# Patient Record
Sex: FEMALE | Race: BLACK OR AFRICAN AMERICAN | NOT HISPANIC OR LATINO
[De-identification: names, ages, dates, MRNs, and addresses within clinical notes are randomized per-mention and may not be internally consistent; named-entity substitution may affect disease eponyms.]

---

## 2019-09-05 ENCOUNTER — RESULT REVIEW (OUTPATIENT)
Age: 44
End: 2019-09-05

## 2021-06-22 ENCOUNTER — APPOINTMENT (OUTPATIENT)
Dept: PHYSICAL MEDICINE AND REHAB | Facility: CLINIC | Age: 46
End: 2021-06-22
Payer: OTHER MISCELLANEOUS

## 2021-06-22 ENCOUNTER — NON-APPOINTMENT (OUTPATIENT)
Age: 46
End: 2021-06-22

## 2021-06-22 VITALS
WEIGHT: 218 LBS | TEMPERATURE: 96.7 F | OXYGEN SATURATION: 97 % | HEART RATE: 67 BPM | RESPIRATION RATE: 18 BRPM | SYSTOLIC BLOOD PRESSURE: 116 MMHG | BODY MASS INDEX: 38.62 KG/M2 | DIASTOLIC BLOOD PRESSURE: 80 MMHG | HEIGHT: 63 IN

## 2021-06-22 DIAGNOSIS — Z86.39 PERSONAL HISTORY OF OTHER ENDOCRINE, NUTRITIONAL AND METABOLIC DISEASE: ICD-10-CM

## 2021-06-22 DIAGNOSIS — M54.6 PAIN IN THORACIC SPINE: ICD-10-CM

## 2021-06-22 DIAGNOSIS — R09.89 OTHER SPECIFIED SYMPTOMS AND SIGNS INVOLVING THE CIRCULATORY AND RESPIRATORY SYSTEMS: ICD-10-CM

## 2021-06-22 DIAGNOSIS — M54.16 RADICULOPATHY, LUMBAR REGION: ICD-10-CM

## 2021-06-22 PROCEDURE — 99072 ADDL SUPL MATRL&STAF TM PHE: CPT

## 2021-06-22 PROCEDURE — 99214 OFFICE O/P EST MOD 30 MIN: CPT

## 2021-06-23 NOTE — DATA REVIEWED
[Plain X-Rays] : plain X-Rays [FreeTextEntry1] : Radiology work up \par Xray of the right knee \par Xray of  the lumbar spine 5/26/2021 Mild djd no fractures\par Xray of the thoracic spine 5/26/2021 no fractures\par

## 2021-06-23 NOTE — REVIEW OF SYSTEMS
[Fever] : no fever [Eye Pain] : no eye pain [Earache] : no earache [Chest Pain] : no chest pain [Shortness Of Breath] : no shortness of breath [Abdominal Pain] : no abdominal pain [Dysuria] : no dysuria [Muscle Pain] : muscle pain

## 2021-06-23 NOTE — PHYSICAL EXAM
[Not Fit For Work] : Not fit for work [Percentage Of Disability ( ___ % )] : currently ~he/she~ is at [unfilled]% disability [FreeTextEntry1] : General: alert and oriented x3. Pleasant. Language: English\par Eyes: extra-ocular movements are intact. No discharge\par HENT: Head:normocephalic, atraumatic. Ears: no discharge. nose: No discharge . Throat: Clear\par Neck: full active range of motion. Spurlings negative\par Resp: good air movement\par CVS: regular rhythm. Regular rate\par Abdom: soft nontender\par WATSON: Thoracic spine spasms in the paraspinal muscles from T7 through T12\par Lumbar spine: FAROM 0-70 moderate  paraspinal spasm. L1 to L4\par \par LUE: Shoulder FAROM, MS 5/5 \par Elbow FAROM, MS 5/5, Reflexes 2/4\par Wrist: FAROM, MS 5/5 reflexes 2/4\par \par RUE: Shoulder FAROM, MS 5/5 \par Elbow FAROM, MS 5/5, Reflexes 2/4\par Wrist: FAROM, MS 5/5 reflexes 2/4\par \par LLE: Hip: FAROM MS 5-/5\par Knee FAROM, MS 5-/5 reflexes 2/4\par Ankle: FAROM,  MS 5-/5 reflexes 2/4\par \par RLE: Hip: FAROM MS 5-/5\par Knee FAROM, MS 5-/5 reflexes 1/4\par Ankle: FAROM, MS 5-/5  reflexes 2/4\par NS: RUE:sensation is intact to light touch, pinprick and proprioception\par \par LUE:sensation is intact to light touch, pinprick and proprioception\par \par RLE:sensation is intact to light touch, pinprick and proprioception\par Negative FAIR, Negative SARAH, Negative SLR\par \par LLE:sensation is intact to light touch, pinprick and proprioception\par Negative FAIR, Negative SARAH, Negative SLR\par \par \par Gait: .spontaneous, reciprocal, and safe without an assistive device

## 2021-06-23 NOTE — HISTORY OF PRESENT ILLNESS
[FreeTextEntry1] : Workers Compensation\par Date of accident: May 7, 2021\par Areas affected: low back pain\par 2:\par .\par 45 year old female was on her job central  for [employer] on May 7, 2021 when she injured her back. 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 She landed on her buttocks and her back struck the edge of the chair which was pinned against the wall. She  developed pain in her back over the course of her shift. She thought the pain would improve with rest and ibuprofen. Her pain worsened.\par She was not allowed to see a doctor until she received her WC number.  She only recently received her WC number. Her pain continues to be severe and she presents for rwo week follow up\par \par She has been to therapy for 6  sessions since her last office visit. \par \par .\par Pain: 6-8/10 Worse: 9/10 Quality: sharp Frequency: constant \par The pain starts over the hips and radiates to the ribs. The pain is aggravated with bending, lifting and lay flat. Sitting is the least painful position. She has pain getting out of a chair.   Her oain is worse with flexion at the hips. Her walking is limited by back pain. two blocks No leg weakness. No b/b difficulties> still has pain with siting and satnding\par She takes extra strength Tylenol 500 mg 2 po tid\par .\par She has never had a WC case previously\par She has not injured her low back before\par Functional Deficits: \par She has pain with getting off low surfaces. She has pain with lower extremity. She has pain with Adls that involve bending. Raising her legs increase pain. She has pain using the stairs\par She has been off work since May 10, 2021\par She drove to the office\par 5:\par Radiology work up \par Xray of the right knee \par Xray of  the lumbar spine 5/26/2021 Mild djd no fractures\par Xray of the thoracic spine 5/26/2021 no fractures\par Xray

## 2021-06-23 NOTE — HISTORY OF PRESENT ILLNESS
[FreeTextEntry1] : Workers Compensation\par Date of accident: May 7, 2021\par Areas affected: low back pain\par 2:\par .\par 45 year old female was on her job central  for [employer] on May 7, 2021 when she injured her back. 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 She landed on her buttocks and her back struck the edge of the chair which was pinned against the wall. She  developed pain in her back over the course of her shift. She thought the pain would improve with rest and ibuprofen. Her pain worsened.\par She was not allowed to see a doctor until she received her WC number.  She only recently received her WC number. Her pain continues to be severe and she presents for rwo week follow up\par \par She has been to therapy for 6  sessions since her last office visit. \par \par .\par Pain: 6-8/10 Worse: 9/10 Quality: sharp Frequency: constant \par The pain starts over the hips and radiates to the ribs. The pain is aggravated with bending, lifting and lay flat. Sitting is the least painful position. She has pain getting out of a chair.   Her oain is worse with flexion at the hips. Her walking is limited by back pain. two blocks No leg weakness. No b/b difficulties> still has pain with siting and satnding\par She takes extra strength Tylenol 500 mg 2 po tid\par .\par She has never had a WC case previously\par She has not injured her low back before\par Functional Deficits: \par She has pain with getting off low surfaces. She has pain with lower extremity. She has pain with Adls that involve bending. Raising her legs increase pain. She has pain using the stairs\par She has been off work since May 10, 2021\par She drove to the office\par 5:\par Radiology work up \par Xray of the right knee \par Xray of  the lumbar spine 5/26/2021 Mild djd no fractures\par Xray of the thoracic spine 5/26/2021 no fractures\par Xray

## 2021-07-08 ENCOUNTER — APPOINTMENT (OUTPATIENT)
Dept: INTERNAL MEDICINE | Facility: CLINIC | Age: 46
End: 2021-07-08
Payer: MEDICAID

## 2021-07-08 VITALS
TEMPERATURE: 96.9 F | SYSTOLIC BLOOD PRESSURE: 130 MMHG | RESPIRATION RATE: 18 BRPM | WEIGHT: 210 LBS | HEIGHT: 63 IN | OXYGEN SATURATION: 97 % | BODY MASS INDEX: 37.21 KG/M2 | DIASTOLIC BLOOD PRESSURE: 86 MMHG | HEART RATE: 87 BPM

## 2021-07-08 DIAGNOSIS — Z11.52 ENCOUNTER FOR SCREENING FOR COVID-19: ICD-10-CM

## 2021-07-08 DIAGNOSIS — N88.8 OTHER SPECIFIED NONINFLAMMATORY DISORDERS OF CERVIX UTERI: ICD-10-CM

## 2021-07-08 PROCEDURE — 99213 OFFICE O/P EST LOW 20 MIN: CPT | Mod: 25

## 2021-07-08 NOTE — HISTORY OF PRESENT ILLNESS
[FreeTextEntry1] : pap [de-identified] : Patient presents for annual Pap smear.  Reporting last menstrual period about 5 years ago.  Status post hysterectomy secondary to fibroids.  Patient denies any pelvic pain urinary symptoms abnormal vaginal discharge or bleeding changes in weight or any abnormal Pap smears in the past.\par She is requesting Covid testing

## 2021-07-08 NOTE — PHYSICAL EXAM
[Normal] : no acute distress, well nourished, well developed and well-appearing [Urethral Meatus] : normal urethra [External Female Genitalia] : normal external genitalia [Vagina] : normal vaginal exam [Cervix] : normal cervix [Uterine Adnexae] : normal adnexa [FreeTextEntry1] : s/p hysterectomy , no adnexal ttp, no cevical motion tenderness,  cervical atrophy+, white d/c

## 2021-07-08 NOTE — ASSESSMENT
[FreeTextEntry1] : Routine Pap safe sex discussed with the patient cytology with reflex to HPV bacterial vaginosis panel GC chlamydia trichomoniasis check pending.\par \par Screening Covid Covid PCR done pending\par Vaginal discharge intervention pending results

## 2021-07-10 LAB
C TRACH RRNA SPEC QL NAA+PROBE: NOT DETECTED
CANDIDA VAG CYTO: NOT DETECTED
G VAGINALIS+PREV SP MTYP VAG QL MICRO: NOT DETECTED
HPV HIGH+LOW RISK DNA PNL CVX: NOT DETECTED
N GONORRHOEA RRNA SPEC QL NAA+PROBE: NOT DETECTED
SARS-COV-2 N GENE NPH QL NAA+PROBE: NOT DETECTED
SOURCE AMPLIFICATION: NORMAL
SOURCE TP AMPLIFICATION: NORMAL
T VAGINALIS RRNA SPEC QL NAA+PROBE: NOT DETECTED
T VAGINALIS VAG QL WET PREP: NOT DETECTED

## 2021-07-15 LAB — CYTOLOGY CVX/VAG DOC THIN PREP: NORMAL

## 2021-07-20 ENCOUNTER — APPOINTMENT (OUTPATIENT)
Dept: PHYSICAL MEDICINE AND REHAB | Facility: CLINIC | Age: 46
End: 2021-07-20
Payer: OTHER MISCELLANEOUS

## 2021-07-20 VITALS
RESPIRATION RATE: 18 BRPM | HEART RATE: 81 BPM | SYSTOLIC BLOOD PRESSURE: 116 MMHG | DIASTOLIC BLOOD PRESSURE: 84 MMHG | BODY MASS INDEX: 38.45 KG/M2 | OXYGEN SATURATION: 96 % | TEMPERATURE: 96.8 F | HEIGHT: 63 IN | WEIGHT: 217 LBS

## 2021-07-20 PROCEDURE — 99213 OFFICE O/P EST LOW 20 MIN: CPT

## 2021-07-20 PROCEDURE — 99072 ADDL SUPL MATRL&STAF TM PHE: CPT

## 2021-07-22 NOTE — REVIEW OF SYSTEMS
[Muscle Pain] : muscle pain [Fever] : no fever [Eye Pain] : no eye pain [Earache] : no earache [Chest Pain] : no chest pain [Shortness Of Breath] : no shortness of breath [Abdominal Pain] : no abdominal pain [Dysuria] : no dysuria

## 2021-07-22 NOTE — PHYSICAL EXAM
[Not Fit For Work] : Not fit for work [Percentage Of Disability ( ___ % )] : currently ~he/she~ is at [unfilled]% disability [FreeTextEntry1] : General: alert and oriented x3. Pleasant. Language: English\par Eyes: extra-ocular movements are intact. No discharge\par HENT: Head:normocephalic, atraumatic. Ears: no discharge. nose: No discharge . Throat: Clear\par Neck: full active range of motion. Spurlings negative\par Resp: good air movement\par CVS: regular rhythm. Regular rate\par Abdom: soft nontender\par WATSON: Thoracic spine spasms in the paraspinal muscles from T7 through T12\par Lumbar spine: FAROM 0-80 mild  paraspinal spasm. L1 to L4\par \par LUE: Shoulder FAROM, MS 5/5 \par Elbow FAROM, MS 5/5, Reflexes 2/4\par Wrist: FAROM, MS 5/5 reflexes 2/4\par \par RUE: Shoulder FAROM, MS 5/5 \par Elbow FAROM, MS 5/5, Reflexes 2/4\par Wrist: FAROM, MS 5/5 reflexes 2/4\par \par LLE: Hip: FAROM MS 5-/5\par Knee FAROM, MS 5-/5 reflexes 2/4\par Ankle: FAROM,  MS 5-/5 reflexes 2/4\par \par RLE: Hip: FAROM MS 5-/5\par Knee FAROM, MS 5-/5 reflexes 1/4\par Ankle: FAROM, MS 5-/5  reflexes 2/4\par NS: RUE:sensation is intact to light touch, pinprick and proprioception\par \par LUE:sensation is intact to light touch, pinprick and proprioception\par \par RLE:sensation is intact to light touch, pinprick and proprioception\par Negative FAIR, Negative SARAH, Negative SLR\par \par LLE:sensation is intact to light touch, pinprick and proprioception\par Negative FAIR, Negative SARAH, Negative SLR\par \par \par Gait: .spontaneous, reciprocal, and safe without an assistive device \par

## 2021-07-22 NOTE — HISTORY OF PRESENT ILLNESS
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 She landed on her buttocks and her back struck the edge of the chair which was pinned against the wall. She  developed pain in her back over the course of her shift. She thought the pain would improve with rest and ibuprofen. Her pain worsened.\par She was not allowed to see a doctor until she received her WC number.  She only recently received her WC number. Her pain continues to be severe and she presents for two week follow up\par \par She has been to therapy for 12  sessions since her last office visit (17 sessions total).  Theraspy has reduced her back pain. Medrol also reduced her back pain. \par \par Pain: 5/10 Worse: 9/10 Quality: sharp Frequency: constant \par The pain starts over the hips and radiates to the ribs. The pain is aggravated with bending, lifting and lay flat. Sitting is the least painful position.  She has numbness going into both thighs. She has pain getting out of a chair.  No leg weakness. No b/b difficulties\par She takes extra strength Tylenol 500 mg 2 po tid\par \par She has never had a WC case previously\par She has not injured her low back before\par Functional Deficits: \par She has pain with getting off low surfaces. She has pain with lower extremity. She has pain with Adls that involve bending. Raising her legs increase pain. She has pain using the stairs\par She has been off work since May 10, 2021\par She drove to the office\par \par

## 2021-08-24 ENCOUNTER — APPOINTMENT (OUTPATIENT)
Dept: PHYSICAL MEDICINE AND REHAB | Facility: CLINIC | Age: 46
End: 2021-08-24
Payer: OTHER MISCELLANEOUS

## 2021-08-24 VITALS
RESPIRATION RATE: 18 BRPM | TEMPERATURE: 96.6 F | WEIGHT: 218 LBS | HEIGHT: 63 IN | SYSTOLIC BLOOD PRESSURE: 128 MMHG | DIASTOLIC BLOOD PRESSURE: 84 MMHG | BODY MASS INDEX: 38.62 KG/M2 | OXYGEN SATURATION: 98 % | HEART RATE: 95 BPM

## 2021-08-24 VITALS — BODY MASS INDEX: 38.62 KG/M2 | WEIGHT: 218 LBS | RESPIRATION RATE: 17 BRPM | HEIGHT: 63 IN

## 2021-08-24 DIAGNOSIS — M54.14 RADICULOPATHY, THORACIC REGION: ICD-10-CM

## 2021-08-24 PROCEDURE — 99213 OFFICE O/P EST LOW 20 MIN: CPT

## 2021-08-24 PROCEDURE — 99072 ADDL SUPL MATRL&STAF TM PHE: CPT

## 2021-08-27 NOTE — HISTORY OF PRESENT ILLNESS
[FreeTextEntry1] : Workers Compensation\par Date of accident: May 7, 2021\par Areas affected: low back pain\par \par 45 year old female was on her job central  for [employer] on May 7, 2021 when she injured her back. 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 She landed on her buttocks and her back struck the edge of the chair which was pinned against the wall. She  developed pain in her back over the course of her shift. She thought the pain would improve with rest and ibuprofen. Her pain worsened.\par She was not allowed to see a doctor until she received her WC number.  \par \par She has been to therapy for 12  sessions since her last office visit (17 sessions total).  Therapy has reduced her back pain.  Therapy has been slowly increasing her lifting capacity Medrol also reduced her back pain.  She still has discomfort with prolonged standing and bending at the hips.\par \par Pain: 5/10 Worse: 9/10 Quality: sharp Frequency: constant \par The pain starts over the hips and radiates to the ribs. The pain is aggravated with bending, lifting and lay flat. Sitting is the least painful position.  She has numbness going into both thighs. She has pain getting out of a chair.  No leg weakness. No b/b difficulties\par She takes extra strength Tylenol 500 mg 2 po tid\par Has been careful with her lifting mechanics\par \par She has never had a WC case previously\par She has not injured her low back before\par Functional Deficits: \par She has pain with getting off low surfaces. She has pain with lower extremity. She has pain with Adls that involve bending. Raising her legs increase pain. She has pain using the stairs\par She has been off work since May 10, 2021\par She drove to the office\par \par

## 2021-08-27 NOTE — PHYSICAL EXAM
[Not Fit For Work] : Not fit for work [Percentage Of Disability ( ___ % )] : currently ~he/she~ is at [unfilled]% disability [FreeTextEntry1] : General: alert and oriented x3. Pleasant. Language: English\par Eyes: extra-ocular movements are intact. No discharge\par HENT: Head:normocephalic, atraumatic. Ears: no discharge. nose: No discharge . Throat: Clear\par Neck: full active range of motion. Spurlings negative\par Resp: good air movement\par CVS: regular rhythm. Regular rate\par Abdom: soft nontender\par WATSON: Thoracic spine spasms in the paraspinal muscles from T7-T10\par Lumbar spine: FAROM 0-90 mild  paraspinal spasm. L1 to L4\par \par LUE: Shoulder FAROM, MS 5/5 \par Elbow FAROM, MS 5/5, Reflexes 2/4\par Wrist: FAROM, MS 5/5 reflexes 2/4\par \par RUE: Shoulder FAROM, MS 5/5 \par Elbow FAROM, MS 5/5, Reflexes 2/4\par Wrist: FAROM, MS 5/5 reflexes 2/4\par \par LLE: Hip: FAROM MS 5-/5\par Knee FAROM, MS 5-/5 reflexes 2/4\par Ankle: FAROM,  MS 5-/5 reflexes 2/4\par \par RLE: Hip: FAROM MS 5-/5\par Knee FAROM, MS 5-/5 reflexes 1/4\par Ankle: FAROM, MS 5-/5  reflexes 2/4\par NS: RUE:sensation is intact to light touch, pinprick and proprioception\par \par LUE:sensation is intact to light touch, pinprick and proprioception\par \par RLE:sensation is intact to light touch, pinprick and proprioception\par Negative FAIR, Negative SARAH, Negative SLR\par \par LLE:sensation is intact to light touch, pinprick and proprioception\par Negative FAIR, Negative SARAH, Negative SLR\par \par \par Gait: .spontaneous, reciprocal, and safe without an assistive device \par  [FreeTextEntry3] : Off work [FreeTextEntry4] : Off work total temporary disabled

## 2021-08-31 ENCOUNTER — APPOINTMENT (OUTPATIENT)
Dept: PHYSICAL MEDICINE AND REHAB | Facility: CLINIC | Age: 46
End: 2021-08-31
Payer: OTHER MISCELLANEOUS

## 2021-08-31 VITALS
TEMPERATURE: 96.9 F | SYSTOLIC BLOOD PRESSURE: 128 MMHG | DIASTOLIC BLOOD PRESSURE: 80 MMHG | BODY MASS INDEX: 38.62 KG/M2 | HEART RATE: 88 BPM | OXYGEN SATURATION: 98 % | RESPIRATION RATE: 18 BRPM | WEIGHT: 218 LBS | HEIGHT: 63 IN

## 2021-08-31 PROCEDURE — 95910 NRV CNDJ TEST 7-8 STUDIES: CPT

## 2021-08-31 PROCEDURE — 99072 ADDL SUPL MATRL&STAF TM PHE: CPT

## 2021-08-31 PROCEDURE — 99212 OFFICE O/P EST SF 10 MIN: CPT | Mod: 25

## 2021-08-31 PROCEDURE — 95886 MUSC TEST DONE W/N TEST COMP: CPT

## 2021-09-06 NOTE — HISTORY OF PRESENT ILLNESS
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 She landed on her buttocks and her back struck the edge of the chair which was pinned against the wall. She  developed pain in her back over the course of her shift. She thought the pain would improve with rest and ibuprofen. Her pain worsened.\par She was not allowed to see a doctor until she received her WC number.  \par \par She has been to therapy for 12  sessions since her last office visit (17 sessions total).  Therapy has reduced her back pain.  Therapy has been slowly increasing her lifting capacity Medrol also reduced her back pain.  She still has discomfort with prolonged standing and bending at the hips.\par \par Pain: 5/10 Worse: 9/10 Quality: sharp Frequency: constant \par The pain starts over the hips and radiates to the ribs. The pain is aggravated with bending, lifting and lay flat. Sitting is the least painful position.  She has numbness going into both thighs. She has pain getting out of a chair.  No leg weakness. No b/b difficulties\par She takes extra strength Tylenol 500 mg 2 po tid\par Has been careful with her lifting mechanics\par \par She has never had a WC case previously\par She has not injured her low back before\par Functional Deficits: \par She has pain with getting off low surfaces. She has pain with lower extremity. She has pain with Adls that involve bending. Raising her legs increase pain. She has pain using the stairs\par She has been off work since May 10, 2021\par She drove to the office\par \par

## 2021-09-23 ENCOUNTER — APPOINTMENT (OUTPATIENT)
Dept: PHYSICAL MEDICINE AND REHAB | Facility: CLINIC | Age: 46
End: 2021-09-23
Payer: OTHER MISCELLANEOUS

## 2021-09-23 VITALS
SYSTOLIC BLOOD PRESSURE: 115 MMHG | TEMPERATURE: 87.5 F | DIASTOLIC BLOOD PRESSURE: 75 MMHG | HEIGHT: 63 IN | HEART RATE: 85 BPM | WEIGHT: 218 LBS | RESPIRATION RATE: 16 BRPM | OXYGEN SATURATION: 97 % | BODY MASS INDEX: 38.62 KG/M2

## 2021-09-23 DIAGNOSIS — M54.16 RADICULOPATHY, LUMBAR REGION: ICD-10-CM

## 2021-09-23 DIAGNOSIS — Z00.8 ENCOUNTER FOR OTHER GENERAL EXAMINATION: ICD-10-CM

## 2021-09-23 PROCEDURE — 99214 OFFICE O/P EST MOD 30 MIN: CPT

## 2021-09-23 PROCEDURE — 99072 ADDL SUPL MATRL&STAF TM PHE: CPT

## 2021-09-27 PROBLEM — M54.16 ACUTE LEFT LUMBAR RADICULOPATHY: Status: ACTIVE | Noted: 2021-06-22

## 2021-09-27 PROBLEM — Z00.8 ENCOUNTER FOR WORK CAPABILITY ASSESSMENT: Status: ACTIVE | Noted: 2021-06-22

## 2021-09-27 NOTE — HISTORY OF PRESENT ILLNESS
[FreeTextEntry1] : Workers Compensation\par Date of accident: May 7, 2021\par Areas affected: low back pain\par \par 45 year old female was on her job central  for [employer] on May 7, 2021 when she injured her back. 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 She landed on her buttocks and her back struck the edge of the chair which was pinned against the wall. She  developed pain in her back over the course of her shift. She thought the pain would improve with rest and ibuprofen. Her pain worsened.\par She was not allowed to see a doctor until she received her WC number.  \par \par She has been to therapy for 9  sessions since her last office visit (26 sessions total).  Therapy has reduced her back pain.  Therapy has been slowly increasing her lifting capacity Medrol also reduced her back pain.  She still has discomfort with prolonged standing and bending at the hips.\par \par Pain: 2/10 Worse: 9/10 Quality: sharp Frequency: constant \par The pain starts over the hips and radiates to the ribs. The pain is aggravated with bending, lifting and lay flat. Sitting is the least painful position.  She has numbness going into both thighs. She has pain getting out of a chair.  No leg weakness. No b/b difficulties. Her pain increases with activities\par She takes extra strength Tylenol 500 mg 2 po tid\par Has been careful with her lifting mechanics\par Has been trying to simulate lifting mechanics at her job.\par She has pain with reaching for objects below her knees in a standing position\par \par She anticpates return to work October 4, 2021\par She has never had a WC case previously\par She has not injured her low back before\par Functional Deficits: \par She has pain with getting off low surfaces.  She has pain with Adls that involve bending. \par She has been off work since May 10, 2021\par She drove to the office\par \par \par Light duty Oct 4, 2021 no lift more than 10 lbs. No crawl, no bending, no laddwers. No overhead moret lucero 5 lbs\par

## 2021-09-27 NOTE — DATA REVIEWED
[Plain X-Rays] : plain X-Rays [EMG] : EMG [MRI] : MRI [FreeTextEntry1] : Radiology work up \par Xray of the right knee \par Xray of  the lumbar spine 5/26/2021 Mild djd no fractures\par Xray of the thoracic spine 5/26/2021 no fractures\par \par EMG/NCV of the lower extremities of the lower extremities performed on August 31, 2021 reveals a right sural neuropathy and a bilateral S1 radiculopathy\par \par RI of the lumbar spine performed on July 6, 2021 reveals disc desiccation at L2/3 and L3/4.  At L4/5 bilateral facet and ligamentum flavum hypertrophy slightly narrowing the transverse diameter of the central canal and mildly intruding into both neuroforamen.  L5/S1 left facet hypertrophy mildly intruding into the left neuroforamen

## 2021-09-27 NOTE — PHYSICAL EXAM
[Not Fit For Work] : Not fit for work [Percentage Of Disability ( ___ % )] : currently ~he/she~ is at [unfilled]% disability [FreeTextEntry3] : Off work [FreeTextEntry4] : Off work total temporary disabled [FreeTextEntry1] : Patient return to work is October 4, 2021.  She is to start at light duty and progress to a full duty status to continue with restorative therapies to transition back to full work.  She will be followed on a monthly basis to monitor her return to work

## 2021-10-21 ENCOUNTER — APPOINTMENT (OUTPATIENT)
Dept: PHYSICAL MEDICINE AND REHAB | Facility: CLINIC | Age: 46
End: 2021-10-21

## 2021-12-17 ENCOUNTER — NON-APPOINTMENT (OUTPATIENT)
Age: 46
End: 2021-12-17

## 2021-12-17 ENCOUNTER — APPOINTMENT (OUTPATIENT)
Dept: INTERNAL MEDICINE | Facility: CLINIC | Age: 46
End: 2021-12-17
Payer: MEDICAID

## 2021-12-17 ENCOUNTER — LABORATORY RESULT (OUTPATIENT)
Age: 46
End: 2021-12-17

## 2021-12-17 VITALS
RESPIRATION RATE: 17 BRPM | TEMPERATURE: 96.6 F | WEIGHT: 218 LBS | OXYGEN SATURATION: 97 % | SYSTOLIC BLOOD PRESSURE: 118 MMHG | DIASTOLIC BLOOD PRESSURE: 78 MMHG | HEIGHT: 63 IN | BODY MASS INDEX: 38.62 KG/M2 | HEART RATE: 93 BPM

## 2021-12-17 DIAGNOSIS — E04.1 NONTOXIC SINGLE THYROID NODULE: ICD-10-CM

## 2021-12-17 DIAGNOSIS — Z00.00 ENCOUNTER FOR GENERAL ADULT MEDICAL EXAMINATION W/OUT ABNORMAL FINDINGS: ICD-10-CM

## 2021-12-17 PROCEDURE — 93000 ELECTROCARDIOGRAM COMPLETE: CPT | Mod: 59

## 2021-12-17 PROCEDURE — 36415 COLL VENOUS BLD VENIPUNCTURE: CPT

## 2021-12-17 PROCEDURE — 99214 OFFICE O/P EST MOD 30 MIN: CPT | Mod: 25

## 2021-12-17 PROCEDURE — 99396 PREV VISIT EST AGE 40-64: CPT | Mod: 25

## 2021-12-17 PROCEDURE — G0447 BEHAVIOR COUNSEL OBESITY 15M: CPT

## 2021-12-20 PROBLEM — E04.1 THYROID NODULE: Status: ACTIVE | Noted: 2021-12-17

## 2021-12-20 NOTE — PHYSICAL EXAM
[No Acute Distress] : no acute distress [Well Nourished] : well nourished [Well Developed] : well developed [Well-Appearing] : well-appearing [Normal Sclera/Conjunctiva] : normal sclera/conjunctiva [PERRL] : pupils equal round and reactive to light [EOMI] : extraocular movements intact [Normal Outer Ear/Nose] : the outer ears and nose were normal in appearance [Normal Oropharynx] : the oropharynx was normal [No JVD] : no jugular venous distention [No Lymphadenopathy] : no lymphadenopathy [Supple] : supple [Enlarged Diffusely] : was diffusely enlarged [No Respiratory Distress] : no respiratory distress  [No Accessory Muscle Use] : no accessory muscle use [Clear to Auscultation] : lungs were clear to auscultation bilaterally [Normal Rate] : normal rate  [Regular Rhythm] : with a regular rhythm [Normal S1, S2] : normal S1 and S2 [No Murmur] : no murmur heard [No Carotid Bruits] : no carotid bruits [No Abdominal Bruit] : a ~M bruit was not heard ~T in the abdomen [No Varicosities] : no varicosities [Pedal Pulses Present] : the pedal pulses are present [No Edema] : there was no peripheral edema [No Palpable Aorta] : no palpable aorta [No Extremity Clubbing/Cyanosis] : no extremity clubbing/cyanosis [Soft] : abdomen soft [Non Tender] : non-tender [Non-distended] : non-distended [No Masses] : no abdominal mass palpated [No HSM] : no HSM [Normal Bowel Sounds] : normal bowel sounds [Normal Posterior Cervical Nodes] : no posterior cervical lymphadenopathy [Normal Anterior Cervical Nodes] : no anterior cervical lymphadenopathy [No CVA Tenderness] : no CVA  tenderness [No Spinal Tenderness] : no spinal tenderness [No Joint Swelling] : no joint swelling [Grossly Normal Strength/Tone] : grossly normal strength/tone [No Rash] : no rash [Coordination Grossly Intact] : coordination grossly intact [No Focal Deficits] : no focal deficits [Normal Gait] : normal gait [Deep Tendon Reflexes (DTR)] : deep tendon reflexes were 2+ and symmetric [Normal Affect] : the affect was normal [Normal Insight/Judgement] : insight and judgment were intact

## 2021-12-21 LAB
25(OH)D3 SERPL-MCNC: 27.5 NG/ML
ALBUMIN SERPL ELPH-MCNC: 4.8 G/DL
ALP BLD-CCNC: 83 U/L
ALT SERPL-CCNC: 12 U/L
ANION GAP SERPL CALC-SCNC: 12 MMOL/L
APPEARANCE: ABNORMAL
AST SERPL-CCNC: 15 U/L
BASOPHILS # BLD AUTO: 0.04 K/UL
BASOPHILS NFR BLD AUTO: 0.6 %
BILIRUB SERPL-MCNC: 0.2 MG/DL
BILIRUBIN URINE: NEGATIVE
BLOOD URINE: NEGATIVE
BUN SERPL-MCNC: 12 MG/DL
CALCIUM SERPL-MCNC: 9.6 MG/DL
CHLORIDE SERPL-SCNC: 106 MMOL/L
CHOLEST SERPL-MCNC: 159 MG/DL
CO2 SERPL-SCNC: 26 MMOL/L
COLOR: YELLOW
CREAT SERPL-MCNC: 0.74 MG/DL
EOSINOPHIL # BLD AUTO: 0.15 K/UL
EOSINOPHIL NFR BLD AUTO: 2.4 %
ESTIMATED AVERAGE GLUCOSE: 97 MG/DL
GLUCOSE QUALITATIVE U: NEGATIVE
GLUCOSE SERPL-MCNC: 100 MG/DL
HBA1C MFR BLD HPLC: 5 %
HCT VFR BLD CALC: 46.6 %
HDLC SERPL-MCNC: 68 MG/DL
HGB BLD-MCNC: 14.6 G/DL
IMM GRANULOCYTES NFR BLD AUTO: 0.3 %
KETONES URINE: NEGATIVE
LDLC SERPL CALC-MCNC: 80 MG/DL
LEUKOCYTE ESTERASE URINE: NEGATIVE
LYMPHOCYTES # BLD AUTO: 1.83 K/UL
LYMPHOCYTES NFR BLD AUTO: 29.4 %
MAN DIFF?: NORMAL
MCHC RBC-ENTMCNC: 31.3 GM/DL
MCHC RBC-ENTMCNC: 31.7 PG
MCV RBC AUTO: 101.1 FL
MONOCYTES # BLD AUTO: 0.61 K/UL
MONOCYTES NFR BLD AUTO: 9.8 %
NEUTROPHILS # BLD AUTO: 3.58 K/UL
NEUTROPHILS NFR BLD AUTO: 57.5 %
NITRITE URINE: NEGATIVE
NONHDLC SERPL-MCNC: 90 MG/DL
PH URINE: 6
PLATELET # BLD AUTO: 278 K/UL
POTASSIUM SERPL-SCNC: 4.9 MMOL/L
PROT SERPL-MCNC: 7.6 G/DL
PROTEIN URINE: NORMAL
RBC # BLD: 4.61 M/UL
RBC # FLD: 12 %
SODIUM SERPL-SCNC: 143 MMOL/L
SPECIFIC GRAVITY URINE: 1.03
TRIGL SERPL-MCNC: 53 MG/DL
TSH SERPL-ACNC: 0.68 UIU/ML
UROBILINOGEN URINE: NORMAL
WBC # FLD AUTO: 6.23 K/UL

## 2021-12-21 NOTE — HISTORY OF PRESENT ILLNESS
[FreeTextEntry1] : annual wellness exam [de-identified] : 47 y/o female presents for annual wellness exam. She states she has recently been having increasing lumbar back pain that is radiating  to her hips and anterior hip flexor area. She has also been having intermittent chest tightness. She is also requesting a referral to a bariatric surgeon. She feels otherwise well and offers no other acute complaints or concerns. ROS as documented below. \par

## 2021-12-21 NOTE — HISTORY OF PRESENT ILLNESS
[FreeTextEntry1] : annual wellness exam [de-identified] : 47 y/o female presents for annual wellness exam. She states she has recently been having increasing lumbar back pain that is radiating  to her hips and anterior hip flexor area. She has also been having intermittent chest tightness. She is also requesting a referral to a bariatric surgeon. She feels otherwise well and offers no other acute complaints or concerns. ROS as documented below. \par

## 2021-12-21 NOTE — HEALTH RISK ASSESSMENT
[Never] : Never [No] : No [0] : 2) Feeling down, depressed, or hopeless: Not at all (0) [PHQ-2 Negative - No further assessment needed] : PHQ-2 Negative - No further assessment needed [[id number]] : 0

## 2021-12-21 NOTE — COUNSELING
[Potential consequences of obesity discussed] : Potential consequences of obesity discussed [Benefits of weight loss discussed] : Benefits of weight loss discussed [Encouraged to maintain food diary] : Encouraged to maintain food diary [Encouraged to increase physical activity] : Encouraged to increase physical activity [FreeTextEntry4] : 8

## 2021-12-21 NOTE — HEALTH RISK ASSESSMENT
[Never] : Never [No] : No [0] : 2) Feeling down, depressed, or hopeless: Not at all (0) [PHQ-2 Negative - No further assessment needed] : PHQ-2 Negative - No further assessment needed [[id number]] : 0

## 2021-12-21 NOTE — REVIEW OF SYSTEMS
[Back Pain] : back pain [Fever] : no fever [Chills] : no chills [Recent Change In Weight] : ~T no recent weight change [Vision Problems] : no vision problems [Earache] : no earache [Nasal Discharge] : no nasal discharge [Sore Throat] : no sore throat [Chest Pain] : no chest pain [Palpitations] : no palpitations [Shortness Of Breath] : no shortness of breath [Wheezing] : no wheezing [Abdominal Pain] : no abdominal pain [Nausea] : no nausea [Diarrhea] : diarrhea [Vomiting] : no vomiting [Dysuria] : no dysuria [Hematuria] : no hematuria [Frequency] : no frequency [Joint Pain] : no joint pain [Joint Swelling] : no joint swelling [Skin Rash] : no skin rash [Headache] : no headache [Dizziness] : no dizziness [Anxiety] : no anxiety [Depression] : no depression [Swollen Glands] : no swollen glands [FreeTextEntry5] : intermittent chest tightness

## 2022-01-04 NOTE — REASON FOR VISIT
[Initial Consult] : an initial consult for [Morbid Obesity (BMI>40)] : morbid obesity (bmi>40) [Home] : at home, [unfilled] , at the time of the visit. [Medical Office: ([facility])___] : at the medical office located in  [Verbal consent obtained from patient] : the patient, [unfilled]

## 2022-01-07 ENCOUNTER — APPOINTMENT (OUTPATIENT)
Dept: SURGERY | Facility: CLINIC | Age: 47
End: 2022-01-07
Payer: MEDICAID

## 2022-01-07 VITALS — HEIGHT: 63 IN | WEIGHT: 248 LBS | BODY MASS INDEX: 43.94 KG/M2

## 2022-01-07 DIAGNOSIS — E66.01 MORBID (SEVERE) OBESITY DUE TO EXCESS CALORIES: ICD-10-CM

## 2022-01-07 PROCEDURE — 99203 OFFICE O/P NEW LOW 30 MIN: CPT | Mod: 95

## 2022-01-07 NOTE — HISTORY OF PRESENT ILLNESS
[de-identified] :  NORBERT COONEY is a 46 year old female here for consultation for weight loss surgery. \par \par She has been struggling with her weight for many years.  Has lost weight several times with Ev[employer]ain[employer] Adhikari, losing up to 60 pounds at one point, but always regains the weight.  She has difficulty with portion sizes at times, and admits to making some unhealthy food choices.  She also is an "emotional eater." Feels frustrated with "yo-yo" dieting, and with the expense of gym memberships and diet programs.\par \par PMHx: morbid obesity\par PSHx: hysterectomy\par Denies prior history of bleeding disorder or blood clots\par No hx of dysphagia or GERD\par Nonsmoker\par \par Referred by her PCP, Dr. Amador.\par

## 2022-01-07 NOTE — ASSESSMENT
[FreeTextEntry1] : 46-year-old female with longstanding history of morbid obesity (height 5 feet 3 inches, weight 248 pounds, BMI 44) who presents for evaluation for bariatric surgery.  She is interested in sleeve gastrectomy.\par \par The patient has failed multiple prior attempts at weight loss and is now dealing with the side effects, risk factors, and poor quality of life associated with morbid obesity.  They would benefit from surgical weight loss as outlined in the NIH and  ASMBS consensus statements on bariatric surgery.  Surgical intervention is medically indicated.\par \par My impression is that the patient is a reasonable candidate for laparoscopic sleeve gastrectomy.\par

## 2022-01-07 NOTE — CONSULT LETTER
[Dear  ___] : Dear  [unfilled], [Consult Letter:] : I had the pleasure of evaluating your patient, [unfilled]. [Please see my note below.] : Please see my note below. [Consult Closing:] : Thank you very much for allowing me to participate in the care of this patient.  If you have any questions, please do not hesitate to contact me. [Sincerely,] : Sincerely, [FreeTextEntry2] : Dr. Zan Amador [FreeTextEntry3] : Rahul Field MD, FACS, FASMBS\par [facility]\par [address]\[place] [address]\par tel. [phone]\par fax [phone]\par  \par

## 2022-01-13 DIAGNOSIS — Z98.84 BARIATRIC SURGERY STATUS: ICD-10-CM

## 2022-01-18 ENCOUNTER — RESULT REVIEW (OUTPATIENT)
Age: 47
End: 2022-01-18

## 2022-01-21 DIAGNOSIS — Z01.812 ENCOUNTER FOR PREPROCEDURAL LABORATORY EXAMINATION: ICD-10-CM

## 2022-01-25 ENCOUNTER — NON-APPOINTMENT (OUTPATIENT)
Age: 47
End: 2022-01-25

## 2022-01-25 ENCOUNTER — APPOINTMENT (OUTPATIENT)
Dept: CARDIOLOGY | Facility: CLINIC | Age: 47
End: 2022-01-25
Payer: MEDICAID

## 2022-01-25 VITALS
OXYGEN SATURATION: 100 % | HEIGHT: 63 IN | DIASTOLIC BLOOD PRESSURE: 74 MMHG | SYSTOLIC BLOOD PRESSURE: 117 MMHG | HEART RATE: 100 BPM

## 2022-01-25 DIAGNOSIS — R06.00 DYSPNEA, UNSPECIFIED: ICD-10-CM

## 2022-01-25 PROCEDURE — 99213 OFFICE O/P EST LOW 20 MIN: CPT

## 2022-01-25 PROCEDURE — 93000 ELECTROCARDIOGRAM COMPLETE: CPT

## 2022-01-25 RX ORDER — CYCLOBENZAPRINE HYDROCHLORIDE 5 MG/1
5 TABLET, FILM COATED ORAL
Refills: 0 | Status: DISCONTINUED | COMMUNITY
End: 2022-01-25

## 2022-01-25 RX ORDER — TIZANIDINE 2 MG/1
2 TABLET ORAL
Refills: 0 | Status: DISCONTINUED | COMMUNITY
End: 2022-01-25

## 2022-01-25 RX ORDER — CETIRIZINE HYDROCHLORIDE 10 MG/1
10 TABLET, COATED ORAL
Refills: 0 | Status: DISCONTINUED | COMMUNITY
End: 2022-01-25

## 2022-01-25 RX ORDER — IBUPROFEN 600 MG/1
600 TABLET, FILM COATED ORAL 3 TIMES DAILY
Refills: 0 | Status: DISCONTINUED | COMMUNITY
End: 2022-01-25

## 2022-01-25 RX ORDER — NAPROXEN 500 MG/1
500 TABLET ORAL TWICE DAILY
Qty: 60 | Refills: 0 | Status: DISCONTINUED | COMMUNITY
Start: 2021-06-22 | End: 2022-01-25

## 2022-01-25 RX ORDER — CYCLOBENZAPRINE HYDROCHLORIDE 10 MG/1
10 TABLET, FILM COATED ORAL TWICE DAILY
Qty: 60 | Refills: 0 | Status: DISCONTINUED | COMMUNITY
Start: 2021-06-22 | End: 2022-01-25

## 2022-01-25 RX ORDER — METHYLPREDNISOLONE 4 MG/1
4 TABLET ORAL AS DIRECTED
Qty: 1 | Refills: 0 | Status: DISCONTINUED | COMMUNITY
Start: 2021-06-22 | End: 2022-01-25

## 2022-01-25 RX ORDER — CYCLOBENZAPRINE HYDROCHLORIDE 7.5 MG/1
7.5 TABLET, FILM COATED ORAL AT BEDTIME
Qty: 10 | Refills: 0 | Status: DISCONTINUED | COMMUNITY
Start: 2021-12-17 | End: 2022-01-25

## 2022-01-25 RX ORDER — METHYLPREDNISOLONE 4 MG/1
4 TABLET ORAL
Qty: 1 | Refills: 0 | Status: DISCONTINUED | COMMUNITY
Start: 2021-12-17 | End: 2022-01-25

## 2022-01-25 NOTE — PHYSICAL EXAM
[General Appearance - Well Developed] : well developed [Normal Appearance] : normal appearance [Well Groomed] : well groomed [General Appearance - Well Nourished] : well nourished [No Deformities] : no deformities [General Appearance - In No Acute Distress] : no acute distress [Normal Conjunctiva] : the conjunctiva exhibited no abnormalities [Eyelids - No Xanthelasma] : the eyelids demonstrated no xanthelasmas [Normal Oral Mucosa] : normal oral mucosa [No Oral Pallor] : no oral pallor [No Oral Cyanosis] : no oral cyanosis [Normal Jugular Venous A Waves Present] : normal jugular venous A waves present [Normal Jugular Venous V Waves Present] : normal jugular venous V waves present [No Jugular Venous Marley A Waves] : no jugular venous marley A waves [Respiration, Rhythm And Depth] : normal respiratory rhythm and effort [Exaggerated Use Of Accessory Muscles For Inspiration] : no accessory muscle use [Auscultation Breath Sounds / Voice Sounds] : lungs were clear to auscultation bilaterally [Heart Rate And Rhythm] : heart rate and rhythm were normal [Heart Sounds] : normal S1 and S2 [Murmurs] : no murmurs present [Abdomen Soft] : soft [Abdomen Tenderness] : non-tender [Abdomen Mass (___ Cm)] : no abdominal mass palpated [Abnormal Walk] : normal gait [Gait - Sufficient For Exercise Testing] : the gait was sufficient for exercise testing [Nail Clubbing] : no clubbing of the fingernails [Cyanosis, Localized] : no localized cyanosis [Petechial Hemorrhages (___cm)] : no petechial hemorrhages [Skin Color & Pigmentation] : normal skin color and pigmentation [] : no rash [No Venous Stasis] : no venous stasis [Skin Lesions] : no skin lesions [No Skin Ulcers] : no skin ulcer [No Xanthoma] : no  xanthoma was observed [Oriented To Time, Place, And Person] : oriented to person, place, and time [Affect] : the affect was normal [Mood] : the mood was normal [No Anxiety] : not feeling anxious

## 2022-01-26 ENCOUNTER — APPOINTMENT (OUTPATIENT)
Dept: ULTRASOUND IMAGING | Facility: HOSPITAL | Age: 47
End: 2022-01-26
Payer: MEDICAID

## 2022-01-26 ENCOUNTER — APPOINTMENT (OUTPATIENT)
Dept: RADIOLOGY | Facility: HOSPITAL | Age: 47
End: 2022-01-26
Payer: MEDICAID

## 2022-01-26 ENCOUNTER — OUTPATIENT (OUTPATIENT)
Dept: OUTPATIENT SERVICES | Facility: HOSPITAL | Age: 47
LOS: 1 days | End: 2022-01-26
Payer: MEDICAID

## 2022-01-26 DIAGNOSIS — K21.9 GASTRO-ESOPHAGEAL REFLUX DISEASE WITHOUT ESOPHAGITIS: ICD-10-CM

## 2022-01-26 PROCEDURE — 76700 US EXAM ABDOM COMPLETE: CPT

## 2022-01-26 PROCEDURE — 74246 X-RAY XM UPR GI TRC 2CNTRST: CPT | Mod: 26

## 2022-01-26 PROCEDURE — 74246 X-RAY XM UPR GI TRC 2CNTRST: CPT

## 2022-01-26 PROCEDURE — 76700 US EXAM ABDOM COMPLETE: CPT | Mod: 26

## 2022-01-31 ENCOUNTER — APPOINTMENT (OUTPATIENT)
Dept: INTERNAL MEDICINE | Facility: CLINIC | Age: 47
End: 2022-01-31
Payer: MEDICAID

## 2022-01-31 PROCEDURE — 99211 OFF/OP EST MAY X REQ PHY/QHP: CPT

## 2022-02-01 LAB — SARS-COV-2 N GENE NPH QL NAA+PROBE: NOT DETECTED

## 2022-02-02 ENCOUNTER — NON-APPOINTMENT (OUTPATIENT)
Age: 47
End: 2022-02-02

## 2022-02-04 ENCOUNTER — APPOINTMENT (OUTPATIENT)
Dept: PULMONOLOGY | Facility: CLINIC | Age: 47
End: 2022-02-04
Payer: MEDICAID

## 2022-02-04 VITALS
OXYGEN SATURATION: 98 % | RESPIRATION RATE: 18 BRPM | WEIGHT: 238 LBS | BODY MASS INDEX: 42.17 KG/M2 | HEIGHT: 63 IN | TEMPERATURE: 97.4 F | DIASTOLIC BLOOD PRESSURE: 82 MMHG | HEART RATE: 92 BPM | SYSTOLIC BLOOD PRESSURE: 122 MMHG

## 2022-02-04 DIAGNOSIS — R06.83 SNORING: ICD-10-CM

## 2022-02-04 DIAGNOSIS — Z78.9 OTHER SPECIFIED HEALTH STATUS: ICD-10-CM

## 2022-02-04 DIAGNOSIS — Z83.3 FAMILY HISTORY OF DIABETES MELLITUS: ICD-10-CM

## 2022-02-04 PROCEDURE — ZZZZZ: CPT

## 2022-02-04 PROCEDURE — 99204 OFFICE O/P NEW MOD 45 MIN: CPT

## 2022-02-04 NOTE — HISTORY OF PRESENT ILLNESS
[Never] : never [TextBox_4] : Ms. Sanchez is a 46 year old, nonsmoking, female.  She has medical history of childhood asthma and obesity. \par She presents for an initial visit. Her chief concern is bariatric surgery clearance. \par \par She feels she is in her baseline state of good health at this time. \par She notes her last asthma exacerbation was over 20 years ago.  She notes she was never admitted to the hospital or intubated d/t asthma.\par \par She admits to intermittent snoring.  She admits she is not a "sound sleeper" and that she tosses and turns, so there are mornings she awakens still tired. She admits to awakening in the middle of the night d/t dry mouth. \par \par She denies fever/chills, decreased appetite, cough, SOB @ rest or exertion, chest tightness, wheezing, or any other issues at this time.  \par She denies witnessed apneas, awakening gasping/choking, or AM headache.

## 2022-02-04 NOTE — ASSESSMENT
[FreeTextEntry1] : Ms. Sanchez is a 46 year old, nonsmoking, female.  She has medical history of childhood asthma and obesity. \par She presents for an initial visit. Her chief concern is bariatric surgery clearance. \par \par 1. Pre-Operative Examination:\par - Patient is a nonsmoker, VS WNL, asymptomatic of Pulmonary complaints. PFT and CXR WNL.\par - Clearance contingent upon HST results. \par \par 2. Asthma:\par - PFT & CXR WNL.\par - No RX needed at this time.\par \par 3. Snoring:\par - HST RX'ed via [facility].\par \par Patient to follow up post HST. \par Patient to call with further questions and concerns.\par Patient verbalizes understanding of care plan and is agreeable.

## 2022-02-04 NOTE — REASON FOR VISIT
[Initial] : an initial visit [Pre-op Risk Stratification] : pre-op risk stratification [Obesity] : obesity [Sleep Evaluation] : sleep evaluation

## 2022-02-04 NOTE — PROCEDURE
[FreeTextEntry1] : PFT's performed in office WNL. \par FEV1: 89% \par FEV1/FVC Ratio: 88%\par AJI44-05%: 112%\par DLCO: 78% \par \par CXR in office; Read by Dr. Barksdale. \par Impression: Normal appearing heart. No infiltrates, effusion or opacities noted.\par

## 2022-02-11 ENCOUNTER — APPOINTMENT (OUTPATIENT)
Dept: INTERNAL MEDICINE | Facility: CLINIC | Age: 47
End: 2022-02-11
Payer: MEDICAID

## 2022-02-11 PROCEDURE — 99211 OFF/OP EST MAY X REQ PHY/QHP: CPT

## 2022-02-14 ENCOUNTER — NON-APPOINTMENT (OUTPATIENT)
Age: 47
End: 2022-02-14

## 2022-02-14 LAB — SARS-COV-2 N GENE NPH QL NAA+PROBE: NOT DETECTED

## 2022-02-26 ENCOUNTER — APPOINTMENT (OUTPATIENT)
Dept: INTERNAL MEDICINE | Facility: CLINIC | Age: 47
End: 2022-02-26
Payer: MEDICAID

## 2022-02-26 VITALS
DIASTOLIC BLOOD PRESSURE: 72 MMHG | HEIGHT: 63 IN | TEMPERATURE: 97.6 F | RESPIRATION RATE: 17 BRPM | OXYGEN SATURATION: 99 % | HEART RATE: 88 BPM | SYSTOLIC BLOOD PRESSURE: 124 MMHG | WEIGHT: 233 LBS | BODY MASS INDEX: 41.29 KG/M2

## 2022-02-26 PROCEDURE — 99211 OFF/OP EST MAY X REQ PHY/QHP: CPT

## 2022-03-03 ENCOUNTER — OUTPATIENT (OUTPATIENT)
Dept: OUTPATIENT SERVICES | Facility: HOSPITAL | Age: 47
LOS: 1 days | End: 2022-03-03
Payer: MEDICAID

## 2022-03-03 ENCOUNTER — APPOINTMENT (OUTPATIENT)
Dept: CV DIAGNOSITCS | Facility: HOSPITAL | Age: 47
End: 2022-03-03

## 2022-03-03 ENCOUNTER — APPOINTMENT (OUTPATIENT)
Dept: CV DIAGNOSTICS | Facility: HOSPITAL | Age: 47
End: 2022-03-03

## 2022-03-03 ENCOUNTER — TRANSCRIPTION ENCOUNTER (OUTPATIENT)
Age: 47
End: 2022-03-03

## 2022-03-03 DIAGNOSIS — R06.00 DYSPNEA, UNSPECIFIED: ICD-10-CM

## 2022-03-03 LAB — SARS-COV-2 N GENE NPH QL NAA+PROBE: NOT DETECTED

## 2022-03-03 PROCEDURE — 93016 CV STRESS TEST SUPVJ ONLY: CPT

## 2022-03-03 PROCEDURE — 93018 CV STRESS TEST I&R ONLY: CPT

## 2022-03-03 PROCEDURE — 93017 CV STRESS TEST TRACING ONLY: CPT

## 2022-03-03 PROCEDURE — 93306 TTE W/DOPPLER COMPLETE: CPT

## 2022-03-03 PROCEDURE — 93306 TTE W/DOPPLER COMPLETE: CPT | Mod: 26

## 2022-03-04 NOTE — HISTORY OF PRESENT ILLNESS
[Preoperative Visit] : for a medical evaluation prior to surgery [Scheduled Procedure ___] : a [unfilled] [Date of Surgery ___] : on [unfilled] [Surgeon Name ___] : surgeon: [unfilled] [FreeTextEntry1] : Katina is a 46-year-old female asthma who is pursuing gastric sleeve. She is on her feet at work and likes to walk but weather has been cold. No CP, palpitations, SOB on exertion.

## 2022-03-04 NOTE — DISCUSSION/SUMMARY
[Procedure Intermediate Risk] : the procedure risk is intermediate [Patient Intermediate Risk] : the patient is an intermediate risk [Additional Diagnostics Recommended] : additional diagnostics recommended [FreeTextEntry1] : The patient is a 46-year-old female asthma pursuing gastric sleeve. \par #1 CV- normal ecg, ECHO and exercise stress test\par #2 Pulm- mild asthma\par #3 General- Start a regular daily walking exercise regimen\par Final clearance pending above.\par 3/4/22 Addendum: ECHO normal, stress test no ischemia\par There are no cardiac contraindications to gastric sleeve.

## 2022-03-22 ENCOUNTER — FORM ENCOUNTER (OUTPATIENT)
Age: 47
End: 2022-03-22

## 2022-03-23 ENCOUNTER — APPOINTMENT (OUTPATIENT)
Dept: SLEEP CENTER | Facility: CLINIC | Age: 47
End: 2022-03-23
Payer: MEDICAID

## 2022-03-23 ENCOUNTER — OUTPATIENT (OUTPATIENT)
Dept: OUTPATIENT SERVICES | Facility: HOSPITAL | Age: 47
LOS: 1 days | End: 2022-03-23
Payer: MEDICAID

## 2022-03-23 PROCEDURE — 95806 SLEEP STUDY UNATT&RESP EFFT: CPT | Mod: 26

## 2022-03-23 PROCEDURE — 95806 SLEEP STUDY UNATT&RESP EFFT: CPT

## 2022-03-31 ENCOUNTER — APPOINTMENT (OUTPATIENT)
Dept: PULMONOLOGY | Facility: CLINIC | Age: 47
End: 2022-03-31
Payer: MEDICAID

## 2022-03-31 VITALS — WEIGHT: 233 LBS | HEIGHT: 63 IN | BODY MASS INDEX: 41.29 KG/M2

## 2022-03-31 DIAGNOSIS — Z01.811 ENCOUNTER FOR PREPROCEDURAL RESPIRATORY EXAMINATION: ICD-10-CM

## 2022-03-31 DIAGNOSIS — J45.909 UNSPECIFIED ASTHMA, UNCOMPLICATED: ICD-10-CM

## 2022-03-31 PROCEDURE — 99213 OFFICE O/P EST LOW 20 MIN: CPT | Mod: 95

## 2022-03-31 NOTE — REASON FOR VISIT
[Home] : at home, [unfilled] , at the time of the visit. [Medical Office: ([facility])___] : at the medical office located in  [Verbal consent obtained from patient] : the patient, [unfilled] [Follow-Up] : a follow-up visit [Asthma] : asthma [Sleep Apnea] : sleep apnea [Pre-op Risk Stratification] : pre-op risk stratification [Obesity] : obesity

## 2022-03-31 NOTE — ASSESSMENT
[FreeTextEntry1] : Ms. Sanchez is a 46 year old, nonsmoking, female.  She has medical history of childhood asthma and obesity. She presents via video for a follow up visit. Her chief concern is bariatric surgery clearance. \par \par 1. Pre-Operative Respiratory Examination:\par - Patient is a nonsmoker, VS WNL, asymptomatic of Pulmonary complaints. PFT and CXR WNL.\par - Patient's HST c/w mild KATHLEEN. Positional sleep and weight loss will likely cure mild KATHLEEN. \par - Patient able to move forward with planned procedure. \par \par 2. Asthma:\par - PFT & CXR WNL.\par - No RX needed at this time.\par \par 3. KATHLEEN, Mild:\par - I have discussed all the negative health consequences associated with obstructive and central sleep apnea including heart conditions/MI, hypertension, diabetes, chronic inflammation, memory issues, stroke, obesity, decreased libido, sleep related accidents, as well as anxiety and depression. Additional recommendations included: Avoid alcohol and sedatives at bedtime. Proper sleep hygiene such as maintaining a regular sleep routine, avoiding naps if possible, not watching TV or reading in bed,  and maintaining a quiet, comfortable bedroom. Sleepy driving avoidance and risks discussed with patient. \par - Diet, exercise and weight loss suggested.\par - Positional sleep and weight loss will likely cure mild KATHLEEN. \par - Upon reaching goal weight; pt advised to follow up with office for repeat HST to ensure KATHLEEN cured with weight loss. \par \par Patient to call with further questions and concerns.\par Patient verbalizes understanding of care plan and is agreeable. \par

## 2022-03-31 NOTE — PHYSICAL EXAM
[No Acute Distress] : no acute distress [Well Nourished] : well nourished [No Deformities] : no deformities [Well Developed] : well developed [Normal Appearance] : normal appearance [No Resp Distress] : no resp distress [Normal Color/ Pigmentation] : normal color/ pigmentation [No Focal Deficits] : no focal deficits [Oriented x3] : oriented x3 [Normal Mood] : normal mood [Normal Affect] : normal affect

## 2022-03-31 NOTE — PROCEDURE
[FreeTextEntry1] : 2/4/22 PFT's performed in office WNL. \par FEV1: 89% \par FEV1/FVC Ratio: 88%\par [id number]%: 112%\par DLCO: 78% \par \par 2/4/22 CXR in office; Read by Dr. Barksdale. \par Impression: Normal appearing heart. No infiltrates, effusion or opacities noted.\par

## 2022-03-31 NOTE — DISCUSSION/SUMMARY
[FreeTextEntry1] : 3/23/22 HST c/w mild KATHLEEN. AHI of 6.7/hr with SPO2 <88% for 0.3 minutes. \par Patient spent most of the time supine position.

## 2022-03-31 NOTE — HISTORY OF PRESENT ILLNESS
[Never] : never [TextBox_4] : Ms. Sanchez is a 46 year old, nonsmoking, female.  She has medical history of childhood asthma and obesity. She presents via video for a follow up visit. Her chief concern is bariatric surgery clearance. \par \par 2/4/22 NPA Visit:\par \par She feels she is in her baseline state of good health at this time. \par She notes her last asthma exacerbation was over 20 years ago.  She notes she was never admitted to the hospital or intubated d/t asthma.\par \par She admits to intermittent snoring.  She admits she is not a "sound sleeper" and that she tosses and turns, so there are mornings she awakens still tired. She admits to awakening in the middle of the night d/t dry mouth. \par \par 3/31/22 UPDATE VIA VIDEO:\par \par Patient has no new complaints.\par She continues to feel she is in good health with no pulmonary concerns at this time. \par \par She denies fever/chills, decreased appetite, cough, SOB @ rest or exertion, chest tightness, wheezing, or any other issues at this time.  \par She denies witnessed apneas, awakening gasping/choking, or AM headache.

## 2022-04-04 ENCOUNTER — NON-APPOINTMENT (OUTPATIENT)
Age: 47
End: 2022-04-04

## 2022-04-14 ENCOUNTER — APPOINTMENT (OUTPATIENT)
Dept: INTERNAL MEDICINE | Facility: CLINIC | Age: 47
End: 2022-04-14
Payer: MEDICAID

## 2022-04-14 VITALS
TEMPERATURE: 97.3 F | OXYGEN SATURATION: 98 % | BODY MASS INDEX: 41.29 KG/M2 | DIASTOLIC BLOOD PRESSURE: 82 MMHG | HEART RATE: 86 BPM | WEIGHT: 233 LBS | RESPIRATION RATE: 17 BRPM | SYSTOLIC BLOOD PRESSURE: 124 MMHG | HEIGHT: 63 IN

## 2022-04-14 DIAGNOSIS — Z01.419 ENCOUNTER FOR GYNECOLOGICAL EXAMINATION (GENERAL) (ROUTINE) W/OUT ABNORMAL FINDINGS: ICD-10-CM

## 2022-04-14 DIAGNOSIS — G47.33 OBSTRUCTIVE SLEEP APNEA (ADULT) (PEDIATRIC): ICD-10-CM

## 2022-04-14 DIAGNOSIS — Z11.3 ENCOUNTER FOR SCREENING FOR INFECTIONS WITH A PREDOMINANTLY SEXUAL MODE OF TRANSMISSION: ICD-10-CM

## 2022-04-14 PROCEDURE — 36415 COLL VENOUS BLD VENIPUNCTURE: CPT

## 2022-04-14 PROCEDURE — 99213 OFFICE O/P EST LOW 20 MIN: CPT | Mod: 25

## 2022-04-14 PROCEDURE — G0101: CPT

## 2022-04-14 NOTE — HISTORY OF PRESENT ILLNESS
[Other: _____] : [unfilled] [FreeTextEntry1] : papallisonear [de-identified] : Patient is A0 presents for annual pelvic examination\par Patient is reporting to have LMP in ,\par  s/p hysterectomy  2/2 fibroids, cervix and ovaries remain \par Patient denies  dysuria, frequency , pelvic pain ,  vag d/c , vaginal bleeding. \par  last pelvic exam  and was nl per patient\par   pt   is sexual active with multiple partners.\par Denies any acute complaints\par \par

## 2022-04-14 NOTE — ASSESSMENT
[FreeTextEntry1] : Routine GYN examination cytology with reflex to HPV bacterial vaginosis panel GC chlamydia.  Breast exam done mammogram due in July.   STD testing done pending\par Follow-up pending results

## 2022-04-16 LAB
C TRACH RRNA SPEC QL NAA+PROBE: NOT DETECTED
CANDIDA VAG CYTO: NOT DETECTED
G VAGINALIS+PREV SP MTYP VAG QL MICRO: NOT DETECTED
HBV SURFACE AG SER QL: NONREACTIVE
HCV AB SER QL: NONREACTIVE
HCV S/CO RATIO: 0.12 S/CO
HIV1+2 AB SPEC QL IA.RAPID: NONREACTIVE
HPV 16 E6+E7 MRNA CVX QL NAA+PROBE: NOT DETECTED
HPV18+45 E6+E7 MRNA CVX QL NAA+PROBE: NOT DETECTED
N GONORRHOEA RRNA SPEC QL NAA+PROBE: NOT DETECTED
SOURCE TP AMPLIFICATION: NORMAL
T VAGINALIS VAG QL WET PREP: NOT DETECTED

## 2022-04-21 ENCOUNTER — NON-APPOINTMENT (OUTPATIENT)
Age: 47
End: 2022-04-21

## 2022-04-21 LAB
CYTOLOGY CVX/VAG DOC THIN PREP: NORMAL
RPR SER-TITR: NORMAL

## 2022-04-22 ENCOUNTER — APPOINTMENT (OUTPATIENT)
Dept: SURGERY | Facility: CLINIC | Age: 47
End: 2022-04-22
Payer: MEDICAID

## 2022-04-22 ENCOUNTER — OUTPATIENT (OUTPATIENT)
Dept: OUTPATIENT SERVICES | Facility: HOSPITAL | Age: 47
LOS: 1 days | End: 2022-04-22
Payer: MEDICAID

## 2022-04-22 VITALS
TEMPERATURE: 99 F | OXYGEN SATURATION: 97 % | DIASTOLIC BLOOD PRESSURE: 66 MMHG | HEIGHT: 63 IN | RESPIRATION RATE: 16 BRPM | HEART RATE: 93 BPM | WEIGHT: 242.07 LBS | SYSTOLIC BLOOD PRESSURE: 97 MMHG

## 2022-04-22 VITALS
OXYGEN SATURATION: 96 % | RESPIRATION RATE: 19 BRPM | WEIGHT: 242.31 LBS | HEIGHT: 63 IN | HEART RATE: 87 BPM | BODY MASS INDEX: 42.93 KG/M2 | TEMPERATURE: 97.2 F | DIASTOLIC BLOOD PRESSURE: 91 MMHG | SYSTOLIC BLOOD PRESSURE: 129 MMHG

## 2022-04-22 DIAGNOSIS — K44.9 DIAPHRAGMATIC HERNIA WITHOUT OBSTRUCTION OR GANGRENE: ICD-10-CM

## 2022-04-22 DIAGNOSIS — E66.01 MORBID (SEVERE) OBESITY DUE TO EXCESS CALORIES: ICD-10-CM

## 2022-04-22 DIAGNOSIS — Z29.9 ENCOUNTER FOR PROPHYLACTIC MEASURES, UNSPECIFIED: ICD-10-CM

## 2022-04-22 DIAGNOSIS — Z90.710 ACQUIRED ABSENCE OF BOTH CERVIX AND UTERUS: Chronic | ICD-10-CM

## 2022-04-22 DIAGNOSIS — Z01.818 ENCOUNTER FOR OTHER PREPROCEDURAL EXAMINATION: ICD-10-CM

## 2022-04-22 LAB
ALBUMIN SERPL ELPH-MCNC: 4.6 G/DL — SIGNIFICANT CHANGE UP (ref 3.3–5)
ALP SERPL-CCNC: 89 U/L — SIGNIFICANT CHANGE UP (ref 40–120)
ALT FLD-CCNC: 20 U/L — SIGNIFICANT CHANGE UP (ref 10–45)
ANION GAP SERPL CALC-SCNC: 16 MMOL/L — SIGNIFICANT CHANGE UP (ref 5–17)
AST SERPL-CCNC: 21 U/L — SIGNIFICANT CHANGE UP (ref 10–40)
BILIRUB SERPL-MCNC: 0.4 MG/DL — SIGNIFICANT CHANGE UP (ref 0.2–1.2)
BLD GP AB SCN SERPL QL: NEGATIVE — SIGNIFICANT CHANGE UP
BUN SERPL-MCNC: 18 MG/DL — SIGNIFICANT CHANGE UP (ref 7–23)
CALCIUM SERPL-MCNC: 10.7 MG/DL — HIGH (ref 8.4–10.5)
CHLORIDE SERPL-SCNC: 99 MMOL/L — SIGNIFICANT CHANGE UP (ref 96–108)
CO2 SERPL-SCNC: 25 MMOL/L — SIGNIFICANT CHANGE UP (ref 22–31)
CREAT SERPL-MCNC: 0.69 MG/DL — SIGNIFICANT CHANGE UP (ref 0.5–1.3)
EGFR: 107 ML/MIN/1.73M2 — SIGNIFICANT CHANGE UP
GLUCOSE SERPL-MCNC: 84 MG/DL — SIGNIFICANT CHANGE UP (ref 70–99)
HCT VFR BLD CALC: 45.6 % — HIGH (ref 34.5–45)
HGB BLD-MCNC: 14.7 G/DL — SIGNIFICANT CHANGE UP (ref 11.5–15.5)
MCHC RBC-ENTMCNC: 31.2 PG — SIGNIFICANT CHANGE UP (ref 27–34)
MCHC RBC-ENTMCNC: 32.2 GM/DL — SIGNIFICANT CHANGE UP (ref 32–36)
MCV RBC AUTO: 96.8 FL — SIGNIFICANT CHANGE UP (ref 80–100)
NRBC # BLD: 0 /100 WBCS — SIGNIFICANT CHANGE UP (ref 0–0)
PLATELET # BLD AUTO: 272 K/UL — SIGNIFICANT CHANGE UP (ref 150–400)
POTASSIUM SERPL-MCNC: 4.4 MMOL/L — SIGNIFICANT CHANGE UP (ref 3.5–5.3)
POTASSIUM SERPL-SCNC: 4.4 MMOL/L — SIGNIFICANT CHANGE UP (ref 3.5–5.3)
PROT SERPL-MCNC: 8 G/DL — SIGNIFICANT CHANGE UP (ref 6–8.3)
RBC # BLD: 4.71 M/UL — SIGNIFICANT CHANGE UP (ref 3.8–5.2)
RBC # FLD: 11.6 % — SIGNIFICANT CHANGE UP (ref 10.3–14.5)
RH IG SCN BLD-IMP: POSITIVE — SIGNIFICANT CHANGE UP
SODIUM SERPL-SCNC: 140 MMOL/L — SIGNIFICANT CHANGE UP (ref 135–145)
WBC # BLD: 6.74 K/UL — SIGNIFICANT CHANGE UP (ref 3.8–10.5)
WBC # FLD AUTO: 6.74 K/UL — SIGNIFICANT CHANGE UP (ref 3.8–10.5)

## 2022-04-22 PROCEDURE — 83036 HEMOGLOBIN GLYCOSYLATED A1C: CPT

## 2022-04-22 PROCEDURE — 80053 COMPREHEN METABOLIC PANEL: CPT

## 2022-04-22 PROCEDURE — G0463: CPT

## 2022-04-22 PROCEDURE — 99212 OFFICE O/P EST SF 10 MIN: CPT

## 2022-04-22 PROCEDURE — 86900 BLOOD TYPING SEROLOGIC ABO: CPT

## 2022-04-22 PROCEDURE — 86901 BLOOD TYPING SEROLOGIC RH(D): CPT

## 2022-04-22 PROCEDURE — 86850 RBC ANTIBODY SCREEN: CPT

## 2022-04-22 PROCEDURE — 85027 COMPLETE CBC AUTOMATED: CPT

## 2022-04-22 RX ORDER — CHLORHEXIDINE GLUCONATE 213 G/1000ML
1 SOLUTION TOPICAL ONCE
Refills: 0 | Status: DISCONTINUED | OUTPATIENT
Start: 2022-05-03 | End: 2022-05-03

## 2022-04-22 RX ORDER — LIDOCAINE HCL 20 MG/ML
0.2 VIAL (ML) INJECTION ONCE
Refills: 0 | Status: DISCONTINUED | OUTPATIENT
Start: 2022-05-03 | End: 2022-05-03

## 2022-04-22 RX ORDER — CEFAZOLIN SODIUM 1 G
3000 VIAL (EA) INJECTION ONCE
Refills: 0 | Status: DISCONTINUED | OUTPATIENT
Start: 2022-05-03 | End: 2022-05-05

## 2022-04-22 RX ORDER — SODIUM CHLORIDE 9 MG/ML
3 INJECTION INTRAMUSCULAR; INTRAVENOUS; SUBCUTANEOUS EVERY 8 HOURS
Refills: 0 | Status: DISCONTINUED | OUTPATIENT
Start: 2022-05-03 | End: 2022-05-03

## 2022-04-22 NOTE — H&P PST ADULT - HISTORY OF PRESENT ILLNESS
46 yr old female with PMH of  46 yr old Obese (BMI: 42.9) female with PMH of Fibroids s/p hysterectomy 2015. Pt offers no complaints at this time. Pt states she is having surgery "to lose weight". Pt denies n/v, abdominal pain or changes in bowel habits. Pt evaluated by Dr. Field for a scheduled Robotic assisted laparoscopic sleeve gastrectomy, hiatal hernia repair on 5/3/22. Pt denies recent travel, sick contact, or covid infection.     **Pt states she has appt for covid swab on 4/30/22 at [facility], given 800# to call for a closer location to her house

## 2022-04-22 NOTE — H&P PST ADULT - NSICDXPASTMEDICALHX_GEN_ALL_CORE_FT
PAST MEDICAL HISTORY:  Asthma ~>20 yrs ago last exacerbation, never hospitalized or intubated    Fibroids

## 2022-04-22 NOTE — H&P PST ADULT - PROBLEM SELECTOR PLAN 1
scheduled Robotic assisted laparoscopic sleeve gastrectomy, hiatal hernia repair on 5/3/22.  -preop instructions given  -ERP protocol  -Labs: CBC, CMP, A1c, T&S done in PST  -DOS: ABO, FS  -Cardiac clearance in chart

## 2022-04-22 NOTE — H&P PST ADULT - LYMPH NODES
Past Medical History


Past Medical History:  Hypertension, Sickle Cell Disease


Past Surgical History:  , Other


Additional Past Surgical Histo:  splenectomy, RIGHT SHOULDER, arnoldchiero neck 

surgery


Alcohol Use:  Occasionally


Drug Use:  None





Adult General


Chief Complaint


Chief Complaint:  PAIN CONTROL





HPI


HPI





Patient is a 49  year old female who presents with sickle cell pain. Patient 

has 2 day history of pain to the left lower extremity typical of her usual 

sickle cell pain crisis. Denies fevers or chills, skin changes, numbness or 

weakness, focal calf tenderness. No history of trauma. Denies chest pain, 

shortness of breath, nausea, vomiting. Last pain crisis was about 3 months ago. 

She took Lortab this morning without relief of symptoms.





Review of Systems


Review of Systems


Constitutional: Denies fever or chills 


HENT: Denies nasal congestion or sore throat 


Respiratory: Denies cough or shortness of breath 


Cardiovascular:  Denies chest pain 


GI: Denies abdominal pain, nausea, vomiting


Musculoskeletal: Reports lower extremity pain without


Integument: Denies rash 


Neurologic: Denies headache





Current Medications


Current Medications





Current Medications








 Medications


  (Trade)  Dose


 Ordered  Sig/Yonas  Start Time


 Stop Time Status Last Admin


Dose Admin


 


 Diphenhydramine


 HCl


  (Benadryl)  25 mg  1X  ONCE  17 12:30


 17 12:31 DC 17 12:43


25 MG


 


 Hydromorphone HCl


  (Dilaudid)  1 mg  PRN Q15MIN  PRN  17 12:30


 17 12:29  17 13:47


1 MG


 


 Ondansetron HCl


  (Zofran)  4 mg  1X  ONCE  17 12:30


 17 12:31 DC 17 12:40


4 MG


 


 Sodium Chloride  1,000 ml @ 


 1,000 mls/hr  1X  ONCE  17 12:30


 17 13:29 DC 17 12:44


1,000 MLS/HR











Allergies


Allergies





Allergies








Coded Allergies Type Severity Reaction Last Updated Verified


 


  morphine Allergy Intermediate RASH, ITCHING, tolerates Dilaudid 8/10/16 Yes


 


  penicillin Allergy Intermediate RASH/HIVES 8/10/16 Yes











Physical Exam


Physical Exam


Constitutional: Well developed, well nourished, no acute distress, non-toxic 

appearance. 


HENT: Normocephalic, atraumatic, bilateral external ears normal, oropharynx 

moist, nose normal.


Eyes: conjunctiva normal, no discharge.


Cardiovascular:  RRR, no murmurs, no edema. 


Lungs & Thorax:  LCTAB, no wheezing, no respiratory distress.


Abdomen: soft, nontender, nondistended.


Skin: Warm, dry, no erythema, no rash.


Back: No tenderness.


Extremities: left lower extremity no focal tenderness, negative Keren sign, no 

joint tenderness, no joint erythema/warmth/swelling, dp/pt 2+, sensation intact 

to foot.


Neurologic: Alert and oriented X 3





Current Patient Data


Vital Signs





 Vital Signs








  Date Time  Temp Pulse Resp B/P (MAP) Pulse Ox O2 Delivery O2 Flow Rate FiO2


 


17 11:36 98.0 42 16 169/76 (107) 97 Room Air  





 98.0       








Lab Values





 Laboratory Tests








Test


  17


12:37


 


White Blood Count


  10.3 x10^3/uL


(4.0-11.0)


 


Red Blood Count


  4.26 x10^6/uL


(3.50-5.40)


 


Hemoglobin


  10.5 g/dL


(12.0-15.5)  L


 


Hematocrit


  32.4 %


(36.0-47.0)  L


 


Mean Corpuscular Volume


  76 fL ()


L


 


Mean Corpuscular Hemoglobin 25 pg (25-35)  


 


Mean Corpuscular Hemoglobin


Concent 32 g/dL


(31-37)


 


Red Cell Distribution Width


  17.7 %


(11.5-14.5)  H


 


Platelet Count


  207 x10^3/uL


(140-400)


 


Neutrophils (%) (Auto) 62 % (31-73)  


 


Lymphocytes (%) (Auto) 26 % (24-48)  


 


Monocytes (%) (Auto) 8 % (0-9)  


 


Eosinophils (%) (Auto) 4 % (0-3)  H


 


Basophils (%) (Auto) 1 % (0-3)  


 


Neutrophils # (Auto)


  6.3 x10^3uL


(1.8-7.7)


 


Lymphocytes # (Auto)


  2.7 x10^3/uL


(1.0-4.8)


 


Monocytes # (Auto)


  0.9 x10^3/uL


(0.0-1.1)


 


Eosinophils # (Auto)


  0.4 x10^3/uL


(0.0-0.7)


 


Basophils # (Auto)


  0.1 x10^3/uL


(0.0-0.2)


 


Reticulocyte Count (auto)


  3.5 %


(0.5-2.5)  H


 


Sodium Level


  143 mmol/L


(136-145)


 


Potassium Level


  4.3 mmol/L


(3.5-5.1)


 


Chloride Level


  107 mmol/L


()


 


Carbon Dioxide Level


  29 mmol/L


(21-32)


 


Anion Gap 7 (6-14)  


 


Blood Urea Nitrogen


  6 mg/dL (7-20)


L


 


Creatinine


  1.0 mg/dL


(0.6-1.0)


 


Estimated GFR


(Cockcroft-Gault) 70.7  


 


 


BUN/Creatinine Ratio 6 (6-20)  


 


Glucose Level


  88 mg/dL


(70-99)


 


Calcium Level


  8.6 mg/dL


(8.5-10.1)


 


Total Bilirubin


  1.9 mg/dL


(0.2-1.0)  H


 


Aspartate Amino Transferase


(AST) 23 U/L (15-37)


 


 


Alanine Aminotransferase (ALT)


  32 U/L (14-59)


 


 


Alkaline Phosphatase


  89 U/L


()


 


Total Protein


  7.2 g/dL


(6.4-8.2)


 


Albumin


  3.6 g/dL


(3.4-5.0)


 


Albumin/Globulin Ratio 1.0 (1.0-1.7)  





 Laboratory Tests


17 12:37








 Laboratory Tests


17 12:37














EKG


EKG


[]





Radiology/Procedures


Radiology/Procedures


[]





Course & Med Decision Making


Course & Med Decision Making


Pertinent Labs and Imaging studies reviewed. (See chart for details)


The patient presents with sickle cell pain crisis.  She improved after 

receiving IV fluids & pain medication in the emergency department.  She 

requested discharge home.  Recommend hydration, rest, continue home meds.  

Follow up with primary care in 2-3 days, may benefit from hematology referral 

if pain crises increasing in frequency.  Come back for high fever, severe chest 

pain or shortness of breath, any otherwise worsening condition.  Discharged 

home in stable & improved condition.


[]





Dragon Disclaimer


Dragon Disclaimer


This electronic medical record was generated, in whole or in part, using a 

voice recognition dictation system.





Departure


Departure


Impression:  


 Primary Impression:  


 Sickle cell crisis


Disposition:   HOME, SELF-CARE


Condition:  STABLE


Referrals:  


KEEGAN AMES (PCP)


Patient Instructions:  Sickle Cell Pain Crisis, Easy-to-Read





Additional Instructions:  


You were seen in the emergency department today for sickle cell pain. Your 

symptoms improved after treatment here. Please be sure to drink fluids to stay 

hydrated. Take medications as prescribed by your doctor. Follow-up with her 

primary care physician and consider asking for a referral to hematology for 

ongoing management. Return to the emergency department for high fever, severe 

shortness of breath or chest pain, any otherwise worsening condition.











GEORGE GIL MD Sep 8, 2017 13:36 No lymphadedenopathy

## 2022-04-23 LAB
A1C WITH ESTIMATED AVERAGE GLUCOSE RESULT: 5.1 % — SIGNIFICANT CHANGE UP (ref 4–5.6)
ESTIMATED AVERAGE GLUCOSE: 100 MG/DL — SIGNIFICANT CHANGE UP (ref 68–114)

## 2022-04-25 LAB
25(OH)D3 SERPL-MCNC: 30.7 NG/ML
ALBUMIN SERPL ELPH-MCNC: 4.8 G/DL
ALP BLD-CCNC: 96 U/L
ALT SERPL-CCNC: 20 U/L
ANION GAP SERPL CALC-SCNC: 14 MMOL/L
APTT BLD: 33.6 SEC
AST SERPL-CCNC: 20 U/L
BASOPHILS # BLD AUTO: 0.04 K/UL
BASOPHILS NFR BLD AUTO: 0.5 %
BILIRUB SERPL-MCNC: 0.4 MG/DL
BUN SERPL-MCNC: 19 MG/DL
CALCIUM SERPL-MCNC: 11.2 MG/DL
CHLORIDE SERPL-SCNC: 98 MMOL/L
CO2 SERPL-SCNC: 28 MMOL/L
CREAT SERPL-MCNC: 0.74 MG/DL
EGFR: 100 ML/MIN/1.73M2
EOSINOPHIL # BLD AUTO: 0.12 K/UL
EOSINOPHIL NFR BLD AUTO: 1.6 %
ESTIMATED AVERAGE GLUCOSE: 100 MG/DL
FOLATE SERPL-MCNC: 17.8 NG/ML
GLUCOSE SERPL-MCNC: 101 MG/DL
HBA1C MFR BLD HPLC: 5.1 %
HCG SERPL QL: NEGATIVE
HCT VFR BLD CALC: 48.1 %
HGB BLD-MCNC: 15.2 G/DL
IMM GRANULOCYTES NFR BLD AUTO: 0.3 %
INR PPP: 0.99 RATIO
IRON SERPL-MCNC: 55 UG/DL
LYMPHOCYTES # BLD AUTO: 2.52 K/UL
LYMPHOCYTES NFR BLD AUTO: 34.6 %
MAN DIFF?: NORMAL
MCHC RBC-ENTMCNC: 30.9 PG
MCHC RBC-ENTMCNC: 31.6 GM/DL
MCV RBC AUTO: 97.8 FL
MONOCYTES # BLD AUTO: 0.72 K/UL
MONOCYTES NFR BLD AUTO: 9.9 %
NEUTROPHILS # BLD AUTO: 3.86 K/UL
NEUTROPHILS NFR BLD AUTO: 53.1 %
PAPP-A SERPL-ACNC: 4 MIU/ML
PLATELET # BLD AUTO: 303 K/UL
POTASSIUM SERPL-SCNC: 4.8 MMOL/L
PROT SERPL-MCNC: 8 G/DL
PT BLD: 11.5 SEC
RBC # BLD: 4.92 M/UL
RBC # FLD: 11.6 %
SODIUM SERPL-SCNC: 140 MMOL/L
TSH SERPL-ACNC: 0.8 UIU/ML
VIT B12 SERPL-MCNC: 597 PG/ML
WBC # FLD AUTO: 7.28 K/UL

## 2022-04-28 ENCOUNTER — APPOINTMENT (OUTPATIENT)
Dept: INTERNAL MEDICINE | Facility: CLINIC | Age: 47
End: 2022-04-28
Payer: MEDICAID

## 2022-04-28 VITALS
DIASTOLIC BLOOD PRESSURE: 84 MMHG | BODY MASS INDEX: 42.88 KG/M2 | SYSTOLIC BLOOD PRESSURE: 124 MMHG | TEMPERATURE: 96.3 F | HEIGHT: 63 IN | OXYGEN SATURATION: 97 % | RESPIRATION RATE: 16 BRPM | HEART RATE: 108 BPM | WEIGHT: 242 LBS

## 2022-04-28 DIAGNOSIS — Z01.818 ENCOUNTER FOR OTHER PREPROCEDURAL EXAMINATION: ICD-10-CM

## 2022-04-28 DIAGNOSIS — E83.52 HYPERCALCEMIA: ICD-10-CM

## 2022-04-28 PROCEDURE — 99214 OFFICE O/P EST MOD 30 MIN: CPT

## 2022-04-28 NOTE — HISTORY OF PRESENT ILLNESS
[FreeTextEntry1] : Preoperatice [de-identified] : Preop for Baraitric surgery.\par calcium elevated.  Repeat CMP and PTH 1 month.  [FreeTextEntry4] : 46 year old female presents for Preop Evaulatuion prior to Bariatric Surgery.,

## 2022-04-28 NOTE — HISTORY OF PRESENT ILLNESS
[FreeTextEntry1] : Preoperatice [de-identified] : Preop for Baraitric surgery.\par calcium elevated.  Repeat CMP and PTH 1 month.  [FreeTextEntry4] : 46 year old female presents for Preop Evaulatuion prior to Bariatric Surgery.,

## 2022-05-02 ENCOUNTER — TRANSCRIPTION ENCOUNTER (OUTPATIENT)
Age: 47
End: 2022-05-02

## 2022-05-03 ENCOUNTER — RESULT REVIEW (OUTPATIENT)
Age: 47
End: 2022-05-03

## 2022-05-03 ENCOUNTER — APPOINTMENT (OUTPATIENT)
Dept: SURGERY | Facility: HOSPITAL | Age: 47
End: 2022-05-03
Payer: MEDICAID

## 2022-05-03 ENCOUNTER — INPATIENT (INPATIENT)
Facility: HOSPITAL | Age: 47
LOS: 1 days | Discharge: HOME | DRG: 621 | End: 2022-05-05
Attending: SURGERY | Admitting: SURGERY
Payer: MEDICAID

## 2022-05-03 ENCOUNTER — TRANSCRIPTION ENCOUNTER (OUTPATIENT)
Age: 47
End: 2022-05-03

## 2022-05-03 VITALS
DIASTOLIC BLOOD PRESSURE: 81 MMHG | RESPIRATION RATE: 17 BRPM | TEMPERATURE: 98 F | WEIGHT: 238.54 LBS | OXYGEN SATURATION: 99 % | HEART RATE: 89 BPM | HEIGHT: 63 IN | SYSTOLIC BLOOD PRESSURE: 117 MMHG

## 2022-05-03 DIAGNOSIS — K44.9 DIAPHRAGMATIC HERNIA WITHOUT OBSTRUCTION OR GANGRENE: ICD-10-CM

## 2022-05-03 DIAGNOSIS — Z90.710 ACQUIRED ABSENCE OF BOTH CERVIX AND UTERUS: Chronic | ICD-10-CM

## 2022-05-03 DIAGNOSIS — E66.01 MORBID (SEVERE) OBESITY DUE TO EXCESS CALORIES: ICD-10-CM

## 2022-05-03 LAB
ANION GAP SERPL CALC-SCNC: 18 MMOL/L — HIGH (ref 5–17)
BASOPHILS # BLD AUTO: 0.02 K/UL — SIGNIFICANT CHANGE UP (ref 0–0.2)
BASOPHILS NFR BLD AUTO: 0.2 % — SIGNIFICANT CHANGE UP (ref 0–2)
BUN SERPL-MCNC: 8 MG/DL — SIGNIFICANT CHANGE UP (ref 7–23)
CALCIUM SERPL-MCNC: 8.8 MG/DL — SIGNIFICANT CHANGE UP (ref 8.4–10.5)
CHLORIDE SERPL-SCNC: 98 MMOL/L — SIGNIFICANT CHANGE UP (ref 96–108)
CO2 SERPL-SCNC: 19 MMOL/L — LOW (ref 22–31)
CREAT SERPL-MCNC: 0.58 MG/DL — SIGNIFICANT CHANGE UP (ref 0.5–1.3)
EGFR: 112 ML/MIN/1.73M2 — SIGNIFICANT CHANGE UP
EOSINOPHIL # BLD AUTO: 0.01 K/UL — SIGNIFICANT CHANGE UP (ref 0–0.5)
EOSINOPHIL NFR BLD AUTO: 0.1 % — SIGNIFICANT CHANGE UP (ref 0–6)
GLUCOSE BLDC GLUCOMTR-MCNC: 88 MG/DL — SIGNIFICANT CHANGE UP (ref 70–99)
GLUCOSE SERPL-MCNC: 123 MG/DL — HIGH (ref 70–99)
HCT VFR BLD CALC: 40.2 % — SIGNIFICANT CHANGE UP (ref 34.5–45)
HGB BLD-MCNC: 13.4 G/DL — SIGNIFICANT CHANGE UP (ref 11.5–15.5)
IMM GRANULOCYTES NFR BLD AUTO: 0.5 % — SIGNIFICANT CHANGE UP (ref 0–1.5)
LYMPHOCYTES # BLD AUTO: 0.72 K/UL — LOW (ref 1–3.3)
LYMPHOCYTES # BLD AUTO: 5.9 % — LOW (ref 13–44)
MAGNESIUM SERPL-MCNC: 2.1 MG/DL — SIGNIFICANT CHANGE UP (ref 1.6–2.6)
MCHC RBC-ENTMCNC: 31.4 PG — SIGNIFICANT CHANGE UP (ref 27–34)
MCHC RBC-ENTMCNC: 33.3 GM/DL — SIGNIFICANT CHANGE UP (ref 32–36)
MCV RBC AUTO: 94.1 FL — SIGNIFICANT CHANGE UP (ref 80–100)
MONOCYTES # BLD AUTO: 0.19 K/UL — SIGNIFICANT CHANGE UP (ref 0–0.9)
MONOCYTES NFR BLD AUTO: 1.6 % — LOW (ref 2–14)
NEUTROPHILS # BLD AUTO: 11.16 K/UL — HIGH (ref 1.8–7.4)
NEUTROPHILS NFR BLD AUTO: 91.7 % — HIGH (ref 43–77)
NRBC # BLD: 0 /100 WBCS — SIGNIFICANT CHANGE UP (ref 0–0)
PHOSPHATE SERPL-MCNC: 3.1 MG/DL — SIGNIFICANT CHANGE UP (ref 2.5–4.5)
PLATELET # BLD AUTO: 267 K/UL — SIGNIFICANT CHANGE UP (ref 150–400)
POTASSIUM SERPL-MCNC: 4.1 MMOL/L — SIGNIFICANT CHANGE UP (ref 3.5–5.3)
POTASSIUM SERPL-SCNC: 4.1 MMOL/L — SIGNIFICANT CHANGE UP (ref 3.5–5.3)
RBC # BLD: 4.27 M/UL — SIGNIFICANT CHANGE UP (ref 3.8–5.2)
RBC # FLD: 11.5 % — SIGNIFICANT CHANGE UP (ref 10.3–14.5)
RH IG SCN BLD-IMP: POSITIVE — SIGNIFICANT CHANGE UP
SODIUM SERPL-SCNC: 135 MMOL/L — SIGNIFICANT CHANGE UP (ref 135–145)
WBC # BLD: 12.16 K/UL — HIGH (ref 3.8–10.5)
WBC # FLD AUTO: 12.16 K/UL — HIGH (ref 3.8–10.5)

## 2022-05-03 PROCEDURE — 88305 TISSUE EXAM BY PATHOLOGIST: CPT | Mod: 26

## 2022-05-03 PROCEDURE — 43281 LAP PARAESOPHAG HERN REPAIR: CPT | Mod: 59

## 2022-05-03 PROCEDURE — 43775 LAP SLEEVE GASTRECTOMY: CPT

## 2022-05-03 DEVICE — STAPLER COVIDIEN TRI-STAPLE 60MM PURPLE INTELLIGENT RELOAD: Type: IMPLANTABLE DEVICE | Status: FUNCTIONAL

## 2022-05-03 DEVICE — SURGICEL 2 X 14": Type: IMPLANTABLE DEVICE | Status: FUNCTIONAL

## 2022-05-03 DEVICE — STAPLER COVIDIEN TRI-STAPLE 60MM BLACK INTELLIGENT RELOAD: Type: IMPLANTABLE DEVICE | Status: FUNCTIONAL

## 2022-05-03 DEVICE — ARISTA 3GR: Type: IMPLANTABLE DEVICE | Status: FUNCTIONAL

## 2022-05-03 RX ORDER — ACETAMINOPHEN 500 MG
15 TABLET ORAL
Qty: 300 | Refills: 0
Start: 2022-05-03 | End: 2022-05-07

## 2022-05-03 RX ORDER — CEFAZOLIN SODIUM 1 G
2000 VIAL (EA) INJECTION EVERY 8 HOURS
Refills: 0 | Status: COMPLETED | OUTPATIENT
Start: 2022-05-03 | End: 2022-05-04

## 2022-05-03 RX ORDER — HYDROMORPHONE HYDROCHLORIDE 2 MG/ML
0.25 INJECTION INTRAMUSCULAR; INTRAVENOUS; SUBCUTANEOUS
Refills: 0 | Status: DISCONTINUED | OUTPATIENT
Start: 2022-05-03 | End: 2022-05-03

## 2022-05-03 RX ORDER — SODIUM CHLORIDE 9 MG/ML
1000 INJECTION, SOLUTION INTRAVENOUS
Refills: 0 | Status: DISCONTINUED | OUTPATIENT
Start: 2022-05-03 | End: 2022-05-05

## 2022-05-03 RX ORDER — ACETAMINOPHEN 500 MG
1000 TABLET ORAL ONCE
Refills: 0 | Status: COMPLETED | OUTPATIENT
Start: 2022-05-03 | End: 2022-05-03

## 2022-05-03 RX ORDER — ACETAMINOPHEN 500 MG
1000 TABLET ORAL ONCE
Refills: 0 | Status: COMPLETED | OUTPATIENT
Start: 2022-05-04 | End: 2022-05-04

## 2022-05-03 RX ORDER — PANTOPRAZOLE SODIUM 20 MG/1
40 TABLET, DELAYED RELEASE ORAL EVERY 24 HOURS
Refills: 0 | Status: DISCONTINUED | OUTPATIENT
Start: 2022-05-03 | End: 2022-05-05

## 2022-05-03 RX ORDER — ACETAMINOPHEN 500 MG
1000 TABLET ORAL ONCE
Refills: 0 | Status: COMPLETED | OUTPATIENT
Start: 2022-05-04 | End: 2022-05-03

## 2022-05-03 RX ORDER — FOLIC ACID 0.8 MG
1 TABLET ORAL ONCE
Refills: 0 | Status: DISCONTINUED | OUTPATIENT
Start: 2022-05-03 | End: 2022-05-05

## 2022-05-03 RX ORDER — HYOSCYAMINE SULFATE 0.13 MG
1 TABLET ORAL
Qty: 28 | Refills: 0
Start: 2022-05-03 | End: 2022-05-09

## 2022-05-03 RX ORDER — THIAMINE MONONITRATE (VIT B1) 100 MG
100 TABLET ORAL ONCE
Refills: 0 | Status: COMPLETED | OUTPATIENT
Start: 2022-05-03 | End: 2022-05-03

## 2022-05-03 RX ORDER — OMEPRAZOLE 10 MG/1
1 CAPSULE, DELAYED RELEASE ORAL
Qty: 30 | Refills: 0
Start: 2022-05-03 | End: 2022-06-01

## 2022-05-03 RX ORDER — HYOSCYAMINE SULFATE 0.13 MG
0.12 TABLET ORAL EVERY 6 HOURS
Refills: 0 | Status: DISCONTINUED | OUTPATIENT
Start: 2022-05-03 | End: 2022-05-05

## 2022-05-03 RX ORDER — HEPARIN SODIUM 5000 [USP'U]/ML
5000 INJECTION INTRAVENOUS; SUBCUTANEOUS EVERY 8 HOURS
Refills: 0 | Status: DISCONTINUED | OUTPATIENT
Start: 2022-05-03 | End: 2022-05-05

## 2022-05-03 RX ORDER — FOSAPREPITANT DIMEGLUMINE 150 MG/5ML
150 INJECTION, POWDER, LYOPHILIZED, FOR SOLUTION INTRAVENOUS ONCE
Refills: 0 | Status: COMPLETED | OUTPATIENT
Start: 2022-05-03 | End: 2022-05-04

## 2022-05-03 RX ORDER — KETOROLAC TROMETHAMINE 30 MG/ML
30 SYRINGE (ML) INJECTION EVERY 6 HOURS
Refills: 0 | Status: DISCONTINUED | OUTPATIENT
Start: 2022-05-03 | End: 2022-05-04

## 2022-05-03 RX ORDER — HEPARIN SODIUM 5000 [USP'U]/ML
5000 INJECTION INTRAVENOUS; SUBCUTANEOUS ONCE
Refills: 0 | Status: COMPLETED | OUTPATIENT
Start: 2022-05-03 | End: 2022-05-03

## 2022-05-03 RX ORDER — ONDANSETRON 8 MG/1
1 TABLET, FILM COATED ORAL
Qty: 28 | Refills: 0
Start: 2022-05-03 | End: 2022-05-09

## 2022-05-03 RX ORDER — ONDANSETRON 8 MG/1
4 TABLET, FILM COATED ORAL EVERY 6 HOURS
Refills: 0 | Status: DISCONTINUED | OUTPATIENT
Start: 2022-05-03 | End: 2022-05-05

## 2022-05-03 RX ADMIN — HYDROMORPHONE HYDROCHLORIDE 0.25 MILLIGRAM(S): 2 INJECTION INTRAMUSCULAR; INTRAVENOUS; SUBCUTANEOUS at 15:50

## 2022-05-03 RX ADMIN — Medication 400 MILLIGRAM(S): at 23:39

## 2022-05-03 RX ADMIN — Medication 100 MILLIGRAM(S): at 20:47

## 2022-05-03 RX ADMIN — Medication 400 MILLIGRAM(S): at 19:53

## 2022-05-03 RX ADMIN — SODIUM CHLORIDE 250 MILLILITER(S): 9 INJECTION, SOLUTION INTRAVENOUS at 17:01

## 2022-05-03 RX ADMIN — SODIUM CHLORIDE 150 MILLILITER(S): 9 INJECTION, SOLUTION INTRAVENOUS at 15:44

## 2022-05-03 RX ADMIN — Medication 100 MILLIGRAM(S): at 15:43

## 2022-05-03 RX ADMIN — Medication 30 MILLIGRAM(S): at 20:48

## 2022-05-03 RX ADMIN — Medication 0.12 MILLIGRAM(S): at 18:01

## 2022-05-03 RX ADMIN — HYDROMORPHONE HYDROCHLORIDE 0.25 MILLIGRAM(S): 2 INJECTION INTRAMUSCULAR; INTRAVENOUS; SUBCUTANEOUS at 17:48

## 2022-05-03 RX ADMIN — HEPARIN SODIUM 5000 UNIT(S): 5000 INJECTION INTRAVENOUS; SUBCUTANEOUS at 21:37

## 2022-05-03 RX ADMIN — Medication 0.12 MILLIGRAM(S): at 23:39

## 2022-05-03 RX ADMIN — HEPARIN SODIUM 5000 UNIT(S): 5000 INJECTION INTRAVENOUS; SUBCUTANEOUS at 11:05

## 2022-05-03 RX ADMIN — ONDANSETRON 4 MILLIGRAM(S): 8 TABLET, FILM COATED ORAL at 18:01

## 2022-05-03 RX ADMIN — Medication 1000 MILLIGRAM(S): at 20:48

## 2022-05-03 RX ADMIN — ONDANSETRON 4 MILLIGRAM(S): 8 TABLET, FILM COATED ORAL at 23:39

## 2022-05-03 RX ADMIN — PANTOPRAZOLE SODIUM 40 MILLIGRAM(S): 20 TABLET, DELAYED RELEASE ORAL at 15:43

## 2022-05-03 NOTE — BRIEF OPERATIVE NOTE - NSICDXBRIEFPOSTOP_GEN_ALL_CORE_FT
POST-OP DIAGNOSIS:  Morbid obesity due to excess calories 03-May-2022 14:17:05  Roxann Pierre  Hernia, hiatal 03-May-2022 14:17:17  Roxann Pierre

## 2022-05-03 NOTE — DISCHARGE NOTE PROVIDER - NSDCFUADDINST_GEN_ALL_CORE_FT
Additional instructions as explained and outlined the gastric sleeve instructions. No exercise, no heavy lifting, call office for shortness for breath chest pain, calf pain and selling, increase pain and drainage from abdominal incision sites. Bariatric full diet as described above.  Do not take any whole large pills.  Please crush medications, open granules or take suspensions. Call office for immediate medical/surgical concerns. bariatric surgery, bariatric diet, increase water intake, nutritional supplement, follow up care, physical activity.

## 2022-05-03 NOTE — DISCHARGE NOTE PROVIDER - NSDCACTIVITY_GEN_ALL_CORE
Stairs allowed/Walking - Indoors allowed/No heavy lifting/straining/Walking - Outdoors allowed Showering allowed/Stairs allowed/Walking - Indoors allowed/No heavy lifting/straining/Walking - Outdoors allowed

## 2022-05-03 NOTE — CHART NOTE - NSCHARTNOTEFT_GEN_A_CORE
Post Operative Check Note  Patient: NORBERT COONEY 46y (1975) Female   MRN: [id number]  Location: [facility] PACU 05  Visit: 05-03-22 Inpatient  Date: 05-03-22 @ 20:30    Procedure: S/P Robotic sleeve gastrectomy    Subjective: Patient seen and examined at bedside in PACU. No acute events since surgery. Pain controlled. No acute events since surgery. Pain controlled. No nausea/vomiting. Recovering appropriately.      Objective:  Vitals: T(F): 98 (05-03-22 @ 18:00), Max: 98 (05-03-22 @ 14:25)  HR: 91 (05-03-22 @ 19:00)  BP: 125/82 (05-03-22 @ 19:00) (93/54 - 131/84)  RR: 14 (05-03-22 @ 19:00)  SpO2: 99% (05-03-22 @ 19:00)  Vent Settings:     In:   05-03-22 @ 07:01  -  05-03-22 @ 20:30  --------------------------------------------------------  IN: 700 mL      IV Fluids: folic acid Injectable 1 milliGRAM(s) IV Push once  lactated ringers. 1000 milliLiter(s) (150 mL/Hr) IV Continuous <Continuous>  sodium chloride 0.9% 1000 milliLiter(s) (250 mL/Hr) IV Continuous <Continuous>      Out:   05-03-22 @ 07:01  -  05-03-22 @ 20:30  --------------------------------------------------------  OUT: 650 mL      EBL:     Voided Urine:   05-03-22 @ 07:01  -  05-03-22 @ 20:30  --------------------------------------------------------  OUT: 650 mL          Physical Examination:  Gen: NAD, resting comfortably in bed  CV: Regular rate  Resp: Nonlabored breathing on room air  Abd: Softly distended, appropriately tender to palpation, port sites with operative dressings in place that are c/d/i, minor strikethrough noted  Ext: Bilateral lower extremities are warm and well perfused       Imaging:  No post-op imaging studies    Assessment:  46yFemale patient S/P robotic sleeve gastrectomy.    Plan:  - Pain control  - Diet: Bariatric CLD  - Activity: OOB and ambulate as tolerated  - DVT ppx: [facility]      Date/Time: 05-03-22 @ 20:30  [facility]  [id number]

## 2022-05-03 NOTE — PATIENT PROFILE ADULT - FALL HARM RISK - UNIVERSAL INTERVENTIONS
Bed in lowest position, wheels locked, appropriate side rails in place/Call bell, personal items and telephone in reach/Instruct patient to call for assistance before getting out of bed or chair/Non-slip footwear when patient is out of bed/[place] to call system/Physically safe environment - no spills, clutter or unnecessary equipment/Purposeful Proactive Rounding/Room/bathroom lighting operational, light cord in reach

## 2022-05-03 NOTE — DISCHARGE NOTE PROVIDER - NSDCCPTREATMENT_GEN_ALL_CORE_FT
PRINCIPAL PROCEDURE  Procedure: Gastrectomy, sleeve, robot-assisted, using da Barber Xi, with hiatal hernia repair  Findings and Treatment:

## 2022-05-03 NOTE — DISCHARGE NOTE PROVIDER - CARE PROVIDER_API CALL
Rahul Field  General Surgery  [address] [place] [address], [address]  [place], [place] [id number]  Phone: [phone]  Fax: [phone]  Follow Up Time: 1 week

## 2022-05-03 NOTE — PATIENT PROFILE ADULT - NSPROPTRIGHTSUPPORTPERSON_GEN_A_NUR
PAST SURGICAL HISTORY:  History of cholecystectomy     History of tonsillectomy     S/P ACL repair      declines

## 2022-05-03 NOTE — BRIEF OPERATIVE NOTE - NSICDXBRIEFPROCEDURE_GEN_ALL_CORE_FT
PROCEDURES:  Laparoscopic sleeve gastrectomy with laparoscopic repair of hiatal hernia 03-May-2022 14:15:54  Roxann Pierre   PROCEDURES:  Gastrectomy, sleeve, robot-assisted, using da Barber Xi, with hiatal hernia repair 03-May-2022 15:09:04  Roxann Pierre

## 2022-05-03 NOTE — BRIEF OPERATIVE NOTE - NSICDXBRIEFPREOP_GEN_ALL_CORE_FT
PRE-OP DIAGNOSIS:  Morbid obesity due to excess calories 03-May-2022 14:16:22  Roxann Pierre  Hiatal hernia 03-May-2022 14:16:41  Roxann Pierre

## 2022-05-03 NOTE — PRE-ANESTHESIA EVALUATION ADULT - NSANTHPMHFT_GEN_ALL_CORE
Pt has asthma but no exacerbation for 20 years. Denies any cardiac or respiratory issues. Able to walk 2-3 blocks without shortness of breath or chest pain.

## 2022-05-03 NOTE — PROVIDER CONTACT NOTE (OTHER) - ASSESSMENT
Pt seen resting comfortably in bed at this time. Pt reports feeling nauseous before vomiting but feels better after. Pt vomited ~20 mL of clear fluid after sipping on water. No other complaints at this time.

## 2022-05-03 NOTE — DISCHARGE NOTE PROVIDER - NSDCCPCAREPLAN_GEN_ALL_CORE_FT
PRINCIPAL DISCHARGE DIAGNOSIS  Diagnosis: Body mass index 40.0-44.9, adult  Assessment and Plan of Treatment: Recovering from surgery  WOUND CARE: Steri-strips have been applied to your incisions.  Do not remove these.  They will fall off as they get wet; in approximately 7-10 days.  BATHING: Please do not submerge wound underwater. Do not take a bath. You may shower and/or sponge bathe.  ACTIVITY: No heavy lifting or straining; do not lift anything greater than 10 pounds. Otherwise, you may return to your usual level of physical activity. If you are taking narcotic pain medication (such as Percocet), do NOT drive a car, operate machinery or make important decisions.  DIET: bariatric protocol   NOTIFY YOUR SURGEON IF: You have any bleeding that does not stop, any pus draining from your wound, any fever (over 100.4 F) or chills, persistent nausea/vomiting, persistent diarrhea, or if your pain is not controlled on your discharge pain medications.  FOLLOW-UP:  1. Please call to make a follow-up appointment within one week of discharge with Dr. Field.   2. Please follow up with your primary care physician in one week regarding your hospitalization.

## 2022-05-03 NOTE — DISCHARGE NOTE PROVIDER - HOSPITAL COURSE
On 5/3/22 who has a history of fibroids s/p hysterectomy and morbid obesity with BMI 42.3 underwent robotic- assisted laparoscopic sleeve gastrectomy and hiatal hernia repair. Bariatric ERAS protocol followed to include preoperative and perioperative use of DVT and SSI prophylaxis as well as multi-modal non-opioid analgesics. Patient tolerated the procedure well extubated in the operating room then transferred to the PACU in stable condition. Once hemodynamically stable and effective pain control the patient was able to ambulate with assistance. Pt was also able to void within 4 hours following the procedure. The patient was later transferred to a bariatric unit and placed on bedside continuous pulse oximetry. Pain management included IV multi-modal non-opioid analgesia then transitioned to liquid as needed. The patient tolerated bariatric clear liquid the evening of surgery.    On POD #1 patient remained stable with no acute events overnight, has effective pain control. Denies nausea and vomiting. Patient is ambulating independently and voiding as expected. The rest of the hospital course was uneventful, patient met 8-Point Bariatric Surgery D/C criteria and subsequently cleared for discharge home on POD#1.  No post-discharge extended VTE prophylaxis needed, [facility] VTE Predicted Risk Stratification low (12 points, <1% ).  The patient will follow a protocol-derived staged meal progression supervised by the dietitian in the outpatient setting. Patient will follow-up with Dr. Field in 7-10 days, medical doctor and dietitian in 30 days. [facility] prior to discharge. Written discharge instruction explained and given to include VTE prevention.   On 5/3/22 Ms Sanchez who has a history of fibroids s/p hysterectomy, Hiatal Hernia and morbid obesity with BMI 42.3 underwent robotic- assisted laparoscopic sleeve gastrectomy and hiatal hernia repair. Bariatric ERAS protocol followed to include preoperative and perioperative use of DVT and SSI prophylaxis as well as multi-modal non-opioid analgesics. Patient tolerated the procedure well extubated in the operating room then transferred to the PACU in stable condition. Once hemodynamically stable and effective pain control the patient was able to ambulate with assistance. Pt was also able to void within 4 hours following the procedure. The patient was later transferred to a bariatric unit and placed on bedside continuous pulse oximetry. Pain management included IV multi-modal non-opioid analgesia transitioned to liquid as needed. The patient began bariatric clear liquid the evening of surgery.    On POD #1 patient remained stable with no acute events overnight, has effective pain control. Denies nausea and vomiting. Patient is ambulating independently and voiding as expected. The rest of the hospital course was uneventful, patient met 8-Point Bariatric Surgery D/C criteria and subsequently cleared for discharge home on POD#1.  No post-discharge extended VTE prophylaxis needed, [facility] VTE Predicted Risk Stratification low (12 points, <1% ).  The patient will follow a protocol-derived staged meal progression supervised by the dietitian in the outpatient setting. Patient will follow-up with Dr. Field in 7-10 days, medical doctor and dietitian in 30 days. All appropriate [facility] obtained from [facility] prior to discharge. Written discharge instruction explained and given to include postop complications, medications and side effects, and VTE prevention.   On 5/3/22 Ms Sanchez who has a history of fibroids s/p hysterectomy, Hiatal Hernia and morbid obesity with BMI 42.3 underwent robotic- assisted laparoscopic sleeve gastrectomy and hiatal hernia repair. Bariatric ERAS protocol followed to include preoperative and perioperative use of DVT and SSI prophylaxis as well as multi-modal non-opioid analgesics. Patient tolerated the procedure well extubated in the operating room then transferred to the PACU in stable condition. Once hemodynamically stable and effective pain control the patient was able to ambulate with assistance. Pt was also able to void within 4 hours following the procedure. The patient was later transferred to a bariatric unit and placed on bedside continuous pulse oximetry. Pain management included IV multi-modal non-opioid analgesia transitioned to liquid as needed. The patient began bariatric clear liquid the evening of surgery.    On POD #1 patient remained stable with no acute events overnight, has effective pain control. Denies nausea and vomiting. Patient is ambulating independently and voiding as expected. The rest of the hospital course was uneventful, patient met 8-Point Bariatric Surgery D/C criteria and subsequently cleared for discharge home on POD#2.  No post-discharge extended VTE prophylaxis needed, [facility] VTE Predicted Risk Stratification low (12 points, <1% ).  The patient will follow a protocol-derived staged meal progression supervised by the dietitian in the outpatient setting. Patient will follow-up with Dr. Field in 7-10 days, medical doctor and dietitian in 30 days. All appropriate [facility] obtained from [facility] prior to discharge. Written discharge instruction explained and given to include postop complications, medications and side effects, and VTE prevention.

## 2022-05-04 LAB
ANION GAP SERPL CALC-SCNC: 16 MMOL/L — SIGNIFICANT CHANGE UP (ref 5–17)
BUN SERPL-MCNC: 7 MG/DL — SIGNIFICANT CHANGE UP (ref 7–23)
CALCIUM SERPL-MCNC: 9.1 MG/DL — SIGNIFICANT CHANGE UP (ref 8.4–10.5)
CHLORIDE SERPL-SCNC: 101 MMOL/L — SIGNIFICANT CHANGE UP (ref 96–108)
CO2 SERPL-SCNC: 20 MMOL/L — LOW (ref 22–31)
CREAT SERPL-MCNC: 0.63 MG/DL — SIGNIFICANT CHANGE UP (ref 0.5–1.3)
EGFR: 109 ML/MIN/1.73M2 — SIGNIFICANT CHANGE UP
GLUCOSE SERPL-MCNC: 101 MG/DL — HIGH (ref 70–99)
HCT VFR BLD CALC: 37.5 % — SIGNIFICANT CHANGE UP (ref 34.5–45)
HGB BLD-MCNC: 12.5 G/DL — SIGNIFICANT CHANGE UP (ref 11.5–15.5)
MCHC RBC-ENTMCNC: 31.2 PG — SIGNIFICANT CHANGE UP (ref 27–34)
MCHC RBC-ENTMCNC: 33.3 GM/DL — SIGNIFICANT CHANGE UP (ref 32–36)
MCV RBC AUTO: 93.5 FL — SIGNIFICANT CHANGE UP (ref 80–100)
NRBC # BLD: 0 /100 WBCS — SIGNIFICANT CHANGE UP (ref 0–0)
PLATELET # BLD AUTO: 259 K/UL — SIGNIFICANT CHANGE UP (ref 150–400)
POTASSIUM SERPL-MCNC: 4.7 MMOL/L — SIGNIFICANT CHANGE UP (ref 3.5–5.3)
POTASSIUM SERPL-SCNC: 4.7 MMOL/L — SIGNIFICANT CHANGE UP (ref 3.5–5.3)
RBC # BLD: 4.01 M/UL — SIGNIFICANT CHANGE UP (ref 3.8–5.2)
RBC # FLD: 11.6 % — SIGNIFICANT CHANGE UP (ref 10.3–14.5)
SODIUM SERPL-SCNC: 137 MMOL/L — SIGNIFICANT CHANGE UP (ref 135–145)
WBC # BLD: 9.53 K/UL — SIGNIFICANT CHANGE UP (ref 3.8–10.5)
WBC # FLD AUTO: 9.53 K/UL — SIGNIFICANT CHANGE UP (ref 3.8–10.5)

## 2022-05-04 RX ORDER — CYCLOBENZAPRINE HYDROCHLORIDE 10 MG/1
5 TABLET, FILM COATED ORAL ONCE
Refills: 0 | Status: COMPLETED | OUTPATIENT
Start: 2022-05-04 | End: 2022-05-04

## 2022-05-04 RX ADMIN — Medication 400 MILLIGRAM(S): at 06:53

## 2022-05-04 RX ADMIN — Medication 0.12 MILLIGRAM(S): at 18:35

## 2022-05-04 RX ADMIN — HEPARIN SODIUM 5000 UNIT(S): 5000 INJECTION INTRAVENOUS; SUBCUTANEOUS at 05:17

## 2022-05-04 RX ADMIN — HEPARIN SODIUM 5000 UNIT(S): 5000 INJECTION INTRAVENOUS; SUBCUTANEOUS at 21:56

## 2022-05-04 RX ADMIN — Medication 30 MILLIGRAM(S): at 08:45

## 2022-05-04 RX ADMIN — HEPARIN SODIUM 5000 UNIT(S): 5000 INJECTION INTRAVENOUS; SUBCUTANEOUS at 15:03

## 2022-05-04 RX ADMIN — ONDANSETRON 4 MILLIGRAM(S): 8 TABLET, FILM COATED ORAL at 18:34

## 2022-05-04 RX ADMIN — FOSAPREPITANT DIMEGLUMINE 300 MILLIGRAM(S): 150 INJECTION, POWDER, LYOPHILIZED, FOR SOLUTION INTRAVENOUS at 01:08

## 2022-05-04 RX ADMIN — ONDANSETRON 4 MILLIGRAM(S): 8 TABLET, FILM COATED ORAL at 05:17

## 2022-05-04 RX ADMIN — Medication 100 MILLIGRAM(S): at 02:06

## 2022-05-04 RX ADMIN — PANTOPRAZOLE SODIUM 40 MILLIGRAM(S): 20 TABLET, DELAYED RELEASE ORAL at 15:04

## 2022-05-04 RX ADMIN — Medication 0.12 MILLIGRAM(S): at 12:09

## 2022-05-04 RX ADMIN — SODIUM CHLORIDE 150 MILLILITER(S): 9 INJECTION, SOLUTION INTRAVENOUS at 19:42

## 2022-05-04 RX ADMIN — ONDANSETRON 4 MILLIGRAM(S): 8 TABLET, FILM COATED ORAL at 23:25

## 2022-05-04 RX ADMIN — ONDANSETRON 4 MILLIGRAM(S): 8 TABLET, FILM COATED ORAL at 12:09

## 2022-05-04 RX ADMIN — Medication 30 MILLIGRAM(S): at 08:28

## 2022-05-04 RX ADMIN — Medication 0.12 MILLIGRAM(S): at 05:17

## 2022-05-04 RX ADMIN — Medication 30 MILLIGRAM(S): at 02:36

## 2022-05-04 RX ADMIN — Medication 1000 MILLIGRAM(S): at 00:09

## 2022-05-04 RX ADMIN — Medication 0.12 MILLIGRAM(S): at 23:25

## 2022-05-04 RX ADMIN — Medication 30 MILLIGRAM(S): at 02:06

## 2022-05-04 RX ADMIN — CYCLOBENZAPRINE HYDROCHLORIDE 5 MILLIGRAM(S): 10 TABLET, FILM COATED ORAL at 09:04

## 2022-05-04 NOTE — DIETITIAN INITIAL EVALUATION ADULT - PERTINENT MEDS FT
Patient asking if his appt on 4/8/21 can be a telephone visit? MEDICATIONS  (STANDING):  ceFAZolin   IVPB 3000 milliGRAM(s) IV Intermittent once  folic acid Injectable 1 milliGRAM(s) IV Push once  heparin   Injectable 5000 Unit(s) SubCutaneous every 8 hours  hyoscyamine SL 0.125 milliGRAM(s) SubLingual every 6 hours  lactated ringers. 1000 milliLiter(s) (150 mL/Hr) IV Continuous <Continuous>  ondansetron Injectable 4 milliGRAM(s) IV Push every 6 hours  pantoprazole  Injectable 40 milliGRAM(s) IV Push every 24 hours  sodium chloride 0.9% 1000 milliLiter(s) (250 mL/Hr) IV Continuous <Continuous>    MEDICATIONS  (PRN):

## 2022-05-04 NOTE — PROGRESS NOTE ADULT - SUBJECTIVE AND OBJECTIVE BOX
Surgery Progress Note     Subjective/24hour Events:   Patient seen and examined.       Vital Signs:  Vital Signs Last 24 Hrs  T(C): 37.3 (04 May 2022 00:20), Max: 37.3 (03 May 2022 23:20)  T(F): 99.2 (04 May 2022 00:20), Max: 99.2 (04 May 2022 00:20)  HR: 88 (04 May 2022 00:20) (80 - 94)  BP: 148/91 (04 May 2022 00:20) (93/54 - 149/81)  BP(mean): 111 (03 May 2022 21:00) (64 - 111)  RR: 18 (04 May 2022 00:20) (12 - 19)  SpO2: 98% (04 May 2022 00:20) (96% - 100%)    CAPILLARY BLOOD GLUCOSE      POCT Blood Glucose.: 88 mg/dL (03 May 2022 09:58)      I&O's Detail    03 May 2022 07:01  -  04 May 2022 00:49  --------------------------------------------------------  IN:    IV PiggyBack: 150 mL    IV PiggyBack: 100 mL    Lactated Ringers: 600 mL    Oral Fluid: 120 mL    sodium chloride 0.9% w/ Additives: 1000 mL  Total IN: 1970 mL    OUT:    Voided (mL): 1000 mL  Total OUT: 1000 mL    Total NET: 970 mL          MEDICATIONS  (STANDING):  acetaminophen   IVPB .. 1000 milliGRAM(s) IV Intermittent once  ceFAZolin   IVPB 2000 milliGRAM(s) IV Intermittent every 8 hours  ceFAZolin   IVPB 3000 milliGRAM(s) IV Intermittent once  folic acid Injectable 1 milliGRAM(s) IV Push once  fosaprepitant IVPB 150 milliGRAM(s) IV Intermittent once  heparin   Injectable 5000 Unit(s) SubCutaneous every 8 hours  hyoscyamine SL 0.125 milliGRAM(s) SubLingual every 6 hours  ketorolac   Injectable 30 milliGRAM(s) IV Push every 6 hours  lactated ringers. 1000 milliLiter(s) (150 mL/Hr) IV Continuous <Continuous>  ondansetron Injectable 4 milliGRAM(s) IV Push every 6 hours  pantoprazole  Injectable 40 milliGRAM(s) IV Push every 24 hours  sodium chloride 0.9% 1000 milliLiter(s) (250 mL/Hr) IV Continuous <Continuous>    MEDICATIONS  (PRN):      Physical Exam:  Gen: NAD, resting comfortably in bed  CV: Regular rate  Resp: Nonlabored breathing on room air  Abd: Softly distended, appropriately tender to palpation, port sites with operative dressings in place that are c/d/i, minor strikethrough noted  Ext: Bilateral lower extremities are warm and well perfused      Labs:    05-03    135  |  98  |  8   ----------------------------<  123<H>  4.1   |  19<L>  |  0.58    Ca    8.8      03 May 2022 16:13  Phos  3.1     05-03  Mg     2.1     05-03                              13.4   12.16 )-----------( 267      ( 03 May 2022 16:13 )             40.2          Surgery Progress Note     Subjective/24hour Events:   Patient seen and examined. Pt was feeling nauseous after sipping on water overnight, but nausea improved after vomiting ~20 mL of clear fluid. Pt denies any CP, SOB, n/v/d, or chills. Pain is well controlled.   I&O's Summary    03 May 2022 07:01  -  04 May 2022 07:00  --------------------------------------------------------  IN: 1970 mL / OUT: 1450 mL / NET: 520 mL        Vital Signs:   Vital Signs Last 24 Hrs  T(C): 36.9 (04 May 2022 04:49), Max: 37.3 (03 May 2022 23:20)  T(F): 98.5 (04 May 2022 04:49), Max: 99.2 (04 May 2022 00:20)  HR: 79 (04 May 2022 04:49) (71 - 94)  BP: 135/79 (04 May 2022 04:49) (93/54 - 149/84)  BP(mean): 111 (03 May 2022 21:00) (64 - 111)  RR: 18 (04 May 2022 04:49) (12 - 19)  SpO2: 99% (04 May 2022 04:49) (96% - 100%)     I&O's Detail    03 May 2022 07:01  -  04 May 2022 07:00  --------------------------------------------------------  IN:    IV PiggyBack: 150 mL    IV PiggyBack: 100 mL    Lactated Ringers: 600 mL    Oral Fluid: 120 mL    sodium chloride 0.9% w/ Additives: 1000 mL  Total IN: 1970 mL    OUT:    Voided (mL): 1450 mL  Total OUT: 1450 mL    Total NET: 520 mL      MEDICATIONS  (STANDING):  acetaminophen   IVPB .. 1000 milliGRAM(s) IV Intermittent once  ceFAZolin   IVPB 2000 milliGRAM(s) IV Intermittent every 8 hours  ceFAZolin   IVPB 3000 milliGRAM(s) IV Intermittent once  folic acid Injectable 1 milliGRAM(s) IV Push once  fosaprepitant IVPB 150 milliGRAM(s) IV Intermittent once  heparin   Injectable 5000 Unit(s) SubCutaneous every 8 hours  hyoscyamine SL 0.125 milliGRAM(s) SubLingual every 6 hours  ketorolac   Injectable 30 milliGRAM(s) IV Push every 6 hours  lactated ringers. 1000 milliLiter(s) (150 mL/Hr) IV Continuous <Continuous>  ondansetron Injectable 4 milliGRAM(s) IV Push every 6 hours  pantoprazole  Injectable 40 milliGRAM(s) IV Push every 24 hours  sodium chloride 0.9% 1000 milliLiter(s) (250 mL/Hr) IV Continuous <Continuous>    MEDICATIONS  (PRN):      Physical Exam:  Gen: NAD, resting comfortably in bed  Resp: Nonlabored breathing on room air  Abd: Softly distended, appropriate postoperative tenderness to palpation, port sites with operative dressings in place that are c/d/i, minor strikethrough noted  Ext: Bilateral lower extremities are warm and well perfused         LABS:                           13.4   12.16 )-----------( 267      ( 03 May 2022 16:13 )             40.2     05-03    135  |  98  |  8   ----------------------------<  123<H>  4.1   |  19<L>  |  0.58    Ca    8.8      03 May 2022 16:13  Phos  3.1     05-03  Mg     2.1     05-03

## 2022-05-04 NOTE — PHARMACOTHERAPY INTERVENTION NOTE - COMMENTS
S/p sleeve gastrectomy and hiatal hernia repair on 5/3/2022.  Patient medication reconciliation completed. Patient currently not taking any medications. Patient was instructed to use crushed, dissolvable, chewable, or liquid formulations of medications for 1 month, if needed.  Patient was informed to take daily multivitamins post surgically. Patient reeducated on NSAID avoidance (ibuprofen, ASA, naproxen, aleve) as they increased risk of GI bleeding; may use APAP for mild pain otherwise contact prescriber for consult. Patient was informed on indications and directions for administration for hyoscyamine SL, acetaminophen liquid, ondansetron ODT, and omeprazole DR. Dailey pre-op opioids  Post-op opioids:    PACU: 5 MME (hydromorphone IV)    2 [place]: 0 MME  Home: 0 MME    Pierce Davis, PharmD, BCPS  [phone]  Available on Microsoft Teams

## 2022-05-04 NOTE — DIETITIAN INITIAL EVALUATION ADULT - ENERGY INTAKE
Pt now S/P laparoscopic vertical sleeve gastrectomy. Pt denies N+V, sipping on bariatric clear liquids during RD visit. Pt with knowledge of bariatric full liquid diet and receptive to in depth review/reinforcement. Pt reports home stock of protein shakes with plans to purchase more. Pt reports plan to purchase the necessary vitamins/minerals in chewable/liquid/crushable form including a multivitamin with added elemental iron, calcium citrate with vitamin D, thiamine and sublingual B12. Pt was advised to take the multivitamin with elemental iron at least 2 hours apart from the calcium citrate with vitamin D for optimal absorption. Pt plans to schedule a follow up appointment with outpatient RD. Pt able to teach back all points discussed during interview.  Fair (50-75%)

## 2022-05-04 NOTE — DIETITIAN INITIAL EVALUATION ADULT - REASON FOR ADMISSION
Diaphragmatic hernia without obstruction and without gangrene    Chart reviewed, events. This is a "46y F patient S/P robotic sleeve gastrectomy on 5/3."

## 2022-05-04 NOTE — DIETITIAN INITIAL EVALUATION ADULT - ETIOLOGY
related to excessive energy intake and inability to lose a significant amount of wt despite multiple attempts

## 2022-05-04 NOTE — PROGRESS NOTE ADULT - SUBJECTIVE AND OBJECTIVE BOX
Post Op Day#: 1    Subjective: "Doing ok    Objective: No acute events overnight, denies N/V, effective pain control. Continuous pulse oximetry at bedside functioning, v/s stable, afebrile. Labs within acceptable range. Pt OOB and ambulated independently around the unit last night.  Tolerating bariatric clear liquid diet and and Voiding as expected.                                              Vital Signs Last 24 Hrs  T(C): 36.9 (04 May 2022 04:49), Max: 37.3 (03 May 2022 23:20)  T(F): 98.5 (04 May 2022 04:49), Max: 99.2 (04 May 2022 00:20)  HR: 79 (04 May 2022 04:49) (71 - 94)  BP: 135/79 (04 May 2022 04:49) (93/54 - 149/84)  BP(mean): 111 (03 May 2022 21:00) (64 - 111)  RR: 18 (04 May 2022 04:49) (12 - 19)  SpO2: 99% (04 May 2022 04:49) (96% - 100%)                                               I&O's Summary    03 May 2022 07:01  -  04 May 2022 07:00  --------------------------------------------------------  IN: 1970 mL / OUT: 1450 mL / NET: 520 mL                                                                          13.4   12.16 )-----------( 267      ( 03 May 2022 16:13 )             40.2                                                 05-03    135  |  98  |  8   ----------------------------<  123<H>  4.1   |  19<L>  |  0.58    Ca    8.8      03 May 2022 16:13  Phos  3.1     05-03  Mg     2.1     05-03      ceFAZolin   IVPB 3000 milliGRAM(s) IV Intermittent once  folic acid Injectable 1 milliGRAM(s) IV Push once  heparin   Injectable 5000 Unit(s) SubCutaneous every 8 hours  hyoscyamine SL 0.125 milliGRAM(s) SubLingual every 6 hours  ketorolac   Injectable 30 milliGRAM(s) IV Push every 6 hours  lactated ringers. 1000 milliLiter(s) IV Continuous <Continuous>  ondansetron Injectable 4 milliGRAM(s) IV Push every 6 hours  pantoprazole  Injectable 40 milliGRAM(s) IV Push every 24 hours  sodium chloride 0.9% 1000 milliLiter(s) IV Continuous <Continuous>      Physical Exam:         Lungs:  clear breath sounds b/l       Heart:  Regular rate & rhythm       Abdomen:  Soft, non-distended.  Scopes sites clean, dry and intact. + bs, - flatus, no rebound or guarding       Skin:  intact, pannus w/o rash       Extremities: + pulses, no edema, no calf tenderness, negative alejandro's     VTE Risk Assessment Scores             [facility]  VTE Predicted RISK Low:   ( <1%), No post D/C extended  VTE prophylaxis      Assessment and Plan: Roboic Sleeve Gastrectomy & Hiatal Hernia Repair POD # 1    - F/U AM labs  - Bariatric Clear diet today then protocol derived staged meal progression supervised by RD in outpatient setting  - DVT/GI prophylaxis, Incentive spirometry  - Ambulate Q 2 hours as tolerated  - Procedure specific education including postop complication, medications and side effect, diet, vitamins and VTE prevention. Written materials given  - Medication reviewed and reconciled, Bariatric meds obtained from [facility] prior to d/c  - D/C planning in place, d/c home once Bariatric 8-Point d/c criteria met  - Follow up with Dr Field in 7-10 days, Dietitian and PMD in 30 days.      Roxann Pierre, JEAN CLAUDE, ANP  [phone]

## 2022-05-04 NOTE — DIETITIAN INITIAL EVALUATION ADULT - ORAL INTAKE PTA/DIET HISTORY
Pt reports following a full liquid diet for 2 weeks PTA as instructed by outpatient RD. Pt reports having Ensure Max supplements, yogurt, soups etc. Confirms NKFA. Pt denies chewing/swallowing difficulty, nausea, vomiting, diarrhea, constipation PTA.

## 2022-05-04 NOTE — PROGRESS NOTE ADULT - ASSESSMENT
46yFemale patient S/P robotic sleeve gastrectomy on 5/3.    Plan:  - Pain control  - Diet: Bariatric CLD  - Activity: OOB and ambulate as tolerated  [facility]  [id number] 46y F patient S/P robotic sleeve gastrectomy on 5/3.    Plan:  - Pain control  - Diet: Bariatric CLD  - Activity: OOB and ambulate as tolerated  - DVT ppx: [facility]  - Continue with bariatric protocol      [facility]  [id number]

## 2022-05-04 NOTE — DIETITIAN INITIAL EVALUATION ADULT - PERTINENT LABORATORY DATA
05-03    135  |  98  |  8   ----------------------------<  123<H>  4.1   |  19<L>  |  0.58    Ca    8.8      03 May 2022 16:13  Phos  3.1     05-03  Mg     2.1     05-03    A1C with Estimated Average Glucose Result: 5.1 % (04-22-22 @ 21:47)

## 2022-05-04 NOTE — DIETITIAN INITIAL EVALUATION ADULT - OTHER INFO
Pt with multiple previous wt loss attempts per chart and was unable to lose and maintain significant wt loss.  Pre-surgical wt (H&P) noted as 242 pounds (4/22). Current wt of 238 pounds (5/3).

## 2022-05-04 NOTE — DIETITIAN INITIAL EVALUATION ADULT - ADD RECOMMEND
1) Bariatric Full liquid diet education done 2) Upon discharge, recommend vitamin/mineral supplements as discussed above.

## 2022-05-05 ENCOUNTER — TRANSCRIPTION ENCOUNTER (OUTPATIENT)
Age: 47
End: 2022-05-05

## 2022-05-05 ENCOUNTER — NON-APPOINTMENT (OUTPATIENT)
Age: 47
End: 2022-05-05

## 2022-05-05 VITALS
DIASTOLIC BLOOD PRESSURE: 68 MMHG | OXYGEN SATURATION: 98 % | TEMPERATURE: 98 F | HEART RATE: 62 BPM | SYSTOLIC BLOOD PRESSURE: 121 MMHG | RESPIRATION RATE: 18 BRPM

## 2022-05-05 LAB
ALBUMIN SERPL ELPH-MCNC: 3.6 G/DL — SIGNIFICANT CHANGE UP (ref 3.3–5)
ALP SERPL-CCNC: 67 U/L — SIGNIFICANT CHANGE UP (ref 40–120)
ALT FLD-CCNC: 102 U/L — HIGH (ref 10–45)
ANION GAP SERPL CALC-SCNC: 10 MMOL/L — SIGNIFICANT CHANGE UP (ref 5–17)
AST SERPL-CCNC: 54 U/L — HIGH (ref 10–40)
BILIRUB SERPL-MCNC: 0.3 MG/DL — SIGNIFICANT CHANGE UP (ref 0.2–1.2)
BUN SERPL-MCNC: 10 MG/DL — SIGNIFICANT CHANGE UP (ref 7–23)
CALCIUM SERPL-MCNC: 9.5 MG/DL — SIGNIFICANT CHANGE UP (ref 8.4–10.5)
CHLORIDE SERPL-SCNC: 104 MMOL/L — SIGNIFICANT CHANGE UP (ref 96–108)
CO2 SERPL-SCNC: 25 MMOL/L — SIGNIFICANT CHANGE UP (ref 22–31)
CREAT SERPL-MCNC: 0.79 MG/DL — SIGNIFICANT CHANGE UP (ref 0.5–1.3)
EGFR: 92 ML/MIN/1.73M2 — SIGNIFICANT CHANGE UP
GLUCOSE SERPL-MCNC: 70 MG/DL — SIGNIFICANT CHANGE UP (ref 70–99)
HCT VFR BLD CALC: 35.3 % — SIGNIFICANT CHANGE UP (ref 34.5–45)
HGB BLD-MCNC: 11.5 G/DL — SIGNIFICANT CHANGE UP (ref 11.5–15.5)
MAGNESIUM SERPL-MCNC: 1.9 MG/DL — SIGNIFICANT CHANGE UP (ref 1.6–2.6)
MCHC RBC-ENTMCNC: 31.3 PG — SIGNIFICANT CHANGE UP (ref 27–34)
MCHC RBC-ENTMCNC: 32.6 GM/DL — SIGNIFICANT CHANGE UP (ref 32–36)
MCV RBC AUTO: 95.9 FL — SIGNIFICANT CHANGE UP (ref 80–100)
NRBC # BLD: 0 /100 WBCS — SIGNIFICANT CHANGE UP (ref 0–0)
PHOSPHATE SERPL-MCNC: 2.4 MG/DL — LOW (ref 2.5–4.5)
PLATELET # BLD AUTO: 215 K/UL — SIGNIFICANT CHANGE UP (ref 150–400)
POTASSIUM SERPL-MCNC: 4.5 MMOL/L — SIGNIFICANT CHANGE UP (ref 3.5–5.3)
POTASSIUM SERPL-SCNC: 4.5 MMOL/L — SIGNIFICANT CHANGE UP (ref 3.5–5.3)
PROT SERPL-MCNC: 6.3 G/DL — SIGNIFICANT CHANGE UP (ref 6–8.3)
RBC # BLD: 3.68 M/UL — LOW (ref 3.8–5.2)
RBC # FLD: 11.9 % — SIGNIFICANT CHANGE UP (ref 10.3–14.5)
SODIUM SERPL-SCNC: 139 MMOL/L — SIGNIFICANT CHANGE UP (ref 135–145)
WBC # BLD: 7.34 K/UL — SIGNIFICANT CHANGE UP (ref 3.8–10.5)
WBC # FLD AUTO: 7.34 K/UL — SIGNIFICANT CHANGE UP (ref 3.8–10.5)

## 2022-05-05 RX ADMIN — Medication 0.12 MILLIGRAM(S): at 05:39

## 2022-05-05 RX ADMIN — Medication 62.5 MILLIMOLE(S): at 10:17

## 2022-05-05 RX ADMIN — ONDANSETRON 4 MILLIGRAM(S): 8 TABLET, FILM COATED ORAL at 05:39

## 2022-05-05 RX ADMIN — HEPARIN SODIUM 5000 UNIT(S): 5000 INJECTION INTRAVENOUS; SUBCUTANEOUS at 05:38

## 2022-05-05 NOTE — PROGRESS NOTE ADULT - ATTENDING COMMENTS
POD2 s/p sleeve and HHR  Doing well, tolerating clears    Vitals normal  Abdomen soft  Inc c/d/i    Cont bariatric protocol  Encouraged ambulation  Discharge when criteria met    Rahul Field MD
POD1 sleeve gastrectomy and HHR  C/o some incisional pain  Mild nausea  Tolerating some sips    Vitals normal  Abdomen soft  Inc c/d/i    Cont bariatric protocol  Encouraged ambulation  Discharge when criteria met    Rahul Field MD

## 2022-05-05 NOTE — PROGRESS NOTE ADULT - ASSESSMENT
46y F patient S/P robotic sleeve gastrectomy on 5/3.    Plan:  - Pain control  - Diet: Bariatric CLD  - Activity: OOB and ambulate as tolerated  - DVT ppx: SQH  - Continue with bariatric protocol      [facility]  [id number]

## 2022-05-05 NOTE — DISCHARGE NOTE NURSING/CASE MANAGEMENT/SOCIAL WORK - PATIENT PORTAL LINK FT
You can access the FollowMyHealth Patient Portal offered by [facility] by registering at the following website: [email or web address]. By joining [facility]’s FollowMyHealth portal, you will also be able to view your health information using other applications (apps) compatible with our system.

## 2022-05-05 NOTE — PROGRESS NOTE ADULT - SUBJECTIVE AND OBJECTIVE BOX
Surgery Progress Note     Subjective/24hour Events:   Patient seen and examined.       Vital Signs:  Vital Signs Last 24 Hrs  T(C): 36.8 (05 May 2022 00:39), Max: 37.3 (04 May 2022 01:20)  T(F): 98.3 (05 May 2022 00:39), Max: 99.2 (04 May 2022 01:20)  HR: 61 (05 May 2022 00:39) (61 - 79)  BP: 127/72 (05 May 2022 00:39) (100/52 - 149/84)  BP(mean): --  RR: 18 (05 May 2022 00:39) (18 - 19)  SpO2: 97% (05 May 2022 00:39) (97% - 100%)    CAPILLARY BLOOD GLUCOSE          I&O's Detail    03 May 2022 07:01  -  04 May 2022 07:00  --------------------------------------------------------  IN:    IV PiggyBack: 150 mL    IV PiggyBack: 100 mL    Lactated Ringers: 600 mL    Oral Fluid: 120 mL    sodium chloride 0.9% w/ Additives: 1000 mL  Total IN: 1970 mL    OUT:    Voided (mL): 1450 mL  Total OUT: 1450 mL    Total NET: 520 mL      04 May 2022 07:01  -  05 May 2022 00:52  --------------------------------------------------------  IN:    Oral Fluid: 360 mL  Total IN: 360 mL    OUT:    Voided (mL): 1600 mL  Total OUT: 1600 mL    Total NET: -1240 mL          MEDICATIONS  (STANDING):  ceFAZolin   IVPB 3000 milliGRAM(s) IV Intermittent once  folic acid Injectable 1 milliGRAM(s) IV Push once  heparin   Injectable 5000 Unit(s) SubCutaneous every 8 hours  hyoscyamine SL 0.125 milliGRAM(s) SubLingual every 6 hours  lactated ringers. 1000 milliLiter(s) (150 mL/Hr) IV Continuous <Continuous>  ondansetron Injectable 4 milliGRAM(s) IV Push every 6 hours  pantoprazole  Injectable 40 milliGRAM(s) IV Push every 24 hours  sodium chloride 0.9% 1000 milliLiter(s) (250 mL/Hr) IV Continuous <Continuous>    MEDICATIONS  (PRN):      Physical Exam:  Gen: NAD, resting comfortably in bed  Resp: Nonlabored breathing on room air  Abd: Softly distended, appropriate postoperative tenderness to palpation, port sites with operative dressings in place that are c/d/i, minor strikethrough noted  Ext: Bilateral lower extremities are warm and well perfused      Labs:    05-04    137  |  101  |  7   ----------------------------<  101<H>  4.7   |  20<L>  |  0.63    Ca    9.1      04 May 2022 09:32  Phos  3.1     05-03  Mg     2.1     05-03                              12.5   9.53  )-----------( 259      ( 04 May 2022 09:32 )             37.5

## 2022-05-05 NOTE — PROGRESS NOTE ADULT - SUBJECTIVE AND OBJECTIVE BOX
Post Op Day#: 1    Subjective: "Feel good, No N/V, minimal discomfort."    Objective: No acute events overnight, effective pain control no N/V, effective pain control, abdominal cramping resolved. Continuous pulse oximetry at bedside functioning, v/s stable, afebrile AM labs pending..  Ambulating independently around the unit last night.  Tolerating bariatric clear liquid diet and ql1dhjg as expected..  Voiding as expected                                              Vital Signs Last 24 Hrs  T(C): 36.8 (05 May 2022 06:22), Max: 36.9 (04 May 2022 09:40)  T(F): 98.3 (05 May 2022 06:22), Max: 98.5 (04 May 2022 09:40)  HR: 55 (05 May 2022 06:22) (55 - 79)  BP: 110/64 (05 May 2022 06:22) (100/52 - 148/77)  BP(mean): --  RR: 19 (05 May 2022 06:22) (18 - 19)  SpO2: 100% (05 May 2022 06:22) (97% - 100%)                                               I&O's Summary    04 May 2022 07:01  -  05 May 2022 07:00  --------------------------------------------------------  IN: 2400 mL / OUT: 1750 mL / NET: 650 mL                                                                          12.5   9.53  )-----------( 259      ( 04 May 2022 09:32 )             37.5                                                 05-04    137  |  101  |  7   ----------------------------<  101<H>  4.7   |  20<L>  |  0.63    Ca    9.1      04 May 2022 09:32  Phos  3.1     05-03  Mg     2.1     05-03      ceFAZolin   IVPB 3000 milliGRAM(s) IV Intermittent once  folic acid Injectable 1 milliGRAM(s) IV Push once  heparin   Injectable 5000 Unit(s) SubCutaneous every 8 hours  hyoscyamine SL 0.125 milliGRAM(s) SubLingual every 6 hours  lactated ringers. 1000 milliLiter(s) IV Continuous <Continuous>  ondansetron Injectable 4 milliGRAM(s) IV Push every 6 hours  pantoprazole  Injectable 40 milliGRAM(s) IV Push every 24 hours  sodium chloride 0.9% 1000 milliLiter(s) IV Continuous <Continuous>      Physical Exam:         Lungs:  clear breath sounds b/l       Heart:  Regular rate & rhythm       Abdomen:  Soft, non-distended.  Scopes sites clean, dry and intact. + bs, - flatus, no rebound or guarding       Skin:  intact, pannus w/o rash       Extremities: + pulses, no edema, no calf tenderness, negative alejandro's     VTE Risk Assessment Scores               [facility] VTE Predicted RISK Low:   ( <1%), No Post D/C extended VTE prophylaxis      Assessment and Plan: Robotic Assisted SLeeve Gastrectomy POD # 2    - Bariatric Clear diet today then protocol derived staged meal progression supervised by RD in outpatient setting  - DVT/GI prophylaxis, Incentive spirometry  - Ambulate as tolerated  - Procedure specific education including postop complications, medications and side effect, diet, vitamins and VTE prevention. Written materials given  - Medication reviewed and reconciled, Bariatric meds obtained from [facility] prior to d/c  -  D/C home Bariatric 8-Point d/c criteria met  -  Follow up with Dr Field in 7-10 days, Dietitian and PMD in 30 days.      Roxann Pierre DNP, ANP  [phone]

## 2022-05-06 LAB — VIT B1 SERPL-MCNC: 185.5 NMOL/L

## 2022-05-09 LAB — SURGICAL PATHOLOGY STUDY: SIGNIFICANT CHANGE UP

## 2022-05-16 ENCOUNTER — APPOINTMENT (OUTPATIENT)
Dept: SURGERY | Facility: CLINIC | Age: 47
End: 2022-05-16
Payer: MEDICAID

## 2022-05-16 VITALS
TEMPERATURE: 97.5 F | BODY MASS INDEX: 39.75 KG/M2 | HEIGHT: 63 IN | WEIGHT: 224.31 LBS | OXYGEN SATURATION: 98 % | RESPIRATION RATE: 18 BRPM | DIASTOLIC BLOOD PRESSURE: 82 MMHG | HEART RATE: 85 BPM | SYSTOLIC BLOOD PRESSURE: 128 MMHG

## 2022-05-16 PROBLEM — J45.909 UNSPECIFIED ASTHMA, UNCOMPLICATED: Chronic | Status: ACTIVE | Noted: 2022-04-22

## 2022-05-16 PROBLEM — D21.9 BENIGN NEOPLASM OF CONNECTIVE AND OTHER SOFT TISSUE, UNSPECIFIED: Chronic | Status: ACTIVE | Noted: 2022-04-22

## 2022-05-16 PROCEDURE — 99024 POSTOP FOLLOW-UP VISIT: CPT

## 2022-05-16 NOTE — PHYSICAL EXAM
[Obese, well nourished, in no acute distress] : obese, well nourished, in no acute distress [Normal] : affect appropriate [de-identified] : Normal respirations [de-identified] : Soft, nontender, nondistended.  Incisions well-healed without erythema or drainage.

## 2022-05-16 NOTE — ASSESSMENT
[FreeTextEntry1] : 46-year-old female recovering well status post robot-assisted laparoscopic sleeve gastrectomy and hiatal hernia repair on 5/3/2022.

## 2022-05-16 NOTE — PLAN
[FreeTextEntry1] : [] Advance to pureed foods as per bariatric diet instructions at 2 weeks\par [] emphasized importance of maintaining excellent hydration, monitoring urine output and color, and keeping a bottle of water available at all times\par [] 1-2 protein shakes per day \par [] walk as much as tolerated\par \par F/u 2 weeks

## 2022-05-16 NOTE — HISTORY OF PRESENT ILLNESS
[de-identified] : NORBERT is a 46 year old female here for s/p Robot-assisted laparoscopic sleeve gastrectomy and hiatal hernia repair on 05/03/2022.\par \par Recovering comfortably without major complaints.  Minimal incisional pain.  No nausea.  Tolerating bariatric liquids, including protein shakes, without dysphagia or GERD.  Ambulating well.  No fevers or chills reported.

## 2022-05-30 PROCEDURE — 85025 COMPLETE CBC W/AUTO DIFF WBC: CPT

## 2022-05-30 PROCEDURE — C9399: CPT

## 2022-05-30 PROCEDURE — 80053 COMPREHEN METABOLIC PANEL: CPT

## 2022-05-30 PROCEDURE — 88305 TISSUE EXAM BY PATHOLOGIST: CPT

## 2022-05-30 PROCEDURE — 36415 COLL VENOUS BLD VENIPUNCTURE: CPT

## 2022-05-30 PROCEDURE — 84100 ASSAY OF PHOSPHORUS: CPT

## 2022-05-30 PROCEDURE — 82962 GLUCOSE BLOOD TEST: CPT

## 2022-05-30 PROCEDURE — 83735 ASSAY OF MAGNESIUM: CPT

## 2022-05-30 PROCEDURE — C1889: CPT

## 2022-05-30 PROCEDURE — 80048 BASIC METABOLIC PNL TOTAL CA: CPT

## 2022-05-30 PROCEDURE — S2900: CPT

## 2022-05-30 PROCEDURE — 85027 COMPLETE CBC AUTOMATED: CPT

## 2022-06-02 ENCOUNTER — INPATIENT (INPATIENT)
Facility: HOSPITAL | Age: 47
LOS: 4 days | Discharge: HOME | DRG: 327 | End: 2022-06-07
Attending: SURGERY | Admitting: SURGERY
Payer: MEDICAID

## 2022-06-02 ENCOUNTER — TRANSCRIPTION ENCOUNTER (OUTPATIENT)
Age: 47
End: 2022-06-02

## 2022-06-02 VITALS
RESPIRATION RATE: 18 BRPM | OXYGEN SATURATION: 97 % | WEIGHT: 205.91 LBS | TEMPERATURE: 98 F | HEART RATE: 101 BPM | SYSTOLIC BLOOD PRESSURE: 134 MMHG | HEIGHT: 63 IN | DIASTOLIC BLOOD PRESSURE: 91 MMHG

## 2022-06-02 DIAGNOSIS — R11.2 NAUSEA WITH VOMITING, UNSPECIFIED: ICD-10-CM

## 2022-06-02 DIAGNOSIS — Z90.710 ACQUIRED ABSENCE OF BOTH CERVIX AND UTERUS: Chronic | ICD-10-CM

## 2022-06-02 LAB
ALBUMIN SERPL ELPH-MCNC: 4.3 G/DL — SIGNIFICANT CHANGE UP (ref 3.3–5)
ALP SERPL-CCNC: 115 U/L — SIGNIFICANT CHANGE UP (ref 40–120)
ALT FLD-CCNC: 9 U/L — LOW (ref 10–45)
ANION GAP SERPL CALC-SCNC: 22 MMOL/L — HIGH (ref 5–17)
AST SERPL-CCNC: 16 U/L — SIGNIFICANT CHANGE UP (ref 10–40)
BASOPHILS # BLD AUTO: 0.05 K/UL — SIGNIFICANT CHANGE UP (ref 0–0.2)
BASOPHILS NFR BLD AUTO: 0.7 % — SIGNIFICANT CHANGE UP (ref 0–2)
BILIRUB SERPL-MCNC: 0.5 MG/DL — SIGNIFICANT CHANGE UP (ref 0.2–1.2)
BUN SERPL-MCNC: 10 MG/DL — SIGNIFICANT CHANGE UP (ref 7–23)
CALCIUM SERPL-MCNC: 10.3 MG/DL — SIGNIFICANT CHANGE UP (ref 8.4–10.5)
CHLORIDE SERPL-SCNC: 101 MMOL/L — SIGNIFICANT CHANGE UP (ref 96–108)
CO2 SERPL-SCNC: 22 MMOL/L — SIGNIFICANT CHANGE UP (ref 22–31)
CREAT SERPL-MCNC: 0.79 MG/DL — SIGNIFICANT CHANGE UP (ref 0.5–1.3)
EGFR: 92 ML/MIN/1.73M2 — SIGNIFICANT CHANGE UP
EOSINOPHIL # BLD AUTO: 0.08 K/UL — SIGNIFICANT CHANGE UP (ref 0–0.5)
EOSINOPHIL NFR BLD AUTO: 1.1 % — SIGNIFICANT CHANGE UP (ref 0–6)
GLUCOSE SERPL-MCNC: 93 MG/DL — SIGNIFICANT CHANGE UP (ref 70–99)
HCG SERPL-ACNC: 4.7 MIU/ML — HIGH
HCT VFR BLD CALC: 49.8 % — HIGH (ref 34.5–45)
HGB BLD-MCNC: 16.1 G/DL — HIGH (ref 11.5–15.5)
IMM GRANULOCYTES NFR BLD AUTO: 0.3 % — SIGNIFICANT CHANGE UP (ref 0–1.5)
LYMPHOCYTES # BLD AUTO: 2.14 K/UL — SIGNIFICANT CHANGE UP (ref 1–3.3)
LYMPHOCYTES # BLD AUTO: 28.7 % — SIGNIFICANT CHANGE UP (ref 13–44)
MAGNESIUM SERPL-MCNC: 1.9 MG/DL — SIGNIFICANT CHANGE UP (ref 1.6–2.6)
MCHC RBC-ENTMCNC: 30.3 PG — SIGNIFICANT CHANGE UP (ref 27–34)
MCHC RBC-ENTMCNC: 32.3 GM/DL — SIGNIFICANT CHANGE UP (ref 32–36)
MCV RBC AUTO: 93.8 FL — SIGNIFICANT CHANGE UP (ref 80–100)
MONOCYTES # BLD AUTO: 0.97 K/UL — HIGH (ref 0–0.9)
MONOCYTES NFR BLD AUTO: 13 % — SIGNIFICANT CHANGE UP (ref 2–14)
NEUTROPHILS # BLD AUTO: 4.19 K/UL — SIGNIFICANT CHANGE UP (ref 1.8–7.4)
NEUTROPHILS NFR BLD AUTO: 56.2 % — SIGNIFICANT CHANGE UP (ref 43–77)
NRBC # BLD: 0 /100 WBCS — SIGNIFICANT CHANGE UP (ref 0–0)
PHOSPHATE SERPL-MCNC: 3.6 MG/DL — SIGNIFICANT CHANGE UP (ref 2.5–4.5)
PLATELET # BLD AUTO: 244 K/UL — SIGNIFICANT CHANGE UP (ref 150–400)
POTASSIUM SERPL-MCNC: 3.7 MMOL/L — SIGNIFICANT CHANGE UP (ref 3.5–5.3)
POTASSIUM SERPL-SCNC: 3.7 MMOL/L — SIGNIFICANT CHANGE UP (ref 3.5–5.3)
PROT SERPL-MCNC: 8.4 G/DL — HIGH (ref 6–8.3)
RBC # BLD: 5.31 M/UL — HIGH (ref 3.8–5.2)
RBC # FLD: 12.7 % — SIGNIFICANT CHANGE UP (ref 10.3–14.5)
SARS-COV-2 RNA SPEC QL NAA+PROBE: SIGNIFICANT CHANGE UP
SODIUM SERPL-SCNC: 145 MMOL/L — SIGNIFICANT CHANGE UP (ref 135–145)
WBC # BLD: 7.45 K/UL — SIGNIFICANT CHANGE UP (ref 3.8–10.5)
WBC # FLD AUTO: 7.45 K/UL — SIGNIFICANT CHANGE UP (ref 3.8–10.5)

## 2022-06-02 PROCEDURE — 71260 CT THORAX DX C+: CPT | Mod: 26,MA

## 2022-06-02 PROCEDURE — 74177 CT ABD & PELVIS W/CONTRAST: CPT | Mod: 26,MA

## 2022-06-02 PROCEDURE — 93010 ELECTROCARDIOGRAM REPORT: CPT

## 2022-06-02 PROCEDURE — 71046 X-RAY EXAM CHEST 2 VIEWS: CPT | Mod: 26

## 2022-06-02 PROCEDURE — 99285 EMERGENCY DEPT VISIT HI MDM: CPT

## 2022-06-02 RX ORDER — SODIUM CHLORIDE 9 MG/ML
1000 INJECTION INTRAMUSCULAR; INTRAVENOUS; SUBCUTANEOUS ONCE
Refills: 0 | Status: COMPLETED | OUTPATIENT
Start: 2022-06-02 | End: 2022-06-02

## 2022-06-02 RX ORDER — ONDANSETRON 8 MG/1
4 TABLET, FILM COATED ORAL ONCE
Refills: 0 | Status: COMPLETED | OUTPATIENT
Start: 2022-06-02 | End: 2022-06-02

## 2022-06-02 RX ADMIN — ONDANSETRON 4 MILLIGRAM(S): 8 TABLET, FILM COATED ORAL at 18:59

## 2022-06-02 RX ADMIN — SODIUM CHLORIDE 1000 MILLILITER(S): 9 INJECTION INTRAMUSCULAR; INTRAVENOUS; SUBCUTANEOUS at 18:59

## 2022-06-02 NOTE — ED PROVIDER NOTE - CLINICAL SUMMARY MEDICAL DECISION MAKING FREE TEXT BOX
Pt w/ recent gastric sleeve surgery here for PO intolerance and clinical dehydration. VSS. Exam w/o acute abdominal process. Concern for sleeve complication causing restrictive intake. Also concern for electrolyte abnormalities. Will obtain labs, ekg, trop, cxr, CT abdomen pelvis, antiemetics, IVF. Surgery consult. Reassess.

## 2022-06-02 NOTE — ED ADULT NURSE NOTE - BRAND OF FIRST COVID-19 BOOSTER
Please excuse Mr. Bender from work. He was seen in our Emergency Department and may not tolerate work for next 5 days. [employer]

## 2022-06-02 NOTE — ED ADULT NURSE NOTE - OBJECTIVE STATEMENT
45 yo presents to the ED from home. A&Ox4, ambulatory c/o unable to tolerate PO intake and epigastric pain s/p gastric sleeve surgery. pt reports gastric sleeve 5/3, no complications was feeling well tolerating PO well until 2 and a half weeks ago. pt reports severe nausea, epigastric pain following small sips of water/ Gatorade or apple sauce intake. pt reports diarrhea 2 weeks ago, none since. pt reports weakness and fatigue, denies dizziness CP. no other medical history as per pt. 20G inserted RAC. pending MD lobo. plan of care discussed. safety and comfort measures maintained.

## 2022-06-02 NOTE — ED PROVIDER NOTE - CARE PLAN
Principal Discharge DX:	Nausea & vomiting  Secondary Diagnosis:	S/P gastric sleeve procedure  Secondary Diagnosis:	Dehydration   1

## 2022-06-02 NOTE — ED ADULT NURSE REASSESSMENT NOTE - NS ED NURSE REASSESS COMMENT FT1
Report received from HUONG Trejo. Pt received A&Ox3, IV intact and patent, VSS, pt denies pain/discomfort, bed locked and in lowest position, call bell within reach and pt instructed on use, safety and comfort measures maintained, pending dispo

## 2022-06-02 NOTE — ED PROVIDER NOTE - PHYSICAL EXAMINATION
General: NAD, obese  HEENT: NCAT, PERRL  Cardiac: RRR, 2+ peripheral pulses  Chest: CTA  Abdomen: soft, non-distended, bowel sounds present, no ttp, no rebound or guarding  Extremities: no peripheral edema, calf tenderness, or leg size discrepancies  Skin: no rashes  Neuro: AAOx3, motor and sensory grossly intact  Psych: mood and affect appropriate

## 2022-06-02 NOTE — ED PROVIDER NOTE - PROGRESS NOTE DETAILS
AGUEDA Rodriguez (PGY-2) - surgery consulted. Signed out pending CT scan, labs, and surgery recs, anticipate admission.

## 2022-06-02 NOTE — ED PROVIDER NOTE - ATTENDING CONTRIBUTION TO CARE
45yo F hx of obesity, hiatal hernia, gastric sleeve 1 month ago here for not tolerating PO the past few weeks, only throw up once a few weeks ago but having difficulty taking solids and liquids down, passing gas, abd soft, nt. surgery consulted, performed here, ct ordered, labs, ivf. abd soft, nt.

## 2022-06-02 NOTE — ED PROVIDER NOTE - OBJECTIVE STATEMENT
45yo F hx of obesity, hiatal hernia, gastric sleeve 1 month ago here for not tolerating PO. Pt states when she left the hospital 1 month ago she was well. 3 weeks ago started not being able to keep down more than 3 sips of water, feels like its stuck in her chest, nauseous w/ some occasional emesis and dry heaving, no abdominal pain. Has not had a BM 2/2 not having PO. Denies fever, sob, change in urination.     Surgeon: Dr. Field

## 2022-06-03 ENCOUNTER — APPOINTMENT (OUTPATIENT)
Dept: SURGERY | Facility: CLINIC | Age: 47
End: 2022-06-03

## 2022-06-03 DIAGNOSIS — E66.01 MORBID (SEVERE) OBESITY DUE TO EXCESS CALORIES: ICD-10-CM

## 2022-06-03 LAB
ALBUMIN SERPL ELPH-MCNC: 3.9 G/DL — SIGNIFICANT CHANGE UP (ref 3.3–5)
ALP SERPL-CCNC: 100 U/L — SIGNIFICANT CHANGE UP (ref 40–120)
ALT FLD-CCNC: 8 U/L — LOW (ref 10–45)
ANION GAP SERPL CALC-SCNC: 21 MMOL/L — HIGH (ref 5–17)
ANION GAP SERPL CALC-SCNC: 24 MMOL/L — HIGH (ref 5–17)
APTT BLD: 29.6 SEC — SIGNIFICANT CHANGE UP (ref 27.5–35.5)
AST SERPL-CCNC: 12 U/L — SIGNIFICANT CHANGE UP (ref 10–40)
BILIRUB SERPL-MCNC: 0.4 MG/DL — SIGNIFICANT CHANGE UP (ref 0.2–1.2)
BLD GP AB SCN SERPL QL: NEGATIVE — SIGNIFICANT CHANGE UP
BUN SERPL-MCNC: 6 MG/DL — LOW (ref 7–23)
BUN SERPL-MCNC: 8 MG/DL — SIGNIFICANT CHANGE UP (ref 7–23)
CALCIUM SERPL-MCNC: 8.7 MG/DL — SIGNIFICANT CHANGE UP (ref 8.4–10.5)
CALCIUM SERPL-MCNC: 9.2 MG/DL — SIGNIFICANT CHANGE UP (ref 8.4–10.5)
CHLORIDE SERPL-SCNC: 109 MMOL/L — HIGH (ref 96–108)
CHLORIDE SERPL-SCNC: 109 MMOL/L — HIGH (ref 96–108)
CO2 SERPL-SCNC: 15 MMOL/L — LOW (ref 22–31)
CO2 SERPL-SCNC: 17 MMOL/L — LOW (ref 22–31)
CREAT SERPL-MCNC: 0.68 MG/DL — SIGNIFICANT CHANGE UP (ref 0.5–1.3)
CREAT SERPL-MCNC: 0.71 MG/DL — SIGNIFICANT CHANGE UP (ref 0.5–1.3)
EGFR: 105 ML/MIN/1.73M2 — SIGNIFICANT CHANGE UP
EGFR: 107 ML/MIN/1.73M2 — SIGNIFICANT CHANGE UP
GLUCOSE SERPL-MCNC: 106 MG/DL — HIGH (ref 70–99)
GLUCOSE SERPL-MCNC: 135 MG/DL — HIGH (ref 70–99)
HCG SERPL-ACNC: 5.2 MIU/ML — HIGH
HCT VFR BLD CALC: 41.8 % — SIGNIFICANT CHANGE UP (ref 34.5–45)
HCT VFR BLD CALC: 45.4 % — HIGH (ref 34.5–45)
HGB BLD-MCNC: 13.3 G/DL — SIGNIFICANT CHANGE UP (ref 11.5–15.5)
HGB BLD-MCNC: 14.5 G/DL — SIGNIFICANT CHANGE UP (ref 11.5–15.5)
INR BLD: 1.27 RATIO — HIGH (ref 0.88–1.16)
MAGNESIUM SERPL-MCNC: 1.7 MG/DL — SIGNIFICANT CHANGE UP (ref 1.6–2.6)
MAGNESIUM SERPL-MCNC: 1.9 MG/DL — SIGNIFICANT CHANGE UP (ref 1.6–2.6)
MCHC RBC-ENTMCNC: 30.7 PG — SIGNIFICANT CHANGE UP (ref 27–34)
MCHC RBC-ENTMCNC: 30.9 PG — SIGNIFICANT CHANGE UP (ref 27–34)
MCHC RBC-ENTMCNC: 31.8 GM/DL — LOW (ref 32–36)
MCHC RBC-ENTMCNC: 31.9 GM/DL — LOW (ref 32–36)
MCV RBC AUTO: 96 FL — SIGNIFICANT CHANGE UP (ref 80–100)
MCV RBC AUTO: 97.2 FL — SIGNIFICANT CHANGE UP (ref 80–100)
NRBC # BLD: 0 /100 WBCS — SIGNIFICANT CHANGE UP (ref 0–0)
NRBC # BLD: 0 /100 WBCS — SIGNIFICANT CHANGE UP (ref 0–0)
PHOSPHATE SERPL-MCNC: 3 MG/DL — SIGNIFICANT CHANGE UP (ref 2.5–4.5)
PHOSPHATE SERPL-MCNC: 3.5 MG/DL — SIGNIFICANT CHANGE UP (ref 2.5–4.5)
PLATELET # BLD AUTO: 184 K/UL — SIGNIFICANT CHANGE UP (ref 150–400)
PLATELET # BLD AUTO: 233 K/UL — SIGNIFICANT CHANGE UP (ref 150–400)
POTASSIUM SERPL-MCNC: 3.6 MMOL/L — SIGNIFICANT CHANGE UP (ref 3.5–5.3)
POTASSIUM SERPL-MCNC: 3.6 MMOL/L — SIGNIFICANT CHANGE UP (ref 3.5–5.3)
POTASSIUM SERPL-SCNC: 3.6 MMOL/L — SIGNIFICANT CHANGE UP (ref 3.5–5.3)
POTASSIUM SERPL-SCNC: 3.6 MMOL/L — SIGNIFICANT CHANGE UP (ref 3.5–5.3)
PROT SERPL-MCNC: 7.5 G/DL — SIGNIFICANT CHANGE UP (ref 6–8.3)
PROTHROM AB SERPL-ACNC: 14.8 SEC — HIGH (ref 10.5–13.4)
RBC # BLD: 4.3 M/UL — SIGNIFICANT CHANGE UP (ref 3.8–5.2)
RBC # BLD: 4.73 M/UL — SIGNIFICANT CHANGE UP (ref 3.8–5.2)
RBC # FLD: 12.8 % — SIGNIFICANT CHANGE UP (ref 10.3–14.5)
RBC # FLD: 13 % — SIGNIFICANT CHANGE UP (ref 10.3–14.5)
RH IG SCN BLD-IMP: POSITIVE — SIGNIFICANT CHANGE UP
SODIUM SERPL-SCNC: 145 MMOL/L — SIGNIFICANT CHANGE UP (ref 135–145)
SODIUM SERPL-SCNC: 150 MMOL/L — HIGH (ref 135–145)
WBC # BLD: 11.24 K/UL — HIGH (ref 3.8–10.5)
WBC # BLD: 8.25 K/UL — SIGNIFICANT CHANGE UP (ref 3.8–10.5)
WBC # FLD AUTO: 11.24 K/UL — HIGH (ref 3.8–10.5)
WBC # FLD AUTO: 8.25 K/UL — SIGNIFICANT CHANGE UP (ref 3.8–10.5)

## 2022-06-03 DEVICE — CLIP APPLIER COVIDIEN ENDOCLIP III 5MM: Type: IMPLANTABLE DEVICE | Status: FUNCTIONAL

## 2022-06-03 DEVICE — SURGICEL 2 X 14": Type: IMPLANTABLE DEVICE | Status: FUNCTIONAL

## 2022-06-03 DEVICE — ARISTA 3GR: Type: IMPLANTABLE DEVICE | Status: FUNCTIONAL

## 2022-06-03 DEVICE — FLOSEAL FAST PREP 10ML: Type: IMPLANTABLE DEVICE | Status: FUNCTIONAL

## 2022-06-03 RX ORDER — PANTOPRAZOLE SODIUM 20 MG/1
40 TABLET, DELAYED RELEASE ORAL EVERY 24 HOURS
Refills: 0 | Status: DISCONTINUED | OUTPATIENT
Start: 2022-06-03 | End: 2022-06-03

## 2022-06-03 RX ORDER — SODIUM CHLORIDE 9 MG/ML
1000 INJECTION, SOLUTION INTRAVENOUS
Refills: 0 | Status: DISCONTINUED | OUTPATIENT
Start: 2022-06-03 | End: 2022-06-07

## 2022-06-03 RX ORDER — CEFAZOLIN SODIUM 1 G
1000 VIAL (EA) INJECTION ONCE
Refills: 0 | Status: COMPLETED | OUTPATIENT
Start: 2022-06-03 | End: 2022-06-04

## 2022-06-03 RX ORDER — ONDANSETRON 8 MG/1
4 TABLET, FILM COATED ORAL ONCE
Refills: 0 | Status: COMPLETED | OUTPATIENT
Start: 2022-06-03 | End: 2022-06-03

## 2022-06-03 RX ORDER — ACETAMINOPHEN 500 MG
1000 TABLET ORAL ONCE
Refills: 0 | Status: COMPLETED | OUTPATIENT
Start: 2022-06-03 | End: 2022-06-03

## 2022-06-03 RX ORDER — ONDANSETRON 8 MG/1
4 TABLET, FILM COATED ORAL ONCE
Refills: 0 | Status: DISCONTINUED | OUTPATIENT
Start: 2022-06-03 | End: 2022-06-07

## 2022-06-03 RX ORDER — SODIUM CHLORIDE 9 MG/ML
1000 INJECTION, SOLUTION INTRAVENOUS
Refills: 0 | Status: DISCONTINUED | OUTPATIENT
Start: 2022-06-03 | End: 2022-06-03

## 2022-06-03 RX ORDER — CEFAZOLIN SODIUM 1 G
VIAL (EA) INJECTION
Refills: 0 | Status: DISCONTINUED | OUTPATIENT
Start: 2022-06-03 | End: 2022-06-03

## 2022-06-03 RX ORDER — KETOROLAC TROMETHAMINE 30 MG/ML
15 SYRINGE (ML) INJECTION EVERY 4 HOURS
Refills: 0 | Status: DISCONTINUED | OUTPATIENT
Start: 2022-06-03 | End: 2022-06-07

## 2022-06-03 RX ORDER — HYDROMORPHONE HYDROCHLORIDE 2 MG/ML
0.25 INJECTION INTRAMUSCULAR; INTRAVENOUS; SUBCUTANEOUS
Refills: 0 | Status: DISCONTINUED | OUTPATIENT
Start: 2022-06-03 | End: 2022-06-03

## 2022-06-03 RX ORDER — ACETAMINOPHEN 500 MG
1000 TABLET ORAL ONCE
Refills: 0 | Status: COMPLETED | OUTPATIENT
Start: 2022-06-04 | End: 2022-06-04

## 2022-06-03 RX ORDER — HYOSCYAMINE SULFATE 0.13 MG
0.12 TABLET ORAL EVERY 6 HOURS
Refills: 0 | Status: DISCONTINUED | OUTPATIENT
Start: 2022-06-03 | End: 2022-06-07

## 2022-06-03 RX ORDER — THIAMINE MONONITRATE (VIT B1) 100 MG
100 TABLET ORAL ONCE
Refills: 0 | Status: COMPLETED | OUTPATIENT
Start: 2022-06-03 | End: 2022-06-03

## 2022-06-03 RX ORDER — PANTOPRAZOLE SODIUM 20 MG/1
40 TABLET, DELAYED RELEASE ORAL EVERY 24 HOURS
Refills: 0 | Status: DISCONTINUED | OUTPATIENT
Start: 2022-06-03 | End: 2022-06-07

## 2022-06-03 RX ORDER — CEFAZOLIN SODIUM 1 G
1000 VIAL (EA) INJECTION EVERY 8 HOURS
Refills: 0 | Status: DISCONTINUED | OUTPATIENT
Start: 2022-06-04 | End: 2022-06-07

## 2022-06-03 RX ORDER — ENOXAPARIN SODIUM 100 MG/ML
40 INJECTION SUBCUTANEOUS EVERY 24 HOURS
Refills: 0 | Status: DISCONTINUED | OUTPATIENT
Start: 2022-06-03 | End: 2022-06-03

## 2022-06-03 RX ORDER — FOSAPREPITANT DIMEGLUMINE 150 MG/5ML
150 INJECTION, POWDER, LYOPHILIZED, FOR SOLUTION INTRAVENOUS ONCE
Refills: 0 | Status: DISCONTINUED | OUTPATIENT
Start: 2022-06-03 | End: 2022-06-07

## 2022-06-03 RX ORDER — SODIUM CHLORIDE 9 MG/ML
1000 INJECTION, SOLUTION INTRAVENOUS
Refills: 0 | Status: COMPLETED | OUTPATIENT
Start: 2022-06-03 | End: 2022-06-03

## 2022-06-03 RX ORDER — ACETAMINOPHEN 500 MG
1000 TABLET ORAL EVERY 6 HOURS
Refills: 0 | Status: DISCONTINUED | OUTPATIENT
Start: 2022-06-03 | End: 2022-06-03

## 2022-06-03 RX ORDER — HALOPERIDOL DECANOATE 100 MG/ML
0.5 INJECTION INTRAMUSCULAR EVERY 4 HOURS
Refills: 0 | Status: DISCONTINUED | OUTPATIENT
Start: 2022-06-03 | End: 2022-06-07

## 2022-06-03 RX ORDER — CEFAZOLIN SODIUM 1 G
VIAL (EA) INJECTION
Refills: 0 | Status: DISCONTINUED | OUTPATIENT
Start: 2022-06-04 | End: 2022-06-07

## 2022-06-03 RX ORDER — FOLIC ACID 0.8 MG
1 TABLET ORAL ONCE
Refills: 0 | Status: COMPLETED | OUTPATIENT
Start: 2022-06-03 | End: 2022-06-03

## 2022-06-03 RX ORDER — ENOXAPARIN SODIUM 100 MG/ML
40 INJECTION SUBCUTANEOUS EVERY 24 HOURS
Refills: 0 | Status: DISCONTINUED | OUTPATIENT
Start: 2022-06-03 | End: 2022-06-07

## 2022-06-03 RX ADMIN — Medication 0.12 MILLIGRAM(S): at 23:02

## 2022-06-03 RX ADMIN — SODIUM CHLORIDE 1000 MILLILITER(S): 9 INJECTION INTRAMUSCULAR; INTRAVENOUS; SUBCUTANEOUS at 02:03

## 2022-06-03 RX ADMIN — HYDROMORPHONE HYDROCHLORIDE 0.25 MILLIGRAM(S): 2 INJECTION INTRAMUSCULAR; INTRAVENOUS; SUBCUTANEOUS at 20:15

## 2022-06-03 RX ADMIN — Medication 15 MILLIGRAM(S): at 23:33

## 2022-06-03 RX ADMIN — SODIUM CHLORIDE 100 MILLILITER(S): 9 INJECTION, SOLUTION INTRAVENOUS at 02:03

## 2022-06-03 RX ADMIN — Medication 1000 MILLIGRAM(S): at 23:48

## 2022-06-03 RX ADMIN — ONDANSETRON 4 MILLIGRAM(S): 8 TABLET, FILM COATED ORAL at 01:18

## 2022-06-03 RX ADMIN — HYDROMORPHONE HYDROCHLORIDE 0.25 MILLIGRAM(S): 2 INJECTION INTRAMUSCULAR; INTRAVENOUS; SUBCUTANEOUS at 20:30

## 2022-06-03 RX ADMIN — ENOXAPARIN SODIUM 40 MILLIGRAM(S): 100 INJECTION SUBCUTANEOUS at 11:16

## 2022-06-03 RX ADMIN — Medication 400 MILLIGRAM(S): at 23:18

## 2022-06-03 RX ADMIN — Medication 15 MILLIGRAM(S): at 23:03

## 2022-06-03 RX ADMIN — Medication 100 MILLIGRAM(S): at 05:49

## 2022-06-03 RX ADMIN — SODIUM CHLORIDE 250 MILLILITER(S): 9 INJECTION, SOLUTION INTRAVENOUS at 23:00

## 2022-06-03 RX ADMIN — PANTOPRAZOLE SODIUM 40 MILLIGRAM(S): 20 TABLET, DELAYED RELEASE ORAL at 01:18

## 2022-06-03 RX ADMIN — Medication 1 MILLIGRAM(S): at 05:48

## 2022-06-03 RX ADMIN — SODIUM CHLORIDE 1000 MILLILITER(S): 9 INJECTION, SOLUTION INTRAVENOUS at 05:49

## 2022-06-03 NOTE — H&P ADULT - ATTENDING COMMENTS
46yF w/ early recurrence of hiatal hernia s/p sleeve/HHR 1 month ago in setting of nausea and frequent vomiting.  Dehydrated on admission    On exam, vitals stable   Afebrile  Tolerating secretions without problems  Abd soft.     Plan  IV hydration  Urgent OR for lap hiatal hernia repair.  Discussed risks and benefits with patient, all questions answered, she agrees to proceed.    Rahul Field MD

## 2022-06-03 NOTE — BRIEF OPERATIVE NOTE - ANESTHESIOLOGIST NAME
DR LEDESMA Follow Up Visit     Melissa Rodriguez returns today for follow up visit regarding Right shoulder partial-thickness rotator cuff tear of the supraspinatus/subscapularis, SLAP tear with biceps tendinosis. Treatment thus far has included Therapy x 6 weeks . He reports symptoms are improved slightly but still painful.      Physical Exam:     Height: 6' 1\" (1.854 m), Weight: 167 lb (75.8 kg) (per pt)    General: Alert and oriented x3, no acute distress  Cardiovascular/pulmonary: No labored breathing, peripheral perfusion intact  Musculoskeletal:    ***    Controlled Substances Monitoring:      Imaging:  ***      Assessment: ***      Plan:   ***    Anuj Crisostomo MD  Orthopaedic Surgery   3/9/22  9:52 AM

## 2022-06-03 NOTE — PATIENT PROFILE ADULT - OVER THE PAST TWO WEEKS, HAVE YOU FELT LITTLE INTEREST OR PLEASURE IN DOING THINGS?
Asthma is worsening 2/2 active Seasonal Allergies -uncontrolled  ACT=10 today  Start ARNUITY 100 mcg qd (Flovent not covered by insurance)   Use to be demonstrated by Lelia Brar -Respiratory Therapist  The patient is experiencing frequent daytime asthma symptoms. She is experiencing frequent nighttime asthma symptoms.  Personalized, written asthma management plan given.  Asthma information handout given.  Discussed monitoring symptoms and use of quick-relief medications and contacting us early in the course of exacerbations.  Warning signs of respiratory distress were reviewed with the patient.   Reduce exposure to inhaled allergens: use impermeable mattress and pillow covers, vacuum 2x/week (the patient should not do the vacuuming), remove carpets in bedroom, wash stuffed animals regularly if kept in bed and don't use humidifiers (may increased dust & mold).  Counseled to void exposure to cigarette smoke.  Discussed medication dosage, use, side effects, and goals of treatment in detail.    Discussed technique for using MDIs and/or nebulizer.  Follow up in 1 month, or sooner should new symptoms or problems arise.   no

## 2022-06-03 NOTE — H&P ADULT - NSHPLABSRESULTS_GEN_ALL_CORE
LABS:                        16.1   7.45  )-----------( 244      ( 02 Jun 2022 19:06 )             49.8     06-02    145  |  101  |  10  ----------------------------<  93  3.7   |  22  |  0.79    Ca    10.3      02 Jun 2022 19:06  Phos  3.6     06-02  Mg     1.9     06-02    TPro  8.4<H>  /  Alb  4.3  /  TBili  0.5  /  DBili  x   /  AST  16  /  ALT  9<L>  /  AlkPhos  115  06-02        CAPILLARY BLOOD GLUCOSE        LIVER FUNCTIONS - ( 02 Jun 2022 19:06 )  Alb: 4.3 g/dL / Pro: 8.4 g/dL / ALK PHOS: 115 U/L / ALT: 9 U/L / AST: 16 U/L / GGT: x             RADIOLOGY & ADDITIONAL STUDIES:  < from: CT Abdomen and Pelvis w/ IV Cont (06.02.22 @ 19:48) >  FINDINGS:  CHEST:  LUNGS AND LARGE AIRWAYS: Patent central airways. Mild rightward deviation   of the trachea due to mass effect from left thyroid lobe lesion. No   pulmonary nodules.  PLEURA: No pleural effusion.  VESSELS: Within normal limits.  HEART: Heart size is normal. No pericardial effusion.  MEDIASTINUM AND ARIANNA: No lymphadenopathy.  CHEST WALL AND LOWER NECK: Enlarged thyroid gland with multiple nodules,   with a dominant heterogeneous lesion with calcifications measuring 3.6 x   3.0 cm in the left lobe.    ABDOMEN AND PELVIS:  LIVER: Within normal limits.  BILE DUCTS: Normal caliber.  GALLBLADDER: Within normal limits.  SPLEEN: Hypodense splenic lesion measuring 1.8 cm, indeterminate.  PANCREAS: Within normal limits.  ADRENALS: Within normal limits.  KIDNEYS/URETERS: Horseshoe kidney. No hydronephrosis.    BLADDER: Within normal limits.  REPRODUCTIVE ORGANS: Hysterectomy.    BOWEL: Status post sleeve gastrectomy. Small hiatal hernia. No bowel   obstruction. Appendix is normal.  PERITONEUM: No ascites.  VESSELS: Within normal limits.  RETROPERITONEUM/LYMPH NODES: No lymphadenopathy.  ABDOMINAL WALL: Postsurgical changes.  BONES: Within normal limits.    IMPRESSION:  Status post sleeve gastrectomy. No bowel obstruction. Small hiatal hernia.    Enlarged thyroid gland with a 3.6 cm dominant heterogeneous lesion with   calcification in the left lobe with mild rightward tracheal deviation.   Thyroid ultrasound is recommended for further evaluation.    Small indeterminate splenic lesion.    Horseshoe kidney.    < end of copied text >

## 2022-06-03 NOTE — CHART NOTE - NSCHARTNOTEFT_GEN_A_CORE
POD 0. Patient seen at bedside on surgical floor. Patient is alert and oriented, endorses that she is in minimal pain, 3/10. She endorses she is grateful for the surgery as she does not feel an "escalator rising up in her throat anymore". She denies any chest pain, SOB, trouble swallowing, nausea, wretching. She has no concerns at this time. She is -/- bowl function at this time.    Physical exam:    Vitals:    /85 HR 90 SpO2 99 RR 18 T 36.6    General- NAD. Pleasant and responsive, non toxic appearing  Abdomen- Soft, non tender on palpation. Non distended. Non peritoneal. Port op sites c/d/i. Evidence of healed midline incision extending down from umbilicus to pubis, from distant hysterectomy. Left FAHEEM w/ SS output  Lungs- Breathing comfortably on room air  - Blood in    A/P:    47 y/o F now s/p recurrent hiatal hernia repair.       - NPO, mIVF  - Monitor for signs of wretching or heaving  - Upper GI Swallow study tomorrow  - Can advance diet if scan wnl  - Pain control  - Monitor blood  - DVT ppx    [facility]  #[id number]. POD 0. Patient seen at bedside on surgical floor. Patient is alert and oriented, endorses that she is in minimal pain, 3/10. She endorses she is grateful for the surgery as she does not feel an "escalator rising up in her throat anymore". She denies any chest pain, SOB, trouble swallowing, nausea, wretching. She has no concerns at this time. She is -/- bowl function at this time.    Physical exam:    Vitals:    /85 HR 90 SpO2 99 RR 18 T 36.6    General- NAD. Pleasant and responsive, non toxic appearing  Abdomen- Soft, non tender on palpation. Non distended. Non peritoneal. Port op sites c/d/i. Evidence of healed midline incision extending down from umbilicus to pubis, from distant hysterectomy surgery. Left FAHEEM w/ SS output  Lungs- Breathing comfortably on room air  - Blood in    A/P:    47 y/o F now s/p recurrent hiatal hernia repair.       - NPO, mIVF  - Monitor for signs of wretching or heaving  - Upper GI Swallow study tomorrow  - Can likely advance diet if scan wnl  - Pain control  - Bar antiemetics PRN  - Monitor blood  - DVT ppx    [facility]  #[id number]. POD 0. Patient seen at bedside on surgical floor. Patient is alert and oriented, endorses that she is in minimal pain, 3/10. She endorses she is grateful for the surgery as she does not feel an "escalator rising up in her throat anymore". She denies any chest pain, SOB, trouble swallowing, nausea, wretching. She has no concerns at this time. She is -/- bowl function at this time.    Physical exam:    Vitals:    /85 HR 90 SpO2 99 RR 18 T 36.6    General- NAD. Pleasant and responsive, non toxic appearing  Abdomen- Soft, non tender on palpation. Non distended. Non peritoneal. Port op sites c/d/i. Evidence of healed midline incision extending down from umbilicus to pubis, from distant hysterectomy surgery. Left FAHEEM w/ SS output  Lungs- Breathing comfortably on room air  - Blood in    A/P:    45 y/o F now s/p laparoscopic recurrent hiatal hernia repair.       - NPO, mIVF  - Monitor for signs of wretching or heaving  - Upper GI Swallow study tomorrow  - Can likely advance diet if scan wnl  - Pain control  - Bar antiemetics PRN  - Monitor blood  - DVT ppx    [facility]  #[id number]. POD 0. Patient seen at bedside on surgical floor. Patient is alert and oriented, endorses that she is in minimal pain, 3/10. She endorses she is grateful for the surgery as she does not feel an "escalator rising up in her throat anymore". She denies any chest pain, SOB, trouble swallowing, nausea, wretching. She has no concerns at this time. She is -/- bowl function at this time.    Physical exam:    Vitals:    /85 HR 90 SpO2 99 RR 18 T 36.6    General- NAD. Pleasant and responsive, non toxic appearing  Abdomen- Soft, non tender on palpation. Non distended. Non peritoneal. Port op sites c/d/i. Evidence of healed midline incision extending down from umbilicus to pubis, from distant hysterectomy surgery. Left FAHEEM w/ SS output  Lungs- Breathing comfortably on room air  - Blood in    A/P:    45 y/o F now s/p laparoscopic recurrent hiatal hernia repair.       - NPO, mIVF  - Monitor for signs of wretching or heaving  - Upper GI Swallow study tomorrow  - Can likely advance diet if scan wnl  - Pain control  - Rich antiemetics PRN  - Monitor blood  - DVT ppx    [facility]  #[id number].

## 2022-06-03 NOTE — PATIENT PROFILE ADULT - FALL HARM RISK - RISK INTERVENTIONS
Assistance OOB with selected safe patient handling equipment/Assistance with ambulation/Communicate Fall Risk and Risk Factors to all staff, patient, and family/Discuss with provider need for PT consult/Monitor gait and stability/Provide patient with walking aids - walker, cane, crutches/Reinforce activity limits and safety measures with patient and family/Sit up slowly, dangle for a short time, stand at bedside before walking/Use of alarms - bed, chair and/or voice tab/Visual Cue: Yellow wristband/Bed in lowest position, wheels locked, appropriate side rails in place/Call bell, personal items and telephone in reach/Instruct patient to call for assistance before getting out of bed or chair/Non-slip footwear when patient is out of bed/[place] to call system/Physically safe environment - no spills, clutter or unnecessary equipment/Purposeful Proactive Rounding/Room/bathroom lighting operational, light cord in reach

## 2022-06-03 NOTE — BRIEF OPERATIVE NOTE - NSICDXBRIEFPROCEDURE_GEN_ALL_CORE_FT
PROCEDURES:  Laparoscopy, diagnostic 03-Jun-2022 19:10:37  Darian Jim  Lysis of peritoneal adhesions 03-Jun-2022 19:10:58  Darian Jim  EGD 03-Jun-2022 19:11:16  Darian Jim  Laparoscopic repair of incarcerated hiatal hernia 03-Jun-2022 19:11:33  Darian Jim

## 2022-06-03 NOTE — H&P ADULT - ASSESSMENT
ASSESSMENT/PLAN:     - Admit to [facility] under Dr. Field  - Banana bag  - NPO/IVF  - Pain control  - f/u AM labs  - OR planning this admission     Patient discussed with fellow, Dr. Jim, on behalf of attending, Dr. Field.    Mag Levin MD  PGY2 Consult Resident  [facility]  [id number]   ASSESSMENT/PLAN: 46y F PMH obesity, hiatal hernia s/p robotic hiatal hernia repair and sleeve gastrectomy on 05/03/2022 presents with several weeks of post-op dysphagia, found on CT A/P to have concern for return of hiatal hernia vs. migration of gastric sleeve.     - Admit to [facility] under Dr. Field  - Banana bag  - NPO/IVF  - Pain control  - f/u AM labs  - OR planning this admission     Patient discussed with fellow, Dr. Jim, on behalf of attending, Dr. Field.    Mag Levin MD  PGY2 Consult Resident  [facility]  [id number]

## 2022-06-03 NOTE — H&P ADULT - NSHPPHYSICALEXAM_GEN_ALL_CORE
Physical Exam:  General: NAD, resting comfortably  HEENT: NC/AT, EOMI, normal hearing, no oral lesions, no LAD, neck supple  Pulmonary: normal resp effort, CTA-B  Cardiovascular: NSR, no murmurs  Abdominal: soft, ND/NT, no organomegaly  Extremities: WWP, normal strength, no clubbing/cyanosis/edema  Neuro: A/O x 3, CNs II-XII grossly intact, normal sensation, no focal deficits  Pulses: palpable distal pulses    Vital Signs Last 24 Hrs  T(C): 36.5 (02 Jun 2022 19:50), Max: 36.7 (02 Jun 2022 16:04)  T(F): 97.7 (02 Jun 2022 19:50), Max: 98.1 (02 Jun 2022 17:17)  HR: 96 (02 Jun 2022 19:50) (90 - 101)  BP: 126/78 (02 Jun 2022 19:50) (111/91 - 134/91)  BP(mean): 103 (02 Jun 2022 17:17) (103 - 103)  RR: 18 (02 Jun 2022 19:50) (16 - 18)  SpO2: 99% (02 Jun 2022 19:50) (97% - 100%) Physical Exam:  General: NAD, ill appearing  HEENT: NC/AT, EOMI, normal hearing, no oral lesions, no LAD, neck supple  Pulmonary: normal resp effort on RA  Cardiovascular: RRR  Abdominal: obese, soft, nontender, well healing surgical scars noted  Extremities: WWP, normal strength, no clubbing/cyanosis/edema  Neuro: A/O x 3, CNs II-XII grossly intact  Pulses: palpable distal pulses    Vital Signs Last 24 Hrs  T(C): 36.5 (02 Jun 2022 19:50), Max: 36.7 (02 Jun 2022 16:04)  T(F): 97.7 (02 Jun 2022 19:50), Max: 98.1 (02 Jun 2022 17:17)  HR: 96 (02 Jun 2022 19:50) (90 - 101)  BP: 126/78 (02 Jun 2022 19:50) (111/91 - 134/91)  BP(mean): 103 (02 Jun 2022 17:17) (103 - 103)  RR: 18 (02 Jun 2022 19:50) (16 - 18)  SpO2: 99% (02 Jun 2022 19:50) (97% - 100%)

## 2022-06-03 NOTE — BRIEF OPERATIVE NOTE - OPERATION/FINDINGS
SEVERE SCARRING  FAT PLANES OBLITERATED  CHRONIC INFLAMMATION  INTRA OP egd TO IDENTIFY THE GE JXN IN RELATION TO THE LAP FINDINGS.  PEXY X 2 OF THE SLEEVE TO THE HIATUS

## 2022-06-03 NOTE — H&P ADULT - HISTORY OF PRESENT ILLNESS
46y F PMH obesity, hiatal hernia s/p robotic hiatal hernia repair and sleeve gastrectomy on 05/03/2022 presents with several weeks of post-op dysphagia. Patient reports poor tolerance PO, liquids and solids, since her operation. Feels like she can only take 2-3 sips/bites before everything "gets stuck" in the center of her chest. Associated with nausea, dry heaving, rare emesis. She saw Dr. Field in the office for her first postop visit, was experiencing symptoms at that time but thought they'd go away. Passing flatus, last BM very small several days ago. Denies fevers, chills, SOB, diarrhea, melena, hematochezia, dysuria.

## 2022-06-03 NOTE — ED ADULT NURSE REASSESSMENT NOTE - NS ED NURSE REASSESS COMMENT FT1
report received from HUONG Merrill in purple. Pt AxOX3, observed sitting up in stretcher conversing with RN without difficulty. Breathing spontaneous and unlabored with pulse ox >95% on room air.  Pt updated on plan of care awaiting bed assignment, RTM surg. Repeat labwork sent.   No acute distress noted. Call bell within reach.

## 2022-06-04 LAB
ANION GAP SERPL CALC-SCNC: 19 MMOL/L — HIGH (ref 5–17)
BUN SERPL-MCNC: <4 MG/DL — LOW (ref 7–23)
CALCIUM SERPL-MCNC: 9 MG/DL — SIGNIFICANT CHANGE UP (ref 8.4–10.5)
CHLORIDE SERPL-SCNC: 108 MMOL/L — SIGNIFICANT CHANGE UP (ref 96–108)
CO2 SERPL-SCNC: 20 MMOL/L — LOW (ref 22–31)
CREAT SERPL-MCNC: 0.55 MG/DL — SIGNIFICANT CHANGE UP (ref 0.5–1.3)
EGFR: 113 ML/MIN/1.73M2 — SIGNIFICANT CHANGE UP
GLUCOSE SERPL-MCNC: 103 MG/DL — HIGH (ref 70–99)
HCT VFR BLD CALC: 38.2 % — SIGNIFICANT CHANGE UP (ref 34.5–45)
HGB BLD-MCNC: 12.5 G/DL — SIGNIFICANT CHANGE UP (ref 11.5–15.5)
MAGNESIUM SERPL-MCNC: 1.7 MG/DL — SIGNIFICANT CHANGE UP (ref 1.6–2.6)
MCHC RBC-ENTMCNC: 30.9 PG — SIGNIFICANT CHANGE UP (ref 27–34)
MCHC RBC-ENTMCNC: 32.7 GM/DL — SIGNIFICANT CHANGE UP (ref 32–36)
MCV RBC AUTO: 94.6 FL — SIGNIFICANT CHANGE UP (ref 80–100)
NRBC # BLD: 0 /100 WBCS — SIGNIFICANT CHANGE UP (ref 0–0)
PHOSPHATE SERPL-MCNC: 2.5 MG/DL — SIGNIFICANT CHANGE UP (ref 2.5–4.5)
PLATELET # BLD AUTO: 174 K/UL — SIGNIFICANT CHANGE UP (ref 150–400)
POTASSIUM SERPL-MCNC: 3.2 MMOL/L — LOW (ref 3.5–5.3)
POTASSIUM SERPL-SCNC: 3.2 MMOL/L — LOW (ref 3.5–5.3)
RBC # BLD: 4.04 M/UL — SIGNIFICANT CHANGE UP (ref 3.8–5.2)
RBC # FLD: 12.8 % — SIGNIFICANT CHANGE UP (ref 10.3–14.5)
SODIUM SERPL-SCNC: 147 MMOL/L — HIGH (ref 135–145)
WBC # BLD: 8.93 K/UL — SIGNIFICANT CHANGE UP (ref 3.8–10.5)
WBC # FLD AUTO: 8.93 K/UL — SIGNIFICANT CHANGE UP (ref 3.8–10.5)

## 2022-06-04 PROCEDURE — 74240 X-RAY XM UPR GI TRC 1CNTRST: CPT | Mod: 26

## 2022-06-04 RX ORDER — POTASSIUM CHLORIDE 20 MEQ
10 PACKET (EA) ORAL
Refills: 0 | Status: COMPLETED | OUTPATIENT
Start: 2022-06-04 | End: 2022-06-04

## 2022-06-04 RX ADMIN — Medication 0.12 MILLIGRAM(S): at 17:05

## 2022-06-04 RX ADMIN — Medication 100 MILLIGRAM(S): at 13:47

## 2022-06-04 RX ADMIN — Medication 100 MILLIEQUIVALENT(S): at 23:33

## 2022-06-04 RX ADMIN — Medication 0.12 MILLIGRAM(S): at 23:33

## 2022-06-04 RX ADMIN — Medication 0.12 MILLIGRAM(S): at 13:48

## 2022-06-04 RX ADMIN — Medication 1000 MILLIGRAM(S): at 05:44

## 2022-06-04 RX ADMIN — Medication 15 MILLIGRAM(S): at 21:31

## 2022-06-04 RX ADMIN — Medication 100 MILLIEQUIVALENT(S): at 17:34

## 2022-06-04 RX ADMIN — Medication 15 MILLIGRAM(S): at 13:48

## 2022-06-04 RX ADMIN — Medication 100 MILLIGRAM(S): at 01:53

## 2022-06-04 RX ADMIN — Medication 15 MILLIGRAM(S): at 02:25

## 2022-06-04 RX ADMIN — Medication 15 MILLIGRAM(S): at 22:01

## 2022-06-04 RX ADMIN — Medication 15 MILLIGRAM(S): at 01:55

## 2022-06-04 RX ADMIN — Medication 15 MILLIGRAM(S): at 17:05

## 2022-06-04 RX ADMIN — Medication 15 MILLIGRAM(S): at 05:16

## 2022-06-04 RX ADMIN — Medication 0.12 MILLIGRAM(S): at 05:17

## 2022-06-04 RX ADMIN — Medication 100 MILLIEQUIVALENT(S): at 20:13

## 2022-06-04 RX ADMIN — Medication 400 MILLIGRAM(S): at 05:14

## 2022-06-04 RX ADMIN — PANTOPRAZOLE SODIUM 40 MILLIGRAM(S): 20 TABLET, DELAYED RELEASE ORAL at 05:17

## 2022-06-04 RX ADMIN — Medication 100 MILLIGRAM(S): at 21:32

## 2022-06-04 RX ADMIN — Medication 15 MILLIGRAM(S): at 17:11

## 2022-06-04 RX ADMIN — Medication 15 MILLIGRAM(S): at 13:51

## 2022-06-04 RX ADMIN — SODIUM CHLORIDE 100 MILLILITER(S): 9 INJECTION, SOLUTION INTRAVENOUS at 23:32

## 2022-06-04 RX ADMIN — ENOXAPARIN SODIUM 40 MILLIGRAM(S): 100 INJECTION SUBCUTANEOUS at 05:16

## 2022-06-04 NOTE — PROGRESS NOTE ADULT - SUBJECTIVE AND OBJECTIVE BOX
Surgery Progress Note    Subjective:     Patient seen and examined at bedside. Patient without complaints this AM. Denies N/V/F/C.     OBJECTIVE:     T(C): 36.6 (06-03-22 @ 23:44), Max: 37.1 (06-03-22 @ 21:45)  HR: 77 (06-03-22 @ 23:44) (76 - 90)  BP: 126/76 (06-03-22 @ 23:44) (88/67 - 135/85)  RR: 18 (06-03-22 @ 23:44) (16 - 18)  SpO2: 98% (06-03-22 @ 23:44) (95% - 100%)  Wt(kg): --    I&O's Detail    03 Jun 2022 07:01  -  04 Jun 2022 00:12  --------------------------------------------------------  IN:    IV PiggyBack: 100 mL    Lactated Ringers: 300 mL  Total IN: 400 mL    OUT:    Bulb (mL): 35 mL    Indwelling Catheter - Urethral (mL): 130 mL    Oral Fluid: 0 mL  Total OUT: 165 mL    Total NET: 235 mL          PHYSICAL EXAM:    GENERAL: NAD, lying in bed comfortably  HEAD:  Atraumatic, Normocephalic  ENT: Moist mucous membranes  CHEST/LUNG: Unlabored respirations  ABDOMEN: Soft, Nontender, Nondistended.   NERVOUS SYSTEM:  Alert & Oriented X3, speech clear.   SKIN: No rashes or lesions    MEDICATIONS  (STANDING):  acetaminophen   IVPB .. 1000 milliGRAM(s) IV Intermittent once  ceFAZolin   IVPB      ceFAZolin   IVPB 1000 milliGRAM(s) IV Intermittent once  ceFAZolin   IVPB 1000 milliGRAM(s) IV Intermittent every 8 hours  enoxaparin Injectable 40 milliGRAM(s) SubCutaneous every 24 hours  fosaprepitant IVPB 150 milliGRAM(s) IV Intermittent once  hyoscyamine SL 0.125 milliGRAM(s) SubLingual every 6 hours  ketorolac   Injectable 15 milliGRAM(s) IV Push every 4 hours  lactated ringers. 1000 milliLiter(s) (100 mL/Hr) IV Continuous <Continuous>  ondansetron Injectable 4 milliGRAM(s) IV Push once  pantoprazole  Injectable 40 milliGRAM(s) IV Push every 24 hours  sodium chloride 0.9% 1000 milliLiter(s) (250 mL/Hr) IV Continuous <Continuous>    MEDICATIONS  (PRN):  haloperidol IVPB 0.5 milliGRAM(s) IV Intermittent every 4 hours PRN N and v  LORazepam   Injectable 0.5 milliGRAM(s) IV Push four times a day PRN Nausea and/or Vomiting      LABS:                          13.3   11.24 )-----------( 184      ( 03 Jun 2022 20:19 )             41.8     06-03    145  |  109<H>  |  6<L>  ----------------------------<  135<H>  3.6   |  15<L>  |  0.71    Ca    8.7      03 Jun 2022 20:19  Phos  3.5     06-03  Mg     1.9     06-03    TPro  7.5  /  Alb  3.9  /  TBili  0.4  /  DBili  x   /  AST  12  /  ALT  8<L>  /  AlkPhos  100  06-03    PT/INR - ( 03 Jun 2022 07:43 )   PT: 14.8 sec;   INR: 1.27 ratio         PTT - ( 03 Jun 2022 07:43 )  PTT:29.6 sec           Surgery Progress Note    Subjective:     Patient seen and examined at bedside. POD 1. VSS, AF. No acute events overnight.     OBJECTIVE:     T(C): 36.6 (06-03-22 @ 23:44), Max: 37.1 (06-03-22 @ 21:45)  HR: 77 (06-03-22 @ 23:44) (76 - 90)  BP: 126/76 (06-03-22 @ 23:44) (88/67 - 135/85)  RR: 18 (06-03-22 @ 23:44) (16 - 18)  SpO2: 98% (06-03-22 @ 23:44) (95% - 100%)  Wt(kg): --    I&O's Detail    03 Jun 2022 07:01  -  04 Jun 2022 00:12  --------------------------------------------------------  IN:    IV PiggyBack: 100 mL    Lactated Ringers: 300 mL  Total IN: 400 mL    OUT:    Bulb (mL): 35 mL    Indwelling Catheter - Urethral (mL): 130 mL    Oral Fluid: 0 mL  Total OUT: 165 mL    Total NET: 235 mL          PHYSICAL EXAM:    General- NAD. Pleasant and responsive, non toxic appearing  Abdomen- Soft, non tender on palpation. Non distended. Non peritoneal. Port op sites c/d/i. Evidence of healed midline incision extending down from umbilicus to pubis, from distant hysterectomy surgery. Left FAHEEM w/ SS output  Lungs- Breathing comfortably on room air  - Crawford in    MEDICATIONS  (STANDING):  acetaminophen   IVPB .. 1000 milliGRAM(s) IV Intermittent once  ceFAZolin   IVPB      ceFAZolin   IVPB 1000 milliGRAM(s) IV Intermittent once  ceFAZolin   IVPB 1000 milliGRAM(s) IV Intermittent every 8 hours  enoxaparin Injectable 40 milliGRAM(s) SubCutaneous every 24 hours  fosaprepitant IVPB 150 milliGRAM(s) IV Intermittent once  hyoscyamine SL 0.125 milliGRAM(s) SubLingual every 6 hours  ketorolac   Injectable 15 milliGRAM(s) IV Push every 4 hours  lactated ringers. 1000 milliLiter(s) (100 mL/Hr) IV Continuous <Continuous>  ondansetron Injectable 4 milliGRAM(s) IV Push once  pantoprazole  Injectable 40 milliGRAM(s) IV Push every 24 hours  sodium chloride 0.9% 1000 milliLiter(s) (250 mL/Hr) IV Continuous <Continuous>    MEDICATIONS  (PRN):  haloperidol IVPB 0.5 milliGRAM(s) IV Intermittent every 4 hours PRN N and v  LORazepam   Injectable 0.5 milliGRAM(s) IV Push four times a day PRN Nausea and/or Vomiting      LABS:                          13.3   11.24 )-----------( 184      ( 03 Jun 2022 20:19 )             41.8     06-03    145  |  109<H>  |  6<L>  ----------------------------<  135<H>  3.6   |  15<L>  |  0.71    Ca    8.7      03 Jun 2022 20:19  Phos  3.5     06-03  Mg     1.9     06-03    TPro  7.5  /  Alb  3.9  /  TBili  0.4  /  DBili  x   /  AST  12  /  ALT  8<L>  /  AlkPhos  100  06-03    PT/INR - ( 03 Jun 2022 07:43 )   PT: 14.8 sec;   INR: 1.27 ratio         PTT - ( 03 Jun 2022 07:43 )  PTT:29.6 sec           Surgery Progress Note    Subjective:     Patient seen and examined at bedside. POD 1. VSS, AF. No acute events overnight. -/- bowl function    OBJECTIVE:     T(C): 36.6 (06-03-22 @ 23:44), Max: 37.1 (06-03-22 @ 21:45)  HR: 77 (06-03-22 @ 23:44) (76 - 90)  BP: 126/76 (06-03-22 @ 23:44) (88/67 - 135/85)  RR: 18 (06-03-22 @ 23:44) (16 - 18)  SpO2: 98% (06-03-22 @ 23:44) (95% - 100%)  Wt(kg): --    I&O's Detail    03 Jun 2022 07:01  -  04 Jun 2022 00:12  --------------------------------------------------------  IN:    IV PiggyBack: 100 mL    Lactated Ringers: 300 mL  Total IN: 400 mL    OUT:    Bulb (mL): 35 mL    Indwelling Catheter - Urethral (mL): 130 mL    Oral Fluid: 0 mL  Total OUT: 165 mL    Total NET: 235 mL          PHYSICAL EXAM:    General- NAD. Pleasant and responsive, non toxic appearing  Abdomen- Soft, non tender on palpation. Non distended. Non peritoneal. Port op sites c/d/i. Evidence of healed midline incision extending down from umbilicus to pubis, from distant hysterectomy surgery. Left FAHEEM w/ SS output  Lungs- Breathing comfortably on room air  - Crawford in    MEDICATIONS  (STANDING):  acetaminophen   IVPB .. 1000 milliGRAM(s) IV Intermittent once  ceFAZolin   IVPB      ceFAZolin   IVPB 1000 milliGRAM(s) IV Intermittent once  ceFAZolin   IVPB 1000 milliGRAM(s) IV Intermittent every 8 hours  enoxaparin Injectable 40 milliGRAM(s) SubCutaneous every 24 hours  fosaprepitant IVPB 150 milliGRAM(s) IV Intermittent once  hyoscyamine SL 0.125 milliGRAM(s) SubLingual every 6 hours  ketorolac   Injectable 15 milliGRAM(s) IV Push every 4 hours  lactated ringers. 1000 milliLiter(s) (100 mL/Hr) IV Continuous <Continuous>  ondansetron Injectable 4 milliGRAM(s) IV Push once  pantoprazole  Injectable 40 milliGRAM(s) IV Push every 24 hours  sodium chloride 0.9% 1000 milliLiter(s) (250 mL/Hr) IV Continuous <Continuous>    MEDICATIONS  (PRN):  haloperidol IVPB 0.5 milliGRAM(s) IV Intermittent every 4 hours PRN N and v  LORazepam   Injectable 0.5 milliGRAM(s) IV Push four times a day PRN Nausea and/or Vomiting      LABS:                          13.3   11.24 )-----------( 184      ( 03 Jun 2022 20:19 )             41.8     06-03    145  |  109<H>  |  6<L>  ----------------------------<  135<H>  3.6   |  15<L>  |  0.71    Ca    8.7      03 Jun 2022 20:19  Phos  3.5     06-03  Mg     1.9     06-03    TPro  7.5  /  Alb  3.9  /  TBili  0.4  /  DBili  x   /  AST  12  /  ALT  8<L>  /  AlkPhos  100  06-03    PT/INR - ( 03 Jun 2022 07:43 )   PT: 14.8 sec;   INR: 1.27 ratio         PTT - ( 03 Jun 2022 07:43 )  PTT:29.6 sec           Surgery Progress Note    Subjective:     Patient seen and examined at bedside. POD 1. VSS, AF. No acute events overnight. -/- bowl function. Patient with no complaints of pain overnight    OBJECTIVE:     T(C): 36.6 (06-03-22 @ 23:44), Max: 37.1 (06-03-22 @ 21:45)  HR: 77 (06-03-22 @ 23:44) (76 - 90)  BP: 126/76 (06-03-22 @ 23:44) (88/67 - 135/85)  RR: 18 (06-03-22 @ 23:44) (16 - 18)  SpO2: 98% (06-03-22 @ 23:44) (95% - 100%)  Wt(kg): --    I&O's Detail    03 Jun 2022 07:01  -  04 Jun 2022 00:12  --------------------------------------------------------  IN:    IV PiggyBack: 100 mL    Lactated Ringers: 300 mL  Total IN: 400 mL    OUT:    Bulb (mL): 35 mL    Indwelling Catheter - Urethral (mL): 130 mL    Oral Fluid: 0 mL  Total OUT: 165 mL    Total NET: 235 mL          PHYSICAL EXAM:    General- NAD. Pleasant and responsive, non toxic appearing  Abdomen- Soft, non tender on palpation. Non distended. Non peritoneal. Port op sites c/d/i. Evidence of healed midline incision extending down from umbilicus to pubis, from distant hysterectomy surgery. Left FAHEEM w/ SS output  Lungs- Breathing comfortably on room air  - Crawford in    MEDICATIONS  (STANDING):  acetaminophen   IVPB .. 1000 milliGRAM(s) IV Intermittent once  ceFAZolin   IVPB      ceFAZolin   IVPB 1000 milliGRAM(s) IV Intermittent once  ceFAZolin   IVPB 1000 milliGRAM(s) IV Intermittent every 8 hours  enoxaparin Injectable 40 milliGRAM(s) SubCutaneous every 24 hours  fosaprepitant IVPB 150 milliGRAM(s) IV Intermittent once  hyoscyamine SL 0.125 milliGRAM(s) SubLingual every 6 hours  ketorolac   Injectable 15 milliGRAM(s) IV Push every 4 hours  lactated ringers. 1000 milliLiter(s) (100 mL/Hr) IV Continuous <Continuous>  ondansetron Injectable 4 milliGRAM(s) IV Push once  pantoprazole  Injectable 40 milliGRAM(s) IV Push every 24 hours  sodium chloride 0.9% 1000 milliLiter(s) (250 mL/Hr) IV Continuous <Continuous>    MEDICATIONS  (PRN):  haloperidol IVPB 0.5 milliGRAM(s) IV Intermittent every 4 hours PRN N and v  LORazepam   Injectable 0.5 milliGRAM(s) IV Push four times a day PRN Nausea and/or Vomiting      LABS:                          13.3   11.24 )-----------( 184      ( 03 Jun 2022 20:19 )             41.8     06-03    145  |  109<H>  |  6<L>  ----------------------------<  135<H>  3.6   |  15<L>  |  0.71    Ca    8.7      03 Jun 2022 20:19  Phos  3.5     06-03  Mg     1.9     06-03    TPro  7.5  /  Alb  3.9  /  TBili  0.4  /  DBili  x   /  AST  12  /  ALT  8<L>  /  AlkPhos  100  06-03    PT/INR - ( 03 Jun 2022 07:43 )   PT: 14.8 sec;   INR: 1.27 ratio         PTT - ( 03 Jun 2022 07:43 )  PTT:29.6 sec

## 2022-06-04 NOTE — PROGRESS NOTE ADULT - ASSESSMENT
45 y/o F now s/p laparoscopic recurrent hiatal hernia repair recovering well on floor.       - NPO, mIVF  - Monitor for signs of wretching or heaving  - Upper GI Swallow study today  - Can likely advance diet if scan wnl  - Pain control  - Bar antiemetics PRN  - Monitor blood  - DVT ppx    [facility]  #[id number].

## 2022-06-05 LAB
ANION GAP SERPL CALC-SCNC: 15 MMOL/L — SIGNIFICANT CHANGE UP (ref 5–17)
BUN SERPL-MCNC: <4 MG/DL — LOW (ref 7–23)
CALCIUM SERPL-MCNC: 8.9 MG/DL — SIGNIFICANT CHANGE UP (ref 8.4–10.5)
CHLORIDE SERPL-SCNC: 105 MMOL/L — SIGNIFICANT CHANGE UP (ref 96–108)
CO2 SERPL-SCNC: 23 MMOL/L — SIGNIFICANT CHANGE UP (ref 22–31)
CREAT SERPL-MCNC: 0.51 MG/DL — SIGNIFICANT CHANGE UP (ref 0.5–1.3)
EGFR: 115 ML/MIN/1.73M2 — SIGNIFICANT CHANGE UP
GLUCOSE SERPL-MCNC: 89 MG/DL — SIGNIFICANT CHANGE UP (ref 70–99)
HCT VFR BLD CALC: 35.4 % — SIGNIFICANT CHANGE UP (ref 34.5–45)
HGB BLD-MCNC: 11.4 G/DL — LOW (ref 11.5–15.5)
MAGNESIUM SERPL-MCNC: 1.6 MG/DL — SIGNIFICANT CHANGE UP (ref 1.6–2.6)
MCHC RBC-ENTMCNC: 30.6 PG — SIGNIFICANT CHANGE UP (ref 27–34)
MCHC RBC-ENTMCNC: 32.2 GM/DL — SIGNIFICANT CHANGE UP (ref 32–36)
MCV RBC AUTO: 94.9 FL — SIGNIFICANT CHANGE UP (ref 80–100)
NRBC # BLD: 0 /100 WBCS — SIGNIFICANT CHANGE UP (ref 0–0)
PHOSPHATE SERPL-MCNC: 2 MG/DL — LOW (ref 2.5–4.5)
PLATELET # BLD AUTO: 153 K/UL — SIGNIFICANT CHANGE UP (ref 150–400)
POTASSIUM SERPL-MCNC: 3.1 MMOL/L — LOW (ref 3.5–5.3)
POTASSIUM SERPL-SCNC: 3.1 MMOL/L — LOW (ref 3.5–5.3)
RBC # BLD: 3.73 M/UL — LOW (ref 3.8–5.2)
RBC # FLD: 12.9 % — SIGNIFICANT CHANGE UP (ref 10.3–14.5)
SODIUM SERPL-SCNC: 143 MMOL/L — SIGNIFICANT CHANGE UP (ref 135–145)
WBC # BLD: 5.9 K/UL — SIGNIFICANT CHANGE UP (ref 3.8–10.5)
WBC # FLD AUTO: 5.9 K/UL — SIGNIFICANT CHANGE UP (ref 3.8–10.5)

## 2022-06-05 RX ORDER — SODIUM,POTASSIUM PHOSPHATES 278-250MG
1 POWDER IN PACKET (EA) ORAL ONCE
Refills: 0 | Status: COMPLETED | OUTPATIENT
Start: 2022-06-05 | End: 2022-06-05

## 2022-06-05 RX ORDER — POTASSIUM CHLORIDE 20 MEQ
20 PACKET (EA) ORAL
Refills: 0 | Status: DISCONTINUED | OUTPATIENT
Start: 2022-06-05 | End: 2022-06-05

## 2022-06-05 RX ORDER — MAGNESIUM SULFATE 500 MG/ML
2 VIAL (ML) INJECTION ONCE
Refills: 0 | Status: DISCONTINUED | OUTPATIENT
Start: 2022-06-05 | End: 2022-06-05

## 2022-06-05 RX ORDER — POTASSIUM PHOSPHATE, MONOBASIC POTASSIUM PHOSPHATE, DIBASIC 236; 224 MG/ML; MG/ML
15 INJECTION, SOLUTION INTRAVENOUS ONCE
Refills: 0 | Status: DISCONTINUED | OUTPATIENT
Start: 2022-06-05 | End: 2022-06-05

## 2022-06-05 RX ORDER — MAGNESIUM SULFATE 500 MG/ML
2 VIAL (ML) INJECTION ONCE
Refills: 0 | Status: COMPLETED | OUTPATIENT
Start: 2022-06-05 | End: 2022-06-06

## 2022-06-05 RX ORDER — POTASSIUM CHLORIDE 20 MEQ
40 PACKET (EA) ORAL ONCE
Refills: 0 | Status: DISCONTINUED | OUTPATIENT
Start: 2022-06-05 | End: 2022-06-05

## 2022-06-05 RX ADMIN — Medication 15 MILLIGRAM(S): at 05:35

## 2022-06-05 RX ADMIN — Medication 0.12 MILLIGRAM(S): at 14:32

## 2022-06-05 RX ADMIN — Medication 100 MILLIGRAM(S): at 23:54

## 2022-06-05 RX ADMIN — Medication 0.12 MILLIGRAM(S): at 18:44

## 2022-06-05 RX ADMIN — PANTOPRAZOLE SODIUM 40 MILLIGRAM(S): 20 TABLET, DELAYED RELEASE ORAL at 05:06

## 2022-06-05 RX ADMIN — Medication 15 MILLIGRAM(S): at 01:53

## 2022-06-05 RX ADMIN — Medication 20 MILLIEQUIVALENT(S): at 20:49

## 2022-06-05 RX ADMIN — Medication 15 MILLIGRAM(S): at 22:29

## 2022-06-05 RX ADMIN — Medication 15 MILLIGRAM(S): at 05:05

## 2022-06-05 RX ADMIN — Medication 100 MILLIGRAM(S): at 05:08

## 2022-06-05 RX ADMIN — Medication 15 MILLIGRAM(S): at 09:46

## 2022-06-05 RX ADMIN — ENOXAPARIN SODIUM 40 MILLIGRAM(S): 100 INJECTION SUBCUTANEOUS at 05:07

## 2022-06-05 RX ADMIN — Medication 1 PACKET(S): at 22:47

## 2022-06-05 RX ADMIN — Medication 15 MILLIGRAM(S): at 02:58

## 2022-06-05 RX ADMIN — Medication 0.12 MILLIGRAM(S): at 05:06

## 2022-06-05 RX ADMIN — Medication 15 MILLIGRAM(S): at 09:16

## 2022-06-05 RX ADMIN — Medication 1 PACKET(S): at 23:33

## 2022-06-05 RX ADMIN — Medication 15 MILLIGRAM(S): at 02:23

## 2022-06-05 NOTE — PROGRESS NOTE ADULT - ASSESSMENT
45 y/o F now s/p laparoscopic recurrent hiatal hernia repair recovering well on floor.       - Bariatric CLD, IVF   - Upper GI Swallow study: No evidence of leak with  expected postsurgical narrowing at the gastroesophageal junction with mildly delayed passage of contrast into   the stomach.  - Pain control  - Bar antiemetics PRN  - Monitor blood  - DVT ppx    [facility]  #[id number]. 45 y/o F now s/p laparoscopic recurrent hiatal hernia repair recovering well on floor.       - Bariatric CLD  - Decrease fluids to 50cc  - Upper GI Swallow study: No evidence of leak with  expected postsurgical narrowing at the gastroesophageal junction with mildly delayed passage of contrast into   the stomach.  - Pain control  - Bar antiemetics PRN  - Monitor blood  - DVT ppx    [facility]  #[id number]. 45 y/o F now s/p laparoscopic recurrent hiatal hernia repair recovering well on floor.       - Bariatric CLD  - Decrease fluids to 50cc  - Upper GI Swallow study: No evidence of leak with  expected postsurgical narrowing at the gastroesophageal junction with mildly delayed passage of contrast into   the stomach.  - Pain control  - Bar antiemetics PRN  - DVT ppx    [facility]  #[id number].

## 2022-06-05 NOTE — PROGRESS NOTE ADULT - SUBJECTIVE AND OBJECTIVE BOX
Subjective:   Patient seen and examined at bedside. No acute events overnight.  Completed swallow study yeseterday and advanced to bariatric CLD.  (-/-)        Objective:   Vital Signs Last 24 Hrs  T(C): 36.8 (04 Jun 2022 20:47), Max: 37.2 (04 Jun 2022 16:44)  T(F): 98.3 (04 Jun 2022 20:47), Max: 99 (04 Jun 2022 16:44)  HR: 82 (04 Jun 2022 20:47) (66 - 90)  BP: 105/58 (04 Jun 2022 20:47) (105/58 - 139/83)  BP(mean): --  RR: 18 (04 Jun 2022 20:47) (18 - 18)  SpO2: 98% (04 Jun 2022 20:47) (98% - 99%)  I&O's Summary    03 Jun 2022 07:01  -  04 Jun 2022 07:00  --------------------------------------------------------  IN: 1550 mL / OUT: 905 mL / NET: 645 mL    04 Jun 2022 07:01  -  05 Jun 2022 00:46  --------------------------------------------------------  IN: 1650 mL / OUT: 455 mL / NET: 1195 mL        Physical Exam:  General- NAD. Pleasant and responsive, non toxic appearing  Abdomen- Soft, non tender on palpation. Non distended. Non peritoneal. Port op sites c/d/i. Evidence of healed midline incision extending down from umbilicus to pubis, from distant hysterectomy surgery. Left FAHEEM w/ SS output  Lungs- Breathing comfortably on room air  ABHISHEK Crawford in    LABS:                        12.5   8.93  )-----------( 174      ( 04 Jun 2022 16:21 )             38.2     06-04    147<H>  |  108  |  <4<L>  ----------------------------<  103<H>  3.2<L>   |  20<L>  |  0.55    Ca    9.0      04 Jun 2022 16:21  Phos  2.5     06-04  Mg     1.7     06-04    TPro  7.5  /  Alb  3.9  /  TBili  0.4  /  DBili  x   /  AST  12  /  ALT  8<L>  /  AlkPhos  100  06-03    PT/INR - ( 03 Jun 2022 07:43 )   PT: 14.8 sec;   INR: 1.27 ratio         PTT - ( 03 Jun 2022 07:43 )  PTT:29.6 sec    ceFAZolin   IVPB   ceFAZolin   IVPB 1000  ceFAZolin   IVPB   ceFAZolin   IVPB 1000  enoxaparin Injectable 40      Radiology and Additional Studies:    Assessment and Plan:  Subjective:   Patient seen and examined at bedside. No acute events overnight.  Completed swallow study yeseterday and advanced to bariatric CLD.  (-/-)        Objective:   Vital Signs Last 24 Hrs  T(C): 36.8 (04 Jun 2022 20:47), Max: 37.2 (04 Jun 2022 16:44)  T(F): 98.3 (04 Jun 2022 20:47), Max: 99 (04 Jun 2022 16:44)  HR: 82 (04 Jun 2022 20:47) (66 - 90)  BP: 105/58 (04 Jun 2022 20:47) (105/58 - 139/83)  BP(mean): --  RR: 18 (04 Jun 2022 20:47) (18 - 18)  SpO2: 98% (04 Jun 2022 20:47) (98% - 99%)  I&O's Summary    03 Jun 2022 07:01  -  04 Jun 2022 07:00  --------------------------------------------------------  IN: 1550 mL / OUT: 905 mL / NET: 645 mL    04 Jun 2022 07:01  -  05 Jun 2022 00:46  --------------------------------------------------------  IN: 1650 mL / OUT: 455 mL / NET: 1195 mL        Physical Exam:  General- NAD. Pleasant and responsive, non toxic appearing  Abdomen- Soft, non tender on palpation. Non distended. Non peritoneal. Port op sites c/d/i. Evidence of healed midline incision extending down from umbilicus to pubis, from distant hysterectomy surgery. Left FAHEEM w/ SS output  Lungs- Breathing comfortably on room air      LABS:                        12.5   8.93  )-----------( 174      ( 04 Jun 2022 16:21 )             38.2     06-04    147<H>  |  108  |  <4<L>  ----------------------------<  103<H>  3.2<L>   |  20<L>  |  0.55    Ca    9.0      04 Jun 2022 16:21  Phos  2.5     06-04  Mg     1.7     06-04    TPro  7.5  /  Alb  3.9  /  TBili  0.4  /  DBili  x   /  AST  12  /  ALT  8<L>  /  AlkPhos  100  06-03    PT/INR - ( 03 Jun 2022 07:43 )   PT: 14.8 sec;   INR: 1.27 ratio         PTT - ( 03 Jun 2022 07:43 )  PTT:29.6 sec    ceFAZolin   IVPB   ceFAZolin   IVPB 1000  ceFAZolin   IVPB   ceFAZolin   IVPB 1000  enoxaparin Injectable 40      Radiology and Additional Studies:    Assessment and Plan:

## 2022-06-06 ENCOUNTER — TRANSCRIPTION ENCOUNTER (OUTPATIENT)
Age: 47
End: 2022-06-06

## 2022-06-06 LAB
ALBUMIN SERPL ELPH-MCNC: 3.2 G/DL — LOW (ref 3.3–5)
ALP SERPL-CCNC: 72 U/L — SIGNIFICANT CHANGE UP (ref 40–120)
ALT FLD-CCNC: 18 U/L — SIGNIFICANT CHANGE UP (ref 10–45)
ANION GAP SERPL CALC-SCNC: 16 MMOL/L — SIGNIFICANT CHANGE UP (ref 5–17)
AST SERPL-CCNC: 15 U/L — SIGNIFICANT CHANGE UP (ref 10–40)
BILIRUB SERPL-MCNC: 0.4 MG/DL — SIGNIFICANT CHANGE UP (ref 0.2–1.2)
BUN SERPL-MCNC: <4 MG/DL — LOW (ref 7–23)
CALCIUM SERPL-MCNC: 8.7 MG/DL — SIGNIFICANT CHANGE UP (ref 8.4–10.5)
CHLORIDE SERPL-SCNC: 101 MMOL/L — SIGNIFICANT CHANGE UP (ref 96–108)
CO2 SERPL-SCNC: 26 MMOL/L — SIGNIFICANT CHANGE UP (ref 22–31)
CREAT SERPL-MCNC: 0.56 MG/DL — SIGNIFICANT CHANGE UP (ref 0.5–1.3)
EGFR: 113 ML/MIN/1.73M2 — SIGNIFICANT CHANGE UP
GLUCOSE SERPL-MCNC: 85 MG/DL — SIGNIFICANT CHANGE UP (ref 70–99)
HCT VFR BLD CALC: 34.9 % — SIGNIFICANT CHANGE UP (ref 34.5–45)
HGB BLD-MCNC: 11.5 G/DL — SIGNIFICANT CHANGE UP (ref 11.5–15.5)
MAGNESIUM SERPL-MCNC: 1.5 MG/DL — LOW (ref 1.6–2.6)
MCHC RBC-ENTMCNC: 31 PG — SIGNIFICANT CHANGE UP (ref 27–34)
MCHC RBC-ENTMCNC: 33 GM/DL — SIGNIFICANT CHANGE UP (ref 32–36)
MCV RBC AUTO: 94.1 FL — SIGNIFICANT CHANGE UP (ref 80–100)
NRBC # BLD: 0 /100 WBCS — SIGNIFICANT CHANGE UP (ref 0–0)
PHOSPHATE SERPL-MCNC: 2.9 MG/DL — SIGNIFICANT CHANGE UP (ref 2.5–4.5)
PLATELET # BLD AUTO: 157 K/UL — SIGNIFICANT CHANGE UP (ref 150–400)
POTASSIUM SERPL-MCNC: 3.4 MMOL/L — LOW (ref 3.5–5.3)
POTASSIUM SERPL-SCNC: 3.4 MMOL/L — LOW (ref 3.5–5.3)
PROT SERPL-MCNC: 5.9 G/DL — LOW (ref 6–8.3)
RBC # BLD: 3.71 M/UL — LOW (ref 3.8–5.2)
RBC # FLD: 12.6 % — SIGNIFICANT CHANGE UP (ref 10.3–14.5)
SODIUM SERPL-SCNC: 143 MMOL/L — SIGNIFICANT CHANGE UP (ref 135–145)
WBC # BLD: 5.73 K/UL — SIGNIFICANT CHANGE UP (ref 3.8–10.5)
WBC # FLD AUTO: 5.73 K/UL — SIGNIFICANT CHANGE UP (ref 3.8–10.5)

## 2022-06-06 RX ORDER — MAGNESIUM SULFATE 500 MG/ML
2 VIAL (ML) INJECTION ONCE
Refills: 0 | Status: COMPLETED | OUTPATIENT
Start: 2022-06-06 | End: 2022-06-06

## 2022-06-06 RX ORDER — SIMETHICONE 80 MG/1
80 TABLET, CHEWABLE ORAL THREE TIMES A DAY
Refills: 0 | Status: DISCONTINUED | OUTPATIENT
Start: 2022-06-06 | End: 2022-06-07

## 2022-06-06 RX ORDER — POTASSIUM CHLORIDE 20 MEQ
10 PACKET (EA) ORAL
Refills: 0 | Status: COMPLETED | OUTPATIENT
Start: 2022-06-06 | End: 2022-06-06

## 2022-06-06 RX ADMIN — Medication 25 GRAM(S): at 08:41

## 2022-06-06 RX ADMIN — SIMETHICONE 80 MILLIGRAM(S): 80 TABLET, CHEWABLE ORAL at 21:29

## 2022-06-06 RX ADMIN — Medication 100 MILLIGRAM(S): at 21:30

## 2022-06-06 RX ADMIN — Medication 15 MILLIGRAM(S): at 05:40

## 2022-06-06 RX ADMIN — Medication 100 MILLIEQUIVALENT(S): at 13:19

## 2022-06-06 RX ADMIN — Medication 15 MILLIGRAM(S): at 06:10

## 2022-06-06 RX ADMIN — ENOXAPARIN SODIUM 40 MILLIGRAM(S): 100 INJECTION SUBCUTANEOUS at 05:40

## 2022-06-06 RX ADMIN — Medication 100 MILLIEQUIVALENT(S): at 17:21

## 2022-06-06 RX ADMIN — Medication 100 MILLIGRAM(S): at 12:27

## 2022-06-06 RX ADMIN — Medication 0.12 MILLIGRAM(S): at 17:21

## 2022-06-06 RX ADMIN — Medication 100 MILLIEQUIVALENT(S): at 12:27

## 2022-06-06 RX ADMIN — Medication 0.12 MILLIGRAM(S): at 12:27

## 2022-06-06 RX ADMIN — Medication 15 MILLIGRAM(S): at 21:32

## 2022-06-06 RX ADMIN — Medication 15 MILLIGRAM(S): at 17:21

## 2022-06-06 RX ADMIN — SIMETHICONE 80 MILLIGRAM(S): 80 TABLET, CHEWABLE ORAL at 08:28

## 2022-06-06 RX ADMIN — Medication 15 MILLIGRAM(S): at 10:05

## 2022-06-06 RX ADMIN — Medication 15 MILLIGRAM(S): at 13:19

## 2022-06-06 RX ADMIN — PANTOPRAZOLE SODIUM 40 MILLIGRAM(S): 20 TABLET, DELAYED RELEASE ORAL at 05:40

## 2022-06-06 RX ADMIN — SIMETHICONE 80 MILLIGRAM(S): 80 TABLET, CHEWABLE ORAL at 13:21

## 2022-06-06 RX ADMIN — Medication 15 MILLIGRAM(S): at 17:51

## 2022-06-06 RX ADMIN — Medication 15 MILLIGRAM(S): at 13:49

## 2022-06-06 RX ADMIN — SODIUM CHLORIDE 50 MILLILITER(S): 9 INJECTION, SOLUTION INTRAVENOUS at 17:22

## 2022-06-06 RX ADMIN — Medication 15 MILLIGRAM(S): at 22:02

## 2022-06-06 RX ADMIN — Medication 100 MILLIGRAM(S): at 05:40

## 2022-06-06 RX ADMIN — Medication 15 MILLIGRAM(S): at 10:35

## 2022-06-06 RX ADMIN — Medication 0.12 MILLIGRAM(S): at 05:41

## 2022-06-06 RX ADMIN — Medication 15 MILLIGRAM(S): at 02:28

## 2022-06-06 NOTE — DISCHARGE NOTE PROVIDER - NSDCFUADDINST_GEN_ALL_CORE_FT
Use incentive spirometry four times daily for 10 days. Take your temperature daily and report fever and chills to your surgeon's office    Walk daily until you can walk 30 minutes at a time. Avoid heavy lifting or straining for 8 weeks. When you do lift, keep your back straight and bend at the knees allowing your legs to do most of the work.     Take medications as prescribed by your doctor. Resume any medications you took prior to surgery if permitted by your doctor. Medication should be liquid, chewable or crushed. Please note extended release medication cannot be crushed and should be changed by your doctor.     Please follow the carloz diet for the next week please continue Carloz clears then will go to Bariatric full liquids x 2 weeks. Then soft/puree for 30 days. Please follow up with Dr. Field

## 2022-06-06 NOTE — DIETITIAN INITIAL EVALUATION ADULT - PERTINENT MEDS FT
MEDICATIONS  (STANDING):  ceFAZolin   IVPB      ceFAZolin   IVPB 1000 milliGRAM(s) IV Intermittent every 8 hours  enoxaparin Injectable 40 milliGRAM(s) SubCutaneous every 24 hours  fosaprepitant IVPB 150 milliGRAM(s) IV Intermittent once  hyoscyamine SL 0.125 milliGRAM(s) SubLingual every 6 hours  ketorolac   Injectable 15 milliGRAM(s) IV Push every 4 hours  lactated ringers. 1000 milliLiter(s) (50 mL/Hr) IV Continuous <Continuous>  magnesium sulfate  IVPB 2 Gram(s) IV Intermittent once  ondansetron Injectable 4 milliGRAM(s) IV Push once  pantoprazole  Injectable 40 milliGRAM(s) IV Push every 24 hours  potassium chloride  10 mEq/100 mL IVPB 10 milliEquivalent(s) IV Intermittent every 1 hour  simethicone 80 milliGRAM(s) Chew three times a day  sodium chloride 0.9% 1000 milliLiter(s) (250 mL/Hr) IV Continuous <Continuous>    MEDICATIONS  (PRN):  haloperidol IVPB 0.5 milliGRAM(s) IV Intermittent every 4 hours PRN N and v  LORazepam   Injectable 0.5 milliGRAM(s) IV Push four times a day PRN Nausea and/or Vomiting

## 2022-06-06 NOTE — DIETITIAN INITIAL EVALUATION ADULT - NS FNS WEIGHT CHANGE REASON
combination of intentional (s/p bariatric surgery with dietary restrictions) vs unintentional (poor tolerance to PO intake)

## 2022-06-06 NOTE — DISCHARGE NOTE PROVIDER - NSDCFUADDAPPT_GEN_ALL_CORE_FT
Please make an appointment and follow up outpatient with Dr. Field within 5-7 days. Your FAHEEM drain will be taken out in the office.  Please make an appointment and follow up with your Primary Care Physician in 1-2 weeks

## 2022-06-06 NOTE — DISCHARGE NOTE PROVIDER - DETAILS OF MALNUTRITION DIAGNOSIS/DIAGNOSES
This patient has been assessed with a concern for Malnutrition and was treated during this hospitalization for the following Nutrition diagnosis/diagnoses:     -  06/06/2022: Mild protein-calorie malnutrition

## 2022-06-06 NOTE — DISCHARGE NOTE PROVIDER - HOSPITAL COURSE
46y F PMH obesity, hiatal hernia s/p robotic hiatal hernia repair and sleeve gastrectomy on 05/03/2022. Patient presented to the the emergency room on 06/02/2022 with several weeks of post-op dysphagia and poor PO tolerance , found on CT A/P to have concern for return of hiatal hernia vs. migration of gastric sleeve. Patient was taken to the OR on 06/03 for a laparoscopic repair of a recurrent incarcerated.  hiatal hernia. Patient  was advanced to clears on POD1, tolerated CLD 46y F PMH obesity, hiatal hernia s/p robotic hiatal hernia repair and sleeve gastrectomy on 05/03/2022. Patient presented to the the emergency room on 06/02/2022 with several weeks of post-op dysphagia and poor PO tolerance , found on CT A/P to have concern for return of hiatal hernia vs. migration of gastric sleeve. Patient was taken to the OR on 06/03 for a laparoscopic repair of a recurrent incarcerated hiatal hernia. Upper GI series was performed which showed status post hiatal hernia repair with expected postoperative narrowing at the gastroesophageal junction. Tiny residual hiatal hernia. No evidence of postoperative leak.  Patient was subsequently started on bariatric clears.      On day of discharge, patient was tolerating **** carloz-clears, pain was controlled on oral medications and she was ambulatory.     Patient to follow up outpatient in 1-2 with Dr. Field.     46y F PMH obesity, hiatal hernia s/p robotic hiatal hernia repair and sleeve gastrectomy on 05/03/2022. Patient presented to the the emergency room on 06/02/2022 with several weeks of post-op dysphagia and poor PO tolerance , found on CT A/P to have concern for return of hiatal hernia vs. migration of gastric sleeve. Patient was taken to the OR on 06/03 for a laparoscopic repair of a recurrent incarcerated hiatal hernia. Upper GI series was performed which showed status post hiatal hernia repair with expected postoperative narrowing at the gastroesophageal junction. Tiny residual hiatal hernia. No evidence of postoperative leak.  Patient was subsequently started on bariatric clears.      On day of discharge, patient was tolerating carloz-clears, pain was controlled on oral medications and she was ambulatory. She margot be going home with the FAHEEM drain.    Patient to follow up outpatient in 1-2 with Dr. Field.

## 2022-06-06 NOTE — PROGRESS NOTE ADULT - SUBJECTIVE AND OBJECTIVE BOX
SURGERY DAILY PROGRESS NOTE:       SUBJECTIVE/ROS: No acute events overnight      MEDICATIONS  (STANDING):  ceFAZolin   IVPB      ceFAZolin   IVPB 1000 milliGRAM(s) IV Intermittent every 8 hours  enoxaparin Injectable 40 milliGRAM(s) SubCutaneous every 24 hours  fosaprepitant IVPB 150 milliGRAM(s) IV Intermittent once  hyoscyamine SL 0.125 milliGRAM(s) SubLingual every 6 hours  ketorolac   Injectable 15 milliGRAM(s) IV Push every 4 hours  lactated ringers. 1000 milliLiter(s) (50 mL/Hr) IV Continuous <Continuous>  magnesium sulfate  IVPB 2 Gram(s) IV Intermittent once  ondansetron Injectable 4 milliGRAM(s) IV Push once  pantoprazole  Injectable 40 milliGRAM(s) IV Push every 24 hours  sodium chloride 0.9% 1000 milliLiter(s) (250 mL/Hr) IV Continuous <Continuous>    MEDICATIONS  (PRN):  haloperidol IVPB 0.5 milliGRAM(s) IV Intermittent every 4 hours PRN N and v  LORazepam   Injectable 0.5 milliGRAM(s) IV Push four times a day PRN Nausea and/or Vomiting      OBJECTIVE:    Vital Signs Last 24 Hrs  T(C): 37.3 (06 Jun 2022 00:52), Max: 37.3 (06 Jun 2022 00:52)  T(F): 99.2 (06 Jun 2022 00:52), Max: 99.2 (06 Jun 2022 00:52)  HR: 72 (06 Jun 2022 00:52) (64 - 87)  BP: 129/82 (06 Jun 2022 00:52) (117/57 - 138/76)  BP(mean): --  RR: 18 (06 Jun 2022 00:52) (18 - 18)  SpO2: 97% (06 Jun 2022 00:52) (97% - 99%)      Physical Exam:  General- NAD. Pleasant and responsive, non toxic appearing  Abdomen- Soft, non tender on palpation. Non distended. Non peritoneal. Port op sites c/d/i. Evidence of healed midline incision extending down from umbilicus to pubis, from distant hysterectomy surgery. Left FAHEEM w/ SS output  Lungs- Breathing comfortably on room air  REINIER- Crawford in    I&O's Detail    04 Jun 2022 07:01  -  05 Jun 2022 07:00  --------------------------------------------------------  IN:    IV PiggyBack: 200 mL    IV PiggyBack: 100 mL    Lactated Ringers: 1200 mL    Oral Fluid: 440 mL    sodium chloride 0.9% w/ Additives: 1200 mL  Total IN: 3140 mL    OUT:    Bulb (mL): 35 mL    Indwelling Catheter - Urethral (mL): 175 mL    Voided (mL): 250 mL  Total OUT: 460 mL    Total NET: 2680 mL      05 Jun 2022 07:01  -  06 Jun 2022 03:34  --------------------------------------------------------  IN:    Oral Fluid: 545 mL  Total IN: 545 mL    OUT:    Bulb (mL): 0 mL    Voided (mL): 400 mL  Total OUT: 400 mL    Total NET: 145 mL          Daily     Daily     LABS:                        11.4   5.90  )-----------( 153      ( 05 Jun 2022 17:32 )             35.4     06-05    143  |  105  |  <4<L>  ----------------------------<  89  3.1<L>   |  23  |  0.51    Ca    8.9      05 Jun 2022 17:32  Phos  2.0     06-05  Mg     1.6     06-05

## 2022-06-06 NOTE — PROGRESS NOTE ADULT - ASSESSMENT
45 y/o F now s/p laparoscopic recurrent hiatal hernia repair recovering well on floor.       - Bariatric CLD  - Decrease fluids to 50cc  - Upper GI Swallow study: No evidence of leak with  expected postsurgical narrowing at the gastroesophageal junction with mildly delayed passage of contrast into   the stomach.  - Pain control  - Bar antiemetics PRN  - Monitor blood  - DVT ppx    Green Team Surgery  #[id number].

## 2022-06-06 NOTE — DIETITIAN INITIAL EVALUATION ADULT - ENERGY INTAKE
Poor (<50%) In-house pt currently on bariatric clear liquid diet. Reports tolerating protein shakes and broth but taking only a few sips at a time. Reports feeling fatigue today due to limited sleep last night. Encouraged intake of liquids, prioritizing protein as able. Patient with no nutrition-related questions at this time. Made aware RD remains available as needed.

## 2022-06-06 NOTE — DISCHARGE NOTE PROVIDER - CARE PROVIDER_API CALL
Rahul Field  General Surgery  [address] [place] [address], [address]  [place], [place] [id number]  Phone: [phone]  Fax: [phone]  Follow Up Time:

## 2022-06-06 NOTE — DIETITIAN INITIAL EVALUATION ADULT - ADD RECOMMEND
1) Medical team to advance diet as tolerated. Recommend advance to bariatric full liquid diet as tolerated. 2) Encourage PO intake of protein-rich foods as able. 3) Upon discharge, recommend vitamin/mineral supplements (chewable/liquid/crushable form of multivitamin with iron, calcium citrate with vitamin D, thiamine, vitamin B12). 4) Malnutrition alert placed in EMR.

## 2022-06-06 NOTE — DIETITIAN INITIAL EVALUATION ADULT - OTHER INFO
Weight: weight prior to surgery was 238lbs (5/4), per HIE weight noted as 224lbs (5/16). Current dosing weight is 206lbs (6/2/22) --weight loss exceeding expected weight loss following first month s/p bariatric surgery

## 2022-06-06 NOTE — DIETITIAN INITIAL EVALUATION ADULT - ORAL INTAKE PTA/DIET HISTORY
Pt reports poor PO intake since gastric sleeve surgery last month. Reports she feel like the full liquids weren't going down and were getting "stuck", also with nausea and occasional dry heaving. Was only to tolerate a few sips of protein shakes or blended soups at a time. NKFA. Pt denies chewing/swallowing difficulty, diarrhea, constipation.

## 2022-06-06 NOTE — DIETITIAN INITIAL EVALUATION ADULT - REASON FOR ADMISSION
Nausea and vomiting    Chart reviewed, events noted. This is a " 47 y/o F now s/p laparoscopic recurrent hiatal hernia repair recovering well on floor. "

## 2022-06-06 NOTE — DISCHARGE NOTE PROVIDER - NSDCCPCAREPLAN_GEN_ALL_CORE_FT
PRINCIPAL DISCHARGE DIAGNOSIS  Diagnosis: Hiatal hernia  Assessment and Plan of Treatment: WOUND CARE: On your abdomen you have a little white bandaids called steristrips let soap/water run over and pat dry after showering, steri strips will fall off on own in 1-2 weeks  BATHING: Please do not submerge wound underwater. You may shower and/or sponge bathe.  ACTIVITY: No heavy lifting anything more than 10-15lbs or straining. Otherwise, you may return to your usual level of physical activity. If you are taking narcotic pain medication (such as oxycodone), do NOT drive a car, operate machinery or make important decisions.  DIET: Bariatric clears *******  NOTIFY YOUR SURGEON IF: You have any bleeding that does not stop, any pus draining from your wound, any fever (over 100.4 F) or chills, persistent nausea/vomiting with inability to tolerate food or liquids, persistent diarrhea, or if your pain is not controlled on your discharge pain medications.  FOLLOW-UP:  1. Please call to make a follow-up appointment within one week of discharge with Dr. Field.         SECONDARY DISCHARGE DIAGNOSES  Diagnosis: S/P gastric sleeve procedure  Assessment and Plan of Treatment:     Diagnosis: Dehydration  Assessment and Plan of Treatment:      PRINCIPAL DISCHARGE DIAGNOSIS  Diagnosis: Hiatal hernia  Assessment and Plan of Treatment: WOUND CARE: On your abdomen you have a little white bandaids called steristrips let soap/water run over and pat dry after showering, steri strips will fall off on own in 1-2 weeks. You will be going home with the FAHEEM drain. Please continue to record drain output as shown to you in the hospital and bring recordings to office visit. The drain will be removed in office.  BATHING: Please do not submerge wound underwater. You may shower and/or sponge bathe. Allow soapy water to run over incision site. Do not rub incision site. Pat dry when out of shower.  ACTIVITY: No heavy lifting anything more than 10-15lbs or straining. Otherwise, you may return to your usual level of physical activity. If you are taking narcotic pain medication (such as oxycodone), do NOT drive a car, operate machinery or make important decisions.  DIET: Bariatric diet  NOTIFY YOUR SURGEON IF: You have any bleeding that does not stop, any pus draining from your wound, any fever (over 100.4 F) or chills, persistent nausea/vomiting with inability to tolerate food or liquids, persistent diarrhea, or if your pain is not controlled on your discharge pain medications.  FOLLOW-UP:  1. Please call to make a follow-up appointment with Dr. Field within one week of discharge         SECONDARY DISCHARGE DIAGNOSES  Diagnosis: S/P gastric sleeve procedure  Assessment and Plan of Treatment:     Diagnosis: Dehydration  Assessment and Plan of Treatment:

## 2022-06-06 NOTE — DISCHARGE NOTE PROVIDER - NSDCCPTREATMENT_GEN_ALL_CORE_FT
PRINCIPAL PROCEDURE  Procedure: Laparoscopic repair of incarcerated hiatal hernia  Findings and Treatment:       SECONDARY PROCEDURE  Procedure: Lysis of peritoneal adhesions  Findings and Treatment:

## 2022-06-06 NOTE — DIETITIAN INITIAL EVALUATION ADULT - PERTINENT LABORATORY DATA
06-06    143  |  101  |  <4<L>  ----------------------------<  85  3.4<L>   |  26  |  0.56    Ca    8.7      06 Jun 2022 09:00  Phos  2.9     06-06  Mg     1.5     06-06    TPro  5.9<L>  /  Alb  3.2<L>  /  TBili  0.4  /  DBili  x   /  AST  15  /  ALT  18  /  AlkPhos  72  06-06  A1C with Estimated Average Glucose Result: 5.1 % (04-22-22 @ 21:47)

## 2022-06-06 NOTE — DIETITIAN INITIAL EVALUATION ADULT - REASON INDICATOR FOR ASSESSMENT
Mom called back asking about what time could Dr. Sales see Yonathan tomorrow in , since it's a longer drive for them, she also asked if she's ok with waiting until Thursday or if it better to see him tomorrow. Please inform her.    inadequate diet order > 3 days

## 2022-06-07 ENCOUNTER — TRANSCRIPTION ENCOUNTER (OUTPATIENT)
Age: 47
End: 2022-06-07

## 2022-06-07 VITALS
HEART RATE: 65 BPM | OXYGEN SATURATION: 98 % | SYSTOLIC BLOOD PRESSURE: 105 MMHG | DIASTOLIC BLOOD PRESSURE: 72 MMHG | RESPIRATION RATE: 18 BRPM | TEMPERATURE: 98 F

## 2022-06-07 LAB
ALBUMIN SERPL ELPH-MCNC: 3 G/DL — LOW (ref 3.3–5)
ALP SERPL-CCNC: 75 U/L — SIGNIFICANT CHANGE UP (ref 40–120)
ALT FLD-CCNC: 16 U/L — SIGNIFICANT CHANGE UP (ref 10–45)
ANION GAP SERPL CALC-SCNC: 18 MMOL/L — HIGH (ref 5–17)
AST SERPL-CCNC: 22 U/L — SIGNIFICANT CHANGE UP (ref 10–40)
BILIRUB SERPL-MCNC: 0.4 MG/DL — SIGNIFICANT CHANGE UP (ref 0.2–1.2)
BUN SERPL-MCNC: <4 MG/DL — LOW (ref 7–23)
CALCIUM SERPL-MCNC: 8.8 MG/DL — SIGNIFICANT CHANGE UP (ref 8.4–10.5)
CHLORIDE SERPL-SCNC: 96 MMOL/L — SIGNIFICANT CHANGE UP (ref 96–108)
CO2 SERPL-SCNC: 25 MMOL/L — SIGNIFICANT CHANGE UP (ref 22–31)
CREAT SERPL-MCNC: 0.55 MG/DL — SIGNIFICANT CHANGE UP (ref 0.5–1.3)
EGFR: 113 ML/MIN/1.73M2 — SIGNIFICANT CHANGE UP
GLUCOSE SERPL-MCNC: 83 MG/DL — SIGNIFICANT CHANGE UP (ref 70–99)
HCT VFR BLD CALC: 35.5 % — SIGNIFICANT CHANGE UP (ref 34.5–45)
HGB BLD-MCNC: 11.7 G/DL — SIGNIFICANT CHANGE UP (ref 11.5–15.5)
MAGNESIUM SERPL-MCNC: 1.6 MG/DL — SIGNIFICANT CHANGE UP (ref 1.6–2.6)
MCHC RBC-ENTMCNC: 30.8 PG — SIGNIFICANT CHANGE UP (ref 27–34)
MCHC RBC-ENTMCNC: 33 GM/DL — SIGNIFICANT CHANGE UP (ref 32–36)
MCV RBC AUTO: 93.4 FL — SIGNIFICANT CHANGE UP (ref 80–100)
NRBC # BLD: 0 /100 WBCS — SIGNIFICANT CHANGE UP (ref 0–0)
PHOSPHATE SERPL-MCNC: 2.7 MG/DL — SIGNIFICANT CHANGE UP (ref 2.5–4.5)
PLATELET # BLD AUTO: 158 K/UL — SIGNIFICANT CHANGE UP (ref 150–400)
POTASSIUM SERPL-MCNC: 3.7 MMOL/L — SIGNIFICANT CHANGE UP (ref 3.5–5.3)
POTASSIUM SERPL-SCNC: 3.7 MMOL/L — SIGNIFICANT CHANGE UP (ref 3.5–5.3)
PROT SERPL-MCNC: 6.1 G/DL — SIGNIFICANT CHANGE UP (ref 6–8.3)
RBC # BLD: 3.8 M/UL — SIGNIFICANT CHANGE UP (ref 3.8–5.2)
RBC # FLD: 12.4 % — SIGNIFICANT CHANGE UP (ref 10.3–14.5)
SODIUM SERPL-SCNC: 139 MMOL/L — SIGNIFICANT CHANGE UP (ref 135–145)
WBC # BLD: 5.77 K/UL — SIGNIFICANT CHANGE UP (ref 3.8–10.5)
WBC # FLD AUTO: 5.77 K/UL — SIGNIFICANT CHANGE UP (ref 3.8–10.5)

## 2022-06-07 RX ORDER — MAGNESIUM SULFATE 500 MG/ML
2 VIAL (ML) INJECTION ONCE
Refills: 0 | Status: COMPLETED | OUTPATIENT
Start: 2022-06-07 | End: 2022-06-07

## 2022-06-07 RX ORDER — POTASSIUM CHLORIDE 20 MEQ
10 PACKET (EA) ORAL
Refills: 0 | Status: COMPLETED | OUTPATIENT
Start: 2022-06-07 | End: 2022-06-07

## 2022-06-07 RX ORDER — OMEPRAZOLE 10 MG/1
1 CAPSULE, DELAYED RELEASE ORAL
Qty: 30 | Refills: 0
Start: 2022-06-07 | End: 2022-07-06

## 2022-06-07 RX ADMIN — PANTOPRAZOLE SODIUM 40 MILLIGRAM(S): 20 TABLET, DELAYED RELEASE ORAL at 05:10

## 2022-06-07 RX ADMIN — Medication 100 MILLIEQUIVALENT(S): at 11:51

## 2022-06-07 RX ADMIN — ENOXAPARIN SODIUM 40 MILLIGRAM(S): 100 INJECTION SUBCUTANEOUS at 05:10

## 2022-06-07 RX ADMIN — Medication 15 MILLIGRAM(S): at 06:21

## 2022-06-07 RX ADMIN — Medication 0.12 MILLIGRAM(S): at 00:11

## 2022-06-07 RX ADMIN — Medication 0.12 MILLIGRAM(S): at 13:09

## 2022-06-07 RX ADMIN — Medication 15 MILLIGRAM(S): at 13:35

## 2022-06-07 RX ADMIN — Medication 15 MILLIGRAM(S): at 03:08

## 2022-06-07 RX ADMIN — Medication 15 MILLIGRAM(S): at 11:24

## 2022-06-07 RX ADMIN — Medication 100 MILLIGRAM(S): at 13:09

## 2022-06-07 RX ADMIN — SIMETHICONE 80 MILLIGRAM(S): 80 TABLET, CHEWABLE ORAL at 05:15

## 2022-06-07 RX ADMIN — Medication 0.12 MILLIGRAM(S): at 06:21

## 2022-06-07 RX ADMIN — Medication 100 MILLIGRAM(S): at 05:10

## 2022-06-07 RX ADMIN — Medication 25 GRAM(S): at 10:24

## 2022-06-07 RX ADMIN — Medication 15 MILLIGRAM(S): at 10:24

## 2022-06-07 RX ADMIN — Medication 15 MILLIGRAM(S): at 14:35

## 2022-06-07 RX ADMIN — SIMETHICONE 80 MILLIGRAM(S): 80 TABLET, CHEWABLE ORAL at 13:35

## 2022-06-07 RX ADMIN — Medication 100 MILLIEQUIVALENT(S): at 13:09

## 2022-06-07 RX ADMIN — Medication 15 MILLIGRAM(S): at 02:38

## 2022-06-07 RX ADMIN — Medication 15 MILLIGRAM(S): at 06:51

## 2022-06-07 NOTE — DISCHARGE NOTE NURSING/CASE MANAGEMENT/SOCIAL WORK - PATIENT PORTAL LINK FT
You can access the FollowMyHealth Patient Portal offered by [facility] by registering at the following website: [email or web address]. By joining [employer]’s FollowMyHealth portal, you will also be able to view your health information using other applications (apps) compatible with our system.

## 2022-06-07 NOTE — PROGRESS NOTE ADULT - ASSESSMENT
47 y/o F now s/p laparoscopic recurrent hiatal hernia repair on 6/3 recovering well on floor.     - Bariatric CLD  - Decrease fluids to 50cc  - Pain control  - Bar antiemetics PRN  - Monitor blood  - DVT ppx    Green Team Surgery  #[id number]. 45 y/o F now s/p laparoscopic recurrent hiatal hernia repair on 6/3 recovering well on floor.     - Bariatric CLD/IVF  - Pain control  - Bar antiemetics PRN  - DVT ppx  -Dispo home with FAHEEM drain     Green Team Surgery  #[id number].

## 2022-06-07 NOTE — PROGRESS NOTE ADULT - ATTENDING COMMENTS
46yF POD3 s/p repair of recurrent hiatal hernia  Postop hypophosphatemia and hypokalemia  Recent hx of sleeve/HHR  Postop UGIS normal, no obstruction, no leak  Tolerating bariatric clears  Minimal incisional pain  Does have some bloating and gas discomfort    Afebrile VSS  Abdomen soft, ND, NT  Incisions healing well  FAHEEM serosanguinous    Plan  Cont carloz clears  Replete electrolytes  Simethicone PRN  Encourage ambulation    Rahul Field MD
Patient able to tolerate some clears.  Will go slow.  Exam with only incisional tenderness.  Cont. to monitor on bariatric clears
Tolerating clears and protein shakes  Minimal incisional pain  No reflux/vomiting    Afeb VSS  Abd soft, nondistended  Inc healing well  Drain scant serosanguinous fluid    Plan  Con carloz clears  PPI  Discharge planning    Rahul Field MD
Patient looks and feels well.  Swallow study today.  If swallow study okay then will start clears

## 2022-06-07 NOTE — PROGRESS NOTE ADULT - SUBJECTIVE AND OBJECTIVE BOX
Surgery Progress Note     Subjective/24hour Events: Patient seen and examined at the bedside this morning. No acute events overnight. Pain controlled. Reports passing flatus and having bowel movements. Tolerating a clear liquid diet.     Vital Signs:  Vital Signs Last 24 Hrs  T(C): 36.9 (06 Jun 2022 20:52), Max: 36.9 (06 Jun 2022 05:51)  T(F): 98.5 (06 Jun 2022 20:52), Max: 98.5 (06 Jun 2022 05:51)  HR: 69 (06 Jun 2022 20:52) (60 - 69)  BP: 124/68 (06 Jun 2022 20:52) (124/62 - 129/85)  BP(mean): --  RR: 18 (06 Jun 2022 20:52) (18 - 18)  SpO2: 100% (06 Jun 2022 20:52) (96% - 100%)        I&O's Detail    05 Jun 2022 07:01  -  06 Jun 2022 07:00  --------------------------------------------------------  IN:    Lactated Ringers: 600 mL    Oral Fluid: 785 mL  Total IN: 1385 mL    OUT:    Bulb (mL): 0 mL    Voided (mL): 600 mL  Total OUT: 600 mL    Total NET: 785 mL      06 Jun 2022 07:01  -  07 Jun 2022 00:52  --------------------------------------------------------  IN:    IV PiggyBack: 50 mL    IV PiggyBack: 50 mL    IV PiggyBack: 300 mL    Lactated Ringers: 600 mL    Oral Fluid: 840 mL  Total IN: 1840 mL    OUT:    Bulb (mL): 45 mL    Voided (mL): 400 mL  Total OUT: 445 mL    Total NET: 1395 mL        Physical Exam:  General- NAD. Pleasant and responsive, non toxic appearing  Abdomen- Soft, non tender on palpation. Non distended. Non peritoneal. Port op sites c/d/i. Evidence of healed midline incision extending down from umbilicus to pubis, from distant hysterectomy surgery. Left FAHEEM w/ SS output  Lungs- Breathing comfortably on room air  ABHISHEK Crawford in    Labs:    06-06    143  |  101  |  <4<L>  ----------------------------<  85  3.4<L>   |  26  |  0.56    Ca    8.7      06 Jun 2022 09:00  Phos  2.9     06-06  Mg     1.5     06-06    TPro  5.9<L>  /  Alb  3.2<L>  /  TBili  0.4  /  DBili  x   /  AST  15  /  ALT  18  /  AlkPhos  72  06-06    CAPILLARY BLOOD GLUCOSE        LIVER FUNCTIONS - ( 06 Jun 2022 09:00 )  Alb: 3.2 g/dL / Pro: 5.9 g/dL / ALK PHOS: 72 U/L / ALT: 18 U/L / AST: 15 U/L / GGT: x                                 11.5   5.73  )-----------( 157      ( 06 Jun 2022 09:00 )             34.9            Surgery Progress Note     Subjective/24hour Events: Patient seen and examined at the bedside this morning. No acute events overnight. Pain controlled. Reports passing flatus and having bowel movements. Tolerating a clear liquid diet.     Vital Signs:  Vital Signs Last 24 Hrs  T(C): 36.9 (06 Jun 2022 20:52), Max: 36.9 (06 Jun 2022 05:51)  T(F): 98.5 (06 Jun 2022 20:52), Max: 98.5 (06 Jun 2022 05:51)  HR: 69 (06 Jun 2022 20:52) (60 - 69)  BP: 124/68 (06 Jun 2022 20:52) (124/62 - 129/85)  BP(mean): --  RR: 18 (06 Jun 2022 20:52) (18 - 18)  SpO2: 100% (06 Jun 2022 20:52) (96% - 100%)        I&O's Detail    05 Jun 2022 07:01  -  06 Jun 2022 07:00  --------------------------------------------------------  IN:    Lactated Ringers: 600 mL    Oral Fluid: 785 mL  Total IN: 1385 mL    OUT:    Bulb (mL): 0 mL    Voided (mL): 600 mL  Total OUT: 600 mL    Total NET: 785 mL      06 Jun 2022 07:01  -  07 Jun 2022 00:52  --------------------------------------------------------  IN:    IV PiggyBack: 50 mL    IV PiggyBack: 50 mL    IV PiggyBack: 300 mL    Lactated Ringers: 600 mL    Oral Fluid: 840 mL  Total IN: 1840 mL    OUT:    Bulb (mL): 45 mL    Voided (mL): 400 mL  Total OUT: 445 mL    Total NET: 1395 mL        Physical Exam:  General- NAD. Pleasant and responsive, non toxic appearing  Abdomen- Soft, non tender on palpation. Non distended. Non peritoneal. Port op sites c/d/i. Evidence of healed midline incision extending down from umbilicus to pubis, from distant hysterectomy surgery. Left FAHEEM w/ SS output  Lungs- Breathing comfortably on room air      Labs:    06-06    143  |  101  |  <4<L>  ----------------------------<  85  3.4<L>   |  26  |  0.56    Ca    8.7      06 Jun 2022 09:00  Phos  2.9     06-06  Mg     1.5     06-06    TPro  5.9<L>  /  Alb  3.2<L>  /  TBili  0.4  /  DBili  x   /  AST  15  /  ALT  18  /  AlkPhos  72  06-06    CAPILLARY BLOOD GLUCOSE        LIVER FUNCTIONS - ( 06 Jun 2022 09:00 )  Alb: 3.2 g/dL / Pro: 5.9 g/dL / ALK PHOS: 72 U/L / ALT: 18 U/L / AST: 15 U/L / GGT: x                                 11.5   5.73  )-----------( 157      ( 06 Jun 2022 09:00 )             34.9

## 2022-06-07 NOTE — DISCHARGE NOTE NURSING/CASE MANAGEMENT/SOCIAL WORK - NSDCPEFALRISK_GEN_ALL_CORE
For information on Fall & Injury Prevention, visit: [email or web address] OR  [email or web address] OR  https://www.cdc.gov/steadi/patient.html

## 2022-06-08 ENCOUNTER — NON-APPOINTMENT (OUTPATIENT)
Age: 47
End: 2022-06-08

## 2022-06-08 PROCEDURE — 71046 X-RAY EXAM CHEST 2 VIEWS: CPT

## 2022-06-08 PROCEDURE — 85025 COMPLETE CBC W/AUTO DIFF WBC: CPT

## 2022-06-08 PROCEDURE — 99285 EMERGENCY DEPT VISIT HI MDM: CPT

## 2022-06-08 PROCEDURE — 74177 CT ABD & PELVIS W/CONTRAST: CPT | Mod: MA

## 2022-06-08 PROCEDURE — 80048 BASIC METABOLIC PNL TOTAL CA: CPT

## 2022-06-08 PROCEDURE — 85730 THROMBOPLASTIN TIME PARTIAL: CPT

## 2022-06-08 PROCEDURE — 86901 BLOOD TYPING SEROLOGIC RH(D): CPT

## 2022-06-08 PROCEDURE — 80053 COMPREHEN METABOLIC PANEL: CPT

## 2022-06-08 PROCEDURE — 36415 COLL VENOUS BLD VENIPUNCTURE: CPT

## 2022-06-08 PROCEDURE — 84702 CHORIONIC GONADOTROPIN TEST: CPT

## 2022-06-08 PROCEDURE — 96374 THER/PROPH/DIAG INJ IV PUSH: CPT

## 2022-06-08 PROCEDURE — 83735 ASSAY OF MAGNESIUM: CPT

## 2022-06-08 PROCEDURE — 85610 PROTHROMBIN TIME: CPT

## 2022-06-08 PROCEDURE — 86900 BLOOD TYPING SEROLOGIC ABO: CPT

## 2022-06-08 PROCEDURE — C9399: CPT

## 2022-06-08 PROCEDURE — U0005: CPT

## 2022-06-08 PROCEDURE — 85027 COMPLETE CBC AUTOMATED: CPT

## 2022-06-08 PROCEDURE — 84100 ASSAY OF PHOSPHORUS: CPT

## 2022-06-08 PROCEDURE — U0003: CPT

## 2022-06-08 PROCEDURE — 74240 X-RAY XM UPR GI TRC 1CNTRST: CPT

## 2022-06-08 PROCEDURE — C1889: CPT

## 2022-06-08 PROCEDURE — 71260 CT THORAX DX C+: CPT | Mod: MA

## 2022-06-08 PROCEDURE — 86850 RBC ANTIBODY SCREEN: CPT

## 2022-06-13 ENCOUNTER — APPOINTMENT (OUTPATIENT)
Dept: CT IMAGING | Facility: IMAGING CENTER | Age: 47
End: 2022-06-13
Payer: MEDICAID

## 2022-06-13 ENCOUNTER — APPOINTMENT (OUTPATIENT)
Dept: CT IMAGING | Facility: IMAGING CENTER | Age: 47
End: 2022-06-13

## 2022-06-13 ENCOUNTER — APPOINTMENT (OUTPATIENT)
Dept: SURGERY | Facility: CLINIC | Age: 47
End: 2022-06-13
Payer: MEDICAID

## 2022-06-13 ENCOUNTER — RESULT REVIEW (OUTPATIENT)
Age: 47
End: 2022-06-13

## 2022-06-13 ENCOUNTER — OUTPATIENT (OUTPATIENT)
Dept: OUTPATIENT SERVICES | Facility: HOSPITAL | Age: 47
LOS: 1 days | End: 2022-06-13
Payer: MEDICAID

## 2022-06-13 VITALS
RESPIRATION RATE: 17 BRPM | HEART RATE: 96 BPM | DIASTOLIC BLOOD PRESSURE: 83 MMHG | TEMPERATURE: 97.6 F | OXYGEN SATURATION: 99 % | SYSTOLIC BLOOD PRESSURE: 118 MMHG

## 2022-06-13 DIAGNOSIS — Z90.710 ACQUIRED ABSENCE OF BOTH CERVIX AND UTERUS: Chronic | ICD-10-CM

## 2022-06-13 DIAGNOSIS — Z98.84 BARIATRIC SURGERY STATUS: ICD-10-CM

## 2022-06-13 LAB — VIT B1 SERPL-MCNC: 182.1 NMOL/L

## 2022-06-13 PROCEDURE — 74177 CT ABD & PELVIS W/CONTRAST: CPT | Mod: 26

## 2022-06-13 PROCEDURE — 74177 CT ABD & PELVIS W/CONTRAST: CPT

## 2022-06-13 PROCEDURE — 99024 POSTOP FOLLOW-UP VISIT: CPT

## 2022-06-13 NOTE — HISTORY OF PRESENT ILLNESS
[de-identified] : NORBERT is a 46 year old female here for S/p Laparoscopic repair of recurrent hiatal hernia and upper endoscopy on 06/03/2022.\par s/p Robot-assisted laparoscopic sleeve gastrectomy and hiatal hernia repair on 05/03/2022.\par She presents today for postoperative follow-up.  She reports she has been doing well, tolerating liquids and protein shakes without any dysphagia or reflux.  She had noted thin serous drainage around her drain site over the weekend, and today noticed that there was more drainage in the bulb itself.  She does not report incisional pain, but she is now feeling some abdominal cramping.  She reports no fevers or chills.\par

## 2022-06-13 NOTE — PHYSICAL EXAM
[Obese, well nourished, in no acute distress] : obese, well nourished, in no acute distress [Normal] : affect appropriate [de-identified] : Normal respirations [de-identified] : Soft, nontender, nondistended.  Incisions well-healed without erythema or drainage.  FAHEEM bulb with thin, serous drainage.  Some tissue within the tube was expressed.

## 2022-06-13 NOTE — PLAN
Yes may certainly do so   [FreeTextEntry1] : We will obtain CT of the abdomen pelvis today\par Patient to follow-up with me tomorrow to review CT results and potentially remove the drain if the CT findings are benign.

## 2022-06-13 NOTE — ASSESSMENT
[FreeTextEntry1] : 46-year-old female recovering status post robot-assisted laparoscopic sleeve gastrectomy and hiatal hernia repair 5/3/2022, complicated by hiatal hernia recurrence requiring laparoscopic repair on 6/3/2022.\par \par Doing well with minimal serous drainage from her operative drain, no fevers, tolerating liquids without dysphagia.\par She is having some new abdominal cramping.

## 2022-06-17 ENCOUNTER — APPOINTMENT (OUTPATIENT)
Dept: INTERNAL MEDICINE | Facility: CLINIC | Age: 47
End: 2022-06-17

## 2022-06-17 ENCOUNTER — APPOINTMENT (OUTPATIENT)
Dept: SURGERY | Facility: CLINIC | Age: 47
End: 2022-06-17
Payer: MEDICAID

## 2022-06-17 VITALS
RESPIRATION RATE: 18 BRPM | BODY MASS INDEX: 36.14 KG/M2 | TEMPERATURE: 97 F | SYSTOLIC BLOOD PRESSURE: 127 MMHG | DIASTOLIC BLOOD PRESSURE: 89 MMHG | WEIGHT: 204 LBS | HEART RATE: 97 BPM | HEIGHT: 63 IN | OXYGEN SATURATION: 97 %

## 2022-06-17 PROCEDURE — 99024 POSTOP FOLLOW-UP VISIT: CPT

## 2022-06-17 NOTE — HISTORY OF PRESENT ILLNESS
[de-identified] : NORBERT is a 46 year old female here for postoperative visit s/p Robot-assisted laparoscopic sleeve gastrectomy and hiatal hernia repair on 05/03/2022, complicated by early recurrence of hiatal hernia, status post Laparoscopic repair of recurrent hiatal hernia and upper endoscopy on 06/03/2022, \par \par Since the last visit, she reports minimal clear drainage from the FAHEEM drain.  She feels better, has much less abdominal cramping, no fevers.  She is tolerating liquids and protein shakes as directed.

## 2022-06-17 NOTE — PHYSICAL EXAM
[Obese, well nourished, in no acute distress] : obese, well nourished, in no acute distress [Normal] : affect appropriate [de-identified] : Normal respirations [de-identified] : Soft, nontender, nondistended.  Incisions well-healed without erythema or drainage.  FAHEEM bulb with thin, serous drainage.

## 2022-06-17 NOTE — PLAN
[FreeTextEntry1] : [] Drain removed at bedside, tolerated well, covered with gauze\par [] continue bariatric diet advancement per instructions\par [] reviewed importance of eating slowly, chewing thoroughly, stop eating when full\par [] recommend increasing cardiovascular exercise, goal 30 minutes per day\par [] continue multivitamins\par [] check nutrition panel\par [] f/u in 2 months

## 2022-06-17 NOTE — ASSESSMENT
[FreeTextEntry1] : 46-year-old female recovering status post robot-assisted laparoscopic sleeve gastrectomy and hiatal hernia repair 5/3/2022, complicated by hiatal hernia recurrence requiring laparoscopic repair on 6/3/2022.\par \par Doing well with minimal serous drainage from her operative drain, abdominal symptoms resolved, CT scan reviewed with benign findings.

## 2022-06-20 ENCOUNTER — NON-APPOINTMENT (OUTPATIENT)
Age: 47
End: 2022-06-20

## 2022-06-21 ENCOUNTER — INPATIENT (INPATIENT)
Facility: HOSPITAL | Age: 47
LOS: 1 days | Discharge: HOME | DRG: 392 | End: 2022-06-23
Attending: SURGERY | Admitting: SURGERY
Payer: MEDICAID

## 2022-06-21 VITALS
HEIGHT: 63 IN | SYSTOLIC BLOOD PRESSURE: 113 MMHG | TEMPERATURE: 98 F | HEART RATE: 95 BPM | OXYGEN SATURATION: 96 % | DIASTOLIC BLOOD PRESSURE: 77 MMHG | WEIGHT: 197.98 LBS | RESPIRATION RATE: 16 BRPM

## 2022-06-21 DIAGNOSIS — Z90.3 ACQUIRED ABSENCE OF STOMACH [PART OF]: Chronic | ICD-10-CM

## 2022-06-21 DIAGNOSIS — Z98.890 OTHER SPECIFIED POSTPROCEDURAL STATES: Chronic | ICD-10-CM

## 2022-06-21 DIAGNOSIS — R11.10 VOMITING, UNSPECIFIED: ICD-10-CM

## 2022-06-21 DIAGNOSIS — Z90.710 ACQUIRED ABSENCE OF BOTH CERVIX AND UTERUS: Chronic | ICD-10-CM

## 2022-06-21 LAB
ALBUMIN SERPL ELPH-MCNC: 4.3 G/DL — SIGNIFICANT CHANGE UP (ref 3.3–5)
ALP SERPL-CCNC: 86 U/L — SIGNIFICANT CHANGE UP (ref 40–120)
ALT FLD-CCNC: 21 U/L — SIGNIFICANT CHANGE UP (ref 10–45)
ANION GAP SERPL CALC-SCNC: 19 MMOL/L — HIGH (ref 5–17)
ANION GAP SERPL CALC-SCNC: 19 MMOL/L — HIGH (ref 5–17)
ANION GAP SERPL CALC-SCNC: 20 MMOL/L — HIGH (ref 5–17)
APTT BLD: 32.6 SEC — SIGNIFICANT CHANGE UP (ref 27.5–35.5)
AST SERPL-CCNC: 32 U/L — SIGNIFICANT CHANGE UP (ref 10–40)
BASE EXCESS BLDV CALC-SCNC: -3.5 MMOL/L — LOW (ref -2–2)
BASOPHILS # BLD AUTO: 0.05 K/UL — SIGNIFICANT CHANGE UP (ref 0–0.2)
BASOPHILS NFR BLD AUTO: 0.7 % — SIGNIFICANT CHANGE UP (ref 0–2)
BILIRUB SERPL-MCNC: 0.6 MG/DL — SIGNIFICANT CHANGE UP (ref 0.2–1.2)
BLD GP AB SCN SERPL QL: NEGATIVE — SIGNIFICANT CHANGE UP
BUN SERPL-MCNC: 5 MG/DL — LOW (ref 7–23)
BUN SERPL-MCNC: 6 MG/DL — LOW (ref 7–23)
BUN SERPL-MCNC: 6 MG/DL — LOW (ref 7–23)
CA-I SERPL-SCNC: 1.13 MMOL/L — LOW (ref 1.15–1.33)
CALCIUM SERPL-MCNC: 10.1 MG/DL — SIGNIFICANT CHANGE UP (ref 8.4–10.5)
CALCIUM SERPL-MCNC: 8.5 MG/DL — SIGNIFICANT CHANGE UP (ref 8.4–10.5)
CALCIUM SERPL-MCNC: 8.9 MG/DL — SIGNIFICANT CHANGE UP (ref 8.4–10.5)
CHLORIDE BLDV-SCNC: 105 MMOL/L — SIGNIFICANT CHANGE UP (ref 96–108)
CHLORIDE SERPL-SCNC: 101 MMOL/L — SIGNIFICANT CHANGE UP (ref 96–108)
CHLORIDE SERPL-SCNC: 103 MMOL/L — SIGNIFICANT CHANGE UP (ref 96–108)
CHLORIDE SERPL-SCNC: 105 MMOL/L — SIGNIFICANT CHANGE UP (ref 96–108)
CO2 BLDV-SCNC: 23 MMOL/L — SIGNIFICANT CHANGE UP (ref 22–26)
CO2 SERPL-SCNC: 21 MMOL/L — LOW (ref 22–31)
CO2 SERPL-SCNC: 22 MMOL/L — SIGNIFICANT CHANGE UP (ref 22–31)
CO2 SERPL-SCNC: 22 MMOL/L — SIGNIFICANT CHANGE UP (ref 22–31)
CREAT SERPL-MCNC: 0.58 MG/DL — SIGNIFICANT CHANGE UP (ref 0.5–1.3)
CREAT SERPL-MCNC: 0.59 MG/DL — SIGNIFICANT CHANGE UP (ref 0.5–1.3)
CREAT SERPL-MCNC: 0.63 MG/DL — SIGNIFICANT CHANGE UP (ref 0.5–1.3)
EGFR: 109 ML/MIN/1.73M2 — SIGNIFICANT CHANGE UP
EGFR: 111 ML/MIN/1.73M2 — SIGNIFICANT CHANGE UP
EGFR: 112 ML/MIN/1.73M2 — SIGNIFICANT CHANGE UP
EOSINOPHIL # BLD AUTO: 0.03 K/UL — SIGNIFICANT CHANGE UP (ref 0–0.5)
EOSINOPHIL NFR BLD AUTO: 0.4 % — SIGNIFICANT CHANGE UP (ref 0–6)
GAS PNL BLDV: 138 MMOL/L — SIGNIFICANT CHANGE UP (ref 136–145)
GAS PNL BLDV: SIGNIFICANT CHANGE UP
GAS PNL BLDV: SIGNIFICANT CHANGE UP
GLUCOSE BLDV-MCNC: 91 MG/DL — SIGNIFICANT CHANGE UP (ref 70–99)
GLUCOSE SERPL-MCNC: 86 MG/DL — SIGNIFICANT CHANGE UP (ref 70–99)
GLUCOSE SERPL-MCNC: 86 MG/DL — SIGNIFICANT CHANGE UP (ref 70–99)
GLUCOSE SERPL-MCNC: 99 MG/DL — SIGNIFICANT CHANGE UP (ref 70–99)
HCG SERPL-ACNC: 5.3 MIU/ML — HIGH
HCO3 BLDV-SCNC: 22 MMOL/L — SIGNIFICANT CHANGE UP (ref 22–29)
HCT VFR BLD CALC: 42.1 % — SIGNIFICANT CHANGE UP (ref 34.5–45)
HCT VFR BLDA CALC: 41 % — SIGNIFICANT CHANGE UP (ref 34.5–46.5)
HGB BLD CALC-MCNC: 13.5 G/DL — SIGNIFICANT CHANGE UP (ref 11.7–16.1)
HGB BLD-MCNC: 13.9 G/DL — SIGNIFICANT CHANGE UP (ref 11.5–15.5)
IMM GRANULOCYTES NFR BLD AUTO: 0.5 % — SIGNIFICANT CHANGE UP (ref 0–1.5)
INR BLD: 1.22 RATIO — HIGH (ref 0.88–1.16)
LACTATE BLDV-MCNC: 1 MMOL/L — SIGNIFICANT CHANGE UP (ref 0.7–2)
LIDOCAIN IGE QN: 22 U/L — SIGNIFICANT CHANGE UP (ref 7–60)
LYMPHOCYTES # BLD AUTO: 1.89 K/UL — SIGNIFICANT CHANGE UP (ref 1–3.3)
LYMPHOCYTES # BLD AUTO: 25.9 % — SIGNIFICANT CHANGE UP (ref 13–44)
MCHC RBC-ENTMCNC: 31 PG — SIGNIFICANT CHANGE UP (ref 27–34)
MCHC RBC-ENTMCNC: 33 GM/DL — SIGNIFICANT CHANGE UP (ref 32–36)
MCV RBC AUTO: 94 FL — SIGNIFICANT CHANGE UP (ref 80–100)
MONOCYTES # BLD AUTO: 0.72 K/UL — SIGNIFICANT CHANGE UP (ref 0–0.9)
MONOCYTES NFR BLD AUTO: 9.8 % — SIGNIFICANT CHANGE UP (ref 2–14)
NEUTROPHILS # BLD AUTO: 4.58 K/UL — SIGNIFICANT CHANGE UP (ref 1.8–7.4)
NEUTROPHILS NFR BLD AUTO: 62.7 % — SIGNIFICANT CHANGE UP (ref 43–77)
NRBC # BLD: 0 /100 WBCS — SIGNIFICANT CHANGE UP (ref 0–0)
PCO2 BLDV: 41 MMHG — SIGNIFICANT CHANGE UP (ref 39–42)
PH BLDV: 7.34 — SIGNIFICANT CHANGE UP (ref 7.32–7.43)
PLATELET # BLD AUTO: 340 K/UL — SIGNIFICANT CHANGE UP (ref 150–400)
PO2 BLDV: 37 MMHG — SIGNIFICANT CHANGE UP (ref 25–45)
POTASSIUM BLDV-SCNC: 5.9 MMOL/L — HIGH (ref 3.5–5.1)
POTASSIUM SERPL-MCNC: 3.4 MMOL/L — LOW (ref 3.5–5.3)
POTASSIUM SERPL-MCNC: 3.6 MMOL/L — SIGNIFICANT CHANGE UP (ref 3.5–5.3)
POTASSIUM SERPL-MCNC: 5.8 MMOL/L — HIGH (ref 3.5–5.3)
POTASSIUM SERPL-SCNC: 3.4 MMOL/L — LOW (ref 3.5–5.3)
POTASSIUM SERPL-SCNC: 3.6 MMOL/L — SIGNIFICANT CHANGE UP (ref 3.5–5.3)
POTASSIUM SERPL-SCNC: 5.8 MMOL/L — HIGH (ref 3.5–5.3)
PROT SERPL-MCNC: 8.4 G/DL — HIGH (ref 6–8.3)
PROTHROM AB SERPL-ACNC: 14.2 SEC — HIGH (ref 10.5–13.4)
RBC # BLD: 4.48 M/UL — SIGNIFICANT CHANGE UP (ref 3.8–5.2)
RBC # FLD: 13.8 % — SIGNIFICANT CHANGE UP (ref 10.3–14.5)
RH IG SCN BLD-IMP: POSITIVE — SIGNIFICANT CHANGE UP
SAO2 % BLDV: 61.2 % — LOW (ref 67–88)
SARS-COV-2 RNA SPEC QL NAA+PROBE: SIGNIFICANT CHANGE UP
SODIUM SERPL-SCNC: 143 MMOL/L — SIGNIFICANT CHANGE UP (ref 135–145)
SODIUM SERPL-SCNC: 143 MMOL/L — SIGNIFICANT CHANGE UP (ref 135–145)
SODIUM SERPL-SCNC: 146 MMOL/L — HIGH (ref 135–145)
WBC # BLD: 7.31 K/UL — SIGNIFICANT CHANGE UP (ref 3.8–10.5)
WBC # FLD AUTO: 7.31 K/UL — SIGNIFICANT CHANGE UP (ref 3.8–10.5)

## 2022-06-21 PROCEDURE — 74177 CT ABD & PELVIS W/CONTRAST: CPT | Mod: 26,MG

## 2022-06-21 PROCEDURE — 71045 X-RAY EXAM CHEST 1 VIEW: CPT | Mod: 26

## 2022-06-21 PROCEDURE — 99285 EMERGENCY DEPT VISIT HI MDM: CPT

## 2022-06-21 PROCEDURE — G1004: CPT

## 2022-06-21 RX ORDER — FOLIC ACID 0.8 MG
1 TABLET ORAL ONCE
Refills: 0 | Status: COMPLETED | OUTPATIENT
Start: 2022-06-21 | End: 2022-06-21

## 2022-06-21 RX ORDER — THIAMINE MONONITRATE (VIT B1) 100 MG
100 TABLET ORAL ONCE
Refills: 0 | Status: COMPLETED | OUTPATIENT
Start: 2022-06-21 | End: 2022-06-21

## 2022-06-21 RX ORDER — ONDANSETRON 8 MG/1
4 TABLET, FILM COATED ORAL ONCE
Refills: 0 | Status: COMPLETED | OUTPATIENT
Start: 2022-06-21 | End: 2022-06-21

## 2022-06-21 RX ORDER — ONDANSETRON 8 MG/1
4 TABLET, FILM COATED ORAL ONCE
Refills: 0 | Status: DISCONTINUED | OUTPATIENT
Start: 2022-06-21 | End: 2022-06-23

## 2022-06-21 RX ORDER — SODIUM CHLORIDE 9 MG/ML
1000 INJECTION INTRAMUSCULAR; INTRAVENOUS; SUBCUTANEOUS ONCE
Refills: 0 | Status: COMPLETED | OUTPATIENT
Start: 2022-06-21 | End: 2022-06-21

## 2022-06-21 RX ORDER — PANTOPRAZOLE SODIUM 20 MG/1
40 TABLET, DELAYED RELEASE ORAL ONCE
Refills: 0 | Status: COMPLETED | OUTPATIENT
Start: 2022-06-21 | End: 2022-06-21

## 2022-06-21 RX ORDER — SODIUM CHLORIDE 9 MG/ML
1000 INJECTION INTRAMUSCULAR; INTRAVENOUS; SUBCUTANEOUS
Refills: 0 | Status: DISCONTINUED | OUTPATIENT
Start: 2022-06-21 | End: 2022-06-22

## 2022-06-21 RX ORDER — HEPARIN SODIUM 5000 [USP'U]/ML
5000 INJECTION INTRAVENOUS; SUBCUTANEOUS EVERY 8 HOURS
Refills: 0 | Status: DISCONTINUED | OUTPATIENT
Start: 2022-06-21 | End: 2022-06-23

## 2022-06-21 RX ORDER — ENOXAPARIN SODIUM 100 MG/ML
40 INJECTION SUBCUTANEOUS EVERY 24 HOURS
Refills: 0 | Status: DISCONTINUED | OUTPATIENT
Start: 2022-06-21 | End: 2022-06-21

## 2022-06-21 RX ORDER — SODIUM CHLORIDE 9 MG/ML
1000 INJECTION, SOLUTION INTRAVENOUS
Refills: 0 | Status: COMPLETED | OUTPATIENT
Start: 2022-06-21 | End: 2022-06-21

## 2022-06-21 RX ORDER — ACETAMINOPHEN 500 MG
1000 TABLET ORAL ONCE
Refills: 0 | Status: DISCONTINUED | OUTPATIENT
Start: 2022-06-21 | End: 2022-06-23

## 2022-06-21 RX ORDER — PANTOPRAZOLE SODIUM 20 MG/1
40 TABLET, DELAYED RELEASE ORAL EVERY 12 HOURS
Refills: 0 | Status: DISCONTINUED | OUTPATIENT
Start: 2022-06-21 | End: 2022-06-23

## 2022-06-21 RX ADMIN — SODIUM CHLORIDE 1000 MILLILITER(S): 9 INJECTION INTRAMUSCULAR; INTRAVENOUS; SUBCUTANEOUS at 14:15

## 2022-06-21 RX ADMIN — Medication 100 MILLIGRAM(S): at 14:15

## 2022-06-21 RX ADMIN — PANTOPRAZOLE SODIUM 40 MILLIGRAM(S): 20 TABLET, DELAYED RELEASE ORAL at 14:14

## 2022-06-21 RX ADMIN — SODIUM CHLORIDE 125 MILLILITER(S): 9 INJECTION INTRAMUSCULAR; INTRAVENOUS; SUBCUTANEOUS at 21:10

## 2022-06-21 RX ADMIN — ONDANSETRON 4 MILLIGRAM(S): 8 TABLET, FILM COATED ORAL at 14:15

## 2022-06-21 RX ADMIN — SODIUM CHLORIDE 200 MILLILITER(S): 9 INJECTION, SOLUTION INTRAVENOUS at 14:36

## 2022-06-21 RX ADMIN — Medication 1 MILLIGRAM(S): at 14:35

## 2022-06-21 RX ADMIN — SODIUM CHLORIDE 1000 MILLILITER(S): 9 INJECTION INTRAMUSCULAR; INTRAVENOUS; SUBCUTANEOUS at 15:45

## 2022-06-21 NOTE — H&P ADULT - NSHPPHYSICALEXAM_GEN_ALL_CORE
Vital signs:  T(C): 36.7, Max: 36.7 (06-21 @ 12:49)  HR: 95 (95 - 95)  BP: 113/77 (113/77 - 113/77)  RR: 16 (16 - 16)  SpO2: 96% (96% - 96%)  Height (cm): 160, 160  Weight (kg): 89.8, 93.4  BMI (kg/m2): 35.1, 36.5    Exam:  General: resting in bed, in mild discomfort  Resp: nonlabored breathing  Abd: soft, NT, ND; no rebound or guarding; incision c/d/i  Ext: WWP  Neuro: AAOx3

## 2022-06-21 NOTE — H&P ADULT - NSHPLABSRESULTS_GEN_ALL_CORE
13.9   7.31  )-----------( 340      ( 06-21 @ 14:04 )             42.1     06-21                             Phos: xx Mg: xx  143  |  103  |  6   ----------------------------<  86  3.6   |  21  |  0.59    , 06-21                             Phos: xx Mg: xx  143  |  101  |  6   ----------------------------<  99  5.8   |  22  |  0.58    06-21   TPro 8.4<H> / Alb 4.3 / TBili 0.6 / DBili x  / AST/AST 32/21 / AlkPhos 86    PT/INR/PTT - (06-21 @ 14:04) PT: 14.2 sec; INR: 1.22 ratio ; PTT: 32.6 sec        PT/INR/PTT - (06-03 @ 07:43) PT: 14.8 sec; INR: 1.27 ratio ; PTT: 29.6 sec

## 2022-06-21 NOTE — ED ADULT NURSE NOTE - OBJECTIVE STATEMENT
46 year old female presents to ED complaining of nausea & vomiting. PMH gastric sleeve last month, fibroids & asthma. States she had been tolerating PO intake until a few days ago and advancing her diet to pureed however is now unable to tolerate any liquids. Sent in by surgeon for evaluation. Denies headache, chest pain, shortness of breath, abd pain, diarrhea, fevers, chills.

## 2022-06-21 NOTE — ED PROVIDER NOTE - CLINICAL SUMMARY MEDICAL DECISION MAKING FREE TEXT BOX
W/ recent bariatric surgery and inability to pass stool/gas, high concern for obstruction, bariatric sx consult, ctap w/ po iv contrast, op labs, swab

## 2022-06-21 NOTE — CONSULT NOTE ADULT - SUBJECTIVE AND OBJECTIVE BOX
BARIATRIC SURGERY CONSULT NOTE    Patient is a 46y old  Female who presents with a chief complaint of PO intolerance    HPI:  46 year old female s/p robotic sleeve gastrectomy, hiatal hernia repair (5/3/22, Dr. Field), c/b recurrent hiatal hernia, s/p diag lap, DONNA, lap hiatal hernia repair 6/3/22, now presents with dysphagia. Patient recently seen by Dr. Field with drain removal 4 days ago. Started having difficulty swallowing liquid 3 days ago. Denies dysphagia with solid food. Last flatus 3 days ago. Denies fever, chills, abdominal pain    10-points review of system performed with pertinent negative and positive findings documented in the HPI     PAST MEDICAL & SURGICAL HISTORY:  Asthma  ~&gt;20 yrs ago last exacerbation, never hospitalized or intubated      Fibroids      S/P hysterectomy  2015      History of repair of hiatal hernia  laparoscopic    History of sleeve gastrectomy    FAMILY HISTORY:  : Family history not pertinent as reviewed with the patient and family    SOCIAL HISTORY: No pertinent social history    MEDICATIONS  (STANDING):  folic acid Injectable 1 milliGRAM(s) IV Push Once  ondansetron Injectable 4 milliGRAM(s) IV Push once  pantoprazole  Injectable 40 milliGRAM(s) IV Push Once  sodium chloride 0.9% 1000 milliLiter(s) (200 mL/Hr) IV Continuous <Continuous>  sodium chloride 0.9% Bolus 1000 milliLiter(s) IV Bolus once  thiamine Injectable 100 milliGRAM(s) IV Push Once    MEDICATIONS  (PRN):    Allergies    No Known Allergies    Intolerances    Vital Signs Last 24 Hrs  T(C): 36.7 (21 Jun 2022 12:49), Max: 36.7 (21 Jun 2022 12:49)  T(F): 98 (21 Jun 2022 12:49), Max: 98 (21 Jun 2022 12:49)  HR: 95 (21 Jun 2022 12:49) (95 - 95)  BP: 113/77 (21 Jun 2022 12:49) (113/77 - 113/77)  BP(mean): --  RR: 16 (21 Jun 2022 12:49) (16 - 16)  SpO2: 96% (21 Jun 2022 12:49) (96% - 96%)  Daily Height in cm: 160.02 (21 Jun 2022 12:49)    Daily     Exam:  General: resting in bed, in mild discomfort  Resp: nonlabored breathing  Abd: soft, NT, ND; no rebound or guarding; incision c/d/i  Ext: WWP  Neuro: AAOx3                        13.9   7.31  )-----------( 340      ( 21 Jun 2022 14:04 )             42.1     IMAGING STUDIES:

## 2022-06-21 NOTE — CONSULT NOTE ADULT - ASSESSMENT
46F s/p robotic sleeve gastrectomy, hiatal hernia repair, c/b recurrent hiatal hernia, s/p diag lap, DONNA, lap hiatal hernia repair 6/3/22, now presents with dysphagia with liquids    Recommendation:  - Please obtain CT A/P  - Banana bag  - PPI  - aspiration precaution  - plan pending CT  - discussed with bariatric fellow    Green surgery  [id number] 46F s/p robotic sleeve gastrectomy, hiatal hernia repair, c/b recurrent hiatal hernia, s/p diag lap, DONNA, lap hiatal hernia repair 6/3/22, now presents with dysphagia with liquids    Recommendation:  - Admit to Dr. Field  - Please obtain CT A/P  - NS @ 125  - NPO  - PPI   - Repeat BMP @ 6PM for hyperkalemia  - DVT ppx with HSQ  - Zofran PRN   - discussed with bariatric fellow    Green surgery  [id number]

## 2022-06-21 NOTE — ED PROVIDER NOTE - PROGRESS NOTE DETAILS
Yovany Maya, DO PGY-2: Pt evaluated @ bedside by bariatric attg Yovany Maay, DO PGY-2: per surg admit to dr carpenter

## 2022-06-21 NOTE — ED PROVIDER NOTE - NS ED ROS FT
Constitutional:  (-) fever, (-) chills, (-) lethargy  Eyes:  (-) eye pain (-) visual changes  ENMT: (-) nasal discharge, (-) sore throat. (-) neck pain or stiffness  Cardiac: (-) chest pain (-) palpitations  Respiratory:  (-) cough (-) respiratory distress.   GI:  (+) nausea (+) vomiting (-) diarrhea (+) abdominal pain. +constipation  :  (-) dysuria (-) frequency (-) burning.  MS:  (-) back pain (-) joint pain.  Neuro:  (-) headache (-) numbness (-) tingling (-) focal weakness  Skin:  (-) rash  Except as documented in the HPI,  all other systems are negative

## 2022-06-21 NOTE — PATIENT PROFILE ADULT - FALL HARM RISK - RISK INTERVENTIONS
Assistance OOB with selected safe patient handling equipment/Assistance with ambulation/Communicate Fall Risk and Risk Factors to all staff, patient, and family/Discuss with provider need for PT consult/Monitor gait and stability/Provide patient with walking aids - walker, cane, crutches/Reinforce activity limits and safety measures with patient and family/Sit up slowly, dangle for a short time, stand at bedside before walking/Use of alarms - bed, chair and/or voice tab/Visual Cue: Yellow wristband/Bed in lowest position, wheels locked, appropriate side rails in place/Call bell, personal items and telephone in reach/Instruct patient to call for assistance before getting out of bed or chair/Non-slip footwear when patient is out of bed/[place] to call system/Physically safe environment - no spills, clutter or unnecessary equipment/Purposeful Proactive Rounding/Room/bathroom lighting operational, light cord in reach

## 2022-06-21 NOTE — ED PROVIDER NOTE - OBJECTIVE STATEMENT
46f pmh obesity, hiatal hernia s/p robotic hiatal hernia repair and sleeve gastrectomy on 05/03/2022, drain removal 6/17 presents to the ed for 5 days of vomiting and inability to tolerate po liquids, pt states she hasn't moved her bowels in 1x week, hasn't passed gas in several days, no diarrhea, pt states she was previously moving bowels well until after the drain was removed. pt denies fevers/chills but does endorse feeling dehydrated.

## 2022-06-21 NOTE — H&P ADULT - HISTORY OF PRESENT ILLNESS
46 year old female s/p robotic sleeve gastrectomy, hiatal hernia repair (5/3/22, Dr. Field), c/b recurrent hiatal hernia, s/p diag lap, DONNA, lap hiatal hernia repair 6/3/22, now presents with dysphagia. Patient recently seen by Dr. Field with drain removal 4 days ago. Started having difficulty swallowing liquid 3 days ago. Denies dysphagia with solid food. Last flatus 3 days ago. Denies fever, chills, abdominal pain.

## 2022-06-21 NOTE — ED PROVIDER NOTE - ATTENDING CONTRIBUTION TO CARE
46F hx sleeve gastrectomy 5/3 and lap hiatal hernia repair on 6/3, had drain removed 4 days ago and presents to ED today stating no BM in 4 days. No fevers. ABle to tolerate solids, however whenever she even takes small amounts of fluids she vomits. Denies nausea, abd pain. Pt is not in distress, slightly dehydrated appearing, +BS soft ntnd lap sites and drain site well healing, no skin rashes or erythema. Will check labs for electrolyte derangement, UA, CTAP w bariatric protocol, bariatrics service consulted.

## 2022-06-21 NOTE — H&P ADULT - ASSESSMENT
46F s/p robotic sleeve gastrectomy, hiatal hernia repair, c/b recurrent hiatal hernia, s/p diag lap, DONNA, lap hiatal hernia repair 6/3/22, now presents with dysphagia with liquids.     Recommendation:  - Admit to Dr. Field  - Please obtain CT A/P  - NS @ 125  - NPO  - PPI   - Repeat BMP @ 6PM for hyperkalemia  - DVT ppx with HSQ  - Zofran PRN   - discussed with bariatric fellow    Green surgery  [id number]

## 2022-06-21 NOTE — ED PROVIDER NOTE - NSICDXPASTSURGICALHX_GEN_ALL_CORE_FT
PAST SURGICAL HISTORY:  History of repair of hiatal hernia laparoscopic    History of sleeve gastrectomy     S/P hysterectomy 2015

## 2022-06-22 LAB
ANION GAP SERPL CALC-SCNC: 19 MMOL/L — HIGH (ref 5–17)
BUN SERPL-MCNC: <4 MG/DL — LOW (ref 7–23)
CALCIUM SERPL-MCNC: 8.7 MG/DL — SIGNIFICANT CHANGE UP (ref 8.4–10.5)
CHLORIDE SERPL-SCNC: 111 MMOL/L — HIGH (ref 96–108)
CO2 SERPL-SCNC: 20 MMOL/L — LOW (ref 22–31)
CREAT SERPL-MCNC: 0.57 MG/DL — SIGNIFICANT CHANGE UP (ref 0.5–1.3)
EGFR: 112 ML/MIN/1.73M2 — SIGNIFICANT CHANGE UP
GLUCOSE SERPL-MCNC: 82 MG/DL — SIGNIFICANT CHANGE UP (ref 70–99)
HCT VFR BLD CALC: 35.6 % — SIGNIFICANT CHANGE UP (ref 34.5–45)
HGB BLD-MCNC: 11.3 G/DL — LOW (ref 11.5–15.5)
MCHC RBC-ENTMCNC: 30.5 PG — SIGNIFICANT CHANGE UP (ref 27–34)
MCHC RBC-ENTMCNC: 31.7 GM/DL — LOW (ref 32–36)
MCV RBC AUTO: 96.2 FL — SIGNIFICANT CHANGE UP (ref 80–100)
NRBC # BLD: 0 /100 WBCS — SIGNIFICANT CHANGE UP (ref 0–0)
PLATELET # BLD AUTO: 220 K/UL — SIGNIFICANT CHANGE UP (ref 150–400)
POTASSIUM SERPL-MCNC: 3.5 MMOL/L — SIGNIFICANT CHANGE UP (ref 3.5–5.3)
POTASSIUM SERPL-SCNC: 3.5 MMOL/L — SIGNIFICANT CHANGE UP (ref 3.5–5.3)
PREALB SERPL-MCNC: 14 MG/DL — LOW (ref 20–40)
RBC # BLD: 3.7 M/UL — LOW (ref 3.8–5.2)
RBC # FLD: 14.1 % — SIGNIFICANT CHANGE UP (ref 10.3–14.5)
SODIUM SERPL-SCNC: 150 MMOL/L — HIGH (ref 135–145)
WBC # BLD: 6.24 K/UL — SIGNIFICANT CHANGE UP (ref 3.8–10.5)
WBC # FLD AUTO: 6.24 K/UL — SIGNIFICANT CHANGE UP (ref 3.8–10.5)

## 2022-06-22 PROCEDURE — 74246 X-RAY XM UPR GI TRC 2CNTRST: CPT | Mod: 26

## 2022-06-22 RX ORDER — SODIUM CHLORIDE 9 MG/ML
1000 INJECTION, SOLUTION INTRAVENOUS ONCE
Refills: 0 | Status: COMPLETED | OUTPATIENT
Start: 2022-06-22 | End: 2022-06-22

## 2022-06-22 RX ORDER — DEXTROSE MONOHYDRATE, SODIUM CHLORIDE, AND POTASSIUM CHLORIDE 50; .745; 4.5 G/1000ML; G/1000ML; G/1000ML
1000 INJECTION, SOLUTION INTRAVENOUS
Refills: 0 | Status: DISCONTINUED | OUTPATIENT
Start: 2022-06-22 | End: 2022-06-23

## 2022-06-22 RX ORDER — SODIUM CHLORIDE 9 MG/ML
1000 INJECTION, SOLUTION INTRAVENOUS
Refills: 0 | Status: DISCONTINUED | OUTPATIENT
Start: 2022-06-22 | End: 2022-06-22

## 2022-06-22 RX ADMIN — PANTOPRAZOLE SODIUM 40 MILLIGRAM(S): 20 TABLET, DELAYED RELEASE ORAL at 17:32

## 2022-06-22 RX ADMIN — SODIUM CHLORIDE 1000 MILLILITER(S): 9 INJECTION, SOLUTION INTRAVENOUS at 16:31

## 2022-06-22 RX ADMIN — PANTOPRAZOLE SODIUM 40 MILLIGRAM(S): 20 TABLET, DELAYED RELEASE ORAL at 05:32

## 2022-06-22 RX ADMIN — DEXTROSE MONOHYDRATE, SODIUM CHLORIDE, AND POTASSIUM CHLORIDE 100 MILLILITER(S): 50; .745; 4.5 INJECTION, SOLUTION INTRAVENOUS at 17:32

## 2022-06-22 NOTE — PROGRESS NOTE ADULT - ASSESSMENT
46F s/p robotic sleeve gastrectomy, hiatal hernia repair, c/b recurrent hiatal hernia, s/p diag lap, DONNA, lap hiatal hernia repair 6/3/22, now presents with dysphagia with liquids    Recommendation:     -CT A/P completed  - NS @ 125  - Rich CLD  - PPI  - DVT ppx with HSQ  - Zofran PRN     Green surgery  [id number] 46F s/p robotic sleeve gastrectomy, hiatal hernia repair, c/b recurrent hiatal hernia, s/p diag lap, DONNA, lap hiatal hernia repair 6/3/22, now presents with dysphagia with liquids    Recommendation:     -CT A/P completed  - NS @ 125  - Rich CLD  - PPI  - DVT ppx with HSQ  - Zofran PRN     Green surgery  [id number]

## 2022-06-22 NOTE — PROGRESS NOTE ADULT - ATTENDING COMMENTS
I saw and examined the patient, and reviewed  the history with the patient and   Agree with note which was also reviewed and edited where appropriate.  D/W patient, RN, residents and Fellow I saw and examined the patient, and reviewed  the history with the patient and   Agree with note which was also reviewed and edited where appropriate.  D/W patient, RN, residents and Fellow  will check UGI today

## 2022-06-22 NOTE — PROGRESS NOTE ADULT - SUBJECTIVE AND OBJECTIVE BOX
Surgery Progress Note    Subjective:     Patient seen and examined at bedside. VSS, AF. No acute events overnight.      OBJECTIVE:     T(C): 36.9 (06-21-22 @ 21:50), Max: 36.9 (06-21-22 @ 21:50)  HR: 86 (06-21-22 @ 21:50) (86 - 95)  BP: 123/85 (06-21-22 @ 21:50) (113/77 - 123/85)  RR: 18 (06-21-22 @ 21:50) (16 - 18)  SpO2: 99% (06-21-22 @ 21:50) (96% - 99%)  Wt(kg): --    I&O's Detail    21 Jun 2022 07:01  -  22 Jun 2022 02:02  --------------------------------------------------------  IN:  Total IN: 0 mL    OUT:    Voided (mL): 400 mL  Total OUT: 400 mL    Total NET: -400 mL          PHYSICAL EXAM:    GENERAL: NAD, lying in bed comfortably  HEAD:  Atraumatic, Normocephalic  ENT: Moist mucous membranes  CHEST/LUNG: Unlabored respirations  ABDOMEN: Soft, Nontender, Nondistended.   NERVOUS SYSTEM:  Alert & Oriented X3, speech clear.   SKIN: No rashes or lesions    MEDICATIONS  (STANDING):  heparin   Injectable 5000 Unit(s) SubCutaneous every 8 hours  pantoprazole  Injectable 40 milliGRAM(s) IV Push every 12 hours  sodium chloride 0.9%. 1000 milliLiter(s) (125 mL/Hr) IV Continuous <Continuous>    MEDICATIONS  (PRN):  acetaminophen   IVPB .. 1000 milliGRAM(s) IV Intermittent once PRN Mild Pain (1 - 3)  ondansetron Injectable 4 milliGRAM(s) IV Push once PRN Nausea and/or Vomiting      LABS:                          13.9   7.31  )-----------( 340      ( 21 Jun 2022 14:04 )             42.1     06-21    146<H>  |  105  |  5<L>  ----------------------------<  86  3.4<L>   |  22  |  0.63    Ca    8.9      21 Jun 2022 18:04    TPro  8.4<H>  /  Alb  4.3  /  TBili  0.6  /  DBili  x   /  AST  32  /  ALT  21  /  AlkPhos  86  06-21    PT/INR - ( 21 Jun 2022 14:04 )   PT: 14.2 sec;   INR: 1.22 ratio         PTT - ( 21 Jun 2022 14:04 )  PTT:32.6 sec           Surgery Progress Note    Subjective:     Patient seen and examined at bedside. VSS, AF. No acute events overnight.      OBJECTIVE:     T(C): 36.9 (06-21-22 @ 21:50), Max: 36.9 (06-21-22 @ 21:50)  HR: 86 (06-21-22 @ 21:50) (86 - 95)  BP: 123/85 (06-21-22 @ 21:50) (113/77 - 123/85)  RR: 18 (06-21-22 @ 21:50) (16 - 18)  SpO2: 99% (06-21-22 @ 21:50) (96% - 99%)  Wt(kg): --    I&O's Detail    21 Jun 2022 07:01  -  22 Jun 2022 02:02  --------------------------------------------------------  IN:  Total IN: 0 mL    OUT:    Voided (mL): 400 mL  Total OUT: 400 mL    Total NET: -400 mL          PHYSICAL EXAM:    General: resting in bed, in mild discomfort  Resp: nonlabored breathing  Abd: soft, NT, ND; no rebound or guarding; incision c/d/i  Ext: WWP  Neuro: AAOx3    MEDICATIONS  (STANDING):  heparin   Injectable 5000 Unit(s) SubCutaneous every 8 hours  pantoprazole  Injectable 40 milliGRAM(s) IV Push every 12 hours  sodium chloride 0.9%. 1000 milliLiter(s) (125 mL/Hr) IV Continuous <Continuous>    MEDICATIONS  (PRN):  acetaminophen   IVPB .. 1000 milliGRAM(s) IV Intermittent once PRN Mild Pain (1 - 3)  ondansetron Injectable 4 milliGRAM(s) IV Push once PRN Nausea and/or Vomiting      LABS:                          13.9   7.31  )-----------( 340      ( 21 Jun 2022 14:04 )             42.1     06-21    146<H>  |  105  |  5<L>  ----------------------------<  86  3.4<L>   |  22  |  0.63    Ca    8.9      21 Jun 2022 18:04    TPro  8.4<H>  /  Alb  4.3  /  TBili  0.6  /  DBili  x   /  AST  32  /  ALT  21  /  AlkPhos  86  06-21    PT/INR - ( 21 Jun 2022 14:04 )   PT: 14.2 sec;   INR: 1.22 ratio         PTT - ( 21 Jun 2022 14:04 )  PTT:32.6 sec

## 2022-06-23 ENCOUNTER — TRANSCRIPTION ENCOUNTER (OUTPATIENT)
Age: 47
End: 2022-06-23

## 2022-06-23 VITALS
DIASTOLIC BLOOD PRESSURE: 66 MMHG | HEART RATE: 74 BPM | RESPIRATION RATE: 18 BRPM | OXYGEN SATURATION: 100 % | TEMPERATURE: 98 F | SYSTOLIC BLOOD PRESSURE: 110 MMHG

## 2022-06-23 LAB
ANION GAP SERPL CALC-SCNC: 11 MMOL/L — SIGNIFICANT CHANGE UP (ref 5–17)
ANION GAP SERPL CALC-SCNC: 13 MMOL/L — SIGNIFICANT CHANGE UP (ref 5–17)
BUN SERPL-MCNC: <4 MG/DL — LOW (ref 7–23)
BUN SERPL-MCNC: <4 MG/DL — LOW (ref 7–23)
CALCIUM SERPL-MCNC: 8.5 MG/DL — SIGNIFICANT CHANGE UP (ref 8.4–10.5)
CALCIUM SERPL-MCNC: 9.1 MG/DL — SIGNIFICANT CHANGE UP (ref 8.4–10.5)
CHLORIDE SERPL-SCNC: 102 MMOL/L — SIGNIFICANT CHANGE UP (ref 96–108)
CHLORIDE SERPL-SCNC: 103 MMOL/L — SIGNIFICANT CHANGE UP (ref 96–108)
CO2 SERPL-SCNC: 25 MMOL/L — SIGNIFICANT CHANGE UP (ref 22–31)
CO2 SERPL-SCNC: 26 MMOL/L — SIGNIFICANT CHANGE UP (ref 22–31)
CREAT SERPL-MCNC: 0.5 MG/DL — SIGNIFICANT CHANGE UP (ref 0.5–1.3)
CREAT SERPL-MCNC: 0.54 MG/DL — SIGNIFICANT CHANGE UP (ref 0.5–1.3)
EGFR: 114 ML/MIN/1.73M2 — SIGNIFICANT CHANGE UP
EGFR: 116 ML/MIN/1.73M2 — SIGNIFICANT CHANGE UP
GLUCOSE SERPL-MCNC: 108 MG/DL — HIGH (ref 70–99)
GLUCOSE SERPL-MCNC: 111 MG/DL — HIGH (ref 70–99)
HCT VFR BLD CALC: 34 % — LOW (ref 34.5–45)
HGB BLD-MCNC: 11 G/DL — LOW (ref 11.5–15.5)
MAGNESIUM SERPL-MCNC: 1.2 MG/DL — LOW (ref 1.6–2.6)
MCHC RBC-ENTMCNC: 30.9 PG — SIGNIFICANT CHANGE UP (ref 27–34)
MCHC RBC-ENTMCNC: 32.4 GM/DL — SIGNIFICANT CHANGE UP (ref 32–36)
MCV RBC AUTO: 95.5 FL — SIGNIFICANT CHANGE UP (ref 80–100)
NRBC # BLD: 0 /100 WBCS — SIGNIFICANT CHANGE UP (ref 0–0)
PHOSPHATE SERPL-MCNC: 2.6 MG/DL — SIGNIFICANT CHANGE UP (ref 2.5–4.5)
PLATELET # BLD AUTO: 201 K/UL — SIGNIFICANT CHANGE UP (ref 150–400)
POTASSIUM SERPL-MCNC: 3 MMOL/L — LOW (ref 3.5–5.3)
POTASSIUM SERPL-MCNC: 3.5 MMOL/L — SIGNIFICANT CHANGE UP (ref 3.5–5.3)
POTASSIUM SERPL-SCNC: 3 MMOL/L — LOW (ref 3.5–5.3)
POTASSIUM SERPL-SCNC: 3.5 MMOL/L — SIGNIFICANT CHANGE UP (ref 3.5–5.3)
PREALB SERPL-MCNC: 12 MG/DL — LOW (ref 20–40)
RBC # BLD: 3.56 M/UL — LOW (ref 3.8–5.2)
RBC # FLD: 13.8 % — SIGNIFICANT CHANGE UP (ref 10.3–14.5)
SODIUM SERPL-SCNC: 140 MMOL/L — SIGNIFICANT CHANGE UP (ref 135–145)
SODIUM SERPL-SCNC: 140 MMOL/L — SIGNIFICANT CHANGE UP (ref 135–145)
WBC # BLD: 5.06 K/UL — SIGNIFICANT CHANGE UP (ref 3.8–10.5)
WBC # FLD AUTO: 5.06 K/UL — SIGNIFICANT CHANGE UP (ref 3.8–10.5)

## 2022-06-23 RX ORDER — ONDANSETRON 8 MG/1
1 TABLET, FILM COATED ORAL
Qty: 28 | Refills: 0
Start: 2022-06-23 | End: 2022-06-29

## 2022-06-23 RX ORDER — MAGNESIUM HYDROXIDE 400 MG/1
30 TABLET, CHEWABLE ORAL DAILY
Refills: 0 | Status: DISCONTINUED | OUTPATIENT
Start: 2022-06-23 | End: 2022-06-23

## 2022-06-23 RX ORDER — OMEPRAZOLE 10 MG/1
1 CAPSULE, DELAYED RELEASE ORAL
Qty: 30 | Refills: 0
Start: 2022-06-23 | End: 2022-07-22

## 2022-06-23 RX ORDER — MAGNESIUM SULFATE 500 MG/ML
2 VIAL (ML) INJECTION ONCE
Refills: 0 | Status: COMPLETED | OUTPATIENT
Start: 2022-06-23 | End: 2022-06-23

## 2022-06-23 RX ORDER — POTASSIUM CHLORIDE 20 MEQ
10 PACKET (EA) ORAL
Refills: 0 | Status: COMPLETED | OUTPATIENT
Start: 2022-06-23 | End: 2022-06-23

## 2022-06-23 RX ADMIN — Medication 25 GRAM(S): at 11:05

## 2022-06-23 RX ADMIN — Medication 100 MILLIEQUIVALENT(S): at 13:05

## 2022-06-23 RX ADMIN — PANTOPRAZOLE SODIUM 40 MILLIGRAM(S): 20 TABLET, DELAYED RELEASE ORAL at 18:30

## 2022-06-23 RX ADMIN — Medication 100 MILLIEQUIVALENT(S): at 16:57

## 2022-06-23 RX ADMIN — Medication 100 MILLIEQUIVALENT(S): at 14:15

## 2022-06-23 RX ADMIN — PANTOPRAZOLE SODIUM 40 MILLIGRAM(S): 20 TABLET, DELAYED RELEASE ORAL at 05:27

## 2022-06-23 RX ADMIN — MAGNESIUM HYDROXIDE 30 MILLILITER(S): 400 TABLET, CHEWABLE ORAL at 16:57

## 2022-06-23 NOTE — PROGRESS NOTE ADULT - ASSESSMENT
46F s/p robotic sleeve gastrectomy, hiatal hernia repair, c/b recurrent hiatal hernia, s/p diag lap, DONNA, lap hiatal hernia repair 6/3/22, now presents with dysphagia with liquids    Recommendation:     -CT A/P completed  - UGI completed  - NS @ 125  - Rich CLD  - PPI  - DVT ppx with HSQ  - Zofran PRN     Green surgery  [id number] 46F s/p robotic sleeve gastrectomy, hiatal hernia repair, c/b recurrent hiatal hernia, s/p diag lap, DONNA, lap hiatal hernia repair 6/3/22, now presents with dysphagia with liquids    Recommendation:     -CT A/P completed  - UGI completed  - NS @ 125  - Rich CLD  - PPI  - DVT ppx with HSQ  - Zofran PRN     Green surgery  [id number]    Amendment 13:45    Patient tolerated liquid diet overnight and so advanced to bariatric full liquid diet.  AM labs consistent for hypomagnesia and hypokalemia. Magnesium and Potassium replaced intravenously               d/C home later this afternoon if tolerating 360ml of full liquid diet and normal electrolytes values.    Bariatric Surgery NP  Roxann Pierre DNP, RNFA  [phone]

## 2022-06-23 NOTE — DIETITIAN INITIAL EVALUATION ADULT - PERTINENT MEDS FT
MEDICATIONS  (STANDING):  dextrose 5% + sodium chloride 0.45% with potassium chloride 20 mEq/L 1000 milliLiter(s) (50 mL/Hr) IV Continuous <Continuous>  heparin   Injectable 5000 Unit(s) SubCutaneous every 8 hours  pantoprazole  Injectable 40 milliGRAM(s) IV Push every 12 hours    MEDICATIONS  (PRN):  acetaminophen   IVPB .. 1000 milliGRAM(s) IV Intermittent once PRN Mild Pain (1 - 3)  ondansetron Injectable 4 milliGRAM(s) IV Push once PRN Nausea and/or Vomiting

## 2022-06-23 NOTE — DISCHARGE NOTE PROVIDER - NSDCCPCAREPLAN_GEN_ALL_CORE_FT
PRINCIPAL DISCHARGE DIAGNOSIS  Diagnosis: Vomiting  Assessment and Plan of Treatment: -Bariatric Full liquid diet and protein shakes  -Complete your prescriptions of protonix, and zofran as needed.   -You may take over the counter stool softeners for symptoms.   -Avoid swallowing pills unless medications cannot be crushed or cut.   - Notify your surgeon and return to ER for temperatures greater than 101, chills sweats, pain not controlled with pain medications, persistent nausea and vomiting, inability to tolerate food, significant abdominal bloating/distension, no passing of flatus or bowel movements, or acutely concerning matters to you, that may require urgent medical attention.   Please follow up with Dr. Field in 1-2 weeks. Contact info below.      SECONDARY DISCHARGE DIAGNOSES  Diagnosis: Hypokalemia  Assessment and Plan of Treatment: repleted during hospital course    Diagnosis: Hypomagnesemia  Assessment and Plan of Treatment: repleted during hospital course

## 2022-06-23 NOTE — DISCHARGE NOTE PROVIDER - HOSPITAL COURSE
46 year old female s/p robotic sleeve gastrectomy, hiatal hernia repair (5/3/22, Dr. Field), c/b recurrent hiatal hernia, s/p diag lap, DONNA, lap hiatal hernia repair 6/3/22, now presents with dysphagia. Patient recently seen by Dr. Field with drain removal 4 days ago. Started having difficulty swallowing liquid 3 days ago. Denies dysphagia with solid food. Last flatus 3 days ago. Denies fever, chills, abdominal pain. Patient admitted. Patient had UGI which showed inflammation and a small hiatal hernia. Diet was advanced to cleares then fulls and she tolerated. At the time of discharge, the patient was hemodynamically stable, was tolerating PO full liquid diet, was voiding urine and passing stool, was ambulating, and was comfortable with adequate pain control. The patient was instructed to follow up with Dr. Field within 1-2 weeks after discharge from the hospital. The patient felt comfortable with discharge. The patient was discharged to home. The patient had no other issues.

## 2022-06-23 NOTE — DIETITIAN INITIAL EVALUATION ADULT - ORAL INTAKE PTA/DIET HISTORY
Pt reports she followed a full liquid diet after bariatric sx in early May, for two weeks, and advanced to very soft foods (fish, scrambled eggs). At the end of May, she was unable to tolerate more than a few spoonfuls of soup, or a few sips of liquids. She endorses occasional vomiting in the morning after drinking water, which she attributed to GERD. Pt was taking 4 spoonfuls of lentil soup/tomato soup, and reports feeling full for hours after. Unable to take entirety of protein shake.

## 2022-06-23 NOTE — DIETITIAN INITIAL EVALUATION ADULT - NS FNS DIET ORDER
Diet, Full Liquid:   Phase 1 Bariatric Full Liquid ([id number])  Liquid Protein Supplement     Qty per Day:  3 (06-23-22 @ 10:22)

## 2022-06-23 NOTE — DIETITIAN INITIAL EVALUATION ADULT - PERTINENT LABORATORY DATA
06-22    150<H>  |  111<H>  |  <4<L>  ----------------------------<  82  3.5   |  20<L>  |  0.57    Ca    8.7      22 Jun 2022 07:24    TPro  8.4<H>  /  Alb  4.3  /  TBili  0.6  /  DBili  x   /  AST  32  /  ALT  21  /  AlkPhos  86  06-21  A1C with Estimated Average Glucose Result: 5.1 % (04-22-22 @ 21:47)

## 2022-06-23 NOTE — DIETITIAN INITIAL EVALUATION ADULT - REASON FOR ADMISSION
"46F s/p robotic sleeve gastrectomy, hiatal hernia repair, c/b recurrent hiatal hernia, s/p diag lap, DONNA, lap hiatal hernia repair 6/3/22, now presents with dysphagia with liquids" Per PA, UGI completed and pt shows small hernia with inflammation.

## 2022-06-23 NOTE — DISCHARGE NOTE PROVIDER - CARE PROVIDER_API CALL
Rahul Field  General Surgery  [address] [place] [address], [address]  [place], [place] [id number]  Phone: [phone]  Fax: [phone]  Follow Up Time: 1 week

## 2022-06-23 NOTE — PROGRESS NOTE ADULT - ATTENDING COMMENTS
I saw and examined the patient, and reviewed  the history with the patient and   Agree with note which was also reviewed and edited where appropriate.  D/W patient, RN, residents and Fellow  UGII seen ,passage of contrast  Pt tolerating protein shakes and liquids  if continues to do well will be sent home on liquids, may need further surgery at a later date if not able to progress po intake

## 2022-06-23 NOTE — DIETITIAN INITIAL EVALUATION ADULT - ETIOLOGY
in the context of complications (hernia, GERD) s/p bariatric sx reducing ability to consume adequate nutrients

## 2022-06-23 NOTE — DIETITIAN INITIAL EVALUATION ADULT - OTHER INFO
Weight: Per records, pt was 238 lbs (5/4/22), 206 lbs (6/2/22) and now weighs 198 lbs (6/21). Pt has lost more weight than desirable post-sx.     Noted sodium 150 today. IVF ordered.

## 2022-06-23 NOTE — DISCHARGE NOTE NURSING/CASE MANAGEMENT/SOCIAL WORK - NSDCPEFALRISK_GEN_ALL_CORE
For information on Fall & Injury Prevention, visit: [email or web address] OR  [email or web address] OR  https://www.cdc.gov/steadi/patient.html

## 2022-06-23 NOTE — DISCHARGE NOTE NURSING/CASE MANAGEMENT/SOCIAL WORK - PATIENT PORTAL LINK FT
You can access the FollowMyHealth Patient Portal offered by [facility] by registering at the following website: [email or web address]. By joining [employer]’s FollowMyHealth portal, you will also be able to view your health information using other applications (apps) compatible with our system.

## 2022-06-23 NOTE — PROGRESS NOTE ADULT - SUBJECTIVE AND OBJECTIVE BOX
Surgery Progress Note    Subjective:     Patient seen and examined at bedside. VSS, AF. No acute events overnight.     OBJECTIVE:     T(C): 36.7 (06-23-22 @ 00:38), Max: 36.7 (06-22-22 @ 16:54)  HR: 79 (06-23-22 @ 00:38) (63 - 81)  BP: 114/76 (06-23-22 @ 00:38) (91/65 - 114/76)  RR: 18 (06-23-22 @ 00:38) (18 - 18)  SpO2: 97% (06-23-22 @ 00:38) (96% - 100%)  Wt(kg): --    I&O's Detail    21 Jun 2022 07:01  -  22 Jun 2022 07:00  --------------------------------------------------------  IN:    Lactated Ringers: 1500 mL  Total IN: 1500 mL    OUT:    Voided (mL): 400 mL  Total OUT: 400 mL    Total NET: 1100 mL      22 Jun 2022 07:01  -  23 Jun 2022 03:16  --------------------------------------------------------  IN:    dextrose 5% + sodium chloride 0.45% w/ Additives: 600 mL    Lactated Ringers: 750 mL    Lactated Ringers Bolus: 100 mL    Oral Fluid: 335 mL  Total IN: 1785 mL    OUT:    Voided (mL): 900 mL  Total OUT: 900 mL    Total NET: 885 mL          PHYSICAL EXAM:    General: resting in bed, in mild discomfort  Resp: nonlabored breathing  Abd: soft, NT, ND; no rebound or guarding; incision c/d/i  Ext: WWP  Neuro: AAOx3    MEDICATIONS  (STANDING):  dextrose 5% + sodium chloride 0.45% with potassium chloride 20 mEq/L 1000 milliLiter(s) (100 mL/Hr) IV Continuous <Continuous>  heparin   Injectable 5000 Unit(s) SubCutaneous every 8 hours  pantoprazole  Injectable 40 milliGRAM(s) IV Push every 12 hours    MEDICATIONS  (PRN):  acetaminophen   IVPB .. 1000 milliGRAM(s) IV Intermittent once PRN Mild Pain (1 - 3)  ondansetron Injectable 4 milliGRAM(s) IV Push once PRN Nausea and/or Vomiting      LABS:                          11.3   6.24  )-----------( 220      ( 22 Jun 2022 07:26 )             35.6     06-22    150<H>  |  111<H>  |  <4<L>  ----------------------------<  82  3.5   |  20<L>  |  0.57    Ca    8.7      22 Jun 2022 07:24    TPro  8.4<H>  /  Alb  4.3  /  TBili  0.6  /  DBili  x   /  AST  32  /  ALT  21  /  AlkPhos  86  06-21    PT/INR - ( 21 Jun 2022 14:04 )   PT: 14.2 sec;   INR: 1.22 ratio         PTT - ( 21 Jun 2022 14:04 )  PTT:32.6 sec

## 2022-06-23 NOTE — DIETITIAN INITIAL EVALUATION ADULT - ADD RECOMMEND
1) Full liquid bariatric diet as per team, recommended to add on 3 LPS supplements, as pt struggles to consume 1 protein shake daily and dislikes shakes. 2) Discussed consuming low fiber soups, to help ease digestion. Recommended to blend foods well, avoid thick consistencies. 3) Consider stool softener, no BM x7 days 4) Monitor PO intake, GI tolerance, skin integrity and labs. RD remains available if needed, pt is aware.

## 2022-06-23 NOTE — DIETITIAN INITIAL EVALUATION ADULT - ENERGY INTAKE
Pt reports some improvement in GERD symptoms with protonix. She denies difficulty swallowing liquids, was able to tolerate 4 cups of tea yesterday and broth, does not cough after drinking liquids. Complaints stem from feeling full (she motions up to her throat).  Poor (<50%)

## 2022-06-23 NOTE — DISCHARGE NOTE PROVIDER - NSDCFUSCHEDAPPT_GEN_ALL_CORE_FT
[facility]  [facility] [address]  Scheduled Appointment: 07/08/2022

## 2022-06-23 NOTE — DISCHARGE NOTE PROVIDER - NSDCMRMEDTOKEN_GEN_ALL_CORE_FT
omeprazole 40 mg oral delayed release capsule: 1 cap(s) orally once a day MDD:1  ondansetron 4 mg oral tablet, disintegratin tab(s) orally every 6 hours, As Needed

## 2022-06-24 ENCOUNTER — NON-APPOINTMENT (OUTPATIENT)
Age: 47
End: 2022-06-24

## 2022-07-06 LAB
25(OH)D3 SERPL-MCNC: 37.1 NG/ML
ALBUMIN SERPL ELPH-MCNC: 3.9 G/DL
ALP BLD-CCNC: 79 U/L
ALT SERPL-CCNC: 9 U/L
ANION GAP SERPL CALC-SCNC: 20 MMOL/L
AST SERPL-CCNC: 12 U/L
BILIRUB DIRECT SERPL-MCNC: 0.1 MG/DL
BILIRUB INDIRECT SERPL-MCNC: 0.2 MG/DL
BILIRUB SERPL-MCNC: 0.3 MG/DL
BUN SERPL-MCNC: 3 MG/DL
CALCIUM SERPL-MCNC: 9.5 MG/DL
CHLORIDE SERPL-SCNC: 101 MMOL/L
CO2 SERPL-SCNC: 21 MMOL/L
CREAT SERPL-MCNC: 0.59 MG/DL
EGFR: 111 ML/MIN/1.73M2
FERRITIN SERPL-MCNC: 840 NG/ML
FOLATE SERPL-MCNC: 10.8 NG/ML
GLUCOSE SERPL-MCNC: 83 MG/DL
IRON SATN MFR SERPL: 25 %
IRON SERPL-MCNC: 42 UG/DL
POTASSIUM SERPL-SCNC: 4.6 MMOL/L
PROT SERPL-MCNC: 7.1 G/DL
SODIUM SERPL-SCNC: 142 MMOL/L
TIBC SERPL-MCNC: 166 UG/DL
UIBC SERPL-MCNC: 124 UG/DL
VIT B12 SERPL-MCNC: 845 PG/ML

## 2022-07-08 ENCOUNTER — APPOINTMENT (OUTPATIENT)
Dept: SURGERY | Facility: CLINIC | Age: 47
End: 2022-07-08

## 2022-07-08 VITALS
SYSTOLIC BLOOD PRESSURE: 142 MMHG | WEIGHT: 199.31 LBS | OXYGEN SATURATION: 97 % | RESPIRATION RATE: 18 BRPM | BODY MASS INDEX: 35.32 KG/M2 | TEMPERATURE: 97.7 F | HEIGHT: 63 IN | DIASTOLIC BLOOD PRESSURE: 91 MMHG | HEART RATE: 93 BPM

## 2022-07-08 PROCEDURE — 99024 POSTOP FOLLOW-UP VISIT: CPT

## 2022-07-08 NOTE — PLAN
[FreeTextEntry1] : [] continue bariatric diet advancement per instructions\par [] reviewed importance of eating slowly, chewing thoroughly, stop eating when full\par [] recommend increasing cardiovascular exercise, goal 30 minutes per day\par [] continue multivitamins\par [] f/u in 1 month

## 2022-07-08 NOTE — PHYSICAL EXAM
[Obese, well nourished, in no acute distress] : obese, well nourished, in no acute distress [Normal] : affect appropriate [de-identified] : Normal respirations [de-identified] : Soft, nontender, nondistended.

## 2022-07-08 NOTE — HISTORY OF PRESENT ILLNESS
[de-identified] : NORBERT is a 46 year old female here for postoperative visit s/p Laparoscopic repair of recurrent hiatal hernia and upper endoscopy on 06/03/2022.\par Robot-assisted laparoscopic sleeve gastrectomy and hiatal hernia repair on 05/03/2022.\par She is doing well, tolerating most soft foods including ground meat and fish without dysphagia.  She feels she is hydrating adequately and drinks plenty of water.  Occasionally she feels some epigastric/chest discomfort while eating, but the feeling is usually transient.\par She reports no abdominal pain.  She is having regular bowel movements.  She continues to take her multivitamins.

## 2022-07-08 NOTE — ASSESSMENT
[FreeTextEntry1] : 46-year-old female recovering status post robot-assisted laparoscopic sleeve gastrectomy and hiatal hernia repair 5/3/2022, complicated by hiatal hernia recurrence requiring laparoscopic repair on 6/3/2022.\par \par Doing well , tolerating soft foods.

## 2022-08-02 ENCOUNTER — INPATIENT (INPATIENT)
Facility: HOSPITAL | Age: 47
LOS: 2 days | Discharge: HOME | DRG: 392 | End: 2022-08-05
Attending: SURGERY | Admitting: SURGERY
Payer: MEDICAID

## 2022-08-02 VITALS
TEMPERATURE: 98 F | OXYGEN SATURATION: 98 % | HEART RATE: 114 BPM | WEIGHT: 182.98 LBS | DIASTOLIC BLOOD PRESSURE: 85 MMHG | HEIGHT: 63 IN | SYSTOLIC BLOOD PRESSURE: 131 MMHG | RESPIRATION RATE: 18 BRPM

## 2022-08-02 DIAGNOSIS — K44.9 DIAPHRAGMATIC HERNIA WITHOUT OBSTRUCTION OR GANGRENE: ICD-10-CM

## 2022-08-02 DIAGNOSIS — Z90.3 ACQUIRED ABSENCE OF STOMACH [PART OF]: Chronic | ICD-10-CM

## 2022-08-02 DIAGNOSIS — Z90.710 ACQUIRED ABSENCE OF BOTH CERVIX AND UTERUS: Chronic | ICD-10-CM

## 2022-08-02 DIAGNOSIS — Z98.890 OTHER SPECIFIED POSTPROCEDURAL STATES: Chronic | ICD-10-CM

## 2022-08-02 LAB
ALBUMIN SERPL ELPH-MCNC: 4.5 G/DL — SIGNIFICANT CHANGE UP (ref 3.3–5)
ALP SERPL-CCNC: 104 U/L — SIGNIFICANT CHANGE UP (ref 40–120)
ALT FLD-CCNC: 5 U/L — LOW (ref 10–45)
ANION GAP SERPL CALC-SCNC: 19 MMOL/L — HIGH (ref 5–17)
ANION GAP SERPL CALC-SCNC: 24 MMOL/L — HIGH (ref 5–17)
APPEARANCE UR: CLEAR — SIGNIFICANT CHANGE UP
APTT BLD: 33 SEC — SIGNIFICANT CHANGE UP (ref 27.5–35.5)
AST SERPL-CCNC: 8 U/L — LOW (ref 10–40)
BACTERIA # UR AUTO: NEGATIVE — SIGNIFICANT CHANGE UP
BASE EXCESS BLDV CALC-SCNC: -5.9 MMOL/L — LOW (ref -2–2)
BASOPHILS # BLD AUTO: 0.04 K/UL — SIGNIFICANT CHANGE UP (ref 0–0.2)
BASOPHILS NFR BLD AUTO: 0.7 % — SIGNIFICANT CHANGE UP (ref 0–2)
BILIRUB SERPL-MCNC: 0.4 MG/DL — SIGNIFICANT CHANGE UP (ref 0.2–1.2)
BILIRUB UR-MCNC: ABNORMAL
BLD GP AB SCN SERPL QL: NEGATIVE — SIGNIFICANT CHANGE UP
BUN SERPL-MCNC: <4 MG/DL — LOW (ref 7–23)
BUN SERPL-MCNC: <4 MG/DL — LOW (ref 7–23)
CA-I SERPL-SCNC: 1.23 MMOL/L — SIGNIFICANT CHANGE UP (ref 1.15–1.33)
CALCIUM SERPL-MCNC: 8.5 MG/DL — SIGNIFICANT CHANGE UP (ref 8.4–10.5)
CALCIUM SERPL-MCNC: 9.6 MG/DL — SIGNIFICANT CHANGE UP (ref 8.4–10.5)
CHLORIDE BLDV-SCNC: 106 MMOL/L — SIGNIFICANT CHANGE UP (ref 96–108)
CHLORIDE SERPL-SCNC: 104 MMOL/L — SIGNIFICANT CHANGE UP (ref 96–108)
CHLORIDE SERPL-SCNC: 109 MMOL/L — HIGH (ref 96–108)
CO2 BLDV-SCNC: 22 MMOL/L — SIGNIFICANT CHANGE UP (ref 22–26)
CO2 SERPL-SCNC: 16 MMOL/L — LOW (ref 22–31)
CO2 SERPL-SCNC: 18 MMOL/L — LOW (ref 22–31)
COLOR SPEC: SIGNIFICANT CHANGE UP
CREAT SERPL-MCNC: 0.59 MG/DL — SIGNIFICANT CHANGE UP (ref 0.5–1.3)
CREAT SERPL-MCNC: 0.65 MG/DL — SIGNIFICANT CHANGE UP (ref 0.5–1.3)
DIFF PNL FLD: NEGATIVE — SIGNIFICANT CHANGE UP
EGFR: 109 ML/MIN/1.73M2 — SIGNIFICANT CHANGE UP
EGFR: 111 ML/MIN/1.73M2 — SIGNIFICANT CHANGE UP
EOSINOPHIL # BLD AUTO: 0.07 K/UL — SIGNIFICANT CHANGE UP (ref 0–0.5)
EOSINOPHIL NFR BLD AUTO: 1.2 % — SIGNIFICANT CHANGE UP (ref 0–6)
EPI CELLS # UR: 1 /HPF — SIGNIFICANT CHANGE UP
GAS PNL BLDV: 141 MMOL/L — SIGNIFICANT CHANGE UP (ref 136–145)
GAS PNL BLDV: SIGNIFICANT CHANGE UP
GAS PNL BLDV: SIGNIFICANT CHANGE UP
GLUCOSE BLDV-MCNC: 93 MG/DL — SIGNIFICANT CHANGE UP (ref 70–99)
GLUCOSE SERPL-MCNC: 80 MG/DL — SIGNIFICANT CHANGE UP (ref 70–99)
GLUCOSE SERPL-MCNC: 91 MG/DL — SIGNIFICANT CHANGE UP (ref 70–99)
GLUCOSE UR QL: NEGATIVE — SIGNIFICANT CHANGE UP
HCG SERPL-ACNC: 3.2 MIU/ML — SIGNIFICANT CHANGE UP
HCO3 BLDV-SCNC: 21 MMOL/L — LOW (ref 22–29)
HCT VFR BLD CALC: 46.2 % — HIGH (ref 34.5–45)
HCT VFR BLDA CALC: 45 % — SIGNIFICANT CHANGE UP (ref 34.5–46.5)
HGB BLD CALC-MCNC: 15 G/DL — SIGNIFICANT CHANGE UP (ref 11.7–16.1)
HGB BLD-MCNC: 14.9 G/DL — SIGNIFICANT CHANGE UP (ref 11.5–15.5)
HYALINE CASTS # UR AUTO: 11 /LPF — HIGH (ref 0–2)
IMM GRANULOCYTES NFR BLD AUTO: 0.5 % — SIGNIFICANT CHANGE UP (ref 0–1.5)
INR BLD: 1.19 RATIO — HIGH (ref 0.88–1.16)
KETONES UR-MCNC: ABNORMAL
LACTATE BLDV-MCNC: 0.7 MMOL/L — SIGNIFICANT CHANGE UP (ref 0.7–2)
LACTATE BLDV-MCNC: 2.1 MMOL/L — HIGH (ref 0.7–2)
LEUKOCYTE ESTERASE UR-ACNC: NEGATIVE — SIGNIFICANT CHANGE UP
LIDOCAIN IGE QN: 11 U/L — SIGNIFICANT CHANGE UP (ref 7–60)
LYMPHOCYTES # BLD AUTO: 1.76 K/UL — SIGNIFICANT CHANGE UP (ref 1–3.3)
LYMPHOCYTES # BLD AUTO: 29 % — SIGNIFICANT CHANGE UP (ref 13–44)
MAGNESIUM SERPL-MCNC: 1.8 MG/DL — SIGNIFICANT CHANGE UP (ref 1.6–2.6)
MCHC RBC-ENTMCNC: 31.4 PG — SIGNIFICANT CHANGE UP (ref 27–34)
MCHC RBC-ENTMCNC: 32.3 GM/DL — SIGNIFICANT CHANGE UP (ref 32–36)
MCV RBC AUTO: 97.3 FL — SIGNIFICANT CHANGE UP (ref 80–100)
MONOCYTES # BLD AUTO: 0.7 K/UL — SIGNIFICANT CHANGE UP (ref 0–0.9)
MONOCYTES NFR BLD AUTO: 11.6 % — SIGNIFICANT CHANGE UP (ref 2–14)
NEUTROPHILS # BLD AUTO: 3.46 K/UL — SIGNIFICANT CHANGE UP (ref 1.8–7.4)
NEUTROPHILS NFR BLD AUTO: 57 % — SIGNIFICANT CHANGE UP (ref 43–77)
NITRITE UR-MCNC: NEGATIVE — SIGNIFICANT CHANGE UP
NRBC # BLD: 0 /100 WBCS — SIGNIFICANT CHANGE UP (ref 0–0)
PCO2 BLDV: 46 MMHG — HIGH (ref 39–42)
PH BLDV: 7.27 — LOW (ref 7.32–7.43)
PH UR: 6.5 — SIGNIFICANT CHANGE UP (ref 5–8)
PLATELET # BLD AUTO: 287 K/UL — SIGNIFICANT CHANGE UP (ref 150–400)
PO2 BLDV: 29 MMHG — SIGNIFICANT CHANGE UP (ref 25–45)
POTASSIUM BLDV-SCNC: 3.8 MMOL/L — SIGNIFICANT CHANGE UP (ref 3.5–5.1)
POTASSIUM SERPL-MCNC: 3 MMOL/L — LOW (ref 3.5–5.3)
POTASSIUM SERPL-MCNC: 3.9 MMOL/L — SIGNIFICANT CHANGE UP (ref 3.5–5.3)
POTASSIUM SERPL-SCNC: 3 MMOL/L — LOW (ref 3.5–5.3)
POTASSIUM SERPL-SCNC: 3.9 MMOL/L — SIGNIFICANT CHANGE UP (ref 3.5–5.3)
PROT SERPL-MCNC: 8.1 G/DL — SIGNIFICANT CHANGE UP (ref 6–8.3)
PROT UR-MCNC: ABNORMAL
PROTHROM AB SERPL-ACNC: 13.7 SEC — HIGH (ref 10.5–13.4)
RBC # BLD: 4.75 M/UL — SIGNIFICANT CHANGE UP (ref 3.8–5.2)
RBC # FLD: 13.7 % — SIGNIFICANT CHANGE UP (ref 10.3–14.5)
RBC CASTS # UR COMP ASSIST: 1 /HPF — SIGNIFICANT CHANGE UP (ref 0–4)
RH IG SCN BLD-IMP: POSITIVE — SIGNIFICANT CHANGE UP
SAO2 % BLDV: 40.2 % — LOW (ref 67–88)
SARS-COV-2 RNA SPEC QL NAA+PROBE: SIGNIFICANT CHANGE UP
SODIUM SERPL-SCNC: 144 MMOL/L — SIGNIFICANT CHANGE UP (ref 135–145)
SODIUM SERPL-SCNC: 146 MMOL/L — HIGH (ref 135–145)
SP GR SPEC: >1.05 (ref 1.01–1.02)
UROBILINOGEN FLD QL: NEGATIVE — SIGNIFICANT CHANGE UP
WBC # BLD: 6.06 K/UL — SIGNIFICANT CHANGE UP (ref 3.8–10.5)
WBC # FLD AUTO: 6.06 K/UL — SIGNIFICANT CHANGE UP (ref 3.8–10.5)
WBC UR QL: 1 /HPF — SIGNIFICANT CHANGE UP (ref 0–5)

## 2022-08-02 PROCEDURE — 71260 CT THORAX DX C+: CPT | Mod: 26,MG

## 2022-08-02 PROCEDURE — G1004: CPT

## 2022-08-02 PROCEDURE — 99285 EMERGENCY DEPT VISIT HI MDM: CPT

## 2022-08-02 PROCEDURE — 74177 CT ABD & PELVIS W/CONTRAST: CPT | Mod: 26,MG

## 2022-08-02 RX ORDER — THIAMINE MONONITRATE (VIT B1) 100 MG
100 TABLET ORAL ONCE
Refills: 0 | Status: COMPLETED | OUTPATIENT
Start: 2022-08-02 | End: 2022-08-02

## 2022-08-02 RX ORDER — PANTOPRAZOLE SODIUM 20 MG/1
40 TABLET, DELAYED RELEASE ORAL EVERY 24 HOURS
Refills: 0 | Status: DISCONTINUED | OUTPATIENT
Start: 2022-08-02 | End: 2022-08-05

## 2022-08-02 RX ORDER — SODIUM CHLORIDE 9 MG/ML
1000 INJECTION, SOLUTION INTRAVENOUS
Refills: 0 | Status: DISCONTINUED | OUTPATIENT
Start: 2022-08-02 | End: 2022-08-02

## 2022-08-02 RX ORDER — HEPARIN SODIUM 5000 [USP'U]/ML
5000 INJECTION INTRAVENOUS; SUBCUTANEOUS EVERY 8 HOURS
Refills: 0 | Status: DISCONTINUED | OUTPATIENT
Start: 2022-08-02 | End: 2022-08-05

## 2022-08-02 RX ORDER — POTASSIUM CHLORIDE 20 MEQ
40 PACKET (EA) ORAL ONCE
Refills: 0 | Status: COMPLETED | OUTPATIENT
Start: 2022-08-02 | End: 2022-08-02

## 2022-08-02 RX ORDER — SODIUM CHLORIDE 9 MG/ML
1000 INJECTION, SOLUTION INTRAVENOUS
Refills: 0 | Status: COMPLETED | OUTPATIENT
Start: 2022-08-02 | End: 2022-08-02

## 2022-08-02 RX ORDER — SODIUM CHLORIDE 9 MG/ML
1000 INJECTION, SOLUTION INTRAVENOUS ONCE
Refills: 0 | Status: COMPLETED | OUTPATIENT
Start: 2022-08-02 | End: 2022-08-02

## 2022-08-02 RX ORDER — FOLIC ACID 0.8 MG
1 TABLET ORAL ONCE
Refills: 0 | Status: COMPLETED | OUTPATIENT
Start: 2022-08-02 | End: 2022-08-02

## 2022-08-02 RX ORDER — POTASSIUM CHLORIDE 20 MEQ
10 PACKET (EA) ORAL
Refills: 0 | Status: COMPLETED | OUTPATIENT
Start: 2022-08-02 | End: 2022-08-02

## 2022-08-02 RX ORDER — FAMOTIDINE 10 MG/ML
20 INJECTION INTRAVENOUS ONCE
Refills: 0 | Status: COMPLETED | OUTPATIENT
Start: 2022-08-02 | End: 2022-08-02

## 2022-08-02 RX ADMIN — Medication 40 MILLIEQUIVALENT(S): at 14:36

## 2022-08-02 RX ADMIN — Medication 100 MILLIEQUIVALENT(S): at 16:52

## 2022-08-02 RX ADMIN — FAMOTIDINE 20 MILLIGRAM(S): 10 INJECTION INTRAVENOUS at 11:45

## 2022-08-02 RX ADMIN — Medication 1 MILLIGRAM(S): at 16:38

## 2022-08-02 RX ADMIN — Medication 100 MILLIEQUIVALENT(S): at 18:38

## 2022-08-02 RX ADMIN — SODIUM CHLORIDE 1000 MILLILITER(S): 9 INJECTION, SOLUTION INTRAVENOUS at 11:45

## 2022-08-02 RX ADMIN — PANTOPRAZOLE SODIUM 40 MILLIGRAM(S): 20 TABLET, DELAYED RELEASE ORAL at 16:38

## 2022-08-02 RX ADMIN — SODIUM CHLORIDE 125 MILLILITER(S): 9 INJECTION, SOLUTION INTRAVENOUS at 16:52

## 2022-08-02 RX ADMIN — Medication 100 MILLIGRAM(S): at 16:37

## 2022-08-02 NOTE — H&P ADULT - NSHPLABSRESULTS_GEN_ALL_CORE
LABS:                        14.9   6.06  )-----------( 287      ( 02 Aug 2022 11:58 )             46.2     146<H>  |  104  |  <4<L>  ----------------------------<  91  3.0<L>   |  18<L>  |  0.65    Ca    9.6      02 Aug 2022 11:58  Phos  3.3     08-02  Mg     1.8     08-02    TPro  8.1  /  Alb  4.5  /  TBili  0.4  /  DBili  x   /  AST  8<L>  /  ALT  5<L>  /  AlkPhos  104  08-02      ACC: [id number] EXAM: CT CHEST IC  ACC: [id number] EXAM: CT ABDOMEN AND PELVIS OC IC    PROCEDURE DATE: 08/02/2022        INTERPRETATION: CLINICAL INFORMATION: Recent sleeve gastrectomy and abdominal hernia repair, presents with chest pain, epigastric pain and dysphagia.    COMPARISON: CT chest abdomen pelvis dated 6/2/2022. CT abdomen pelvis dated 6/21/2022.    CONTRAST/COMPLICATIONS:  IV Contrast: Omnipaque 350 60 cc administered 0 cc discarded  Oral Contrast: Omnipaque 350  Complications: None reported at time of study completion    PROCEDURE:  CT of the Chest, Abdomen and Pelvis was performed.  Sagittal and coronal reformats were performed.    FINDINGS:  CHEST:  LUNGS AND LARGE AIRWAYS: Mild rightward displacement and narrowing of the trachea by extrinsic mass effect from left thyroid nodule. No pulmonary nodules.  PLEURA: No pleural effusion.  VESSELS: Within normal limits.  HEART: Heart size is normal. No pericardial effusion.  MEDIASTINUM AND ARIANNA: No lymphadenopathy.  CHEST WALL AND LOWER NECK: 2.9 cm left thyroid nodule with dystrophic calcifications.    ABDOMEN AND PELVIS:  LIVER: Within normal limits.  BILE DUCTS: Normal caliber.  GALLBLADDER: Within normal limits.  SPLEEN: Within normal limits.  PANCREAS: Within normal limits.  ADRENALS: Within normal limits.  KIDNEYS/URETERS: Horseshoe kidney. No hydronephrosis.    BLADDER: Within normal limits.  REPRODUCTIVE ORGANS: Supracervical hysterectomy.    BOWEL: Status post sleeve gastrectomy. No bowel obstruction. Appendix is normal.  PERITONEUM: No ascites. No abscess.  VESSELS: Within normal limits.  RETROPERITONEUM/LYMPH NODES: No lymphadenopathy.  ABDOMINAL WALL: Within normal limits.  BONES: Within normal limits.    IMPRESSION:  No CT evidence of acute process in the chest, abdomen and pelvis.        --- End of Report ---

## 2022-08-02 NOTE — ED PROVIDER NOTE - PROGRESS NOTE DETAILS
Bariatric surgery made aware, requesting CTAP w/ oral and IV cont and will see pt - Blade Andrade PA-C Surgery evaluated pt at bedside, requesting CT chest as well given pain location. - Blade Andrade PA-C surg attending reviewed CT, appears to him to be recurrent hernia, will d/w Dr. Field (aware of CT read as per radiology). Requesting multivitamin IVF bag w/ folate and thiamine. - Blade Andrade PA-C Surgery called back, recommending admission to Dr. Field. Pt aware. Stable for admission, pre-op labs ordered per surgery. - Blade Andrade PA-C

## 2022-08-02 NOTE — H&P ADULT - ASSESSMENT
46F s/p robotic sleeve gastrectomy, hiatal hernia repair, c/b recurrent hiatal hernia, s/p diag lap, DONNA, lap hiatal hernia repair 6/3/22, prior admission with dysphagia to liquids, presents with dysphagia and chest pain.     Recommendation:  - Admit to [facility] team Surgery Dr. Field  - NS @ 125 with multivitamin  - NPO  - Thiamine, folate   - Pantoprazole 40mg IV daily  - KCL IV 10mEQ x2 and then repeat BMP @ 7PM for hypokalemia  - DVT ppx with HSQ  - Upper GI series  - Plan discussed with bariatric fellow on call Dr. Diandra Squires surgery  [id number]   46F s/p robotic sleeve gastrectomy, hiatal hernia repair, c/b recurrent hiatal hernia, s/p diag lap, DONNA, lap hiatal hernia repair 6/3/22, prior admission with dysphagia to liquids, presents with dysphagia and chest pain.     Recommendation:  - Admit to [facility] team Surgery Dr. Field  - NS @ 125 with multivitamin  - NPO  - Thiamine, folate   - Pantoprazole 40mg IV daily  - KCL IV 10mEQ x2 and then repeat BMP @ 7PM for hypokalemia  - DVT ppx with HSQ  - Upper GI series am   - Plan discussed with bariatric fellow on call Dr. Diandra Squires surgery  [id number]

## 2022-08-02 NOTE — ED ADULT NURSE REASSESSMENT NOTE - NS ED NURSE REASSESS COMMENT FT1
K level was 3.0. Pt tolerated only 1 tab of po potassium. Reports pain in swallowing. MD aware. KCl IVPB times 2 doses given.

## 2022-08-02 NOTE — ED ADULT TRIAGE NOTE - CHIEF COMPLAINT QUOTE
h/o Gastric sleeve and Hiatal hernia, came here for epigastric pain and feels like something is sitting on her epigastric/esophageal area.

## 2022-08-02 NOTE — H&P ADULT - NSHPPHYSICALEXAM_GEN_ALL_CORE
Vital Signs Last 24 Hrs  T(C): 36.7 (02 Aug 2022 10:40), Max: 36.7 (02 Aug 2022 10:40)  T(F): 98.1 (02 Aug 2022 10:40), Max: 98.1 (02 Aug 2022 10:40)  HR: 102 (02 Aug 2022 11:23) (102 - 114)  BP: 119/87 (02 Aug 2022 11:23) (119/87 - 131/85)  BP(mean): --  RR: 22 (02 Aug 2022 11:23) (18 - 22)  SpO2: 100% (02 Aug 2022 11:23) (98% - 100%)    Parameters below as of 02 Aug 2022 11:23  Patient On (Oxygen Delivery Method): room air    Physical exam  General: NAD  Cardiac: Regular rate  Respiratory: Breath sounds clear and equal bilaterally.  Abdominal Exam: soft, nontender  Muskuloskeletal: Moves all 4 extremities spontaneously  Neurological: Alert and oriented, no gross focal deficits, no motor or sensory deficits.  Psych: AOX3

## 2022-08-02 NOTE — ED PROVIDER NOTE - OBJECTIVE STATEMENT
47 yo female s/p robotic sleeve gastrectomy, hiatal hernia repair (5/3/22, Dr. Field), c/b recurrent hiatal hernia, s/p diag lap, DONNA, lap hiatal hernia repair 6/3/22 presents to the ED c/o 24 hours of feeling as though feeling liquids are slow to pass down esophagus, constipation and no flatus x 2 days. Has been eating soup the last few days as doesn't feel she can tolerate solids. +belching a lot. No vomiting. Denies abdominal pain, fever/chills, diarrhea, cp, sob, headache, recent travel.   Surgeon: Dr. Field

## 2022-08-02 NOTE — ED PROVIDER NOTE - ATTENDING APP SHARED VISIT CONTRIBUTION OF CARE
Attending (Luis Arcos M.D.):  I have personally seen and examined this patient. I have performed a substantive portion of the visit including all aspects of the medical decision making. Resident and/or ACP note reviewed. I agree on the plan of care except where noted.    See MDM

## 2022-08-02 NOTE — ED PROVIDER NOTE - CLINICAL SUMMARY MEDICAL DECISION MAKING FREE TEXT BOX
45 yo F hx of sleeve gastrectomy, hiatal hernia repair (5.3.22 w/ Dr Field), c/b recurrent  hiatal hernia, s/p diag lap, DONNA, lap hiatal hernia repair 6/3/22 presents to the ED c/o 24 hours of feeling as though feeling liquids are slow to pass down esophagus, constipation and no flatus x 2 days. Exam relatively unremarkable. Will get CT C/A/P w. PO and IV contrast. Will get labs, give pain meds, and consult bariatric surgery and dispo accordingly.

## 2022-08-02 NOTE — ED ADULT NURSE NOTE - OBJECTIVE STATEMENT
Female 46 years old with past medical history of Hiatal Hernia, came in for epigastric pain Female 46 years old with past medical history of Hiatal Hernia, came in for epigastric pain. Pt states she feels like something is sitting in her throat. Female 46 years old with past medical history of Hiatal Hernia s/p Hiatal hernia repair came in for epigastric pain. Pt states she feels like something is sitting in her throat. when she drinks, liquids is slow to pass down to her esophagus. Reports no BM with no flatus for 2 days. Denies chest pain, sob, N/V, fevers or chills. Will monitor.

## 2022-08-02 NOTE — H&P ADULT - HISTORY OF PRESENT ILLNESS
46 year old female s/p robotic sleeve gastrectomy, hiatal hernia repair (5/3/22, Dr. Field), c/b recurrent hiatal hernia, s/p diagnostic laparoscopy, DONNA, lap hiatal hernia repair 6/3/22, now presents with chest pain starting 48 hrs to presentation to the ED. Patient started having difficulty swallowing liquid in the end of June, and was admitted for further workup. UGI series showed remaining hiatal hernia without esophageal stricture. Patient was discharged after she tolerated liquids and solids. Patient currently denies dysphagia with solid food. Last bowel movement was 3 days ago and does not remember when she passed flatus. Denies fever, chills, abdominal pain.

## 2022-08-03 LAB
ALBUMIN SERPL ELPH-MCNC: 2.3 G/DL — LOW (ref 3.3–5)
ALP SERPL-CCNC: 54 U/L — SIGNIFICANT CHANGE UP (ref 40–120)
ALT FLD-CCNC: <5 U/L — LOW (ref 10–45)
ANION GAP SERPL CALC-SCNC: 16 MMOL/L — SIGNIFICANT CHANGE UP (ref 5–17)
ANION GAP SERPL CALC-SCNC: 18 MMOL/L — HIGH (ref 5–17)
AST SERPL-CCNC: 7 U/L — LOW (ref 10–40)
BILIRUB SERPL-MCNC: 0.2 MG/DL — SIGNIFICANT CHANGE UP (ref 0.2–1.2)
BUN SERPL-MCNC: <4 MG/DL — LOW (ref 7–23)
BUN SERPL-MCNC: <4 MG/DL — LOW (ref 7–23)
CALCIUM SERPL-MCNC: 6.3 MG/DL — CRITICAL LOW (ref 8.4–10.5)
CALCIUM SERPL-MCNC: 8.6 MG/DL — SIGNIFICANT CHANGE UP (ref 8.4–10.5)
CHLORIDE SERPL-SCNC: 109 MMOL/L — HIGH (ref 96–108)
CHLORIDE SERPL-SCNC: 118 MMOL/L — HIGH (ref 96–108)
CO2 SERPL-SCNC: 14 MMOL/L — LOW (ref 22–31)
CO2 SERPL-SCNC: 19 MMOL/L — LOW (ref 22–31)
CREAT SERPL-MCNC: 0.39 MG/DL — LOW (ref 0.5–1.3)
CREAT SERPL-MCNC: 0.55 MG/DL — SIGNIFICANT CHANGE UP (ref 0.5–1.3)
EGFR: 113 ML/MIN/1.73M2 — SIGNIFICANT CHANGE UP
EGFR: 123 ML/MIN/1.73M2 — SIGNIFICANT CHANGE UP
GLUCOSE BLDC GLUCOMTR-MCNC: 72 MG/DL — SIGNIFICANT CHANGE UP (ref 70–99)
GLUCOSE SERPL-MCNC: 53 MG/DL — CRITICAL LOW (ref 70–99)
GLUCOSE SERPL-MCNC: 79 MG/DL — SIGNIFICANT CHANGE UP (ref 70–99)
HCT VFR BLD CALC: 28.3 % — LOW (ref 34.5–45)
HCT VFR BLD CALC: 39.4 % — SIGNIFICANT CHANGE UP (ref 34.5–45)
HGB BLD-MCNC: 12.6 G/DL — SIGNIFICANT CHANGE UP (ref 11.5–15.5)
HGB BLD-MCNC: 9 G/DL — LOW (ref 11.5–15.5)
MAGNESIUM SERPL-MCNC: 1.2 MG/DL — LOW (ref 1.6–2.6)
MAGNESIUM SERPL-MCNC: 1.6 MG/DL — SIGNIFICANT CHANGE UP (ref 1.6–2.6)
MCHC RBC-ENTMCNC: 30.9 PG — SIGNIFICANT CHANGE UP (ref 27–34)
MCHC RBC-ENTMCNC: 31.3 PG — SIGNIFICANT CHANGE UP (ref 27–34)
MCHC RBC-ENTMCNC: 31.8 GM/DL — LOW (ref 32–36)
MCHC RBC-ENTMCNC: 32 GM/DL — SIGNIFICANT CHANGE UP (ref 32–36)
MCV RBC AUTO: 96.6 FL — SIGNIFICANT CHANGE UP (ref 80–100)
MCV RBC AUTO: 98.3 FL — SIGNIFICANT CHANGE UP (ref 80–100)
NRBC # BLD: 0 /100 WBCS — SIGNIFICANT CHANGE UP (ref 0–0)
NRBC # BLD: 0 /100 WBCS — SIGNIFICANT CHANGE UP (ref 0–0)
PHOSPHATE SERPL-MCNC: 2.1 MG/DL — LOW (ref 2.5–4.5)
PHOSPHATE SERPL-MCNC: 2.8 MG/DL — SIGNIFICANT CHANGE UP (ref 2.5–4.5)
PLATELET # BLD AUTO: 162 K/UL — SIGNIFICANT CHANGE UP (ref 150–400)
PLATELET # BLD AUTO: 222 K/UL — SIGNIFICANT CHANGE UP (ref 150–400)
POTASSIUM SERPL-MCNC: 2.5 MMOL/L — CRITICAL LOW (ref 3.5–5.3)
POTASSIUM SERPL-MCNC: 3.5 MMOL/L — SIGNIFICANT CHANGE UP (ref 3.5–5.3)
POTASSIUM SERPL-SCNC: 2.5 MMOL/L — CRITICAL LOW (ref 3.5–5.3)
POTASSIUM SERPL-SCNC: 3.5 MMOL/L — SIGNIFICANT CHANGE UP (ref 3.5–5.3)
PROT SERPL-MCNC: 4.1 G/DL — LOW (ref 6–8.3)
RBC # BLD: 2.88 M/UL — LOW (ref 3.8–5.2)
RBC # BLD: 4.08 M/UL — SIGNIFICANT CHANGE UP (ref 3.8–5.2)
RBC # FLD: 13.9 % — SIGNIFICANT CHANGE UP (ref 10.3–14.5)
RBC # FLD: 13.9 % — SIGNIFICANT CHANGE UP (ref 10.3–14.5)
SODIUM SERPL-SCNC: 146 MMOL/L — HIGH (ref 135–145)
SODIUM SERPL-SCNC: 148 MMOL/L — HIGH (ref 135–145)
WBC # BLD: 3.85 K/UL — SIGNIFICANT CHANGE UP (ref 3.8–10.5)
WBC # BLD: 5.45 K/UL — SIGNIFICANT CHANGE UP (ref 3.8–10.5)
WBC # FLD AUTO: 3.85 K/UL — SIGNIFICANT CHANGE UP (ref 3.8–10.5)
WBC # FLD AUTO: 5.45 K/UL — SIGNIFICANT CHANGE UP (ref 3.8–10.5)

## 2022-08-03 PROCEDURE — 99222 1ST HOSP IP/OBS MODERATE 55: CPT | Mod: GC

## 2022-08-03 PROCEDURE — 74240 X-RAY XM UPR GI TRC 1CNTRST: CPT | Mod: 26

## 2022-08-03 RX ORDER — POTASSIUM PHOSPHATE, MONOBASIC POTASSIUM PHOSPHATE, DIBASIC 236; 224 MG/ML; MG/ML
15 INJECTION, SOLUTION INTRAVENOUS ONCE
Refills: 0 | Status: COMPLETED | OUTPATIENT
Start: 2022-08-03 | End: 2022-08-03

## 2022-08-03 RX ORDER — PANTOPRAZOLE SODIUM 20 MG/1
20 TABLET, DELAYED RELEASE ORAL ONCE
Refills: 0 | Status: COMPLETED | OUTPATIENT
Start: 2022-08-03 | End: 2022-08-03

## 2022-08-03 RX ORDER — SODIUM CHLORIDE 9 MG/ML
1000 INJECTION INTRAMUSCULAR; INTRAVENOUS; SUBCUTANEOUS
Refills: 0 | Status: DISCONTINUED | OUTPATIENT
Start: 2022-08-03 | End: 2022-08-05

## 2022-08-03 RX ORDER — MAGNESIUM SULFATE 500 MG/ML
2 VIAL (ML) INJECTION ONCE
Refills: 0 | Status: COMPLETED | OUTPATIENT
Start: 2022-08-03 | End: 2022-08-03

## 2022-08-03 RX ADMIN — SODIUM CHLORIDE 125 MILLILITER(S): 9 INJECTION INTRAMUSCULAR; INTRAVENOUS; SUBCUTANEOUS at 19:52

## 2022-08-03 RX ADMIN — PANTOPRAZOLE SODIUM 40 MILLIGRAM(S): 20 TABLET, DELAYED RELEASE ORAL at 15:23

## 2022-08-03 RX ADMIN — POTASSIUM PHOSPHATE, MONOBASIC POTASSIUM PHOSPHATE, DIBASIC 62.5 MILLIMOLE(S): 236; 224 INJECTION, SOLUTION INTRAVENOUS at 15:21

## 2022-08-03 RX ADMIN — PANTOPRAZOLE SODIUM 20 MILLIGRAM(S): 20 TABLET, DELAYED RELEASE ORAL at 23:41

## 2022-08-03 RX ADMIN — SODIUM CHLORIDE 125 MILLILITER(S): 9 INJECTION INTRAMUSCULAR; INTRAVENOUS; SUBCUTANEOUS at 02:17

## 2022-08-03 RX ADMIN — Medication 25 GRAM(S): at 23:40

## 2022-08-03 NOTE — CONSULT NOTE ADULT - SUBJECTIVE AND OBJECTIVE BOX
Chief Complaint:  Patient is a 46y old  Female who presents with a chief complaint of dyaphagis    HPI: 46 year old female s/p robotic sleeve gastrectomy, hiatal hernia repair (5/3/22, Dr. Field), c/b recurrent hiatal hernia, s/p diagnostic laparoscopy, DONNA, lap hiatal hernia repair 6/3/22, now presenting with chest discomfort and dysphagia. Patient has been having difficulty swallowing liquid in the end of June, and was admitted for workup. UGI series at that time showed remaining hiatal hernia. Patient was discharged after she tolerated liquids and solids. Patient now presents again with complaints of   "food gets stuck in chest and dances around." Reports this occurs with solids and liquids. Symptoms only occur after PO intake. Patient states her discomfort improves after belching. Patient denies any abdominal pain, heartburn, SOB, n/v/d. Patient's last BM 4 days ago and does not remember when she last passed flatus.     Last bowel movement was 3 days ago and does not remember when she passed flatus. Denies fever, chills, abdominal pain.     Allergies:  No Known Allergies      Home Medications:    Hospital Medications:  heparin   Injectable 5000 Unit(s) SubCutaneous every 8 hours  pantoprazole  Injectable 40 milliGRAM(s) IV Push every 24 hours  sodium chloride 0.9%. 1000 milliLiter(s) IV Continuous <Continuous>      PMHX/PSHX:  Asthma    Fibroids    S/P hysterectomy    History of repair of hiatal hernia    History of sleeve gastrectomy        Family history:  hx of scleroderma in sister. Pancreatic CA father    There is no family history of peptic ulcer disease, gastric cancer, colon polyps, colon cancer, celiac disease, biliary, hepatic, or pancreatic disease.  None of the female relatives have breast, uterine, or ovarian cancer.     ROS:     General:  No wt loss, fevers, chills, night sweats, fatigue,   Eyes:  Good vision, no reported pain  ENT:  No sore throat, pain, runny nose  CV:  No palpitations, hypo/hypertension  Resp:  No dyspnea, cough, tachypnea, wheezing  GI:  No pain, No nausea, No vomiting, No diarrhea, No constipation, No weight loss, No fever, No pruritis, No rectal bleeding, No tarry stools, No dysphagia,  :  No pain, bleeding, incontinence, nocturia  Muscle:  No pain, weakness  Neuro:  No weakness, tingling, memory problems  Psych:  No fatigue, insomnia, mood problems, depression  Endocrine:  No polyuria, polydipsia, cold/heat intolerance  Heme:  No petechiae, ecchymosis, easy bruisability  Skin:  No rash, tattoos, scars, edema      PHYSICAL EXAM:     GENERAL:  Appears stated age, well-groomed, well-nourished, no distress  HEENT:  NC/AT,  conjunctivae clear and pink, no thyromegaly, nodules, adenopathy, no JVD, sclera -anicteric  CHEST:  Full & symmetric excursion, no increased effort, breath sounds clear  HEART:  Regular rhythm, S1, S2, no murmur/rub/S3/S4, no abdominal bruit, no edema  ABDOMEN:  Soft, non-tender, non-distended, normoactive bowel sounds,  no masses ,no hepato-splenomegaly, no signs of chronic liver disease  EXTEREMITIES:  no cyanosis,clubbing or edema  SKIN:  No rash/erythema/ecchymoses/petechiae/wounds/abscess/warm/dry  NEURO:  Alert, oriented, no asterixis, no tremor, no encephalopathy    Vital Signs:  Vital Signs Last 24 Hrs  T(C): 36.4 (03 Aug 2022 05:30), Max: 36.8 (02 Aug 2022 19:55)  T(F): 97.5 (03 Aug 2022 05:30), Max: 98.3 (02 Aug 2022 21:32)  HR: 61 (03 Aug 2022 05:30) (61 - 81)  BP: 101/65 (03 Aug 2022 05:30) (101/65 - 121/65)  BP(mean): 87 (02 Aug 2022 19:55) (87 - 87)  RR: 18 (03 Aug 2022 05:30) (16 - 18)  SpO2: 100% (03 Aug 2022 05:30) (97% - 100%)    Parameters below as of 03 Aug 2022 05:30  Patient On (Oxygen Delivery Method): room air      Daily     Daily     LABS:                        12.6   5.45  )-----------( 222      ( 03 Aug 2022 11:40 )             39.4     Mean Cell Volume: 96.6 fl (08-03-22 @ 11:40)    08-03    146<H>  |  109<H>  |  <4<L>  ----------------------------<  79  3.5   |  19<L>  |  0.55    Ca    8.6      03 Aug 2022 11:40  Phos  2.8     08-03  Mg     1.6     08-03    TPro  4.1<L>  /  Alb  2.3<L>  /  TBili  0.2  /  DBili  x   /  AST  7<L>  /  ALT  <5<L>  /  AlkPhos  54  08-03    LIVER FUNCTIONS - ( 03 Aug 2022 07:12 )  Alb: 2.3 g/dL / Pro: 4.1 g/dL / ALK PHOS: 54 U/L / ALT: <5 U/L / AST: 7 U/L / GGT: x           PT/INR - ( 02 Aug 2022 16:53 )   PT: 13.7 sec;   INR: 1.19 ratio         PTT - ( 02 Aug 2022 16:53 )  PTT:33.0 sec  Urinalysis Basic - ( 02 Aug 2022 20:52 )    Color: Light Yellow / Appearance: Clear / SG: >1.050 / pH: x  Gluc: x / Ketone: Large  / Bili: Small / Urobili: Negative   Blood: x / Protein: 30 mg/dL / Nitrite: Negative   Leuk Esterase: Negative / RBC: 1 /hpf / WBC 1 /HPF   Sq Epi: x / Non Sq Epi: 1 /hpf / Bacteria: Negative                              12.6   5.45  )-----------( 222      ( 03 Aug 2022 11:40 )             39.4                         9.0    3.85  )-----------( 162      ( 03 Aug 2022 07:11 )             28.3                         14.9   6.06  )-----------( 287      ( 02 Aug 2022 11:58 )             46.2     Imaging:               Chief Complaint:  Patient is a 46y old  Female who presents with a chief complaint of dyaphagis    HPI: 46 year old female s/p robotic sleeve gastrectomy, hiatal hernia repair (5/3/22, Dr. Field), c/b recurrent hiatal hernia, s/p diagnostic laparoscopy, DONNA, lap hiatal hernia repair 6/3/22, now presenting with chest discomfort and dysphagia. Patient has been having difficulty swallowing liquid in the end of June, and was admitted for workup. UGI series at that time showed remaining hiatal hernia. Patient was discharged after she tolerated liquids and solids. Patient now presents again with complaints of   "food gets stuck in chest and dances around." Reports this occurs with solids and liquids. Symptoms only occur after PO intake. Patient states her discomfort improves after belching. Patient denies any abdominal pain, heartburn, SOB, n/v/d. Patient's last BM 4 days ago and does not remember when she last passed flatus. No previous dysphagia. Has never had esophageal manometric testing. Denies reurgitation or vomiting up food.    Last bowel movement was 3 days ago and does not remember when she passed flatus. Denies fever, chills, abdominal pain.     Allergies:  No Known Allergies      Home Medications:    Hospital Medications:  heparin   Injectable 5000 Unit(s) SubCutaneous every 8 hours  pantoprazole  Injectable 40 milliGRAM(s) IV Push every 24 hours  sodium chloride 0.9%. 1000 milliLiter(s) IV Continuous <Continuous>      PMHX/PSHX:  Asthma    Fibroids    S/P hysterectomy    History of repair of hiatal hernia    History of sleeve gastrectomy        Family history:  hx of scleroderma in sister. Pancreatic CA father    There is no family history of peptic ulcer disease, gastric cancer, colon polyps, colon cancer, celiac disease, biliary, hepatic, or pancreatic disease.  None of the female relatives have breast, uterine, or ovarian cancer.     ROS:     General:  No wt loss, fevers, chills, night sweats, fatigue,   Eyes:  Good vision, no reported pain  ENT:  No sore throat, pain, runny nose  CV:  No palpitations, hypo/hypertension  Resp:  No dyspnea, cough, tachypnea, wheezing  GI:  No pain, No nausea, No vomiting, No diarrhea, No constipation, No weight loss, No fever, No pruritis, No rectal bleeding, No tarry stools, No dysphagia,  :  No pain, bleeding, incontinence, nocturia  Muscle:  No pain, weakness  Neuro:  No weakness, tingling, memory problems  Psych:  No fatigue, insomnia, mood problems, depression  Endocrine:  No polyuria, polydipsia, cold/heat intolerance  Heme:  No petechiae, ecchymosis, easy bruisability  Skin:  No rash, tattoos, scars, edema      PHYSICAL EXAM:     GENERAL:  Appears stated age, well-groomed, well-nourished, no distress  HEENT:  NC/AT,  conjunctivae clear and pink, no thyromegaly, nodules, adenopathy, no JVD, sclera -anicteric  CHEST:  Full & symmetric excursion, no increased effort, breath sounds clear  HEART:  Regular rhythm, S1, S2, no murmur/rub/S3/S4, no abdominal bruit, no edema  ABDOMEN:  Soft, non-tender, non-distended, normoactive bowel sounds,  no masses ,no hepato-splenomegaly, no signs of chronic liver disease  EXTEREMITIES:  no cyanosis,clubbing or edema  SKIN:  No rash/erythema/ecchymoses/petechiae/wounds/abscess/warm/dry  NEURO:  Alert, oriented, no asterixis, no tremor, no encephalopathy  PSYCH: normal affect    Vital Signs:  Vital Signs Last 24 Hrs  T(C): 36.4 (03 Aug 2022 05:30), Max: 36.8 (02 Aug 2022 19:55)  T(F): 97.5 (03 Aug 2022 05:30), Max: 98.3 (02 Aug 2022 21:32)  HR: 61 (03 Aug 2022 05:30) (61 - 81)  BP: 101/65 (03 Aug 2022 05:30) (101/65 - 121/65)  BP(mean): 87 (02 Aug 2022 19:55) (87 - 87)  RR: 18 (03 Aug 2022 05:30) (16 - 18)  SpO2: 100% (03 Aug 2022 05:30) (97% - 100%)    Parameters below as of 03 Aug 2022 05:30  Patient On (Oxygen Delivery Method): room air      Daily     Daily     LABS:                        12.6   5.45  )-----------( 222      ( 03 Aug 2022 11:40 )             39.4     Mean Cell Volume: 96.6 fl (08-03-22 @ 11:40)    08-03    146<H>  |  109<H>  |  <4<L>  ----------------------------<  79  3.5   |  19<L>  |  0.55    Ca    8.6      03 Aug 2022 11:40  Phos  2.8     08-03  Mg     1.6     08-03    TPro  4.1<L>  /  Alb  2.3<L>  /  TBili  0.2  /  DBili  x   /  AST  7<L>  /  ALT  <5<L>  /  AlkPhos  54  08-03    LIVER FUNCTIONS - ( 03 Aug 2022 07:12 )  Alb: 2.3 g/dL / Pro: 4.1 g/dL / ALK PHOS: 54 U/L / ALT: <5 U/L / AST: 7 U/L / GGT: x           PT/INR - ( 02 Aug 2022 16:53 )   PT: 13.7 sec;   INR: 1.19 ratio         PTT - ( 02 Aug 2022 16:53 )  PTT:33.0 sec  Urinalysis Basic - ( 02 Aug 2022 20:52 )    Color: Light Yellow / Appearance: Clear / SG: >1.050 / pH: x  Gluc: x / Ketone: Large  / Bili: Small / Urobili: Negative   Blood: x / Protein: 30 mg/dL / Nitrite: Negative   Leuk Esterase: Negative / RBC: 1 /hpf / WBC 1 /HPF   Sq Epi: x / Non Sq Epi: 1 /hpf / Bacteria: Negative                              12.6   5.45  )-----------( 222      ( 03 Aug 2022 11:40 )             39.4                         9.0    3.85  )-----------( 162      ( 03 Aug 2022 07:11 )             28.3                         14.9   6.06  )-----------( 287      ( 02 Aug 2022 11:58 )             46.2     Imaging:      < from: CT Abdomen and Pelvis w/ Oral Cont and w/ IV Cont (08.02.22 @ 13:43) >  FINDINGS:  CHEST:  LUNGS AND LARGE AIRWAYS: Mild rightward displacement and narrowing of the   trachea by extrinsic mass effect from left thyroid nodule. No pulmonary   nodules.  PLEURA: No pleural effusion.  VESSELS: Within normal limits.  HEART: Heart size is normal. No pericardial effusion.  MEDIASTINUM AND ARIANNA: No lymphadenopathy.  CHEST WALL AND LOWER NECK: 2.9 cm left thyroid nodule with dystrophic   calcifications.    ABDOMEN AND PELVIS:  LIVER: Within normal limits.  BILE DUCTS: Normal caliber.  GALLBLADDER: Within normal limits.  SPLEEN: Within normal limits.  PANCREAS: Within normal limits.  ADRENALS: Within normal limits.  KIDNEYS/URETERS: Horseshoe kidney. No hydronephrosis.    BLADDER: Within normal limits.  REPRODUCTIVE ORGANS: Supracervical hysterectomy.    BOWEL: Status post sleeve gastrectomy. No bowel obstruction. Appendix is   normal.  PERITONEUM: No ascites. No abscess.  VESSELS: Within normal limits.  RETROPERITONEUM/LYMPH NODES: No lymphadenopathy.  ABDOMINAL WALL: Within normal limits.  BONES: Within normal limits.    IMPRESSION:  No CT evidence of acute process in the chest, abdomen and pelvis.    < end of copied text >

## 2022-08-03 NOTE — CONSULT NOTE ADULT - ATTENDING COMMENTS
Agree with above. Awaiting UGI series, pending results, will offer EGD for evaluation of inability to tolerate PO/pain with eating post sleeve/hernia surgery. Keep NPO after midnight.

## 2022-08-03 NOTE — PROGRESS NOTE ADULT - ATTENDING COMMENTS
46yF with re-recurrent hiatal hernia, s/p sleeve gastrectomy with HHR then subsequent HHR (significant adhesions and technically difficult operation).  Presents with 2 days of worsening reflux and dysphagia -- pain and discomfort when eating solids and liquids, feels like food gets stuck in chest.  Previously was eating well, says lots of fruits and vegetable and salads.  Did not have any difficulty as long as she stuck to small portions and ate slowly.    CT shows non-obstructed hiatal hernia, ? bezoar at hiatus  Contrast passes distally  Normal sleeve anatomy    VSS  Abd soft, NT    Plan  UGIS today  Consider EGD to eval/evacuate bezoar  Discussed with patient reop hiatal hernia repair, abdominal vs thoracic approach, although will try to put this off if possible given the extent of adhesions at last operation.    All questions answered.    Rahul Field MD

## 2022-08-03 NOTE — PROGRESS NOTE ADULT - SUBJECTIVE AND OBJECTIVE BOX
[facility] Progress Note    INTERVAL EVENTS:   No acute events overnight. Lactate  down to 0.7 from 2.1, K 3.9 from 3 after repleations.     SUBJECTIVE: Patient seen and examined at bedside with surgical team,. Denies fever, chills, CP, SOB.     OBJECTIVE:    Vital Signs Last 24 Hrs  T(C): 36.8 (02 Aug 2022 23:17), Max: 36.8 (02 Aug 2022 19:55)  T(F): 98.3 (02 Aug 2022 23:17), Max: 98.3 (02 Aug 2022 21:32)  HR: 65 (02 Aug 2022 23:17) (65 - 114)  BP: 121/65 (02 Aug 2022 23:17) (113/74 - 131/85)  BP(mean): 87 (02 Aug 2022 19:55) (87 - 87)  RR: 18 (02 Aug 2022 23:17) (16 - 22)  SpO2: 99% (02 Aug 2022 23:17) (97% - 100%)    Parameters below as of 02 Aug 2022 23:17  Patient On (Oxygen Delivery Method): room air    I&O's Detail  MEDICATIONS  (STANDING):  heparin   Injectable 5000 Unit(s) SubCutaneous every 8 hours  pantoprazole  Injectable 40 milliGRAM(s) IV Push every 24 hours    MEDICATIONS  (PRN):      Physical exam  General: NAD  Cardiac: Regular rate  Respiratory: Breath sounds clear and equal bilaterally.  Abdominal Exam: soft, nontender, non peritoneal.   Muskuloskeletal: Moves all 4 extremities spontaneously  Neurological: Alert and oriented, no gross focal deficits, no motor or sensory deficits.  Psych: AOX3    LABS:                        14.9   6.06  )-----------( 287      ( 02 Aug 2022 11:58 )             46.2     08-02    144  |  109<H>  |  <4<L>  ----------------------------<  80  3.9   |  16<L>  |  0.59    Ca    8.5      02 Aug 2022 20:37  Phos  3.3     08-02  Mg     1.8     08-02    TPro  8.1  /  Alb  4.5  /  TBili  0.4  /  DBili  x   /  AST  8<L>  /  ALT  5<L>  /  AlkPhos  104  08-02    PT/INR - ( 02 Aug 2022 16:53 )   PT: 13.7 sec;   INR: 1.19 ratio         PTT - ( 02 Aug 2022 16:53 )  PTT:33.0 sec  LIVER FUNCTIONS - ( 02 Aug 2022 11:58 )  Alb: 4.5 g/dL / Pro: 8.1 g/dL / ALK PHOS: 104 U/L / ALT: 5 U/L / AST: 8 U/L / GGT: x           Urinalysis Basic - ( 02 Aug 2022 20:52 )    Color: Light Yellow / Appearance: Clear / SG: >1.050 / pH: x  Gluc: x / Ketone: Large  / Bili: Small / Urobili: Negative   Blood: x / Protein: 30 mg/dL / Nitrite: Negative   Leuk Esterase: Negative / RBC: 1 /hpf / WBC 1 /HPF   Sq Epi: x / Non Sq Epi: 1 /hpf / Bacteria: Negative      ABO Interpretation: O (08-02-22 @ 16:54)      IMAGING:       Green Surgery Progress Note    INTERVAL EVENTS:   No acute events overnight. Lactate  down to 0.7 from 2.1, K 3.9 from 3 after repleations.     SUBJECTIVE: Patient seen and examined at bedside with surgical team,. Denies fever, chills, CP, SOB.     OBJECTIVE:      Physical exam  General: NAD  Cardiac: Regular rate  Respiratory: Breath sounds clear and equal bilaterally.  Abdominal Exam: soft, nontender, non peritoneal.   Muskuloskeletal: Moves all 4 extremities spontaneously  Neurological: Alert and oriented, no gross focal deficits, no motor or sensory deficits.  Psych: AOX3    OBJECTIVE  T(C): 36.4 (08-03-22 @ 05:30), Max: 36.8 (08-02-22 @ 19:55)  HR: 61 (08-03-22 @ 05:30) (61 - 114)  BP: 101/65 (08-03-22 @ 05:30) (101/65 - 131/85)  RR: 18 (08-03-22 @ 05:30) (16 - 22)  SpO2: 100% (08-03-22 @ 05:30) (97% - 100%)  I&O's Summary    02 Aug 2022 07:01  -  03 Aug 2022 05:54  --------------------------------------------------------  IN: 625 mL / OUT: 0 mL / NET: 625 mL      I&O's Detail    02 Aug 2022 07:01  -  03 Aug 2022 05:54  --------------------------------------------------------  IN:    sodium chloride 0.9%: 625 mL  Total IN: 625 mL    OUT:    Oral Fluid: 0 mL  Total OUT: 0 mL    Total NET: 625 mL        LABS                        14.9   6.06  )-----------( 287      ( 02 Aug 2022 11:58 )             46.2     08-02    144  |  109<H>  |  <4<L>  ----------------------------<  80  3.9   |  16<L>  |  0.59    Ca    8.5      02 Aug 2022 20:37  Phos  3.3     08-02  Mg     1.8     08-02    TPro  8.1  /  Alb  4.5  /  TBili  0.4  /  DBili  x   /  AST  8<L>  /  ALT  5<L>  /  AlkPhos  104  08-02    PT/INR - ( 02 Aug 2022 16:53 )   PT: 13.7 sec;   INR: 1.19 ratio         PTT - ( 02 Aug 2022 16:53 )  PTT:33.0 sec  Urinalysis Basic - ( 02 Aug 2022 20:52 )    Color: Light Yellow / Appearance: Clear / SG: >1.050 / pH: x  Gluc: x / Ketone: Large  / Bili: Small / Urobili: Negative   Blood: x / Protein: 30 mg/dL / Nitrite: Negative   Leuk Esterase: Negative / RBC: 1 /hpf / WBC 1 /HPF   Sq Epi: x / Non Sq Epi: 1 /hpf / Bacteria: Negative      ORDERS/MEDICATIONS  MEDICATIONS  (STANDING):  heparin   Injectable 5000 Unit(s) SubCutaneous every 8 hours  pantoprazole  Injectable 40 milliGRAM(s) IV Push every 24 hours  sodium chloride 0.9%. 1000 milliLiter(s) (125 mL/Hr) IV Continuous <Continuous>    MEDICATIONS  (PRN):       Green Surgery Progress Note    INTERVAL EVENTS:   No acute events overnight. Lactate  down to 0.7 from 2.1, K 3.9 from 3 after repleations.     SUBJECTIVE: Patient seen and examined at bedside with surgical team,. Denies fever, chills, CP, SOB.     OBJECTIVE:    Physical exam  General: NAD  Cardiac: Regular rate  Respiratory: Breath sounds clear and equal bilaterally.  Abdominal Exam: soft, nontender, non peritoneal.   Muskuloskeletal: Moves all 4 extremities spontaneously  Neurological: Alert and oriented, no gross focal deficits, no motor or sensory deficits.  Psych: AOX3    OBJECTIVE  T(C): 36.4 (08-03-22 @ 05:30), Max: 36.8 (08-02-22 @ 19:55)  HR: 61 (08-03-22 @ 05:30) (61 - 114)  BP: 101/65 (08-03-22 @ 05:30) (101/65 - 131/85)  RR: 18 (08-03-22 @ 05:30) (16 - 22)  SpO2: 100% (08-03-22 @ 05:30) (97% - 100%)  I&O's Summary    02 Aug 2022 07:01  -  03 Aug 2022 05:54  --------------------------------------------------------  IN: 625 mL / OUT: 0 mL / NET: 625 mL      I&O's Detail    02 Aug 2022 07:01  -  03 Aug 2022 05:54  --------------------------------------------------------  IN:    sodium chloride 0.9%: 625 mL  Total IN: 625 mL    OUT:    Oral Fluid: 0 mL  Total OUT: 0 mL    Total NET: 625 mL        LABS                        14.9   6.06  )-----------( 287      ( 02 Aug 2022 11:58 )             46.2     08-02    144  |  109<H>  |  <4<L>  ----------------------------<  80  3.9   |  16<L>  |  0.59    Ca    8.5      02 Aug 2022 20:37  Phos  3.3     08-02  Mg     1.8     08-02    TPro  8.1  /  Alb  4.5  /  TBili  0.4  /  DBili  x   /  AST  8<L>  /  ALT  5<L>  /  AlkPhos  104  08-02    PT/INR - ( 02 Aug 2022 16:53 )   PT: 13.7 sec;   INR: 1.19 ratio         PTT - ( 02 Aug 2022 16:53 )  PTT:33.0 sec  Urinalysis Basic - ( 02 Aug 2022 20:52 )    Color: Light Yellow / Appearance: Clear / SG: >1.050 / pH: x  Gluc: x / Ketone: Large  / Bili: Small / Urobili: Negative   Blood: x / Protein: 30 mg/dL / Nitrite: Negative   Leuk Esterase: Negative / RBC: 1 /hpf / WBC 1 /HPF   Sq Epi: x / Non Sq Epi: 1 /hpf / Bacteria: Negative      ORDERS/MEDICATIONS  MEDICATIONS  (STANDING):  heparin   Injectable 5000 Unit(s) SubCutaneous every 8 hours  pantoprazole  Injectable 40 milliGRAM(s) IV Push every 24 hours  sodium chloride 0.9%. 1000 milliLiter(s) (125 mL/Hr) IV Continuous <Continuous>    MEDICATIONS  (PRN):

## 2022-08-03 NOTE — CONSULT NOTE ADULT - ASSESSMENT
46 year old female s/p robotic sleeve gastrectomy, hiatal hernia repair (5/3/22, Dr. Field), c/b recurrent hiatal hernia, s/p diagnostic laparoscopy, DONNA, lap hiatal hernia repair 6/3/22, now presenting with chest discomfort and dysphagia.    Assessment:  Given lack of cough, choking, unlikely oropharyngeal dysphagia. Patient reports "sticking" feeling consistent with esophageal dysphagia. Symptoms with both liquid and solids c/f motility disorder. Differential diagnosis to consider esophageal spasms (reports food "dancing"); pt also with family hx of scleroderma    Plan:  - Follow-up UGIs  - Tentative EGD plan for tomorrow  - NPO at midnight  - Please ensure morning labs are obtained    All recommendations are not finalized until signed by the attending    NON-URGENT CONSULTS:  Please email [email or web address]  AT NIGHT AND ON WEEKENDS:  Contact on-call GI fellow via answering service ([phone]) from 5pm-8am and on weekends/holidays  MONDAY-FRIDAY 8AM-5PM:  Pager# [phone] ([facility]) or [phone] ([facility])  GI Phone# [phone] ([facility])

## 2022-08-03 NOTE — PROGRESS NOTE ADULT - ASSESSMENT
46F s/p robotic sleeve gastrectomy, hiatal hernia repair, c/b recurrent hiatal hernia, s/p diag lap, DONNA, lap hiatal hernia repair 6/3/22, prior admission with dysphagia to liquids, presents with dysphagia and chest pain.     Recommendation:    - NS @ 125 with multivitamin  - NPO  - Pantoprazole 40mg IV daily  - DVT ppx with HSQ  - Upper GI series this AM       Green surgery  [id number]   46F s/p robotic sleeve gastrectomy, hiatal hernia repair, c/b recurrent hiatal hernia, s/p diag lap, DONNA, lap hiatal hernia repair 6/3/22, prior admission with dysphagia to liquids, presents with dysphagia and chest pain.     Recommendation:    - NS @ 125 with multivitamin  - NPO  - Pantoprazole 40mg IV daily  - DVT ppx with HSQ  - Upper GI series this AM        Green surgery  [id number]   46F s/p robotic sleeve gastrectomy, hiatal hernia repair, c/b recurrent hiatal hernia, s/p diag lap, DONNA, lap hiatal hernia repair 6/3/22, prior admission with dysphagia to liquids, presents with dysphagia and chest pain.     Recommendation:    - NS @ 125 with multivitamin  - NPO  - Pantoprazole 40mg IV daily  - DVT ppx with HSQ  - Consult GI for scope 8/4  - Upper GI series this AM    -      [place] [facility]  [id number]

## 2022-08-04 LAB
ALBUMIN SERPL ELPH-MCNC: 3.4 G/DL — SIGNIFICANT CHANGE UP (ref 3.3–5)
ALP SERPL-CCNC: 87 U/L — SIGNIFICANT CHANGE UP (ref 40–120)
ALT FLD-CCNC: 5 U/L — LOW (ref 10–45)
ANION GAP SERPL CALC-SCNC: 16 MMOL/L — SIGNIFICANT CHANGE UP (ref 5–17)
AST SERPL-CCNC: 7 U/L — LOW (ref 10–40)
BILIRUB SERPL-MCNC: 0.4 MG/DL — SIGNIFICANT CHANGE UP (ref 0.2–1.2)
BUN SERPL-MCNC: <4 MG/DL — LOW (ref 7–23)
CALCIUM SERPL-MCNC: 8.6 MG/DL — SIGNIFICANT CHANGE UP (ref 8.4–10.5)
CHLORIDE SERPL-SCNC: 106 MMOL/L — SIGNIFICANT CHANGE UP (ref 96–108)
CO2 SERPL-SCNC: 18 MMOL/L — LOW (ref 22–31)
CREAT SERPL-MCNC: 0.49 MG/DL — LOW (ref 0.5–1.3)
EGFR: 116 ML/MIN/1.73M2 — SIGNIFICANT CHANGE UP
GLUCOSE SERPL-MCNC: 71 MG/DL — SIGNIFICANT CHANGE UP (ref 70–99)
HCG SERPL-ACNC: 2.6 MIU/ML — SIGNIFICANT CHANGE UP
HCT VFR BLD CALC: 40.4 % — SIGNIFICANT CHANGE UP (ref 34.5–45)
HGB BLD-MCNC: 12.7 G/DL — SIGNIFICANT CHANGE UP (ref 11.5–15.5)
MAGNESIUM SERPL-MCNC: 1.8 MG/DL — SIGNIFICANT CHANGE UP (ref 1.6–2.6)
MCHC RBC-ENTMCNC: 30.8 PG — SIGNIFICANT CHANGE UP (ref 27–34)
MCHC RBC-ENTMCNC: 31.4 GM/DL — LOW (ref 32–36)
MCV RBC AUTO: 98.1 FL — SIGNIFICANT CHANGE UP (ref 80–100)
NRBC # BLD: 0 /100 WBCS — SIGNIFICANT CHANGE UP (ref 0–0)
PHOSPHATE SERPL-MCNC: 3.1 MG/DL — SIGNIFICANT CHANGE UP (ref 2.5–4.5)
PLATELET # BLD AUTO: 198 K/UL — SIGNIFICANT CHANGE UP (ref 150–400)
POTASSIUM SERPL-MCNC: 3.2 MMOL/L — LOW (ref 3.5–5.3)
POTASSIUM SERPL-SCNC: 3.2 MMOL/L — LOW (ref 3.5–5.3)
PROT SERPL-MCNC: 6.2 G/DL — SIGNIFICANT CHANGE UP (ref 6–8.3)
RBC # BLD: 4.12 M/UL — SIGNIFICANT CHANGE UP (ref 3.8–5.2)
RBC # FLD: 13.6 % — SIGNIFICANT CHANGE UP (ref 10.3–14.5)
SODIUM SERPL-SCNC: 140 MMOL/L — SIGNIFICANT CHANGE UP (ref 135–145)
WBC # BLD: 4.9 K/UL — SIGNIFICANT CHANGE UP (ref 3.8–10.5)
WBC # FLD AUTO: 4.9 K/UL — SIGNIFICANT CHANGE UP (ref 3.8–10.5)

## 2022-08-04 PROCEDURE — 44360 SMALL BOWEL ENDOSCOPY: CPT | Mod: GC

## 2022-08-04 RX ORDER — MAGNESIUM SULFATE 500 MG/ML
2 VIAL (ML) INJECTION ONCE
Refills: 0 | Status: COMPLETED | OUTPATIENT
Start: 2022-08-04 | End: 2022-08-04

## 2022-08-04 RX ORDER — LIDOCAINE HCL 20 MG/ML
4 VIAL (ML) INJECTION ONCE
Refills: 0 | Status: DISCONTINUED | OUTPATIENT
Start: 2022-08-04 | End: 2022-08-05

## 2022-08-04 RX ORDER — POTASSIUM CHLORIDE 20 MEQ
10 PACKET (EA) ORAL
Refills: 0 | Status: COMPLETED | OUTPATIENT
Start: 2022-08-04 | End: 2022-08-04

## 2022-08-04 RX ADMIN — SODIUM CHLORIDE 125 MILLILITER(S): 9 INJECTION INTRAMUSCULAR; INTRAVENOUS; SUBCUTANEOUS at 20:39

## 2022-08-04 RX ADMIN — Medication 100 MILLIEQUIVALENT(S): at 15:54

## 2022-08-04 RX ADMIN — Medication 100 MILLIEQUIVALENT(S): at 17:41

## 2022-08-04 RX ADMIN — PANTOPRAZOLE SODIUM 40 MILLIGRAM(S): 20 TABLET, DELAYED RELEASE ORAL at 13:26

## 2022-08-04 RX ADMIN — Medication 25 GRAM(S): at 13:26

## 2022-08-04 RX ADMIN — Medication 100 MILLIEQUIVALENT(S): at 20:39

## 2022-08-04 NOTE — CHART NOTE - NSCHARTNOTEFT_GEN_A_CORE
Post Operative Note  Patient: NORBERT COONEY 46y (1975) Female   MRN: [id number]  Location: [facility] 2MON 223 D1  Visit: 08-02-22 Inpatient  Date: 08-04-22 @ 16:10    Procedure: S/P upper GI endoscopy    Subjective: Patient seen and examined post operatively. Reports pain as controlled. Denies nausea, vomiting, fever, chills, chest pain, SOB, cough. She reports feeling well, and reports that symptoms of dysphagia are no longer present while eating.       Objective:  Vitals: T(F): 98 (08-04-22 @ 16:07), Max: 98.6 (08-04-22 @ 00:21)  HR: 75 (08-04-22 @ 16:07)  BP: 131/80 (08-04-22 @ 16:07) (90/57 - 131/80)  RR: 18 (08-04-22 @ 16:07)  SpO2: 100% (08-04-22 @ 16:07)  Vent Settings:     In:   08-03-22 @ 07:01  -  08-04-22 @ 07:00  --------------------------------------------------------  IN: 1550 mL    08-04-22 @ 07:01  -  08-04-22 @ 16:10  --------------------------------------------------------  IN: 0 mL      IV Fluids: potassium chloride  10 mEq/100 mL IVPB 10 milliEquivalent(s) IV Intermittent every 1 hour  sodium chloride 0.9%. 1000 milliLiter(s) (125 mL/Hr) IV Continuous <Continuous>      Out:   08-03-22 @ 07:01  -  08-04-22 @ 07:00  --------------------------------------------------------  OUT: 350 mL    08-04-22 @ 07:01  -  08-04-22 @ 16:10  --------------------------------------------------------  OUT: 400 mL      EBL:     Voided Urine:   08-03-22 @ 07:01  -  08-04-22 @ 07:00  --------------------------------------------------------  OUT: 350 mL    08-04-22 @ 07:01  -  08-04-22 @ 16:10  --------------------------------------------------------  OUT: 400 mL        Physical Examination:  General: NAD, resting comfortably in bed  HEENT: Normocephalic atraumatic  Respiratory: Nonlabored respirations, normal CW expansion.  Cardio: S1S2, regular rate and rhythm.  Abdomen: nondistended, no tenderness to palpation.   Vascular: extremities are warm and well perfused.     Endoscopy findings:   - Normal esophagus.  - Z-line regular, 31 cm from the incisors.                       - LA Grade A reflux esophagitis.                       - 2 cm hiatal hernia.                       - A sleeve gastrectomy was found, characterized by healthy appearing mucosa.                        Mild narrowing in mid stomach, but widely patent.                       - Normal examined duodenum.                       - No specimens collected.    Assessment:  46yFemale patient S/P upper GI endoscopy for dysphagia and abnormal upper GI series    Plan:  - Pain control PRN  - Diet: CLD  - IVF  - Activity: OOB  - continue to monitor    Date/Time: 08-04-22 @ 16:10

## 2022-08-04 NOTE — PROGRESS NOTE ADULT - SUBJECTIVE AND OBJECTIVE BOX
Green team Surgery Progress Note    INTERVAL EVENTS:   1 episode of reflux, 20 mg protonix IV given.     SUBJECTIVE: Patient seen and examined at bedside with surgical team, patient without complaints. Denies fever, chills, CP, SOB nausea, vomiting.     OBJECTIVE:    Vital Signs Last 24 Hrs  T(C): 37 (04 Aug 2022 00:21), Max: 37 (04 Aug 2022 00:21)  T(F): 98.6 (04 Aug 2022 00:21), Max: 98.6 (04 Aug 2022 00:21)  HR: 76 (04 Aug 2022 00:21) (60 - 76)  BP: 106/67 (04 Aug 2022 00:21) (101/65 - 118/65)  BP(mean): --  RR: 18 (04 Aug 2022 00:21) (18 - 18)  SpO2: 92% (04 Aug 2022 00:21) (92% - 100%)    Parameters below as of 04 Aug 2022 00:21  Patient On (Oxygen Delivery Method): room air    I&O's Detail    02 Aug 2022 07:01  -  03 Aug 2022 07:00  --------------------------------------------------------  IN:    sodium chloride 0.9%: 625 mL  Total IN: 625 mL    OUT:    Oral Fluid: 0 mL  Total OUT: 0 mL    Total NET: 625 mL      03 Aug 2022 07:01  -  04 Aug 2022 02:40  --------------------------------------------------------  IN:  Total IN: 0 mL    OUT:    Voided (mL): 350 mL  Total OUT: 350 mL    Total NET: -350 mL      MEDICATIONS  (STANDING):  heparin   Injectable 5000 Unit(s) SubCutaneous every 8 hours  pantoprazole  Injectable 40 milliGRAM(s) IV Push every 24 hours  sodium chloride 0.9%. 1000 milliLiter(s) (125 mL/Hr) IV Continuous <Continuous>    MEDICATIONS  (PRN):      PHYSICAL EXAM:  Constitutional: A&Ox3, NAD  Respiratory: Unlabored breathing  Abdomen: Soft, nondistended, NTTP. No rebound or guarding.  Extremities: WWP, GUARDADO spontaneously    LABS:                        12.6   5.45  )-----------( 222      ( 03 Aug 2022 11:40 )             39.4     08-03    146<H>  |  109<H>  |  <4<L>  ----------------------------<  79  3.5   |  19<L>  |  0.55    Ca    8.6      03 Aug 2022 11:40  Phos  2.8     08-03  Mg     1.6     08-03    TPro  4.1<L>  /  Alb  2.3<L>  /  TBili  0.2  /  DBili  x   /  AST  7<L>  /  ALT  <5<L>  /  AlkPhos  54  08-03    PT/INR - ( 02 Aug 2022 16:53 )   PT: 13.7 sec;   INR: 1.19 ratio         PTT - ( 02 Aug 2022 16:53 )  PTT:33.0 sec  LIVER FUNCTIONS - ( 03 Aug 2022 07:12 )  Alb: 2.3 g/dL / Pro: 4.1 g/dL / ALK PHOS: 54 U/L / ALT: <5 U/L / AST: 7 U/L / GGT: x           Urinalysis Basic - ( 02 Aug 2022 20:52 )    Color: Light Yellow / Appearance: Clear / SG: >1.050 / pH: x  Gluc: x / Ketone: Large  / Bili: Small / Urobili: Negative   Blood: x / Protein: 30 mg/dL / Nitrite: Negative   Leuk Esterase: Negative / RBC: 1 /hpf / WBC 1 /HPF   Sq Epi: x / Non Sq Epi: 1 /hpf / Bacteria: Negative          IMAGING:     Green team Surgery Progress Note    INTERVAL EVENTS:   1 episode of reflux, 20 mg protonix IV given.     SUBJECTIVE: Patient seen and examined at bedside with surgical team, patient without complaints. Denies fever, chills, CP, SOB nausea, vomiting.     OBJECTIVE:    Vital Signs Last 24 Hrs  T(C): 37 (04 Aug 2022 00:21), Max: 37 (04 Aug 2022 00:21)  T(F): 98.6 (04 Aug 2022 00:21), Max: 98.6 (04 Aug 2022 00:21)  HR: 76 (04 Aug 2022 00:21) (60 - 76)  BP: 106/67 (04 Aug 2022 00:21) (101/65 - 118/65)  BP(mean): --  RR: 18 (04 Aug 2022 00:21) (18 - 18)  SpO2: 92% (04 Aug 2022 00:21) (92% - 100%)    Parameters below as of 04 Aug 2022 00:21  Patient On (Oxygen Delivery Method): room air    I&O's Detail    02 Aug 2022 07:01  -  03 Aug 2022 07:00  --------------------------------------------------------  IN:    sodium chloride 0.9%: 625 mL  Total IN: 625 mL    OUT:    Oral Fluid: 0 mL  Total OUT: 0 mL    Total NET: 625 mL      03 Aug 2022 07:01  -  04 Aug 2022 02:40  --------------------------------------------------------  IN:  Total IN: 0 mL    OUT:    Voided (mL): 350 mL  Total OUT: 350 mL    Total NET: -350 mL      MEDICATIONS  (STANDING):  heparin   Injectable 5000 Unit(s) SubCutaneous every 8 hours  pantoprazole  Injectable 40 milliGRAM(s) IV Push every 24 hours  sodium chloride 0.9%. 1000 milliLiter(s) (125 mL/Hr) IV Continuous <Continuous>    MEDICATIONS  (PRN):      PHYSICAL EXAM:  Constitutional: A&Ox3, NAD  Respiratory: Unlabored breathing  Abdomen: Soft, nondistended, NTTP. No rebound or guarding.  Extremities: WWP, GUARDADO spontaneously    LABS:                        12.6   5.45  )-----------( 222      ( 03 Aug 2022 11:40 )             39.4     08-03    146<H>  |  109<H>  |  <4<L>  ----------------------------<  79  3.5   |  19<L>  |  0.55    Ca    8.6      03 Aug 2022 11:40  Phos  2.8     08-03  Mg     1.6     08-03    TPro  4.1<L>  /  Alb  2.3<L>  /  TBili  0.2  /  DBili  x   /  AST  7<L>  /  ALT  <5<L>  /  AlkPhos  54  08-03    PT/INR - ( 02 Aug 2022 16:53 )   PT: 13.7 sec;   INR: 1.19 ratio         PTT - ( 02 Aug 2022 16:53 )  PTT:33.0 sec  LIVER FUNCTIONS - ( 03 Aug 2022 07:12 )  Alb: 2.3 g/dL / Pro: 4.1 g/dL / ALK PHOS: 54 U/L / ALT: <5 U/L / AST: 7 U/L / GGT: x           Urinalysis Basic - ( 02 Aug 2022 20:52 )    Color: Light Yellow / Appearance: Clear / SG: >1.050 / pH: x  Gluc: x / Ketone: Large  / Bili: Small / Urobili: Negative   Blood: x / Protein: 30 mg/dL / Nitrite: Negative   Leuk Esterase: Negative / RBC: 1 /hpf / WBC 1 /HPF   Sq Epi: x / Non Sq Epi: 1 /hpf / Bacteria: Negative          IMAGING:    < from: Xray UGI Single Contrast Study (08.03.22 @ 09:44) >  IMPRESSION:  Suggestion of a short segment narrowing in the proximal third of the   stomach, status post sleeve gastrectomy. Correlation with endoscopy could   be performed.  Small hiatal hernia with stasis and reflux of contrast in the esophagus.    < end of copied text >   Green team Surgery Progress Note    INTERVAL EVENTS:   1 episode of reflux, 20 mg protonix IV given.     SUBJECTIVE: Patient seen and examined at bedside with surgical team, patient without complaints. Denies fever, chills, CP, SOB nausea, vomiting.     OBJECTIVE:    Vital Signs Last 24 Hrs    PHYSICAL EXAM:  Constitutional: A&Ox3, NAD  Respiratory: Unlabored breathing  Abdomen: Soft, nondistended, NTTP. No rebound or guarding.  Extremities: WWP, GUARDADO spontaneously   OBJECTIVE  T(C): 36.9 (08-04-22 @ 08:34), Max: 37 (08-04-22 @ 00:21)  HR: 78 (08-04-22 @ 08:34) (60 - 78)  BP: 130/78 (08-04-22 @ 08:34) (103/66 - 130/78)  RR: 18 (08-04-22 @ 08:34) (18 - 18)  SpO2: 99% (08-04-22 @ 08:34) (92% - 100%)  I&O's Summary    03 Aug 2022 07:01  -  04 Aug 2022 07:00  --------------------------------------------------------  IN: 1550 mL / OUT: 350 mL / NET: 1200 mL      I&O's Detail    03 Aug 2022 07:01  -  04 Aug 2022 07:00  --------------------------------------------------------  IN:    IV PiggyBack: 50 mL    sodium chloride 0.9%: 1500 mL  Total IN: 1550 mL    OUT:    Oral Fluid: 0 mL    Voided (mL): 350 mL  Total OUT: 350 mL    Total NET: 1200 mL        LABS                        12.6   5.45  )-----------( 222      ( 03 Aug 2022 11:40 )             39.4     08-03    146<H>  |  109<H>  |  <4<L>  ----------------------------<  79  3.5   |  19<L>  |  0.55    Ca    8.6      03 Aug 2022 11:40  Phos  2.8     08-03  Mg     1.6     08-03    TPro  4.1<L>  /  Alb  2.3<L>  /  TBili  0.2  /  DBili  x   /  AST  7<L>  /  ALT  <5<L>  /  AlkPhos  54  08-03    PT/INR - ( 02 Aug 2022 16:53 )   PT: 13.7 sec;   INR: 1.19 ratio         PTT - ( 02 Aug 2022 16:53 )  PTT:33.0 sec  Urinalysis Basic - ( 02 Aug 2022 20:52 )    Color: Light Yellow / Appearance: Clear / SG: >1.050 / pH: x  Gluc: x / Ketone: Large  / Bili: Small / Urobili: Negative   Blood: x / Protein: 30 mg/dL / Nitrite: Negative   Leuk Esterase: Negative / RBC: 1 /hpf / WBC 1 /HPF   Sq Epi: x / Non Sq Epi: 1 /hpf / Bacteria: Negative      ORDERS/MEDICATIONS  MEDICATIONS  (STANDING):  heparin   Injectable 5000 Unit(s) SubCutaneous every 8 hours  pantoprazole  Injectable 40 milliGRAM(s) IV Push every 24 hours  sodium chloride 0.9%. 1000 milliLiter(s) (125 mL/Hr) IV Continuous <Continuous>    MEDICATIONS  (PRN):          IMAGING:    < from: Xray UGI Single Contrast Study (08.03.22 @ 09:44) >  IMPRESSION:  Suggestion of a short segment narrowing in the proximal third of the   stomach, status post sleeve gastrectomy. Correlation with endoscopy could   be performed.  Small hiatal hernia with stasis and reflux of contrast in the esophagus.    < end of copied text >

## 2022-08-04 NOTE — PRE PROCEDURE NOTE - PRE PROCEDURE EVALUATION
Attending Physician:    Dr. Rojas                        Procedure:   EGD    Indication for Procedure:   Dysphagia  ________________________________________________________  PAST MEDICAL & SURGICAL HISTORY:    Asthma- 20 years ago last exacerbation, never hospitalized or intubated  Fibroids  S/P hysterectomy-2015  History of repair of hiatal hernia-laparoscopic  History of sleeve gastrectomy    ALLERGIES:  No Known Allergies    HOME MEDICATIONS:    AICD/PPM: [ ] yes   [X ] no    PERTINENT LAB DATA:                        12.7   4.90  )-----------( 198      ( 04 Aug 2022 08:31 )             40.4     08-04    140  |  106  |  <4<L>  ----------------------------<  71  3.2<L>   |  18<L>  |  0.49<L>    Ca    8.6      04 Aug 2022 08:31  Phos  3.1     08-04  Mg     1.8     08-04  TPro  6.2  /  Alb  3.4  /  TBili  0.4  /  DBili  x   /  AST  7<L>  /  ALT  5<L>  /  AlkPhos  87  08-04  PT/INR - ( 02 Aug 2022 16:53 )   PT: 13.7 sec;   INR: 1.19 ratio    PTT - ( 02 Aug 2022 16:53 )  PTT:33.0 sec  HCG Quantitative, Serum: 2.6 mIU/mL (08-04-22 @ 08:31)  HCG Quantitative, Serum: 3.2 mIU/mL (08-02-22 @ 11:58)    PHYSICAL EXAMINATION:    T(C): 36.9  HR: 78  BP: 130/78  RR: 18  SpO2: 99%    Constitutional: NAD    Neck:  No JVD  Respiratory: CTAB/L  Cardiovascular: S1 and S2  Gastrointestinal: BS+, soft, NT/ND  Extremities: No peripheral edema  Neurological: A/O x 4      COMMENTS:    The patient is a suitable candidate for the planned procedure unless box checked [ ]  No, explain:

## 2022-08-04 NOTE — PROGRESS NOTE ADULT - ASSESSMENT
46F s/p robotic sleeve gastrectomy, hiatal hernia repair, c/b recurrent hiatal hernia, s/p diag lap, DONNA, lap hiatal hernia repair 6/3/22, prior admission with dysphagia to liquids, presents with dysphagia and chest pain.     Recommendation:    - NPO/ IVF  - Pantoprazole 40mg IV daily  - DVT ppx with HSQ  - EGD Today        Green surgery  [id number]

## 2022-08-04 NOTE — PROGRESS NOTE ADULT - ATTENDING COMMENTS
Still with c/o heartburn/reflux  On PPI  No emesis    UGIS showed small hiatal hernia, no obstruction, possible short segment narrowing at proximal stomach as per radiology report    Plan for EGD today to assess for narrowing/stricture  Consider consultation with thoracic surgery for redo hiatal hernia repair if necessary.    Rahul Field MD

## 2022-08-05 ENCOUNTER — TRANSCRIPTION ENCOUNTER (OUTPATIENT)
Age: 47
End: 2022-08-05

## 2022-08-05 VITALS
RESPIRATION RATE: 18 BRPM | OXYGEN SATURATION: 100 % | DIASTOLIC BLOOD PRESSURE: 69 MMHG | SYSTOLIC BLOOD PRESSURE: 114 MMHG | TEMPERATURE: 98 F | HEART RATE: 67 BPM

## 2022-08-05 LAB
ALBUMIN SERPL ELPH-MCNC: 3.5 G/DL — SIGNIFICANT CHANGE UP (ref 3.3–5)
ALP SERPL-CCNC: 88 U/L — SIGNIFICANT CHANGE UP (ref 40–120)
ALT FLD-CCNC: 5 U/L — LOW (ref 10–45)
ANION GAP SERPL CALC-SCNC: 18 MMOL/L — HIGH (ref 5–17)
AST SERPL-CCNC: 9 U/L — LOW (ref 10–40)
BILIRUB SERPL-MCNC: 0.4 MG/DL — SIGNIFICANT CHANGE UP (ref 0.2–1.2)
BUN SERPL-MCNC: <4 MG/DL — LOW (ref 7–23)
CALCIUM SERPL-MCNC: 8.6 MG/DL — SIGNIFICANT CHANGE UP (ref 8.4–10.5)
CHLORIDE SERPL-SCNC: 103 MMOL/L — SIGNIFICANT CHANGE UP (ref 96–108)
CO2 SERPL-SCNC: 19 MMOL/L — LOW (ref 22–31)
CREAT SERPL-MCNC: 0.49 MG/DL — LOW (ref 0.5–1.3)
EGFR: 116 ML/MIN/1.73M2 — SIGNIFICANT CHANGE UP
GLUCOSE SERPL-MCNC: 68 MG/DL — LOW (ref 70–99)
HCT VFR BLD CALC: 38.2 % — SIGNIFICANT CHANGE UP (ref 34.5–45)
HGB BLD-MCNC: 12.5 G/DL — SIGNIFICANT CHANGE UP (ref 11.5–15.5)
MCHC RBC-ENTMCNC: 31.3 PG — SIGNIFICANT CHANGE UP (ref 27–34)
MCHC RBC-ENTMCNC: 32.7 GM/DL — SIGNIFICANT CHANGE UP (ref 32–36)
MCV RBC AUTO: 95.7 FL — SIGNIFICANT CHANGE UP (ref 80–100)
NRBC # BLD: 0 /100 WBCS — SIGNIFICANT CHANGE UP (ref 0–0)
PLATELET # BLD AUTO: 183 K/UL — SIGNIFICANT CHANGE UP (ref 150–400)
POTASSIUM SERPL-MCNC: 3.2 MMOL/L — LOW (ref 3.5–5.3)
POTASSIUM SERPL-SCNC: 3.2 MMOL/L — LOW (ref 3.5–5.3)
PROT SERPL-MCNC: 6.3 G/DL — SIGNIFICANT CHANGE UP (ref 6–8.3)
RBC # BLD: 3.99 M/UL — SIGNIFICANT CHANGE UP (ref 3.8–5.2)
RBC # FLD: 13.2 % — SIGNIFICANT CHANGE UP (ref 10.3–14.5)
SODIUM SERPL-SCNC: 140 MMOL/L — SIGNIFICANT CHANGE UP (ref 135–145)
WBC # BLD: 5.73 K/UL — SIGNIFICANT CHANGE UP (ref 3.8–10.5)
WBC # FLD AUTO: 5.73 K/UL — SIGNIFICANT CHANGE UP (ref 3.8–10.5)

## 2022-08-05 PROCEDURE — 84295 ASSAY OF SERUM SODIUM: CPT

## 2022-08-05 PROCEDURE — U0005: CPT

## 2022-08-05 PROCEDURE — 74240 X-RAY XM UPR GI TRC 1CNTRST: CPT

## 2022-08-05 PROCEDURE — 85014 HEMATOCRIT: CPT

## 2022-08-05 PROCEDURE — 83735 ASSAY OF MAGNESIUM: CPT

## 2022-08-05 PROCEDURE — 85730 THROMBOPLASTIN TIME PARTIAL: CPT

## 2022-08-05 PROCEDURE — 85025 COMPLETE CBC W/AUTO DIFF WBC: CPT

## 2022-08-05 PROCEDURE — 83690 ASSAY OF LIPASE: CPT

## 2022-08-05 PROCEDURE — 83605 ASSAY OF LACTIC ACID: CPT

## 2022-08-05 PROCEDURE — 82947 ASSAY GLUCOSE BLOOD QUANT: CPT

## 2022-08-05 PROCEDURE — 84702 CHORIONIC GONADOTROPIN TEST: CPT

## 2022-08-05 PROCEDURE — 36415 COLL VENOUS BLD VENIPUNCTURE: CPT

## 2022-08-05 PROCEDURE — 82962 GLUCOSE BLOOD TEST: CPT

## 2022-08-05 PROCEDURE — 86901 BLOOD TYPING SEROLOGIC RH(D): CPT

## 2022-08-05 PROCEDURE — U0003: CPT

## 2022-08-05 PROCEDURE — 86900 BLOOD TYPING SEROLOGIC ABO: CPT

## 2022-08-05 PROCEDURE — 84100 ASSAY OF PHOSPHORUS: CPT

## 2022-08-05 PROCEDURE — 82435 ASSAY OF BLOOD CHLORIDE: CPT

## 2022-08-05 PROCEDURE — 99232 SBSQ HOSP IP/OBS MODERATE 35: CPT | Mod: GC

## 2022-08-05 PROCEDURE — 82803 BLOOD GASES ANY COMBINATION: CPT

## 2022-08-05 PROCEDURE — 74177 CT ABD & PELVIS W/CONTRAST: CPT | Mod: MG

## 2022-08-05 PROCEDURE — 85018 HEMOGLOBIN: CPT

## 2022-08-05 PROCEDURE — 99285 EMERGENCY DEPT VISIT HI MDM: CPT

## 2022-08-05 PROCEDURE — 96374 THER/PROPH/DIAG INJ IV PUSH: CPT

## 2022-08-05 PROCEDURE — 81001 URINALYSIS AUTO W/SCOPE: CPT

## 2022-08-05 PROCEDURE — G1004: CPT

## 2022-08-05 PROCEDURE — 85610 PROTHROMBIN TIME: CPT

## 2022-08-05 PROCEDURE — 71260 CT THORAX DX C+: CPT | Mod: MG

## 2022-08-05 PROCEDURE — 80048 BASIC METABOLIC PNL TOTAL CA: CPT

## 2022-08-05 PROCEDURE — 96375 TX/PRO/DX INJ NEW DRUG ADDON: CPT

## 2022-08-05 PROCEDURE — 82330 ASSAY OF CALCIUM: CPT

## 2022-08-05 PROCEDURE — 86850 RBC ANTIBODY SCREEN: CPT

## 2022-08-05 PROCEDURE — 85027 COMPLETE CBC AUTOMATED: CPT

## 2022-08-05 PROCEDURE — 80053 COMPREHEN METABOLIC PANEL: CPT

## 2022-08-05 PROCEDURE — 84132 ASSAY OF SERUM POTASSIUM: CPT

## 2022-08-05 RX ORDER — POTASSIUM CHLORIDE 20 MEQ
40 PACKET (EA) ORAL ONCE
Refills: 0 | Status: DISCONTINUED | OUTPATIENT
Start: 2022-08-05 | End: 2022-08-05

## 2022-08-05 RX ORDER — PANTOPRAZOLE SODIUM 20 MG/1
40 TABLET, DELAYED RELEASE ORAL
Refills: 0 | Status: DISCONTINUED | OUTPATIENT
Start: 2022-08-05 | End: 2022-08-05

## 2022-08-05 RX ORDER — POTASSIUM CHLORIDE 20 MEQ
10 PACKET (EA) ORAL
Refills: 0 | Status: DISCONTINUED | OUTPATIENT
Start: 2022-08-05 | End: 2022-08-05

## 2022-08-05 RX ORDER — OMEPRAZOLE 10 MG/1
1 CAPSULE, DELAYED RELEASE ORAL
Qty: 30 | Refills: 0
Start: 2022-08-05 | End: 2022-09-03

## 2022-08-05 RX ADMIN — Medication 100 MILLIEQUIVALENT(S): at 11:58

## 2022-08-05 NOTE — PROGRESS NOTE ADULT - ATTENDING COMMENTS
Agree with above. No strictures amendable to dilation seen on EGD yesterday, no bezoars. Today patient feels well, no pain, and tolerating full liquids. Would continue PPI given hernia/esophagitis. Advance diet as per bariatrics team.

## 2022-08-05 NOTE — DISCHARGE NOTE PROVIDER - NSDCFUSCHEDAPPT_GEN_ALL_CORE_FT
[facility]  [facility] [address]  Scheduled Appointment: 08/12/2022

## 2022-08-05 NOTE — DISCHARGE NOTE PROVIDER - CARE PROVIDER_API CALL
Rahul Field  General Surgery  [address] [place] [address], [address]  [place], [place] [id number]  Phone: [phone]  Fax: [phone]  Follow Up Time: 1 week   Rahul Field  General Surgery  [address] [facility], [address]  [place], [place] [address]  Phone: [phone]  Fax: [phone]  Follow Up Time: 1 week    Omar Rodriguez)  Surgery; Thoracic Surgery  [address] [place] [address]  Phone: [phone]  Fax: [phone]  Follow Up Time: 1 week

## 2022-08-05 NOTE — PROGRESS NOTE ADULT - ASSESSMENT
46 year old female s/p robotic sleeve gastrectomy, hiatal hernia repair (5/3/22, Dr. Field), c/b recurrent hiatal hernia, s/p diagnostic laparoscopy, DONNA, lap hiatal hernia repair 6/3/22, now presenting with chest discomfort and dysphagia.    Assessment:   Given lack of cough, choking, unlikely oropharyngeal dysphagia. Patient reports "sticking" feeling consistent with esophageal dysphagia. Symptoms with both liquid and solids c/f motility disorder. Differential diagnosis to consider esophageal spasms (reports food "dancing"); pt also with family hx of scleroderma. s/p endoscopy on 8/4 with grade A reflux esophagitis, 2cm hiatal hernia and mild narrowing of midstomach but widely patent.     Plan:  - CLD, advance as tolerated   - PPI PO 40mg daily   - rest of care per primary team     All recommendations are tentative until note is attested by attending.     Nikki Paiz, PGY-4   Gastroenterology/Hepatology Fellow  Available on Microsoft Teams  [phone] ([facility] Short Range Pager)  [phone] ([facility] Long Range Pager)    After 5pm, please contact the on-call GI fellow. [phone]

## 2022-08-05 NOTE — PROGRESS NOTE ADULT - SUBJECTIVE AND OBJECTIVE BOX
Gastroenterology/Hepatology Progress Note    Interval Events:   - patient with no complaints this morning   - working on ipad     Allergies:  No Known Allergies      Hospital Medications:  heparin   Injectable 5000 Unit(s) SubCutaneous every 8 hours  lidocaine 4% Injection for Nebulization 4 milliLiter(s) Nebulizer once  pantoprazole    Tablet 40 milliGRAM(s) Oral before breakfast  sodium chloride 0.9%. 1000 milliLiter(s) IV Continuous <Continuous>      ROS: 14 point ROS negative unless otherwise state in subjective    PHYSICAL EXAM:   Vital Signs:  Vital Signs Last 24 Hrs  T(C): 36.6 (05 Aug 2022 05:50), Max: 36.9 (04 Aug 2022 08:34)  T(F): 97.9 (05 Aug 2022 05:50), Max: 98.4 (04 Aug 2022 08:34)  HR: 66 (05 Aug 2022 05:50) (66 - 97)  BP: 114/71 (05 Aug 2022 05:50) (90/57 - 131/80)  BP(mean): --  RR: 18 (05 Aug 2022 05:50) (16 - 18)  SpO2: 99% (05 Aug 2022 05:50) (92% - 100%)    Parameters below as of 05 Aug 2022 05:50  Patient On (Oxygen Delivery Method): room air      Daily Height in cm: 160.02 (04 Aug 2022 10:31)    Daily     GENERAL:  No acute distress  HEENT:  NCAT, no scleral icterus  CHEST: no resp distress  HEART:  RRR  ABDOMEN:  Soft, non-tender, non-distended, no masses  EXTREMITIES:  No cyanosis, clubbing, or edema  SKIN:  No rash/erythema/ecchymoses/petechiae/wounds/abscess/warm/dry  NEURO:  Alert and oriented x 3    LABS:                        12.7   4.90  )-----------( 198      ( 04 Aug 2022 08:31 )             40.4     Mean Cell Volume: 98.1 fl (08-04-22 @ 08:31)    08-04    140  |  106  |  <4<L>  ----------------------------<  71  3.2<L>   |  18<L>  |  0.49<L>    Ca    8.6      04 Aug 2022 08:31  Phos  3.1     08-04  Mg     1.8     08-04    TPro  6.2  /  Alb  3.4  /  TBili  0.4  /  DBili  x   /  AST  7<L>  /  ALT  5<L>  /  AlkPhos  87  08-04    LIVER FUNCTIONS - ( 04 Aug 2022 08:31 )  Alb: 3.4 g/dL / Pro: 6.2 g/dL / ALK PHOS: 87 U/L / ALT: 5 U/L / AST: 7 U/L / GGT: x                     Imaging:           Gastroenterology/Hepatology Progress Note    Interval Events:   - patient with no complaints this morning   - No pain or dyspepsia, tolerating full liquids  - working on ipad     Allergies:  No Known Allergies      Hospital Medications:  heparin   Injectable 5000 Unit(s) SubCutaneous every 8 hours  lidocaine 4% Injection for Nebulization 4 milliLiter(s) Nebulizer once  pantoprazole    Tablet 40 milliGRAM(s) Oral before breakfast  sodium chloride 0.9%. 1000 milliLiter(s) IV Continuous <Continuous>      ROS: 14 point ROS negative unless otherwise state in subjective    PHYSICAL EXAM:   Vital Signs:  Vital Signs Last 24 Hrs  T(C): 36.6 (05 Aug 2022 05:50), Max: 36.9 (04 Aug 2022 08:34)  T(F): 97.9 (05 Aug 2022 05:50), Max: 98.4 (04 Aug 2022 08:34)  HR: 66 (05 Aug 2022 05:50) (66 - 97)  BP: 114/71 (05 Aug 2022 05:50) (90/57 - 131/80)  BP(mean): --  RR: 18 (05 Aug 2022 05:50) (16 - 18)  SpO2: 99% (05 Aug 2022 05:50) (92% - 100%)    Parameters below as of 05 Aug 2022 05:50  Patient On (Oxygen Delivery Method): room air      Daily Height in cm: 160.02 (04 Aug 2022 10:31)    Daily     GENERAL:  No acute distress  HEENT:  NCAT, no scleral icterus  CHEST: no resp distress  HEART:  RRR  ABDOMEN:  Soft, non-tender, non-distended, no masses  EXTREMITIES:  No cyanosis, clubbing, or edema  SKIN:  No rash/erythema/ecchymoses/petechiae/wounds/abscess/warm/dry  NEURO:  Alert and oriented x 3    LABS:                        12.7   4.90  )-----------( 198      ( 04 Aug 2022 08:31 )             40.4     Mean Cell Volume: 98.1 fl (08-04-22 @ 08:31)    08-04    140  |  106  |  <4<L>  ----------------------------<  71  3.2<L>   |  18<L>  |  0.49<L>    Ca    8.6      04 Aug 2022 08:31  Phos  3.1     08-04  Mg     1.8     08-04    TPro  6.2  /  Alb  3.4  /  TBili  0.4  /  DBili  x   /  AST  7<L>  /  ALT  5<L>  /  AlkPhos  87  08-04    LIVER FUNCTIONS - ( 04 Aug 2022 08:31 )  Alb: 3.4 g/dL / Pro: 6.2 g/dL / ALK PHOS: 87 U/L / ALT: 5 U/L / AST: 7 U/L / GGT: x                     Imaging:

## 2022-08-05 NOTE — DISCHARGE NOTE PROVIDER - CARE PROVIDERS DIRECT ADDRESSES
[email or web address]

## 2022-08-05 NOTE — DISCHARGE NOTE PROVIDER - NSDCMRMEDTOKEN_GEN_ALL_CORE_FT
omeprazole 40 mg oral delayed release capsule: 1 cap(s) orally once a day MDD:1  ondansetron 4 mg oral tablet, disintegratin tab(s) orally every 6 hours, As Needed    omeprazole 40 mg oral delayed release capsule: 1 cap(s) orally once a day MDD:1

## 2022-08-05 NOTE — PROGRESS NOTE ADULT - ATTENDING COMMENTS
S/p EGD, which showed mild narrowing in stomach but easily traversed with scope  No evidence of obstruction  2cm hiatal hernia    Clinically stable, no abd pain    Trial liquids PO    If can tolerate protein shakes/fulls, will discharge home with plans to f/u with thoracic surgery, who will see her as an outpatient to discuss hiatal hernia repair.    Rahul Field MD S/p EGD, which showed mild narrowing in stomach but easily traversed with scope  No evidence of obstruction  2cm hiatal hernia    Clinically stable, no abd pain    Trial liquids PO    If can tolerate protein shakes/fulls, will discharge home with plans to f/u with thoracic surgery, who will see her as an outpatient to discuss hiatal hernia repair.  Hypokalemia -- repleting    Rahul Field MD

## 2022-08-05 NOTE — DISCHARGE NOTE PROVIDER - HOSPITAL COURSE
46 year old female s/p robotic sleeve gastrectomy, hiatal hernia repair (5/3/22, Dr. Field), c/b recurrent hiatal hernia, s/p diagnostic laparoscopy, DONNA, lap hiatal hernia repair 6/3/22, now presents with chest pain starting 48 hrs to presentation to the ED. Patient started having difficulty swallowing liquid in the end of June, and was admitted for further workup. UGI series showed remaining hiatal hernia without esophageal stricture. Patient was discharged after she tolerated liquids and solids. Patient currently denies dysphagia with solid food. Last bowel movement was 3 days ago and does not remember when she passed flatus. Denies fever, chills, abdominal pain.    Pt admitted to surgery. CT shows non-obstructed hiatal hernia, ? bezoar at hiatus, Contrast passes distally, Normal sleeve anatomy. UGIS showed small hiatal hernia, no obstruction, possible short segment narrowing at proximal stomach as per radiology report. GI consulted- 8/4 sp egd which showed normal esophagus, LA Grade A reflux esophagitis, 2 cm hiatal hernia, sleeve gastrectomy with mild narrowing in mid stomach, but widely patent. Diet advanced as tolerated.    Pt continued to recover appropriately and was deemed stable for dc. On day of dc pt VSS, tolerating diet, voiding, having gi fxn, ambulating and pain controlled. Pt to f/u with Dr Field in one week. 46 year old female s/p robotic sleeve gastrectomy, hiatal hernia repair (5/3/22, Dr. Field), c/b recurrent hiatal hernia, s/p diagnostic laparoscopy, DONNA, lap hiatal hernia repair 6/3/22, now presents with chest pain starting 48 hrs to presentation to the ED. Patient started having difficulty swallowing liquid in the end of June, and was admitted for further workup. UGI series showed remaining hiatal hernia without esophageal stricture. Patient was discharged after she tolerated liquids and solids. Patient currently denies dysphagia with solid food. Last bowel movement was 3 days ago and does not remember when she passed flatus. Denies fever, chills, abdominal pain.    Pt admitted to surgery. CT shows non-obstructed hiatal hernia, ? bezoar at hiatus, Contrast passes distally, Normal sleeve anatomy. UGIS showed small hiatal hernia, no obstruction, possible short segment narrowing at proximal stomach as per radiology report. GI consulted- 8/4 sp egd which showed normal esophagus, LA Grade A reflux esophagitis, 2 cm hiatal hernia, sleeve gastrectomy with mild narrowing in mid stomach, but widely patent, daily ppi. Diet advanced to carloz fulls as tolerated.    Pt continued to recover appropriately and was deemed stable for dc. On day of dc pt VSS, tolerating diet, voiding, having gi fxn, ambulating and pain controlled. Pt to f/u with Dr Field in one week and Dr Omar Rodriguez CTS at [facility].

## 2022-08-05 NOTE — DISCHARGE NOTE PROVIDER - PROVIDER TOKENS
PROVIDER:[TOKEN:[[id number]],FOLLOWUP:[1 week]] PROVIDER:[TOKEN:[[id number]],FOLLOWUP:[1 week]],PROVIDER:[TOKEN:[[id number]],FOLLOWUP:[1 week]]

## 2022-08-05 NOTE — DISCHARGE NOTE PROVIDER - NSDCCPCAREPLAN_GEN_ALL_CORE_FT
PRINCIPAL DISCHARGE DIAGNOSIS  Diagnosis: Hiatal hernia  Assessment and Plan of Treatment: Imaging showed small hiatal hernia, no obstruction. You were evaluated by the gastroenterology team and underwent an endoscopy, continue anti reflux medication daily; please call to schedule a follow up appointment with Dr Field within 1 week of discharge   ACTIVITY: as tolerated as per prior routine  DIET: resume as per prior routine  NOTIFY YOUR SURGEON IF: You have any fever (over 100.4 F) or chills, persistent nausea/vomiting/dysphagia with inability to tolerate food or liquids, or if your pain is not controlled on your discharge pain medications.  FOLLOW-UP:  - Please call to make a follow-up appointment within 1-2 weeks of discharge  with your surgeon Dr Field  - Please follow up with your primary care physician in 1-2 weeks regarding your hospitalization       PRINCIPAL DISCHARGE DIAGNOSIS  Diagnosis: Hiatal hernia  Assessment and Plan of Treatment: Imaging showed small hiatal hernia, no obstruction. You were evaluated by the gastroenterology team and underwent an endoscopy, continue anti reflux medication daily; please call to schedule a follow up appointment with Dr Field within 1 week of discharge; please call to schedule a follow up appointment with Dr Omar Rodriguez at [facility] after discharge as well  ACTIVITY: as tolerated as per prior routine  DIET: bariatric fulls  NOTIFY YOUR SURGEON IF: You have any fever (over 100.4 F) or chills, persistent nausea/vomiting/dysphagia with inability to tolerate liquids, or if your pain is not controlled on your discharge pain medications.  FOLLOW-UP:  - Please call to make a follow-up appointment within 1 week of discharge  with your surgeon Dr Field; please call to schedule a follow up appointment with Dr Omar Rodriguez for further management  - Please follow up with your primary care physician in 1-2 weeks regarding your hospitalization       PRINCIPAL DISCHARGE DIAGNOSIS  Diagnosis: Hiatal hernia  Assessment and Plan of Treatment: Imaging showed small hiatal hernia, no obstruction. You were evaluated by the gastroenterology team and underwent an endoscopy, continue anti reflux medication daily; please call to schedule a follow up appointment with Dr Field within 1 week of discharge; please call to schedule a follow up appointment with Dr Omar Rodriguez at [facility] after discharge as well  ACTIVITY: as tolerated as per prior routine  DIET: bariatric fulls  NOTIFY YOUR SURGEON IF: You have any fever (over 100.4 F) or chills, persistent nausea/vomiting/dysphagia with inability to tolerate liquids, or if your pain is not controlled on your discharge pain medications.  FOLLOW-UP:  - Please call to make a follow-up appointment within 1 week of discharge  with your surgeon Dr Field; please call to schedule a follow up appointment with Dr Omar Rodriguez (cardiothoracic surgery) for further management  - Please follow up with your primary care physician in 1-2 weeks regarding your hospitalization

## 2022-08-05 NOTE — DISCHARGE NOTE NURSING/CASE MANAGEMENT/SOCIAL WORK - PATIENT PORTAL LINK FT
You can access the FollowMyHealth Patient Portal offered by [facility] by registering at the following website: [email or web address]. By joining [facility]’s FollowMyHealth portal, you will also be able to view your health information using other applications (apps) compatible with our system.

## 2022-08-05 NOTE — PROGRESS NOTE ADULT - SUBJECTIVE AND OBJECTIVE BOX
Red team Surgery Progress Note    INTERVAL EVENTS:   No acute events overnight.    SUBJECTIVE: Patient seen and examined at bedside with surgical team,     OBJECTIVE:    Vital Signs Last 24 Hrs  T(C): 36.9 (05 Aug 2022 00:35), Max: 36.9 (04 Aug 2022 08:34)  T(F): 98.4 (05 Aug 2022 00:35), Max: 98.4 (04 Aug 2022 08:34)  HR: 72 (05 Aug 2022 00:35) (62 - 97)  BP: 106/69 (05 Aug 2022 00:35) (90/57 - 131/80)  BP(mean): --  RR: 18 (05 Aug 2022 00:35) (16 - 18)  SpO2: 99% (05 Aug 2022 00:35) (92% - 100%)    Parameters below as of 05 Aug 2022 00:35  Patient On (Oxygen Delivery Method): room air    I&O's Detail    03 Aug 2022 07:01  -  04 Aug 2022 07:00  --------------------------------------------------------  IN:    IV PiggyBack: 50 mL    sodium chloride 0.9%: 1500 mL  Total IN: 1550 mL    OUT:    Oral Fluid: 0 mL    Voided (mL): 350 mL  Total OUT: 350 mL    Total NET: 1200 mL      04 Aug 2022 07:01  -  05 Aug 2022 02:31  --------------------------------------------------------  IN:  Total IN: 0 mL    OUT:    Oral Fluid: 0 mL    Voided (mL): 400 mL  Total OUT: 400 mL    Total NET: -400 mL      MEDICATIONS  (STANDING):  heparin   Injectable 5000 Unit(s) SubCutaneous every 8 hours  lidocaine 4% Injection for Nebulization 4 milliLiter(s) Nebulizer once  pantoprazole  Injectable 40 milliGRAM(s) IV Push every 24 hours  sodium chloride 0.9%. 1000 milliLiter(s) (125 mL/Hr) IV Continuous <Continuous>    MEDICATIONS  (PRN):      HYSICAL EXAM:  Constitutional: A&Ox3, NAD  Respiratory: Unlabored breathing  Abdomen: Soft, nondistended, NTTP. No rebound or guarding.  Extremities: WWP, GUARDADO spontaneously    LABS:                        12.7   4.90  )-----------( 198      ( 04 Aug 2022 08:31 )             40.4     08-04    140  |  106  |  <4<L>  ----------------------------<  71  3.2<L>   |  18<L>  |  0.49<L>    Ca    8.6      04 Aug 2022 08:31  Phos  3.1     08-04  Mg     1.8     08-04    TPro  6.2  /  Alb  3.4  /  TBili  0.4  /  DBili  x   /  AST  7<L>  /  ALT  5<L>  /  AlkPhos  87  08-04      LIVER FUNCTIONS - ( 04 Aug 2022 08:31 )  Alb: 3.4 g/dL / Pro: 6.2 g/dL / ALK PHOS: 87 U/L / ALT: 5 U/L / AST: 7 U/L / GGT: x                 IMAGING:       Green team Surgery Progress Note    INTERVAL EVENTS:   No acute events overnight. EGD done.     SUBJECTIVE: Patient seen and examined at bedside with surgical team, Denies SOB, nausea or vomiting.     OBJECTIVE:    Vital Signs Last 24 Hrs  T(C): 36.9 (05 Aug 2022 00:35), Max: 36.9 (04 Aug 2022 08:34)  T(F): 98.4 (05 Aug 2022 00:35), Max: 98.4 (04 Aug 2022 08:34)  HR: 72 (05 Aug 2022 00:35) (62 - 97)  BP: 106/69 (05 Aug 2022 00:35) (90/57 - 131/80)  BP(mean): --  RR: 18 (05 Aug 2022 00:35) (16 - 18)  SpO2: 99% (05 Aug 2022 00:35) (92% - 100%)    Parameters below as of 05 Aug 2022 00:35  Patient On (Oxygen Delivery Method): room air    I&O's Detail    03 Aug 2022 07:01  -  04 Aug 2022 07:00  --------------------------------------------------------  IN:    IV PiggyBack: 50 mL    sodium chloride 0.9%: 1500 mL  Total IN: 1550 mL    OUT:    Oral Fluid: 0 mL    Voided (mL): 350 mL  Total OUT: 350 mL    Total NET: 1200 mL      04 Aug 2022 07:01  -  05 Aug 2022 02:31  --------------------------------------------------------  IN:  Total IN: 0 mL    OUT:    Oral Fluid: 0 mL    Voided (mL): 400 mL  Total OUT: 400 mL    Total NET: -400 mL      MEDICATIONS  (STANDING):  heparin   Injectable 5000 Unit(s) SubCutaneous every 8 hours  lidocaine 4% Injection for Nebulization 4 milliLiter(s) Nebulizer once  pantoprazole  Injectable 40 milliGRAM(s) IV Push every 24 hours  sodium chloride 0.9%. 1000 milliLiter(s) (125 mL/Hr) IV Continuous <Continuous>    MEDICATIONS  (PRN):      HYSICAL EXAM:  Constitutional: A&Ox3, NAD  Respiratory: Unlabored breathing  Abdomen: Soft, nondistended, NTTP. No rebound or guarding.  Extremities: WWP, GUARDADO spontaneously    LABS:                        12.7   4.90  )-----------( 198      ( 04 Aug 2022 08:31 )             40.4     08-04    140  |  106  |  <4<L>  ----------------------------<  71  3.2<L>   |  18<L>  |  0.49<L>    Ca    8.6      04 Aug 2022 08:31  Phos  3.1     08-04  Mg     1.8     08-04    TPro  6.2  /  Alb  3.4  /  TBili  0.4  /  DBili  x   /  AST  7<L>  /  ALT  5<L>  /  AlkPhos  87  08-04      LIVER FUNCTIONS - ( 04 Aug 2022 08:31 )  Alb: 3.4 g/dL / Pro: 6.2 g/dL / ALK PHOS: 87 U/L / ALT: 5 U/L / AST: 7 U/L / GGT: x                 IMAGING:       Green team Surgery Progress Note    INTERVAL EVENTS:   No acute events overnight. EGD done.     SUBJECTIVE: Patient seen and examined at bedside with surgical team, Denies SOB, nausea or vomiting.     OBJECTIVE:    Vital Signs Last 24 Hrs  T(C): 36.9 (05 Aug 2022 00:35), Max: 36.9 (04 Aug 2022 08:34)  T(F): 98.4 (05 Aug 2022 00:35), Max: 98.4 (04 Aug 2022 08:34)  HR: 72 (05 Aug 2022 00:35) (62 - 97)  BP: 106/69 (05 Aug 2022 00:35) (90/57 - 131/80)  BP(mean): --  RR: 18 (05 Aug 2022 00:35) (16 - 18)    SpO2: 99% (05 Aug 2022 00:35) (92% - 100%)    Parameters below as of 05 Aug 2022 00:35  Patient On (Oxygen Delivery Method): room air    I&O's Detail    03 Aug 2022 07:01  -  04 Aug 2022 07:00  --------------------------------------------------------  IN:    IV PiggyBack: 50 mL    sodium chloride 0.9%: 1500 mL  Total IN: 1550 mL    OUT:    Oral Fluid: 0 mL    Voided (mL): 350 mL  Total OUT: 350 mL    Total NET: 1200 mL      04 Aug 2022 07:01  -  05 Aug 2022 02:31  --------------------------------------------------------  IN:  Total IN: 0 mL    OUT:    Oral Fluid: 0 mL    Voided (mL): 400 mL  Total OUT: 400 mL    Total NET: -400 mL      MEDICATIONS  (STANDING):  heparin   Injectable 5000 Unit(s) SubCutaneous every 8 hours  lidocaine 4% Injection for Nebulization 4 milliLiter(s) Nebulizer once  pantoprazole  Injectable 40 milliGRAM(s) IV Push every 24 hours  sodium chloride 0.9%. 1000 milliLiter(s) (125 mL/Hr) IV Continuous <Continuous>    MEDICATIONS  (PRN):      HYSICAL EXAM:  Constitutional: A&Ox3, NAD  Respiratory: Unlabored breathing  Abdomen: Soft, nondistended, NTTP. No rebound or guarding.  Extremities: WWP, GUARDADO spontaneously    LABS:                        12.7   4.90  )-----------( 198      ( 04 Aug 2022 08:31 )             40.4     08-04    140  |  106  |  <4<L>  ----------------------------<  71  3.2<L>   |  18<L>  |  0.49<L>    Ca    8.6      04 Aug 2022 08:31  Phos  3.1     08-04  Mg     1.8     08-04    TPro  6.2  /  Alb  3.4  /  TBili  0.4  /  DBili  x   /  AST  7<L>  /  ALT  5<L>  /  AlkPhos  87  08-04      LIVER FUNCTIONS - ( 04 Aug 2022 08:31 )  Alb: 3.4 g/dL / Pro: 6.2 g/dL / ALK PHOS: 87 U/L / ALT: 5 U/L / AST: 7 U/L / GGT: x                 IMAGING:

## 2022-08-08 ENCOUNTER — NON-APPOINTMENT (OUTPATIENT)
Age: 47
End: 2022-08-08

## 2022-08-11 PROCEDURE — 86850 RBC ANTIBODY SCREEN: CPT

## 2022-08-11 PROCEDURE — 74177 CT ABD & PELVIS W/CONTRAST: CPT | Mod: MG

## 2022-08-11 PROCEDURE — 99285 EMERGENCY DEPT VISIT HI MDM: CPT | Mod: 25

## 2022-08-11 PROCEDURE — 82565 ASSAY OF CREATININE: CPT

## 2022-08-11 PROCEDURE — 83605 ASSAY OF LACTIC ACID: CPT

## 2022-08-11 PROCEDURE — 86901 BLOOD TYPING SEROLOGIC RH(D): CPT

## 2022-08-11 PROCEDURE — 36415 COLL VENOUS BLD VENIPUNCTURE: CPT

## 2022-08-11 PROCEDURE — 84100 ASSAY OF PHOSPHORUS: CPT

## 2022-08-11 PROCEDURE — 74246 X-RAY XM UPR GI TRC 2CNTRST: CPT

## 2022-08-11 PROCEDURE — 82330 ASSAY OF CALCIUM: CPT

## 2022-08-11 PROCEDURE — 85730 THROMBOPLASTIN TIME PARTIAL: CPT

## 2022-08-11 PROCEDURE — 82947 ASSAY GLUCOSE BLOOD QUANT: CPT

## 2022-08-11 PROCEDURE — 84134 ASSAY OF PREALBUMIN: CPT

## 2022-08-11 PROCEDURE — U0003: CPT

## 2022-08-11 PROCEDURE — 71045 X-RAY EXAM CHEST 1 VIEW: CPT

## 2022-08-11 PROCEDURE — G1004: CPT

## 2022-08-11 PROCEDURE — 85027 COMPLETE CBC AUTOMATED: CPT

## 2022-08-11 PROCEDURE — 96375 TX/PRO/DX INJ NEW DRUG ADDON: CPT

## 2022-08-11 PROCEDURE — 85014 HEMATOCRIT: CPT

## 2022-08-11 PROCEDURE — 80053 COMPREHEN METABOLIC PANEL: CPT

## 2022-08-11 PROCEDURE — 86900 BLOOD TYPING SEROLOGIC ABO: CPT

## 2022-08-11 PROCEDURE — 84295 ASSAY OF SERUM SODIUM: CPT

## 2022-08-11 PROCEDURE — 96374 THER/PROPH/DIAG INJ IV PUSH: CPT

## 2022-08-11 PROCEDURE — 84132 ASSAY OF SERUM POTASSIUM: CPT

## 2022-08-11 PROCEDURE — U0005: CPT

## 2022-08-11 PROCEDURE — 85025 COMPLETE CBC W/AUTO DIFF WBC: CPT

## 2022-08-11 PROCEDURE — 82435 ASSAY OF BLOOD CHLORIDE: CPT

## 2022-08-11 PROCEDURE — 85610 PROTHROMBIN TIME: CPT

## 2022-08-11 PROCEDURE — 96361 HYDRATE IV INFUSION ADD-ON: CPT

## 2022-08-11 PROCEDURE — 84702 CHORIONIC GONADOTROPIN TEST: CPT

## 2022-08-11 PROCEDURE — 80048 BASIC METABOLIC PNL TOTAL CA: CPT

## 2022-08-11 PROCEDURE — 83690 ASSAY OF LIPASE: CPT

## 2022-08-11 PROCEDURE — 85018 HEMOGLOBIN: CPT

## 2022-08-11 PROCEDURE — 82803 BLOOD GASES ANY COMBINATION: CPT

## 2022-08-11 PROCEDURE — 83735 ASSAY OF MAGNESIUM: CPT

## 2022-08-12 ENCOUNTER — APPOINTMENT (OUTPATIENT)
Dept: SURGERY | Facility: CLINIC | Age: 47
End: 2022-08-12

## 2022-08-12 VITALS
DIASTOLIC BLOOD PRESSURE: 77 MMHG | HEART RATE: 80 BPM | OXYGEN SATURATION: 98 % | HEIGHT: 63 IN | RESPIRATION RATE: 18 BRPM | WEIGHT: 191.5 LBS | TEMPERATURE: 97.5 F | SYSTOLIC BLOOD PRESSURE: 110 MMHG | BODY MASS INDEX: 33.93 KG/M2

## 2022-08-12 DIAGNOSIS — Z98.84 BARIATRIC SURGERY STATUS: ICD-10-CM

## 2022-08-12 PROCEDURE — 99024 POSTOP FOLLOW-UP VISIT: CPT

## 2022-09-01 ENCOUNTER — APPOINTMENT (OUTPATIENT)
Dept: THORACIC SURGERY | Facility: CLINIC | Age: 47
End: 2022-09-01

## 2022-09-01 VITALS
OXYGEN SATURATION: 96 % | BODY MASS INDEX: 33.49 KG/M2 | WEIGHT: 189 LBS | HEIGHT: 63 IN | RESPIRATION RATE: 17 BRPM | DIASTOLIC BLOOD PRESSURE: 94 MMHG | HEART RATE: 66 BPM | SYSTOLIC BLOOD PRESSURE: 144 MMHG

## 2022-09-01 DIAGNOSIS — Z72.89 OTHER PROBLEMS RELATED TO LIFESTYLE: ICD-10-CM

## 2022-09-01 DIAGNOSIS — Z77.22 CONTACT WITH AND (SUSPECTED) EXPOSURE TO ENVIRONMENTAL TOBACCO SMOKE (ACUTE) (CHRONIC): ICD-10-CM

## 2022-09-01 PROCEDURE — 99205 OFFICE O/P NEW HI 60 MIN: CPT

## 2022-09-01 RX ORDER — CYCLOBENZAPRINE HYDROCHLORIDE 5 MG/1
5 TABLET, FILM COATED ORAL
Refills: 0 | Status: DISCONTINUED | COMMUNITY
End: 2022-09-01

## 2022-09-01 RX ORDER — TIZANIDINE HYDROCHLORIDE 2 MG/1
2 CAPSULE ORAL
Refills: 0 | Status: DISCONTINUED | COMMUNITY
End: 2022-09-01

## 2022-09-01 RX ORDER — IBUPROFEN 600 MG/1
600 TABLET, FILM COATED ORAL
Refills: 0 | Status: DISCONTINUED | COMMUNITY
End: 2022-09-01

## 2022-09-02 ENCOUNTER — NON-APPOINTMENT (OUTPATIENT)
Age: 47
End: 2022-09-02

## 2022-09-02 PROBLEM — Z77.22 HISTORY OF SECOND HAND SMOKE EXPOSURE: Status: ACTIVE | Noted: 2022-09-02

## 2022-09-02 PROBLEM — Z72.89 ALCOHOL USE: Status: ACTIVE | Noted: 2022-09-02

## 2022-09-02 RX ORDER — OMEPRAZOLE MAGNESIUM 40 MG/1
40 CAPSULE, DELAYED RELEASE ORAL
Refills: 0 | Status: ACTIVE | COMMUNITY

## 2022-09-02 NOTE — CONSULT LETTER
[Dear  ___] : Dear  [unfilled], [Courtesy Letter:] : I had the pleasure of seeing your patient, [unfilled], in my office today. [Please see my note below.] : Please see my note below. [Sincerely,] : Sincerely, [FreeTextEntry2] : Rahul Field MD (Bariatric Sx) [FreeTextEntry3] : Omar Rodriguez MD, MPH \par System Director of Thoracic Surgery \par Director of Comprehensive Lung and Foregut [place] \par Professor Cardiovascular & Thoracic Surgery  \par [employer] [facility] [employer] [employer] at [facility]\par

## 2022-09-02 NOTE — ASSESSMENT
[FreeTextEntry1] : Ms. NORBERT COONEY, 47 year old female, never smoker, w/ hx of s/p RA, Lap, sleeve gastrectomy and hiatal hernia repair on 5/3/22 by Dr. Rahul Field, patient went back to ED on 06/02/2022 c/o dysphagia and poor PO tolerance, CT A/P showed concern for hiatal hernia vs migration of gastric sleeve, s/p Lap repair of recurrent hiatal hernia repair on 060/3/2022, patient went back to ED on 06/21/2022 for dysphagia, and again on 08/02/2022 for CP and dysphagia. \par \par CT C/A/P on 08/02/2022:\par - Status post sleeve gastrectomy. No bowel obstruction\par - Mild rightward displacement and narrowing of the trachea by extrinsic mass effect from left thyroid nodule.\par -  2.9 cm left thyroid nodule with dystrophic calcifications.\par \par Upper GI series on 08/03/2022:\par - Suggestion of a short segment narrowing in the proximal third of the stomach, status post sleeve gastrectomy. Correlation with endoscopy could be performed.\par - Small hiatal hernia with stasis and reflux of contrast in the esophagus.\par \par Patient is s/p EGD on 08/04/2022 showed Normal esophagus; Z-line regular, 31 cm from the incisors; LA Grade A reflux esophagitis; 2 cm hiatal hernia; A sleeve gastrectomy was found, characterized by healthy appearing mucosa. Mild narrowing in mid stomach, but widely patent; Normal examined duodenum; \par \par I have reviewed the patient's medical records and diagnostic images at time of this office consultation and have made the following recommendation:\par 1. All above studies reviewed with pt, I would like to do a EGD for further evaluation. \par 2. Bravo and manometry by Dr. Mary Hager\par \par \par I personally performed the services described in the documentation, reviewed the documentation recorded by the scribe in my presence and it accurately and completely records my words and actions. \par \par I, Deidra Caro NP, am scribing for and the presence of EDWARD Gardner, the following sections HISTORY OF PRESENT ILLNESS, PAST MEDICAL/FAMILY/SOCIAL HISTORY; REVIEW OF SYSTEMS; VITAL SIGNS; PHYSICAL EXAM; DISPOSITION.\par \par \par

## 2022-09-02 NOTE — HISTORY OF PRESENT ILLNESS
[FreeTextEntry1] : Ms. NORBERT COONEY, 47 year old female, never smoker, w/ hx of s/p RA, Lap, sleeve gastrectomy and hiatal hernia repair on 5/3/22 by Dr. Rahul Field, patient went back to ED on 06/02/2022 c/o dysphagia and poor PO tolerance, CT A/P showed concern for hiatal hernia vs migration of gastric sleeve, s/p Lap repair of recurrent hiatal hernia repair on 060/3/2022, patient went back to ED on 06/21/2022 for dysphagia, and again on 08/02/2022 for CP and dysphagia. \par \par CT C/A/P on 08/02/2022:\par - Status post sleeve gastrectomy. No bowel obstruction\par - Mild rightward displacement and narrowing of the trachea by extrinsic mass effect from left thyroid nodule.\par -  2.9 cm left thyroid nodule with dystrophic calcifications.\par \par Upper GI series on 08/03/2022:\par - Suggestion of a short segment narrowing in the proximal third of the stomach, status post sleeve gastrectomy. Correlation with endoscopy could be performed.\par - Small hiatal hernia with stasis and reflux of contrast in the esophagus.\par \par Patient is s/p EGD on 08/04/2022 showed Normal esophagus; Z-line regular, 31 cm from the incisors; LA Grade A reflux esophagitis; 2 cm hiatal hernia; A sleeve gastrectomy was found, characterized by healthy appearing mucosa. Mild narrowing in mid stomach, but widely patent; Normal examined duodenum; \par \par Patient is here today for CT surgery consultation, referred by  Dr. Field. Pt admits to dysphagia, food gets stuck.

## 2022-09-08 PROBLEM — Z00.00 ENCOUNTER FOR PREVENTIVE HEALTH EXAMINATION: Status: ACTIVE | Noted: 2021-06-22

## 2022-09-09 ENCOUNTER — APPOINTMENT (OUTPATIENT)
Dept: SURGERY | Facility: CLINIC | Age: 47
End: 2022-09-09

## 2022-09-09 VITALS
TEMPERATURE: 97.2 F | HEIGHT: 63 IN | OXYGEN SATURATION: 98 % | RESPIRATION RATE: 17 BRPM | WEIGHT: 187.31 LBS | SYSTOLIC BLOOD PRESSURE: 124 MMHG | BODY MASS INDEX: 33.19 KG/M2 | DIASTOLIC BLOOD PRESSURE: 84 MMHG | HEART RATE: 70 BPM

## 2022-09-09 DIAGNOSIS — E44.1 MILD PROTEIN-CALORIE MALNUTRITION: ICD-10-CM

## 2022-09-09 PROCEDURE — 99213 OFFICE O/P EST LOW 20 MIN: CPT

## 2022-09-09 NOTE — PHYSICAL EXAM
[Obese, well nourished, in no acute distress] : obese, well nourished, in no acute distress [Normal] : affect appropriate [de-identified] : Normal respirations [de-identified] : Soft, nontender, nondistended.  Well-healed laparoscopic incision sites.

## 2022-09-09 NOTE — HISTORY OF PRESENT ILLNESS
[de-identified] : Katina is a 47 year old female here for follow up visit, s/p robot-assisted laparoscopic sleeve gastrectomy and hiatal hernia repair 5/3/22. S/p Laparoscopic repair of recurrent hiatal hernia and upper endoscopy 6/3/22. \par \par She says she is doing well, tolerating soft diet without reflux or regurgitation.  She does feel some stasis of foods in her chest for some time after eating.  She has no abdominal pain and no pain when swallowing.

## 2022-09-09 NOTE — ASSESSMENT
[FreeTextEntry1] : 47-year-old female with recurrent hiatal hernia s/p sleeve gastrectomy/HHR 5/3/22 and s/p repair of hiatal hernia 6/3/22.\par Tolerating soft diet.\par

## 2022-09-09 NOTE — PHYSICAL EXAM
[Obese] : obese [Normal] : affect appropriate [de-identified] : Soft, obese, non-distended, non-tender, no masses appreciated

## 2022-09-09 NOTE — HISTORY OF PRESENT ILLNESS
[de-identified] : NORBERT is a 46 year old female here for postoperative visit s/p Laparoscopic repair of recurrent hiatal hernia and upper endoscopy on 06/03/2022.\par \par Patient recently discharged from [facility] after admission for epigastric pain and discomfort. She underwent an upper GI demonstrating a recurrent hiatal hernia. She was tolerating a soft diet prior to discharge. Since being at home, she has been doing OK. Eating ground turkey cooked in broth with well cooked broccoli or cauliflower. She still has sensations of the food getting stuck, with slow transit into the stomach. Denies nausea or vomiting. And is able to tolerate liquids. Having regular bowel movements. No fevers, chills, sweats, abdominal pain, chest pain. The patient called Dr. Rodriguez's office at [facility] (Thoracic Surgery) to schedule an appointment, and was told by the  that she would be receiving a call back.

## 2022-09-09 NOTE — PLAN
[FreeTextEntry1] : [] Continue soft diet as tolerated\par [] [] Follow up with Dr. Rodriguez from Thoracic surgery for discussion of possible repair of recurrent hiatal hernia\par [] Counseled to call office or go to ED if she's having severe/worsening epigastric pain, persistent nausea or vomiting, fevers, or any other concerns\par [] f/u 1month

## 2022-09-09 NOTE — REVIEW OF SYSTEMS
[Negative] : Allergic/Immunologic [Abdominal Pain] : no abdominal pain [Vomiting] : no vomiting [Constipation] : no constipation [Diarrhea] : no diarrhea [Reflux/Heartburn] : no reflux/ heartburn [FreeTextEntry7] : Mild dysphagia

## 2022-09-09 NOTE — PLAN
[FreeTextEntry1] : Cont soft diet, focusing on proteins and vegetables\par Aim for 50-60oz fluid per day\par Planning for EGD and manometry and will f/u with Dr. Rodriguez.\par f/u 2 months

## 2022-09-12 ENCOUNTER — NON-APPOINTMENT (OUTPATIENT)
Age: 47
End: 2022-09-12

## 2022-09-14 ENCOUNTER — APPOINTMENT (OUTPATIENT)
Dept: GASTROENTEROLOGY | Facility: CLINIC | Age: 47
End: 2022-09-14

## 2022-09-14 LAB
25(OH)D3 SERPL-MCNC: 31.2 NG/ML
ALBUMIN SERPL ELPH-MCNC: 3.9 G/DL
ALP BLD-CCNC: 78 U/L
ALT SERPL-CCNC: 8 U/L
ANION GAP SERPL CALC-SCNC: 15 MMOL/L
AST SERPL-CCNC: 10 U/L
BASOPHILS # BLD AUTO: 0.03 K/UL
BASOPHILS NFR BLD AUTO: 0.6 %
BILIRUB DIRECT SERPL-MCNC: 0.1 MG/DL
BILIRUB INDIRECT SERPL-MCNC: 0.3 MG/DL
BILIRUB SERPL-MCNC: 0.4 MG/DL
BUN SERPL-MCNC: 3 MG/DL
CALCIUM SERPL-MCNC: 9.9 MG/DL
CHLORIDE SERPL-SCNC: 101 MMOL/L
CO2 SERPL-SCNC: 28 MMOL/L
CREAT SERPL-MCNC: 0.54 MG/DL
EGFR: 113 ML/MIN/1.73M2
EOSINOPHIL # BLD AUTO: 0.23 K/UL
EOSINOPHIL NFR BLD AUTO: 4.8 %
FERRITIN SERPL-MCNC: 417 NG/ML
FOLATE SERPL-MCNC: 6.1 NG/ML
GLUCOSE SERPL-MCNC: 85 MG/DL
HCT VFR BLD CALC: 42.4 %
HGB BLD-MCNC: 13.2 G/DL
IMM GRANULOCYTES NFR BLD AUTO: 0.2 %
IRON SATN MFR SERPL: 22 %
IRON SERPL-MCNC: 44 UG/DL
LYMPHOCYTES # BLD AUTO: 1.7 K/UL
LYMPHOCYTES NFR BLD AUTO: 35.6 %
MAN DIFF?: NORMAL
MCHC RBC-ENTMCNC: 31.1 GM/DL
MCHC RBC-ENTMCNC: 31.2 PG
MCV RBC AUTO: 100.2 FL
MONOCYTES # BLD AUTO: 0.53 K/UL
MONOCYTES NFR BLD AUTO: 11.1 %
NEUTROPHILS # BLD AUTO: 2.27 K/UL
NEUTROPHILS NFR BLD AUTO: 47.7 %
PLATELET # BLD AUTO: 271 K/UL
POTASSIUM SERPL-SCNC: 4.4 MMOL/L
PROT SERPL-MCNC: 6.5 G/DL
RBC # BLD: 4.23 M/UL
RBC # FLD: 12.6 %
SODIUM SERPL-SCNC: 145 MMOL/L
TIBC SERPL-MCNC: 201 UG/DL
UIBC SERPL-MCNC: 156 UG/DL
VIT B12 SERPL-MCNC: 502 PG/ML
WBC # FLD AUTO: 4.77 K/UL

## 2022-09-14 PROCEDURE — 99202 OFFICE O/P NEW SF 15 MIN: CPT | Mod: 95

## 2022-09-14 PROCEDURE — 99212 OFFICE O/P EST SF 10 MIN: CPT | Mod: 95

## 2022-09-14 NOTE — HISTORY OF PRESENT ILLNESS
[Home] : at home, [unfilled] , at the time of the visit. [Other Location: e.g. Home (Enter Location, City,State)___] : at [unfilled] [Verbal consent obtained from patient] : the patient, [unfilled] [FreeTextEntry1] : 47 year old female with PMH sleeve gastrectomy with hiatal hernia repair in May 2022, followed by repair of recurrent hiatal hernia in June 2022 presents for telehealth consultation, as referred by Dr. Omar Rodriguez. Patient has had two ER visits since second hernia repair due to dysphagia and chest pain. Today she continues to report feeling as though food gets stuck in her chest. States it is mainly with solid foods, but can happen with liquids if she drinks too much volume too quickly. Also describes reflux symptoms but states she has not been experiencing the chest pain anymore lately. She currently taking prilosec and famotidine for her symptoms.\par  [de-identified] : Upper GI series on 08/03/2022:\par - Suggestion of a short segment narrowing in the proximal third of the stomach, status post sleeve gastrectomy. Correlation with endoscopy could be performed.\par - Small hiatal hernia with stasis and reflux of contrast in the esophagus. [de-identified] : CT C/A/P on 08/02/2022:\par - Status post sleeve gastrectomy. No bowel obstruction\par - Mild rightward displacement and narrowing of the trachea by extrinsic mass effect from left thyroid nodule.\par - 2.9 cm left thyroid nodule with dystrophic calcifications.

## 2022-09-14 NOTE — ASSESSMENT
[FreeTextEntry1] : 47 year old female with PMH sleeve gastrectomy with hiatal hernia repair in May 2022, followed by repair of recurrent hiatal hernia in June 2022 referred by Dr. Omar Rodriguez for reflux and motility testing. Discussed upper endoscopy procedure with patient. Advised EGD will be performed by Dr. Rodriguez and Dr. Hager, as Dr. Hager will place the BRAVO. Advised that BRAVO pH testing is performed off antacid therapy and therefore instructed patient to discontinue prilosec 7 days prior to procedure and famotidine 3 days prior to procedure. \par Discussed that depending on the results of the EGD/BRAVO, an esophageal manometry may be necessary as well. We agreed to hold a procedure slot on 10/7/22, approximately 1 week after her EGD/BRAVO on 9/29/22. Patient in agreed with plan and all questoins were answered.\par \par The risks, benefits and alternatives of the procedure were discussed with the patient in detail. Risks include bleeding, infection, bowel perforation, dysphagia and chest discomfort from the BRAVO capsule. The patient was also instructed that an MRI is not permitted within 30 days after BRAVO is placed, unless there is confirmed expulsion of the capsule. I instructed the patient that the study is done off therapy and instructed to stop PPI 7 days prior to the test, H2 blocker 3 days prior to test, and antacids 24 hours prior to test. Patient was also instructed to bring the recorder back to the  at endo suite once the study is completed. Patient was also made aware that a COVID19 PCR test is required within 3 days of the procedure. All questions were answered. The patient expressed understanding of these risks and is agreeable to proceed. The  to confirm appointment with patient.\par \par The risks, benefits and alternatives of the Esophageal Manometry procedure were discussed in detail with the patient. Risks includes nasal irritation, bleeding, coughing, sore throat and sinusitis. Patient advised that an escort is not required as no anesthesia is used in this procedure. Patient was made aware that the procedure is performed by an NP/PA and interpreted at a later date by the physician. Patient was advised a COVID-19 PCR test is required within 3 days of the procedure. All questions were answered. The patient expressed understanding of these risks and is agreeable to proceed.\par

## 2022-09-21 LAB — VIT B1 SERPL-MCNC: 102.6 NMOL/L

## 2022-09-26 ENCOUNTER — LABORATORY RESULT (OUTPATIENT)
Age: 47
End: 2022-09-26

## 2022-09-29 ENCOUNTER — OUTPATIENT (OUTPATIENT)
Dept: OUTPATIENT SERVICES | Facility: HOSPITAL | Age: 47
LOS: 1 days | Discharge: HOME | End: 2022-09-29

## 2022-09-29 ENCOUNTER — APPOINTMENT (OUTPATIENT)
Dept: THORACIC SURGERY | Facility: HOSPITAL | Age: 47
End: 2022-09-29

## 2022-09-29 ENCOUNTER — RESULT REVIEW (OUTPATIENT)
Age: 47
End: 2022-09-29

## 2022-09-29 VITALS
DIASTOLIC BLOOD PRESSURE: 76 MMHG | RESPIRATION RATE: 16 BRPM | TEMPERATURE: 97 F | WEIGHT: 182.98 LBS | HEIGHT: 63 IN | HEART RATE: 68 BPM | SYSTOLIC BLOOD PRESSURE: 120 MMHG | OXYGEN SATURATION: 99 %

## 2022-09-29 VITALS
OXYGEN SATURATION: 98 % | DIASTOLIC BLOOD PRESSURE: 69 MMHG | SYSTOLIC BLOOD PRESSURE: 115 MMHG | RESPIRATION RATE: 18 BRPM

## 2022-09-29 DIAGNOSIS — K44.9 DIAPHRAGMATIC HERNIA WITHOUT OBSTRUCTION OR GANGRENE: ICD-10-CM

## 2022-09-29 DIAGNOSIS — Z98.890 OTHER SPECIFIED POSTPROCEDURAL STATES: Chronic | ICD-10-CM

## 2022-09-29 DIAGNOSIS — Z90.710 ACQUIRED ABSENCE OF BOTH CERVIX AND UTERUS: Chronic | ICD-10-CM

## 2022-09-29 DIAGNOSIS — Z90.3 ACQUIRED ABSENCE OF STOMACH [PART OF]: Chronic | ICD-10-CM

## 2022-09-29 PROCEDURE — 43239 EGD BIOPSY SINGLE/MULTIPLE: CPT

## 2022-09-29 PROCEDURE — 88305 TISSUE EXAM BY PATHOLOGIST: CPT | Mod: 26

## 2022-09-29 DEVICE — IMPLANTABLE DEVICE: Type: IMPLANTABLE DEVICE | Status: FUNCTIONAL

## 2022-09-30 LAB — SURGICAL PATHOLOGY STUDY: SIGNIFICANT CHANGE UP

## 2022-10-01 PROCEDURE — 91035 G-ESOPH REFLX TST W/ELECTROD: CPT | Mod: 26

## 2022-10-07 ENCOUNTER — APPOINTMENT (OUTPATIENT)
Dept: GASTROENTEROLOGY | Facility: HOSPITAL | Age: 47
End: 2022-10-07

## 2022-10-07 ENCOUNTER — OUTPATIENT (OUTPATIENT)
Dept: OUTPATIENT SERVICES | Facility: HOSPITAL | Age: 47
LOS: 1 days | Discharge: HOME | End: 2022-10-07

## 2022-10-07 VITALS
RESPIRATION RATE: 18 BRPM | OXYGEN SATURATION: 100 % | DIASTOLIC BLOOD PRESSURE: 78 MMHG | TEMPERATURE: 97 F | HEART RATE: 76 BPM | SYSTOLIC BLOOD PRESSURE: 121 MMHG

## 2022-10-07 VITALS
SYSTOLIC BLOOD PRESSURE: 121 MMHG | DIASTOLIC BLOOD PRESSURE: 78 MMHG | HEIGHT: 63 IN | OXYGEN SATURATION: 100 % | WEIGHT: 179.9 LBS | HEART RATE: 76 BPM | TEMPERATURE: 97 F | RESPIRATION RATE: 18 BRPM

## 2022-10-07 DIAGNOSIS — Z90.710 ACQUIRED ABSENCE OF BOTH CERVIX AND UTERUS: Chronic | ICD-10-CM

## 2022-10-07 DIAGNOSIS — Z98.890 OTHER SPECIFIED POSTPROCEDURAL STATES: Chronic | ICD-10-CM

## 2022-10-07 DIAGNOSIS — Z90.3 ACQUIRED ABSENCE OF STOMACH [PART OF]: Chronic | ICD-10-CM

## 2022-10-07 DIAGNOSIS — K21.9 GASTRO-ESOPHAGEAL REFLUX DISEASE WITHOUT ESOPHAGITIS: ICD-10-CM

## 2022-10-07 PROCEDURE — 91037 ESOPH IMPED FUNCTION TEST: CPT | Mod: 26

## 2022-10-07 PROCEDURE — 91013 ESOPHGL MOTIL W/STIM/PERFUS: CPT | Mod: 26

## 2022-10-07 PROCEDURE — 91010 ESOPHAGUS MOTILITY STUDY: CPT | Mod: 26

## 2022-10-12 ENCOUNTER — NON-APPOINTMENT (OUTPATIENT)
Age: 47
End: 2022-10-12

## 2022-10-13 ENCOUNTER — APPOINTMENT (OUTPATIENT)
Dept: THORACIC SURGERY | Facility: CLINIC | Age: 47
End: 2022-10-13

## 2022-10-13 VITALS
WEIGHT: 179 LBS | OXYGEN SATURATION: 97 % | HEIGHT: 63 IN | DIASTOLIC BLOOD PRESSURE: 75 MMHG | HEART RATE: 70 BPM | SYSTOLIC BLOOD PRESSURE: 109 MMHG | BODY MASS INDEX: 31.71 KG/M2

## 2022-10-13 DIAGNOSIS — E66.01 MORBID (SEVERE) OBESITY DUE TO EXCESS CALORIES: ICD-10-CM

## 2022-10-13 DIAGNOSIS — E66.9 OBESITY, UNSPECIFIED: ICD-10-CM

## 2022-10-13 PROCEDURE — 99214 OFFICE O/P EST MOD 30 MIN: CPT

## 2022-10-14 PROBLEM — E66.9 OBESITY (BMI 30-39.9): Status: ACTIVE | Noted: 2021-12-17

## 2022-10-14 PROBLEM — E66.01 MORBID OBESITY: Status: ACTIVE | Noted: 2022-01-06

## 2022-10-14 NOTE — HISTORY OF PRESENT ILLNESS
[FreeTextEntry1] : Ms. NORBERT COONEY, 47 year old female, never smoker, w/ hx of s/p RA, Lap, sleeve gastrectomy and hiatal hernia repair on 5/3/22 by Dr. Rahul Field, patient went back to ED on 06/02/2022 c/o dysphagia and poor PO tolerance, CT A/P showed concern for hiatal hernia vs migration of gastric sleeve, s/p Lap repair of recurrent hiatal hernia repair on 060/3/2022, patient went back to ED on 06/21/2022 for dysphagia, and again on 08/02/2022 for CP and dysphagia. \par \par CT C/A/P on 08/02/2022:\par - Status post sleeve gastrectomy. No bowel obstruction\par - Mild rightward displacement and narrowing of the trachea by extrinsic mass effect from left thyroid nodule.\par -  2.9 cm left thyroid nodule with dystrophic calcifications.\par \par Upper GI series on 08/03/2022:\par - Suggestion of a short segment narrowing in the proximal third of the stomach, status post sleeve gastrectomy. Correlation with endoscopy could be performed.\par - Small hiatal hernia with stasis and reflux of contrast in the esophagus.\par \par Patient is s/p EGD on 08/04/2022 showed Normal esophagus; Z-line regular, 31 cm from the incisors; LA Grade A reflux esophagitis; 2 cm hiatal hernia; A sleeve gastrectomy was found, characterized by healthy appearing mucosa. Mild narrowing in mid stomach, but widely patent; Normal examined duodenum; \par \par Bravo on 9/29/22: DeMeester score of 48\par \par Manometry didn’t perform. \par \par Now s/p EGD with biopsy on 9/29/22. There is 2 cm hiatal hernia seen and grade C esophagitis. There is a light bend in the gastric sleeve conduit with mild narrowing, but scope was able to pass. \par \par Pt presents today for follow up. She c/o frequent acid reflux, associated with food regurgitation, nausea at times. Denies CP, cough, vomiting. \par

## 2022-10-14 NOTE — PHYSICAL EXAM
[Sclera] : the sclera and conjunctiva were normal [PERRL With Normal Accommodation] : pupils were equal in size, round, and reactive to light [Neck Appearance] : the appearance of the neck was normal [Neck Cervical Mass (___cm)] : no neck mass was observed [Respiration, Rhythm And Depth] : normal respiratory rhythm and effort [Auscultation Breath Sounds / Voice Sounds] : lungs were clear to auscultation bilaterally [Heart Rate And Rhythm] : heart rate was normal and rhythm regular [Heart Sounds] : normal S1 and S2 [Heart Sounds Gallop] : no gallops [Examination Of The Chest] : the chest was normal in appearance [Chest Visual Inspection Thoracic Asymmetry] : no chest asymmetry [Bowel Sounds] : normal bowel sounds [Abdomen Soft] : soft [Abdomen Tenderness] : non-tender [No CVA Tenderness] : no ~M costovertebral angle tenderness [No Spinal Tenderness] : no spinal tenderness [Involuntary Movements] : no involuntary movements were seen [Skin Color & Pigmentation] : normal skin color and pigmentation [Skin Turgor] : normal skin turgor [] : no rash [No Focal Deficits] : no focal deficits [Oriented To Time, Place, And Person] : oriented to person, place, and time [Affect] : the affect was normal [Mood] : the mood was normal

## 2022-10-14 NOTE — CONSULT LETTER
[Dear  ___] : Dear  [unfilled], [Courtesy Letter:] : I had the pleasure of seeing your patient, [unfilled], in my office today. [Please see my note below.] : Please see my note below. [Sincerely,] : Sincerely, [FreeTextEntry2] : Rahul Field MD (Bariatric Sx) [FreeTextEntry3] : Omar Rodriguez MD, MPH \par System Director of Thoracic Surgery \par Director of Comprehensive Lung [facility] Fore[facility] [place] \par Professor Cardiovascular & Thoracic Surgery  \par [employer] [facility] [employer] [facility]\par

## 2022-10-14 NOTE — ASSESSMENT
[FreeTextEntry1] : Ms. NORBERT COONEY, 47 year old female, never smoker, w/ hx of s/p RA, Lap, sleeve gastrectomy and hiatal hernia repair on 5/3/22 by Dr. Rahul Field, patient went back to ED on 06/02/2022 c/o dysphagia and poor PO tolerance, CT A/P showed concern for hiatal hernia vs migration of gastric sleeve, s/p Lap repair of recurrent hiatal hernia repair on 060/3/2022, patient went back to ED on 06/21/2022 for dysphagia, and again on 08/02/2022 for CP and dysphagia. \par \par CT C/A/P on 08/02/2022:\par - Status post sleeve gastrectomy. No bowel obstruction\par - Mild rightward displacement and narrowing of the trachea by extrinsic mass effect from left thyroid nodule.\par -  2.9 cm left thyroid nodule with dystrophic calcifications.\par \par Upper GI series on 08/03/2022:\par - Suggestion of a short segment narrowing in the proximal third of the stomach, status post sleeve gastrectomy. Correlation with endoscopy could be performed.\par - Small hiatal hernia with stasis and reflux of contrast in the esophagus.\par \par Patient is s/p EGD on 08/04/2022 showed Normal esophagus; Z-line regular, 31 cm from the incisors; LA Grade A reflux esophagitis; 2 cm hiatal hernia; A sleeve gastrectomy was found, characterized by healthy appearing mucosa. Mild narrowing in mid stomach, but widely patent; Normal examined duodenum; \par \par Bravo on 9/29/22: DeMeester score of 48\par \par Manometry didn’t perform. \par \par Now s/p EGD with biopsy on 9/29/22. There is 2 cm hiatal hernia seen and grade C esophagitis. There is a light bend in the gastric sleeve conduit with mild narrowing, but scope was able to pass. \par \par \par I have reviewed the patient's medical records and diagnostic images at time of this office consultation and have made the following recommendation:\par 1. To follow up with Dr. Field re: re-do hiatal hernia repair vs. Poncho-en- Y reconstruction.\par 2. RTC after above. \par \par \par I personally performed the services described in the documentation, reviewed the documentation recorded by the scribe in my presence and it accurately and completely records my words and actions. \par \par I, Roberta Cao NP, am scribing for and the presence of EDWARD Gardner, the following sections HISTORY OF PRESENT ILLNESS, PAST MEDICAL/FAMILY/SOCIAL HISTORY; REVIEW OF SYSTEMS; VITAL SIGNS; PHYSICAL EXAM; DISPOSITION.\par

## 2022-10-14 NOTE — DATA REVIEWED
[FreeTextEntry1] : I have independently reviewed the following:\par Bravo on 9/29/22\par Manometry on \par EGD with biopsy on 9/29/22.

## 2022-11-18 ENCOUNTER — APPOINTMENT (OUTPATIENT)
Dept: SURGERY | Facility: CLINIC | Age: 47
End: 2022-11-18

## 2022-11-18 VITALS
OXYGEN SATURATION: 99 % | RESPIRATION RATE: 17 BRPM | TEMPERATURE: 97 F | DIASTOLIC BLOOD PRESSURE: 79 MMHG | SYSTOLIC BLOOD PRESSURE: 117 MMHG | HEART RATE: 80 BPM | WEIGHT: 179.38 LBS | HEIGHT: 63 IN | BODY MASS INDEX: 31.79 KG/M2

## 2022-11-18 DIAGNOSIS — Z90.3 ACQUIRED ABSENCE OF STOMACH [PART OF]: ICD-10-CM

## 2022-11-18 PROCEDURE — 99214 OFFICE O/P EST MOD 30 MIN: CPT

## 2022-11-18 NOTE — PHYSICAL EXAM
[Obese, well nourished, in no acute distress] : obese, well nourished, in no acute distress [Normal] : affect appropriate [de-identified] : Normal respirations [de-identified] : Soft, nontender, nondistended.  Well-healed laparoscopic incision sites.

## 2022-11-18 NOTE — ASSESSMENT
[FreeTextEntry1] : 47-year-old female with recurrent hiatal hernia s/p sleeve gastrectomy/HHR 5/3/22 and s/p repair of hiatal hernia 6/3/22.\par Continued GERD and dysphagia.\par \par I have recommended robot-assisted laparoscopic revision to Poncho-en-Y gastric bypass with concurrent hiatal hernia repair via thoracic approach, to be performed with Dr. Omar Rodriguez of thoracic surgery.\par \par \par All surgical options were discussed including non-surgical treatments.  \par \par Benefits and risks of the planned surgery were discussed in depth, particularly leak, bleeding, sepsis, blood clots, ulcer, malnutrition, internal hernia and complications related to adhesions, weight regain, prolonged hospital stay and the remote possibility of death.   Also discussed was the importance of adhering to the recommended nutritional guidelines and supplements and attending regular follow-up.  I reviewed the critical importance of behavioral modification and follow-up in order to optimize outcomes and avoid complications. The patient was given the opportunity to ask pertinent questions and expressed good understanding of the aforementioned issues.\par \par \par

## 2022-11-18 NOTE — PLAN
[FreeTextEntry1] : Plan will be for robot-assisted laparoscopic revision to Poncho-en-Y gastric bypass with concurrent hiatal hernia repair to be performed by Dr. Omar Rodriguez at [facility].\par Preoperative medical clearance is requested.\par \par

## 2022-12-13 NOTE — ED ADULT NURSE NOTE - NSFALLRSKOUTCOME_ED_ALL_ED
Patient: Ayesha Grant    Procedure: Procedure(s):  COLONOSCOPY WITH POLYPECTOMY       Anesthesia Type:  MAC    Note:  Disposition: Outpatient   Postop Pain Control: Uneventful            Sign Out: Well controlled pain   PONV: No   Neuro/Psych: Uneventful            Sign Out: Acceptable/Baseline neuro status   Airway/Respiratory: Uneventful            Sign Out: Acceptable/Baseline resp. status   CV/Hemodynamics: Uneventful            Sign Out: Acceptable CV status; No obvious hypovolemia; No obvious fluid overload   Other NRE: NONE   DID A NON-ROUTINE EVENT OCCUR? No           Last vitals:  Vitals Value Taken Time   /90 12/13/22 1510   Temp     Pulse 85 12/13/22 1510   Resp 18 12/13/22 1510   SpO2 93 % 12/13/22 1515   Vitals shown include unvalidated device data.    Electronically Signed By: GÉNESIS ROSENBERG CRNA  December 13, 2022  4:03 PM   Universal Safety Interventions

## 2022-12-30 ENCOUNTER — OUTPATIENT (OUTPATIENT)
Dept: OUTPATIENT SERVICES | Facility: HOSPITAL | Age: 47
LOS: 1 days | End: 2022-12-30

## 2022-12-30 VITALS
HEART RATE: 65 BPM | RESPIRATION RATE: 16 BRPM | WEIGHT: 184.09 LBS | SYSTOLIC BLOOD PRESSURE: 123 MMHG | HEIGHT: 63 IN | TEMPERATURE: 97 F | OXYGEN SATURATION: 98 % | DIASTOLIC BLOOD PRESSURE: 83 MMHG

## 2022-12-30 DIAGNOSIS — Z98.890 OTHER SPECIFIED POSTPROCEDURAL STATES: Chronic | ICD-10-CM

## 2022-12-30 DIAGNOSIS — K44.9 DIAPHRAGMATIC HERNIA WITHOUT OBSTRUCTION OR GANGRENE: ICD-10-CM

## 2022-12-30 DIAGNOSIS — Z90.3 ACQUIRED ABSENCE OF STOMACH [PART OF]: Chronic | ICD-10-CM

## 2022-12-30 DIAGNOSIS — K21.9 GASTRO-ESOPHAGEAL REFLUX DISEASE WITHOUT ESOPHAGITIS: ICD-10-CM

## 2022-12-30 DIAGNOSIS — G47.30 SLEEP APNEA, UNSPECIFIED: ICD-10-CM

## 2022-12-30 DIAGNOSIS — Z90.710 ACQUIRED ABSENCE OF BOTH CERVIX AND UTERUS: Chronic | ICD-10-CM

## 2022-12-30 DIAGNOSIS — Z87.19 PERSONAL HISTORY OF OTHER DISEASES OF THE DIGESTIVE SYSTEM: ICD-10-CM

## 2022-12-30 LAB
ALBUMIN SERPL ELPH-MCNC: 4.4 G/DL — SIGNIFICANT CHANGE UP (ref 3.3–5)
ALP SERPL-CCNC: 87 U/L — SIGNIFICANT CHANGE UP (ref 40–120)
ALT FLD-CCNC: 10 U/L — SIGNIFICANT CHANGE UP (ref 4–33)
ANION GAP SERPL CALC-SCNC: 7 MMOL/L — SIGNIFICANT CHANGE UP (ref 7–14)
AST SERPL-CCNC: 18 U/L — SIGNIFICANT CHANGE UP (ref 4–32)
BILIRUB SERPL-MCNC: 0.4 MG/DL — SIGNIFICANT CHANGE UP (ref 0.2–1.2)
BLD GP AB SCN SERPL QL: NEGATIVE — SIGNIFICANT CHANGE UP
BUN SERPL-MCNC: 9 MG/DL — SIGNIFICANT CHANGE UP (ref 7–23)
CALCIUM SERPL-MCNC: 9.6 MG/DL — SIGNIFICANT CHANGE UP (ref 8.4–10.5)
CHLORIDE SERPL-SCNC: 103 MMOL/L — SIGNIFICANT CHANGE UP (ref 98–107)
CO2 SERPL-SCNC: 29 MMOL/L — SIGNIFICANT CHANGE UP (ref 22–31)
CREAT SERPL-MCNC: 0.65 MG/DL — SIGNIFICANT CHANGE UP (ref 0.5–1.3)
EGFR: 108 ML/MIN/1.73M2 — SIGNIFICANT CHANGE UP
GLUCOSE SERPL-MCNC: 88 MG/DL — SIGNIFICANT CHANGE UP (ref 70–99)
HCT VFR BLD CALC: 40.1 % — SIGNIFICANT CHANGE UP (ref 34.5–45)
HGB BLD-MCNC: 12.6 G/DL — SIGNIFICANT CHANGE UP (ref 11.5–15.5)
MCHC RBC-ENTMCNC: 31 PG — SIGNIFICANT CHANGE UP (ref 27–34)
MCHC RBC-ENTMCNC: 31.4 GM/DL — LOW (ref 32–36)
MCV RBC AUTO: 98.5 FL — SIGNIFICANT CHANGE UP (ref 80–100)
NRBC # BLD: 0 /100 WBCS — SIGNIFICANT CHANGE UP (ref 0–0)
NRBC # FLD: 0 K/UL — SIGNIFICANT CHANGE UP (ref 0–0)
PLATELET # BLD AUTO: 250 K/UL — SIGNIFICANT CHANGE UP (ref 150–400)
POTASSIUM SERPL-MCNC: 4 MMOL/L — SIGNIFICANT CHANGE UP (ref 3.5–5.3)
POTASSIUM SERPL-SCNC: 4 MMOL/L — SIGNIFICANT CHANGE UP (ref 3.5–5.3)
PROT SERPL-MCNC: 7.3 G/DL — SIGNIFICANT CHANGE UP (ref 6–8.3)
RBC # BLD: 4.07 M/UL — SIGNIFICANT CHANGE UP (ref 3.8–5.2)
RBC # FLD: 12.3 % — SIGNIFICANT CHANGE UP (ref 10.3–14.5)
RH IG SCN BLD-IMP: POSITIVE — SIGNIFICANT CHANGE UP
SODIUM SERPL-SCNC: 139 MMOL/L — SIGNIFICANT CHANGE UP (ref 135–145)
WBC # BLD: 5.59 K/UL — SIGNIFICANT CHANGE UP (ref 3.8–10.5)
WBC # FLD AUTO: 5.59 K/UL — SIGNIFICANT CHANGE UP (ref 3.8–10.5)

## 2022-12-30 RX ORDER — SODIUM CHLORIDE 9 MG/ML
1000 INJECTION, SOLUTION INTRAVENOUS
Refills: 0 | Status: DISCONTINUED | OUTPATIENT
Start: 2023-01-11 | End: 2023-01-11

## 2022-12-30 NOTE — H&P PST ADULT - GASTROINTESTINAL COMMENTS
hx of obesity, hiatal hernia, s/p repair of hiatal hernia and gastric  sleeve surgery May 2022.  Pt report hiatal hernia returned, and once again  repaired June 2022.  Pt reports hiatal hernia returned, and she has severe gastric reflux.   Scheduled for Robotic Assisted laparoscopic Rou-En Gastric Bypass.

## 2022-12-30 NOTE — H&P PST ADULT - ATTENDING COMMENTS
I discussed with pt regarding R vats robotic mobilization of esophagus/lysis of adhesion and repair of HH, then Dr. Field will approach robotic conversion of RY gastric bypass. All risk/benefits discussed.

## 2022-12-30 NOTE — H&P PST ADULT - PROBLEM SELECTOR PLAN 1
Robotic Assisted laparoscopic Rou-En Gastric Bypass.     CBC CMP T&S EKG    Preop instructions and antibacterial soap given and explained (verbal and written), with teach back.     Preop covid tesing protocol reviewed with pt    Pt reports she has appt with PMD foor preop eval 1/3/23 as requested by surgeon

## 2022-12-30 NOTE — H&P PST ADULT - HISTORY OF PRESENT ILLNESS
48 y/o female with hx of obesity, hiatal hernia, s/p repair of hiatla hernia and gastric  sleeve surgery May 2022.  Pt report hiatal hernia returned, and once again  repaired June 2022.  Pt reports hiatal hernia returned, and she has severe gastric reflux.   Scheduled for Robotic Assisted laparoscopic Rou-En Gastric Bypass.

## 2022-12-30 NOTE — H&P PST ADULT - NSICDXPASTMEDICALHX_GEN_ALL_CORE_FT
PAST MEDICAL HISTORY:  Asthma ~>20 yrs ago last exacerbation, never hospitalized or intubated    Chronic GERD     Fibroids     Obesity      PAST MEDICAL HISTORY:  Asthma ~>20 yrs ago last exacerbation, never hospitalized or intubated    Chronic GERD     Fibroids     Hiatal hernia     Obesity

## 2023-01-03 ENCOUNTER — APPOINTMENT (OUTPATIENT)
Dept: INTERNAL MEDICINE | Facility: CLINIC | Age: 48
End: 2023-01-03
Payer: MEDICAID

## 2023-01-03 VITALS
WEIGHT: 183 LBS | HEIGHT: 63 IN | RESPIRATION RATE: 17 BRPM | BODY MASS INDEX: 32.43 KG/M2 | DIASTOLIC BLOOD PRESSURE: 80 MMHG | OXYGEN SATURATION: 99 % | TEMPERATURE: 97.2 F | HEART RATE: 82 BPM | SYSTOLIC BLOOD PRESSURE: 112 MMHG

## 2023-01-03 DIAGNOSIS — Z01.818 ENCOUNTER FOR OTHER PREPROCEDURAL EXAMINATION: ICD-10-CM

## 2023-01-03 PROCEDURE — 99214 OFFICE O/P EST MOD 30 MIN: CPT

## 2023-01-05 PROBLEM — Z01.818 PREOPERATIVE EXAMINATION: Status: ACTIVE | Noted: 2022-01-06

## 2023-01-05 NOTE — REVIEW OF SYSTEMS
[Heartburn] : heartburn [Fever] : no fever [Chills] : no chills [Fatigue] : no fatigue [Vision Problems] : no vision problems [Hearing Loss] : no hearing loss [Chest Pain] : no chest pain [Palpitations] : no palpitations [Lower Ext Edema] : no lower extremity edema [Shortness Of Breath] : no shortness of breath [Wheezing] : no wheezing [Cough] : no cough [Dyspnea on Exertion] : no dyspnea on exertion [Abdominal Pain] : no abdominal pain [Constipation] : no constipation [Diarrhea] : diarrhea [Vomiting] : no vomiting [Melena] : no melena [Dysuria] : no dysuria [Hematuria] : no hematuria [Joint Pain] : no joint pain [Muscle Pain] : no muscle pain [Skin Rash] : no skin rash [Headache] : no headache [Anxiety] : no anxiety [Depression] : no depression [Easy Bleeding] : no easy bleeding [Easy Bruising] : no easy bruising

## 2023-01-10 ENCOUNTER — TRANSCRIPTION ENCOUNTER (OUTPATIENT)
Age: 48
End: 2023-01-10

## 2023-01-10 NOTE — ASU PATIENT PROFILE, ADULT - HEALTHCARE QUESTIONS, PROFILE
Health Maintenance Due   Topic Date Due   • Hepatitis B Vaccine (1 of 3 - 3-dose primary series) 2013   • IPV Vaccine (1 of 3 - 4-dose series) 01/15/2014   • DTaP/Tdap/Td Vaccine (1 - DTaP) 01/15/2014   • MMR Vaccine (1 of 2 - Standard series) 11/15/2014   • Varicella Vaccine (1 of 2 - 2-dose childhood series) 11/15/2014   • Hepatitis A Vaccine (1 of 2 - 2-dose series) 11/15/2014       Patient is due for topics as listed above but is not proceeding with Immunization(s) Dtap/Tdap/Td, Hep A, Hep B, IPV, MMR and Varicella at this time. Outside records requested for Immunization(s) Dtap/Tdap/Td, Hep A, Hep B, IPV, MMR and Varicella.         none

## 2023-01-10 NOTE — ASU PATIENT PROFILE, ADULT - NSICDXPASTMEDICALHX_GEN_ALL_CORE_FT
PAST MEDICAL HISTORY:  Asthma ~>20 yrs ago last exacerbation, never hospitalized or intubated    Chronic GERD     Fibroids     Hiatal hernia     Obesity

## 2023-01-10 NOTE — ASU PATIENT PROFILE, ADULT - FALL HARM RISK - UNIVERSAL INTERVENTIONS
Bed in lowest position, wheels locked, appropriate side rails in place/Call bell, personal items and telephone in reach/Instruct patient to call for assistance before getting out of bed or chair/Non-slip footwear when patient is out of bed/Jakin to call system/Physically safe environment - no spills, clutter or unnecessary equipment/Purposeful Proactive Rounding/Room/bathroom lighting operational, light cord in reach

## 2023-01-11 ENCOUNTER — INPATIENT (INPATIENT)
Facility: HOSPITAL | Age: 48
LOS: 2 days | Discharge: HOME | End: 2023-01-14
Attending: THORACIC SURGERY (CARDIOTHORACIC VASCULAR SURGERY) | Admitting: THORACIC SURGERY (CARDIOTHORACIC VASCULAR SURGERY)
Payer: MEDICAID

## 2023-01-11 ENCOUNTER — APPOINTMENT (OUTPATIENT)
Dept: SURGERY | Facility: HOSPITAL | Age: 48
End: 2023-01-11
Payer: MEDICAID

## 2023-01-11 ENCOUNTER — TRANSCRIPTION ENCOUNTER (OUTPATIENT)
Age: 48
End: 2023-01-11

## 2023-01-11 ENCOUNTER — APPOINTMENT (OUTPATIENT)
Dept: THORACIC SURGERY | Facility: HOSPITAL | Age: 48
End: 2023-01-11

## 2023-01-11 VITALS
TEMPERATURE: 99 F | HEIGHT: 63 IN | HEART RATE: 66 BPM | OXYGEN SATURATION: 100 % | SYSTOLIC BLOOD PRESSURE: 121 MMHG | DIASTOLIC BLOOD PRESSURE: 74 MMHG | WEIGHT: 184.09 LBS | RESPIRATION RATE: 18 BRPM

## 2023-01-11 DIAGNOSIS — Z98.890 OTHER SPECIFIED POSTPROCEDURAL STATES: Chronic | ICD-10-CM

## 2023-01-11 DIAGNOSIS — Z90.710 ACQUIRED ABSENCE OF BOTH CERVIX AND UTERUS: Chronic | ICD-10-CM

## 2023-01-11 DIAGNOSIS — Z90.3 ACQUIRED ABSENCE OF STOMACH [PART OF]: Chronic | ICD-10-CM

## 2023-01-11 DIAGNOSIS — K44.9 DIAPHRAGMATIC HERNIA WITHOUT OBSTRUCTION OR GANGRENE: ICD-10-CM

## 2023-01-11 PROCEDURE — 32652 THORACOSCOPY REM TOTL CORTEX: CPT | Mod: AS,RT

## 2023-01-11 PROCEDURE — 32652 THORACOSCOPY REM TOTL CORTEX: CPT | Mod: RT

## 2023-01-11 PROCEDURE — 43235 EGD DIAGNOSTIC BRUSH WASH: CPT | Mod: 59

## 2023-01-11 PROCEDURE — 71045 X-RAY EXAM CHEST 1 VIEW: CPT | Mod: 26

## 2023-01-11 PROCEDURE — 43644 LAP GASTRIC BYPASS/ROUX-EN-Y: CPT | Mod: 82

## 2023-01-11 PROCEDURE — S2900 ROBOTIC SURGICAL SYSTEM: CPT | Mod: NC

## 2023-01-11 PROCEDURE — 43281 LAP PARAESOPHAG HERN REPAIR: CPT | Mod: AS

## 2023-01-11 PROCEDURE — 99233 SBSQ HOSP IP/OBS HIGH 50: CPT

## 2023-01-11 PROCEDURE — 43644 LAP GASTRIC BYPASS/ROUX-EN-Y: CPT

## 2023-01-11 PROCEDURE — 43281 LAP PARAESOPHAG HERN REPAIR: CPT

## 2023-01-11 DEVICE — STAPLER COVIDIEN TRI-STAPLE 60MM PURPLE RELOAD: Type: IMPLANTABLE DEVICE | Status: FUNCTIONAL

## 2023-01-11 DEVICE — LIGATING CLIPS WECK HEMOLOK POLYMER MEDIUM-LARGE (GREEN) 6: Type: IMPLANTABLE DEVICE | Status: FUNCTIONAL

## 2023-01-11 DEVICE — CHEST DRAIN THORACIC ARGYLE PVC 28FR STRAIGHT: Type: IMPLANTABLE DEVICE | Status: FUNCTIONAL

## 2023-01-11 DEVICE — SURGICEL 2 X 14": Type: IMPLANTABLE DEVICE | Status: FUNCTIONAL

## 2023-01-11 DEVICE — CHEST DRAIN THORACIC ARGYLE PVC 24FR STRAIGHT: Type: IMPLANTABLE DEVICE | Status: FUNCTIONAL

## 2023-01-11 DEVICE — STAPLER COVIDIEN TRI-STAPLE 45MM PURPLE RELOAD: Type: IMPLANTABLE DEVICE | Status: FUNCTIONAL

## 2023-01-11 DEVICE — KIT CVC 2LUM 7FRX16CM BLU FLX TIP: Type: IMPLANTABLE DEVICE | Status: FUNCTIONAL

## 2023-01-11 DEVICE — STAPLER COVIDIEN TRI-STAPLE 60MM TAN RELOAD: Type: IMPLANTABLE DEVICE | Status: FUNCTIONAL

## 2023-01-11 DEVICE — LIGATING CLIPS WECK HEMOLOK POLYMER LARGE (PURPLE) 6: Type: IMPLANTABLE DEVICE | Status: FUNCTIONAL

## 2023-01-11 DEVICE — CLIP APPLIER ETHICON LIGAMAX 5MM: Type: IMPLANTABLE DEVICE | Status: FUNCTIONAL

## 2023-01-11 RX ORDER — CEFAZOLIN SODIUM 1 G
2000 VIAL (EA) INJECTION ONCE
Refills: 0 | Status: COMPLETED | OUTPATIENT
Start: 2023-01-11 | End: 2023-01-11

## 2023-01-11 RX ORDER — HYDROMORPHONE HYDROCHLORIDE 2 MG/ML
0.5 INJECTION INTRAMUSCULAR; INTRAVENOUS; SUBCUTANEOUS
Refills: 0 | Status: DISCONTINUED | OUTPATIENT
Start: 2023-01-11 | End: 2023-01-12

## 2023-01-11 RX ORDER — FOLIC ACID 0.8 MG
1 TABLET ORAL ONCE
Refills: 0 | Status: DISCONTINUED | OUTPATIENT
Start: 2023-01-11 | End: 2023-01-12

## 2023-01-11 RX ORDER — HYDROMORPHONE HYDROCHLORIDE 2 MG/ML
30 INJECTION INTRAMUSCULAR; INTRAVENOUS; SUBCUTANEOUS
Refills: 0 | Status: DISCONTINUED | OUTPATIENT
Start: 2023-01-11 | End: 2023-01-12

## 2023-01-11 RX ORDER — NALOXONE HYDROCHLORIDE 4 MG/.1ML
0.1 SPRAY NASAL
Refills: 0 | Status: DISCONTINUED | OUTPATIENT
Start: 2023-01-11 | End: 2023-01-14

## 2023-01-11 RX ORDER — ONDANSETRON 8 MG/1
4 TABLET, FILM COATED ORAL EVERY 6 HOURS
Refills: 0 | Status: DISCONTINUED | OUTPATIENT
Start: 2023-01-11 | End: 2023-01-14

## 2023-01-11 RX ORDER — FOSAPREPITANT DIMEGLUMINE 150 MG/5ML
150 INJECTION, POWDER, LYOPHILIZED, FOR SOLUTION INTRAVENOUS ONCE
Refills: 0 | Status: COMPLETED | OUTPATIENT
Start: 2023-01-11 | End: 2023-01-11

## 2023-01-11 RX ORDER — CEFAZOLIN SODIUM 1 G
2000 VIAL (EA) INJECTION EVERY 8 HOURS
Refills: 0 | Status: COMPLETED | OUTPATIENT
Start: 2023-01-11 | End: 2023-01-11

## 2023-01-11 RX ORDER — DIPHENHYDRAMINE HCL 50 MG
25 CAPSULE ORAL EVERY 4 HOURS
Refills: 0 | Status: DISCONTINUED | OUTPATIENT
Start: 2023-01-11 | End: 2023-01-14

## 2023-01-11 RX ORDER — ACETAMINOPHEN 500 MG
1000 TABLET ORAL ONCE
Refills: 0 | Status: COMPLETED | OUTPATIENT
Start: 2023-01-12 | End: 2023-01-12

## 2023-01-11 RX ORDER — ACETAMINOPHEN 500 MG
1000 TABLET ORAL ONCE
Refills: 0 | Status: COMPLETED | OUTPATIENT
Start: 2023-01-11 | End: 2023-01-11

## 2023-01-11 RX ORDER — HEPARIN SODIUM 5000 [USP'U]/ML
5000 INJECTION INTRAVENOUS; SUBCUTANEOUS EVERY 8 HOURS
Refills: 0 | Status: COMPLETED | OUTPATIENT
Start: 2023-01-11 | End: 2023-01-11

## 2023-01-11 RX ORDER — PANTOPRAZOLE SODIUM 20 MG/1
40 TABLET, DELAYED RELEASE ORAL DAILY
Refills: 0 | Status: DISCONTINUED | OUTPATIENT
Start: 2023-01-11 | End: 2023-01-14

## 2023-01-11 RX ORDER — SODIUM CHLORIDE 9 MG/ML
1000 INJECTION, SOLUTION INTRAVENOUS
Refills: 0 | Status: DISCONTINUED | OUTPATIENT
Start: 2023-01-11 | End: 2023-01-12

## 2023-01-11 RX ORDER — POTASSIUM CHLORIDE 20 MEQ
10 PACKET (EA) ORAL
Refills: 0 | Status: COMPLETED | OUTPATIENT
Start: 2023-01-11 | End: 2023-01-11

## 2023-01-11 RX ORDER — ACETAMINOPHEN 500 MG
1000 TABLET ORAL ONCE
Refills: 0 | Status: DISCONTINUED | OUTPATIENT
Start: 2023-01-11 | End: 2023-01-12

## 2023-01-11 RX ORDER — THIAMINE MONONITRATE (VIT B1) 100 MG
100 TABLET ORAL ONCE
Refills: 0 | Status: DISCONTINUED | OUTPATIENT
Start: 2023-01-11 | End: 2023-01-12

## 2023-01-11 RX ORDER — HEPARIN SODIUM 5000 [USP'U]/ML
5000 INJECTION INTRAVENOUS; SUBCUTANEOUS EVERY 8 HOURS
Refills: 0 | Status: DISCONTINUED | OUTPATIENT
Start: 2023-01-11 | End: 2023-01-14

## 2023-01-11 RX ADMIN — Medication 100 MILLIGRAM(S): at 18:43

## 2023-01-11 RX ADMIN — Medication 100 MILLIGRAM(S): at 22:51

## 2023-01-11 RX ADMIN — Medication 100 MILLIEQUIVALENT(S): at 22:51

## 2023-01-11 RX ADMIN — Medication 100 MILLIEQUIVALENT(S): at 21:47

## 2023-01-11 RX ADMIN — HYDROMORPHONE HYDROCHLORIDE 30 MILLILITER(S): 2 INJECTION INTRAMUSCULAR; INTRAVENOUS; SUBCUTANEOUS at 19:15

## 2023-01-11 RX ADMIN — HEPARIN SODIUM 5000 UNIT(S): 5000 INJECTION INTRAVENOUS; SUBCUTANEOUS at 23:14

## 2023-01-11 RX ADMIN — HEPARIN SODIUM 5000 UNIT(S): 5000 INJECTION INTRAVENOUS; SUBCUTANEOUS at 07:30

## 2023-01-11 RX ADMIN — HYDROMORPHONE HYDROCHLORIDE 0.5 MILLIGRAM(S): 2 INJECTION INTRAMUSCULAR; INTRAVENOUS; SUBCUTANEOUS at 18:46

## 2023-01-11 RX ADMIN — SODIUM CHLORIDE 150 MILLILITER(S): 9 INJECTION, SOLUTION INTRAVENOUS at 18:42

## 2023-01-11 RX ADMIN — Medication 100 MILLIEQUIVALENT(S): at 19:54

## 2023-01-11 NOTE — PATIENT PROFILE ADULT - FALL HARM RISK - HARM RISK INTERVENTIONS

## 2023-01-11 NOTE — BRIEF OPERATIVE NOTE - OPERATION/FINDINGS
Robotic lap assisted hiatal hernia repair with closure of baltazar. RnYGB with armen limb 150cm, and PB limb 60cm. Passed leak test.
Normal anatomy

## 2023-01-11 NOTE — ASU PREOP CHECKLIST - SIDE RAILS UP
Outpatient Psychiatry Evaluation     Patient: El Jasmine Date: 2021   : 1994       AGE: 27 year old MR#: [id number]     This visit was performed via live interactive two-way video with patient's verbal consent.   Clinician Location: Clinic.  Patient Location: Home.  El is in [place] and identity has been established.   He was informed that consent to treat includes permission to submit charges to the applicable insurance on file. El was advised regarding the potential risk inherent in video visits, as the assessment may be limited due to what can be seen on the screen which potentially results in an incomplete assessment; as well as either of us may discontinue the video visit if it is.    Source of history:  I based this report upon my review of El Jasmine's  electronic medical record and my interview of El Jasmine.      Identifying information: El Jasmine is a 27 year old White  male seen for outpatient evaluation.    Chief Complaint: \"Struggling with alcoholism.\"     History of Present Illness:  27-year-old engaged white male with a history of alcohol use disorder, anxiety and depression.  Patient here to establish care with outpatient psychiatry.  Patient with history of 2 inpatient psychiatric stays, 1 in March and 1 in 2021 for his alcoholism.  Hospitalization was at [facility] where current medication was initiated.    Patient reports long struggle with alcoholism with a history of significant daily alcohol use though no history of DWIs and he has been able to maintain employment over the years.  Alcohol use significantly decreased since and hospitalizations earlier this year though last use was about 5 days ago.  Patient does believe naltrexone is helping keep desire and quantity to a minimum.  Patient otherwise denies drug use though will smoke a small amount of marijuana or typically have some cannabis edibles.  Patient did use more marijuana while in high  school.    Anxiety has been pretty chronic and moderate with some generalized anxiety disorder symptoms since the age of 13 after his mother passed from cancer.  Symptoms are classic and continue to this day.  Patient also with frequent often daily panic attacks lasting 5 minutes starting in his teenage years and persisting through today.  Symptoms include anxiety, tremulousness, diaphoresis, etcetera.  Patient also endorses Obsessive compulsive disorder symptoms since a young age where everything has to be in its place.    Patient denies history of manic or psychotic symptoms.  Patient denies history of active or passive suicidal ideation.    Past Psychiatric History:  No history of suicidal ideation or attempts 2 inpatient psychiatric stays at [facility], in March and  of this year 1 week and each precipitated by alcohol use disorder.  Current medication Remeron 15 mg q.h.s. and naltrexone 50 mg q.day.  History of Lexapro with side effect.    No other exposure to psychotropic medication other than what is listed above.  Regarding AODA treatment, patient has his 2nd q.month visit with his therapist at an outside clinic in the near future.    Family Psychiatric History:  Patient denies history of bipolar disorder in family.    Social/Developmental History:   Patient living with fiance of 1.5 years.  Patient a  with the same company over the past 6 months.  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  Mother  of cancer when patient was aged 13 and after that patient was living amongst various family members including an uncle as well as a grandmother.    Recent PHQ 2/9 Score    PHQ 2:  Date Adult PHQ 2 Score Adult PHQ 2 Interpretation   2021 5 Further screening needed       PHQ 9:  Date Adult PHQ 9 Score Adult PHQ 9 Interpretation   2021 13 Moderate Depression       Most Recent ELIZABETH 7 Score           VITALS:  There were no vitals taken for  this visit.  Ht Readings from Last 1 Encounters:   02/08/21 6' (1.829 m)     Wt Readings from Last 1 Encounters:   06/22/21 79 kg       Past Medical History:   Past Medical History:   Diagnosis Date   • Alcohol dependence in early full remission (CMS/HCC)    • Alcohol intoxication with moderate or severe use disorder, uncomplicated (CMS/HCC) 2/19/2021   • Anxiety    • Depression        No past surgical history on file.    Medications:  Current Outpatient Medications   Medication Sig   • mirtazapine (REMERON) 30 MG tablet Take 1 tablet by mouth nightly. Indications: Major Depressive Disorder, Panic Disorder, ELIZABETH   • nalTREXone (REVIA) 50 MG tablet Take 1 tablet by mouth at bedtime.   • Multiple Vitamins-Minerals (Multivitamin Adult, Minerals,) Tab Take 1 tablet by mouth daily.     No current facility-administered medications for this visit.       ALLERGIES:  ALLERGIES:   Allergen Reactions   • Peanut   (Food Or Med) SHORTNESS OF BREATH       Recent Lab Study Results:   No results found for: WBC, RBC, HGB, HCT, PLT, SEG, PMON, PEOS, PBASO, ANEUT, ALYMS, ERUM, AEOS, ABASO  Lab Results   Component Value Date    SODIUM 136 06/22/2021    POTASSIUM 4.0 06/22/2021    CHLORIDE 102 06/22/2021    CO2 30 06/22/2021    GLUCOSE 96 06/22/2021    BUN 9 06/22/2021    CREATININE 0.94 06/22/2021    CALCIUM 9.1 06/22/2021    ALBUMIN 4.2 06/22/2021    BILIRUBIN 0.6 06/22/2021    ALKPT 86 06/22/2021    AST 35 06/22/2021    GPT 39 06/22/2021     No results found for: TSH, T4FREE, T3FREE    MENTAL STATUS EXAM:  Appearance:  Well-groomed, good eye contact, appears stated age.   Behavior:  Calmed, pleasant, interactive.   Cooperativity:  Cooperative, forthcoming, appears reliable.    Speech:  Normal rate, tone and volume.   Language:  No abnormality noted.    Mood:  Noted in History of Present Illness.  Affect:  Mood-congruent, stable, normal range.  Thought Process:  Linear, logical, goal-directed.    Thought Content:  No overt delusions or  abnormality noted.    Perception:  No hallucinations, not responding to internal stimuli.   Consciousness:  Awake and alert.   Orientation:  Oriented to person, place and time.   Musculoskeletal: No abnormal or involuntary muscle movements observed.  Memory:  Good, able to demonstrate accurate historical recall for recent and more remote events and discussions.  Attention:  Good, no fluctuation or obvious deficit noted and able to follow the conversation.   Fund of Knowledge:  Consistent with education and experiences as evidenced by vocabulary.   Insight:  Good, based on appropriate recognition of impact of psychiatric symptoms and need for treatment.   Judgment:  Good, based on recent decisions.  Safety:  Noted in History of Present Illness.  Motivation to pursue treatment:  Good.    Strengths (minimum of two): understands need for treatment, safe home environment and willing to comply with treatment recommendations    Diagnoses:  Major depressive disorder, severe (CMS/HCC)  (primary encounter diagnosis)  Panic disorder with agoraphobia  ELIZABETH (generalized anxiety disorder)  Alcohol use disorder, moderate, in early remission (CMS/[facility])    Assessment and Plan:  27-year-old engaged white male without past psychiatric history until March this year where he was diagnosed with alcohol use disorder anxiety and depressive disorders.  Patient does have signs and symptoms consistent with the above diagnoses.  At this point, Remeron likely not doing much though is helping with sleep; naltrexone is felt to be helpful per patient report.  1. Diagnosis, treatment options, common medication side effects, benefits and risks, etc. all reviewed with patient today.  2. We agreed to increase Remeron 30 mg q.h.s. and given this substantial anxiety symptoms/diagnoses will likely want to increase to 45 mg next visit.  3. Patient not interested in AA; does have follow-up visit with an AODA therapist in the coming weeks, at an outside  clinic.    Follow up: 1 months      Chadd Singh MD                   n/a

## 2023-01-11 NOTE — PATIENT PROFILE ADULT - ..
Outpatient Physical Therapy Ortho Treatment Note       Patient Name: Elia Payne  : 1975  MRN: [id number]  Today's Date: 2017      Visit Date: 2017    Visit Dx:    ICD-10-CM ICD-9-CM   1. Bimalleolar ankle fracture, right, closed, initial encounter S82.841A 824.4       Patient Active Problem List   Diagnosis   • Abnormal weight gain   • Anxiety, generalized   • Inflamed seborrheic keratosis   • Acute maxillary sinusitis   • Ankle fracture   • Bimalleolar ankle fracture   • Status post open reduction with internal fixation (ORIF) of fracture of ankle, right 2017   • Healthcare maintenance        Past Medical History:   Diagnosis Date   • Depression    • PONV (postoperative nausea and vomiting)         Past Surgical History:   Procedure Laterality Date   • ABDOMINAL SURGERY     • ANKLE OPEN REDUCTION INTERNAL FIXATION Right 2017    Procedure:  OPEN REDUCTION INTERNAL FIXATION right ankle fractures;  Surgeon: Mega Beck MD;  Location: [facility];  Service:    • ANKLE SURGERY      broken in    • BREAST SURGERY     • CHOLECYSTECTOMY     • COSMETIC SURGERY     • DILATATION AND CURETTAGE     • FRACTURE SURGERY  2017    right ankle           Manual therapy techniques were performed by PT during this patient's treatment s/p ORIF of bimalleolar ankle fracture. Manual retrograde massage to decrease ankle edema, improve pain and ankle/foot ROM and mobility were performed. Posterior talocrual joint mobilizations were performed to decrease pain and increase ankle dorsiflexion ROM for improved gait and ambulation. Tarsal, metatarsal, and calcaneal joint mobilizations were performed to improve ankle and foot ROM and mobility to improve shock absorption during ambulation.                                                            Time Calculation:                      Alfredo Lopez, PT  2017        11-Jan-2023 22:02:01

## 2023-01-11 NOTE — BRIEF OPERATIVE NOTE - NSICDXBRIEFPOSTOP_GEN_ALL_CORE_FT
POST-OP DIAGNOSIS:  Hiatal hernia 11-Jan-2023 11:56:29  Homer Toro  GERD (gastroesophageal reflux disease) 11-Jan-2023 18:26:32  Smooth XIE  
POST-OP DIAGNOSIS:  Hiatal hernia 11-Jan-2023 11:56:29  Homer Toro

## 2023-01-11 NOTE — BRIEF OPERATIVE NOTE - NSICDXBRIEFPROCEDURE_GEN_ALL_CORE_FT
PROCEDURES:  Bronchoscopy, flexible, by CT surgery 11-Jan-2023 11:56:43  Homer Toro  Exploration, esophagus, robot-assisted 11-Jan-2023 12:02:01 Mobilization of the esophagus Homer Toro  
PROCEDURES:  Repair, hiatal hernia, robot-assisted 11-Jan-2023 18:23:42  Smooth XIE  Robot-assisted laparoscopic conversion of previous sleeve gastrectomy to Poncho-en-Y gastric bypass using da Barber Xi 11-Jan-2023 18:25:36  Smooth XIE

## 2023-01-11 NOTE — BRIEF OPERATIVE NOTE - NSICDXBRIEFPREOP_GEN_ALL_CORE_FT
PRE-OP DIAGNOSIS:  Hiatal hernia 11-Jan-2023 11:56:09  Homer Toro  
PRE-OP DIAGNOSIS:  Hiatal hernia 11-Jan-2023 11:56:09  Homer Toro  GERD (gastroesophageal reflux disease) 11-Jan-2023 18:26:14  Smooth XIE

## 2023-01-12 LAB
ANION GAP SERPL CALC-SCNC: 9 MMOL/L — SIGNIFICANT CHANGE UP (ref 7–14)
BUN SERPL-MCNC: 10 MG/DL — SIGNIFICANT CHANGE UP (ref 7–23)
CALCIUM SERPL-MCNC: 8.4 MG/DL — SIGNIFICANT CHANGE UP (ref 8.4–10.5)
CHLORIDE SERPL-SCNC: 105 MMOL/L — SIGNIFICANT CHANGE UP (ref 98–107)
CO2 SERPL-SCNC: 23 MMOL/L — SIGNIFICANT CHANGE UP (ref 22–31)
CREAT SERPL-MCNC: 0.51 MG/DL — SIGNIFICANT CHANGE UP (ref 0.5–1.3)
EGFR: 114 ML/MIN/1.73M2 — SIGNIFICANT CHANGE UP
GLUCOSE BLDC GLUCOMTR-MCNC: 108 MG/DL — HIGH (ref 70–99)
GLUCOSE BLDC GLUCOMTR-MCNC: 87 MG/DL — SIGNIFICANT CHANGE UP (ref 70–99)
GLUCOSE SERPL-MCNC: 135 MG/DL — HIGH (ref 70–99)
HCT VFR BLD CALC: 35.8 % — SIGNIFICANT CHANGE UP (ref 34.5–45)
HGB BLD-MCNC: 11.6 G/DL — SIGNIFICANT CHANGE UP (ref 11.5–15.5)
MAGNESIUM SERPL-MCNC: 1.5 MG/DL — LOW (ref 1.6–2.6)
MCHC RBC-ENTMCNC: 31.4 PG — SIGNIFICANT CHANGE UP (ref 27–34)
MCHC RBC-ENTMCNC: 32.4 GM/DL — SIGNIFICANT CHANGE UP (ref 32–36)
MCV RBC AUTO: 96.8 FL — SIGNIFICANT CHANGE UP (ref 80–100)
NRBC # BLD: 0 /100 WBCS — SIGNIFICANT CHANGE UP (ref 0–0)
NRBC # FLD: 0 K/UL — SIGNIFICANT CHANGE UP (ref 0–0)
PHOSPHATE SERPL-MCNC: 2.6 MG/DL — SIGNIFICANT CHANGE UP (ref 2.5–4.5)
PLATELET # BLD AUTO: 210 K/UL — SIGNIFICANT CHANGE UP (ref 150–400)
POTASSIUM SERPL-MCNC: 3.9 MMOL/L — SIGNIFICANT CHANGE UP (ref 3.5–5.3)
POTASSIUM SERPL-SCNC: 3.9 MMOL/L — SIGNIFICANT CHANGE UP (ref 3.5–5.3)
RBC # BLD: 3.7 M/UL — LOW (ref 3.8–5.2)
RBC # FLD: 12.1 % — SIGNIFICANT CHANGE UP (ref 10.3–14.5)
SODIUM SERPL-SCNC: 137 MMOL/L — SIGNIFICANT CHANGE UP (ref 135–145)
WBC # BLD: 13.11 K/UL — HIGH (ref 3.8–10.5)
WBC # FLD AUTO: 13.11 K/UL — HIGH (ref 3.8–10.5)

## 2023-01-12 PROCEDURE — 99233 SBSQ HOSP IP/OBS HIGH 50: CPT

## 2023-01-12 PROCEDURE — 71045 X-RAY EXAM CHEST 1 VIEW: CPT | Mod: 26

## 2023-01-12 RX ORDER — CHLORHEXIDINE GLUCONATE 213 G/1000ML
1 SOLUTION TOPICAL DAILY
Refills: 0 | Status: DISCONTINUED | OUTPATIENT
Start: 2023-01-12 | End: 2023-01-14

## 2023-01-12 RX ORDER — KETOROLAC TROMETHAMINE 30 MG/ML
15 SYRINGE (ML) INJECTION EVERY 6 HOURS
Refills: 0 | Status: DISCONTINUED | OUTPATIENT
Start: 2023-01-12 | End: 2023-01-14

## 2023-01-12 RX ORDER — ACETAMINOPHEN 500 MG
750 TABLET ORAL EVERY 6 HOURS
Refills: 0 | Status: DISCONTINUED | OUTPATIENT
Start: 2023-01-12 | End: 2023-01-12

## 2023-01-12 RX ORDER — HYDROMORPHONE HYDROCHLORIDE 2 MG/ML
0.5 INJECTION INTRAMUSCULAR; INTRAVENOUS; SUBCUTANEOUS EVERY 4 HOURS
Refills: 0 | Status: DISCONTINUED | OUTPATIENT
Start: 2023-01-12 | End: 2023-01-14

## 2023-01-12 RX ORDER — MAGNESIUM SULFATE 500 MG/ML
2 VIAL (ML) INJECTION
Refills: 0 | Status: COMPLETED | OUTPATIENT
Start: 2023-01-12 | End: 2023-01-12

## 2023-01-12 RX ORDER — OXYCODONE HYDROCHLORIDE 5 MG/1
10 TABLET ORAL EVERY 4 HOURS
Refills: 0 | Status: DISCONTINUED | OUTPATIENT
Start: 2023-01-12 | End: 2023-01-14

## 2023-01-12 RX ORDER — LIDOCAINE 4 G/100G
1 CREAM TOPICAL EVERY 24 HOURS
Refills: 0 | Status: DISCONTINUED | OUTPATIENT
Start: 2023-01-12 | End: 2023-01-12

## 2023-01-12 RX ORDER — SODIUM CHLORIDE 9 MG/ML
1000 INJECTION, SOLUTION INTRAVENOUS
Refills: 0 | Status: DISCONTINUED | OUTPATIENT
Start: 2023-01-12 | End: 2023-01-14

## 2023-01-12 RX ORDER — SODIUM CHLORIDE 9 MG/ML
4 INJECTION INTRAMUSCULAR; INTRAVENOUS; SUBCUTANEOUS EVERY 12 HOURS
Refills: 0 | Status: DISCONTINUED | OUTPATIENT
Start: 2023-01-12 | End: 2023-01-14

## 2023-01-12 RX ORDER — IPRATROPIUM/ALBUTEROL SULFATE 18-103MCG
3 AEROSOL WITH ADAPTER (GRAM) INHALATION EVERY 6 HOURS
Refills: 0 | Status: DISCONTINUED | OUTPATIENT
Start: 2023-01-12 | End: 2023-01-14

## 2023-01-12 RX ORDER — OXYCODONE HYDROCHLORIDE 5 MG/1
5 TABLET ORAL EVERY 4 HOURS
Refills: 0 | Status: DISCONTINUED | OUTPATIENT
Start: 2023-01-12 | End: 2023-01-14

## 2023-01-12 RX ORDER — FOLIC ACID 0.8 MG
1 TABLET ORAL ONCE
Refills: 0 | Status: COMPLETED | OUTPATIENT
Start: 2023-01-12 | End: 2023-01-12

## 2023-01-12 RX ORDER — THIAMINE MONONITRATE (VIT B1) 100 MG
100 TABLET ORAL ONCE
Refills: 0 | Status: COMPLETED | OUTPATIENT
Start: 2023-01-12 | End: 2023-01-12

## 2023-01-12 RX ORDER — ACETAMINOPHEN 500 MG
1000 TABLET ORAL ONCE
Refills: 0 | Status: DISCONTINUED | OUTPATIENT
Start: 2023-01-12 | End: 2023-01-14

## 2023-01-12 RX ORDER — LIDOCAINE 4 G/100G
1 CREAM TOPICAL EVERY 24 HOURS
Refills: 0 | Status: DISCONTINUED | OUTPATIENT
Start: 2023-01-12 | End: 2023-01-14

## 2023-01-12 RX ADMIN — LIDOCAINE 1 PATCH: 4 CREAM TOPICAL at 18:16

## 2023-01-12 RX ADMIN — CHLORHEXIDINE GLUCONATE 1 APPLICATION(S): 213 SOLUTION TOPICAL at 11:56

## 2023-01-12 RX ADMIN — Medication 3 MILLILITER(S): at 10:02

## 2023-01-12 RX ADMIN — OXYCODONE HYDROCHLORIDE 5 MILLIGRAM(S): 5 TABLET ORAL at 23:40

## 2023-01-12 RX ADMIN — Medication 100 MILLIGRAM(S): at 12:11

## 2023-01-12 RX ADMIN — HEPARIN SODIUM 5000 UNIT(S): 5000 INJECTION INTRAVENOUS; SUBCUTANEOUS at 15:39

## 2023-01-12 RX ADMIN — LIDOCAINE 1 PATCH: 4 CREAM TOPICAL at 12:00

## 2023-01-12 RX ADMIN — Medication 25 GRAM(S): at 05:13

## 2023-01-12 RX ADMIN — Medication 750 MILLIGRAM(S): at 12:30

## 2023-01-12 RX ADMIN — SODIUM CHLORIDE 4 MILLILITER(S): 9 INJECTION INTRAMUSCULAR; INTRAVENOUS; SUBCUTANEOUS at 10:02

## 2023-01-12 RX ADMIN — SODIUM CHLORIDE 4 MILLILITER(S): 9 INJECTION INTRAMUSCULAR; INTRAVENOUS; SUBCUTANEOUS at 22:41

## 2023-01-12 RX ADMIN — PANTOPRAZOLE SODIUM 40 MILLIGRAM(S): 20 TABLET, DELAYED RELEASE ORAL at 11:51

## 2023-01-12 RX ADMIN — Medication 3 MILLILITER(S): at 16:11

## 2023-01-12 RX ADMIN — Medication 300 MILLIGRAM(S): at 12:00

## 2023-01-12 RX ADMIN — Medication 15 MILLIGRAM(S): at 16:00

## 2023-01-12 RX ADMIN — HEPARIN SODIUM 5000 UNIT(S): 5000 INJECTION INTRAVENOUS; SUBCUTANEOUS at 06:47

## 2023-01-12 RX ADMIN — SODIUM CHLORIDE 125 MILLILITER(S): 9 INJECTION, SOLUTION INTRAVENOUS at 11:48

## 2023-01-12 RX ADMIN — Medication 25 GRAM(S): at 08:21

## 2023-01-12 RX ADMIN — Medication 15 MILLIGRAM(S): at 16:15

## 2023-01-12 RX ADMIN — LIDOCAINE 1 PATCH: 4 CREAM TOPICAL at 23:36

## 2023-01-12 RX ADMIN — Medication 3 MILLILITER(S): at 22:41

## 2023-01-12 RX ADMIN — LIDOCAINE 1 PATCH: 4 CREAM TOPICAL at 11:50

## 2023-01-12 RX ADMIN — Medication 15 MILLIGRAM(S): at 22:15

## 2023-01-12 RX ADMIN — Medication 300 MILLIGRAM(S): at 17:33

## 2023-01-12 RX ADMIN — HEPARIN SODIUM 5000 UNIT(S): 5000 INJECTION INTRAVENOUS; SUBCUTANEOUS at 22:41

## 2023-01-12 RX ADMIN — OXYCODONE HYDROCHLORIDE 5 MILLIGRAM(S): 5 TABLET ORAL at 22:40

## 2023-01-12 RX ADMIN — HYDROMORPHONE HYDROCHLORIDE 0.5 MILLIGRAM(S): 2 INJECTION INTRAMUSCULAR; INTRAVENOUS; SUBCUTANEOUS at 07:07

## 2023-01-12 RX ADMIN — Medication 1 MILLIGRAM(S): at 12:20

## 2023-01-12 RX ADMIN — SODIUM CHLORIDE 150 MILLILITER(S): 9 INJECTION, SOLUTION INTRAVENOUS at 08:13

## 2023-01-12 RX ADMIN — SODIUM CHLORIDE 125 MILLILITER(S): 9 INJECTION, SOLUTION INTRAVENOUS at 22:45

## 2023-01-12 RX ADMIN — Medication 400 MILLIGRAM(S): at 01:07

## 2023-01-12 RX ADMIN — Medication 15 MILLIGRAM(S): at 22:05

## 2023-01-12 RX ADMIN — Medication 1000 MILLIGRAM(S): at 01:45

## 2023-01-12 RX ADMIN — Medication 750 MILLIGRAM(S): at 18:00

## 2023-01-12 NOTE — DIETITIAN INITIAL EVALUATION ADULT - NSFNSGIIOFT_GEN_A_CORE
01-11-23 @ 07:01 - 01-12-23 @ 07:00  --------------------------------------------------------  OUT:    Chest Tube (mL): 65 mL    Chest Tube (mL): 30 mL  Total OUT: 95 mL    Total NET: -95 mL      01-12-23 @ 07:01 - 01-12-23 @ 10:32  --------------------------------------------------------  OUT:    Chest Tube (mL): 10 mL    Chest Tube (mL): 30 mL  Total OUT: 40 mL    Total NET: -40 mL

## 2023-01-12 NOTE — PROGRESS NOTE ADULT - ASSESSMENT
47 year old female PMH GERD, Asthma, Sleep apnea, and PSH of hysterectomy and previous gastric sleeve May 2022 and laparoscopic hiatal hernia repair in June 2022 w/ Dr. Field presents for Robotic assisted Poncho n Y gastric bypass + hiatal hernia repair.     Plan:  - Appreciate Care Per CTICU  - Advance to Bariatric clears today  - Pain control PRN (bariatric pain protocol no whole pills)  - IVF  - DVT ppx   47 year old female PMH GERD, Asthma, Sleep apnea, and PSH of hysterectomy and previous gastric sleeve May 2022 and laparoscopic hiatal hernia repair in June 2022 w/ Dr. Field presents for Robotic assisted Poncho n Y gastric bypass + hiatal hernia repair.     Plan:  - Appreciate Care Per CTICU  - Advance to Bariatric clears today  - Pain control PRN (PCA in place)  - IVF  - DVT ppx

## 2023-01-12 NOTE — DIETITIAN INITIAL EVALUATION ADULT - PERTINENT LABORATORY DATA
01-12    137  |  105  |  10  ----------------------------<  135<H>  3.9   |  23  |  0.51    Ca    8.4      12 Jan 2023 02:40  Phos  2.6     01-12  Mg     1.50     01-12    A1C with Estimated Average Glucose Result: 5.1 % (04-22-22 @ 21:47)

## 2023-01-12 NOTE — DIETITIAN INITIAL EVALUATION ADULT - ORAL INTAKE PTA/DIET HISTORY
Met w/pt who provided nutrition hx.  Extensive chart review conducted to obtain nutrition related information via RDN notes from previous admissions.  Pt admitted for bariatric surgery.  Pt denies food allergies or difficulty chewing/swallowing.  She endorses that she had a good appetite, but felt that food got "stuck" in her chest and reached pouch very slowly.  She was on MVI supplement PTA.  She denies taking any high protein supplements.  She states that she was on a liquid diet x2w prior to initial surgery in 5/2022 and following.  Diet was advanced to very soft which is when she noticed food feeling like it was stuck.  She was taking only soup in very small amounts.  Initial weight at time of surgery was 238lbs.  Weight dropped to 198lbs at time of next admission in 6/2022 for HH.  Pt found to be in moderate malnutrition 2/2 wt loss and <75% intake greater than 1m.  Pt now with c/o HH and s/p surgery yesterday.  She states that she felt pain from HH immediately after surgery in June.  Further wt loss w/o effort since that time reported.

## 2023-01-12 NOTE — DIETITIAN INITIAL EVALUATION ADULT - PERTINENT MEDS FT
MEDICATIONS  (STANDING):  acetaminophen   IVPB .. 750 milliGRAM(s) IV Intermittent every 6 hours  albuterol/ipratropium for Nebulization 3 milliLiter(s) Nebulizer every 6 hours  folic acid Injectable 1 milliGRAM(s) IV Push once  heparin   Injectable 5000 Unit(s) SubCutaneous every 8 hours  lactated ringers 1000 milliLiter(s) (125 mL/Hr) IV Continuous <Continuous>  lidocaine   4% Patch 1 Patch Transdermal every 24 hours  lidocaine   4% Patch 1 Patch Transdermal every 24 hours  pantoprazole  Injectable 40 milliGRAM(s) IV Push daily  sodium chloride 3%  Inhalation 4 milliLiter(s) Inhalation every 12 hours  thiamine Injectable 100 milliGRAM(s) IV Push once    MEDICATIONS  (PRN):  aluminum hydroxide/magnesium hydroxide/simethicone Suspension 30 milliLiter(s) Oral every 4 hours PRN Dyspepsia  diphenhydrAMINE Injectable 25 milliGRAM(s) IV Push every 4 hours PRN Pruritus  ketorolac   Injectable 15 milliGRAM(s) IV Push every 6 hours PRN Moderate Pain (4 - 6)  naloxone Injectable 0.1 milliGRAM(s) IV Push every 3 minutes PRN For ANY of the following changes in patient status:  A. RR LESS THAN 10 breaths per minute, B. Oxygen saturation LESS THAN 90%, C. Sedation score of 6  ondansetron Injectable 4 milliGRAM(s) IV Push every 6 hours PRN Nausea  ondansetron Injectable 4 milliGRAM(s) IV Push every 6 hours PRN Nausea

## 2023-01-12 NOTE — DIETITIAN INITIAL EVALUATION ADULT - ADD RECOMMEND
1) Monitor weights, labs, BM's, skin integrity; 2) Initiate Bariatric Clear diet as medically feasible; 3) Advance diet as tolerated to Full Liquid, Bariatric; Continue to advance as tolerated; 4) MVI supplement

## 2023-01-13 ENCOUNTER — TRANSCRIPTION ENCOUNTER (OUTPATIENT)
Age: 48
End: 2023-01-13

## 2023-01-13 LAB
ANION GAP SERPL CALC-SCNC: 10 MMOL/L — SIGNIFICANT CHANGE UP (ref 7–14)
BUN SERPL-MCNC: 8 MG/DL — SIGNIFICANT CHANGE UP (ref 7–23)
CALCIUM SERPL-MCNC: 8.9 MG/DL — SIGNIFICANT CHANGE UP (ref 8.4–10.5)
CHLORIDE SERPL-SCNC: 103 MMOL/L — SIGNIFICANT CHANGE UP (ref 98–107)
CO2 SERPL-SCNC: 26 MMOL/L — SIGNIFICANT CHANGE UP (ref 22–31)
CREAT SERPL-MCNC: 0.64 MG/DL — SIGNIFICANT CHANGE UP (ref 0.5–1.3)
EGFR: 108 ML/MIN/1.73M2 — SIGNIFICANT CHANGE UP
GLUCOSE SERPL-MCNC: 94 MG/DL — SIGNIFICANT CHANGE UP (ref 70–99)
HCT VFR BLD CALC: 34.9 % — SIGNIFICANT CHANGE UP (ref 34.5–45)
HGB BLD-MCNC: 11 G/DL — LOW (ref 11.5–15.5)
MAGNESIUM SERPL-MCNC: 2.2 MG/DL — SIGNIFICANT CHANGE UP (ref 1.6–2.6)
MCHC RBC-ENTMCNC: 31.5 GM/DL — LOW (ref 32–36)
MCHC RBC-ENTMCNC: 31.5 PG — SIGNIFICANT CHANGE UP (ref 27–34)
MCV RBC AUTO: 100 FL — SIGNIFICANT CHANGE UP (ref 80–100)
NRBC # BLD: 0 /100 WBCS — SIGNIFICANT CHANGE UP (ref 0–0)
NRBC # FLD: 0 K/UL — SIGNIFICANT CHANGE UP (ref 0–0)
PHOSPHATE SERPL-MCNC: 2.9 MG/DL — SIGNIFICANT CHANGE UP (ref 2.5–4.5)
PLATELET # BLD AUTO: 178 K/UL — SIGNIFICANT CHANGE UP (ref 150–400)
POTASSIUM SERPL-MCNC: 4.2 MMOL/L — SIGNIFICANT CHANGE UP (ref 3.5–5.3)
POTASSIUM SERPL-SCNC: 4.2 MMOL/L — SIGNIFICANT CHANGE UP (ref 3.5–5.3)
RBC # BLD: 3.49 M/UL — LOW (ref 3.8–5.2)
RBC # FLD: 12.4 % — SIGNIFICANT CHANGE UP (ref 10.3–14.5)
SODIUM SERPL-SCNC: 139 MMOL/L — SIGNIFICANT CHANGE UP (ref 135–145)
WBC # BLD: 8.13 K/UL — SIGNIFICANT CHANGE UP (ref 3.8–10.5)
WBC # FLD AUTO: 8.13 K/UL — SIGNIFICANT CHANGE UP (ref 3.8–10.5)

## 2023-01-13 PROCEDURE — 99233 SBSQ HOSP IP/OBS HIGH 50: CPT

## 2023-01-13 PROCEDURE — 71045 X-RAY EXAM CHEST 1 VIEW: CPT | Mod: 26

## 2023-01-13 RX ORDER — OMEPRAZOLE 10 MG/1
1 CAPSULE, DELAYED RELEASE ORAL
Qty: 0 | Refills: 0 | DISCHARGE

## 2023-01-13 RX ORDER — OXYCODONE HYDROCHLORIDE 5 MG/1
5 TABLET ORAL
Qty: 150 | Refills: 0
Start: 2023-01-13 | End: 2023-01-17

## 2023-01-13 RX ORDER — ONDANSETRON 8 MG/1
1 TABLET, FILM COATED ORAL
Qty: 28 | Refills: 0
Start: 2023-01-13 | End: 2023-01-19

## 2023-01-13 RX ADMIN — SODIUM CHLORIDE 75 MILLILITER(S): 9 INJECTION, SOLUTION INTRAVENOUS at 08:43

## 2023-01-13 RX ADMIN — SODIUM CHLORIDE 4 MILLILITER(S): 9 INJECTION INTRAMUSCULAR; INTRAVENOUS; SUBCUTANEOUS at 09:53

## 2023-01-13 RX ADMIN — SODIUM CHLORIDE 125 MILLILITER(S): 9 INJECTION, SOLUTION INTRAVENOUS at 06:10

## 2023-01-13 RX ADMIN — HEPARIN SODIUM 5000 UNIT(S): 5000 INJECTION INTRAVENOUS; SUBCUTANEOUS at 17:11

## 2023-01-13 RX ADMIN — Medication 3 MILLILITER(S): at 22:30

## 2023-01-13 RX ADMIN — LIDOCAINE 1 PATCH: 4 CREAM TOPICAL at 19:46

## 2023-01-13 RX ADMIN — PANTOPRAZOLE SODIUM 40 MILLIGRAM(S): 20 TABLET, DELAYED RELEASE ORAL at 12:45

## 2023-01-13 RX ADMIN — CHLORHEXIDINE GLUCONATE 1 APPLICATION(S): 213 SOLUTION TOPICAL at 12:46

## 2023-01-13 RX ADMIN — Medication 15 MILLIGRAM(S): at 19:11

## 2023-01-13 RX ADMIN — SODIUM CHLORIDE 4 MILLILITER(S): 9 INJECTION INTRAMUSCULAR; INTRAVENOUS; SUBCUTANEOUS at 22:31

## 2023-01-13 RX ADMIN — Medication 3 MILLILITER(S): at 09:52

## 2023-01-13 RX ADMIN — SODIUM CHLORIDE 75 MILLILITER(S): 9 INJECTION, SOLUTION INTRAVENOUS at 20:11

## 2023-01-13 RX ADMIN — HEPARIN SODIUM 5000 UNIT(S): 5000 INJECTION INTRAVENOUS; SUBCUTANEOUS at 08:43

## 2023-01-13 RX ADMIN — LIDOCAINE 1 PATCH: 4 CREAM TOPICAL at 13:45

## 2023-01-13 NOTE — DISCHARGE NOTE PROVIDER - NSDCFUADDINST_GEN_ALL_CORE_FT
If you are constipated, take a stool softener .  Bariatric Full liquid diet for 2 weeks then bariatric soft/puree for 30 days. no carbonated beverage, avoid chewing gums and using straws.

## 2023-01-13 NOTE — DISCHARGE NOTE PROVIDER - PROVIDER TOKENS
[FreeTextEntry1] : 80 yo male with hypertension, recently diagnosed atrial flutter with variable AV block on 8/17/21. \par ECG today shows atrial fibrillation, but rate controlled.\par \par Will continue metoprolol succinate 25 mg po daily for rate control.\par Given [id number]-OLCa score = 3 (currently), will continue Eliquis 5 mg po bid for thromboembolic prophylaxis.\par \par BP is elevated today on current regimen (amlodipine 10 mg, lisinopril 40 mg, and metoprolol succinate 25 mg). He also reports elevated -150s at home.\par Will add HCTZ 12.5 mg po daily.\par RTC in 1-2 months for BP check and labs.\par  PROVIDER:[TOKEN:[[id number]]],PROVIDER:[TOKEN:[[id number]]]

## 2023-01-13 NOTE — DISCHARGE NOTE PROVIDER - CARE PROVIDERS DIRECT ADDRESSES
[email or web address]

## 2023-01-13 NOTE — DISCHARGE NOTE PROVIDER - CARE PROVIDER_API CALL
Rahul Field  General Surgery  [address], [address]  [place], [place] [id number]  Phone: [phone]  Fax: [phone]  Follow Up Time:     Omar Rodriguez)  Surgery; Thoracic Surgery  [address] [place] [address]  Phone: [phone]  Fax: [phone]  Follow Up Time:

## 2023-01-13 NOTE — PROCEDURE NOTE - NSPOSTCAREGUIDE_GEN_A_CORE
AMD (DRY), OU:  PRESCRIBE AREDS 2 VITAMINS AND UV PROTECTION TO SLOW PROGRESSION. SMOKING CESSATION EMPHASIZED. PRESCRIBE REGULAR AMSLER GRID CHECKS TO SELF MONITOR. PATIENT TO CALL WITH CHANGES FOR RETINA CONSULT. RETURN FOR FOLLOW-UP AS SCHEDULED. Verbal/written post procedure instructions were given to patient/caregiver done

## 2023-01-13 NOTE — DISCHARGE NOTE PROVIDER - NSDCMRMEDTOKEN_GEN_ALL_CORE_FT
ondansetron 4 mg oral tablet, disintegratin tab(s) orally every 6 hours, As Needed   oxyCODONE 5 mg/5 mL oral solution: 5 milliliter(s) orally every 4 hours, As Needed -Severe Pain (7 - 10) MDD:30 ml  PriLOSEC 40 mg oral delayed release capsule: 1 cap(s) orally once a day AM Do not swallow whole. Open capsule and take in applesauce

## 2023-01-13 NOTE — DISCHARGE NOTE PROVIDER - HOSPITAL COURSE
46 yo male, PMH asthma GERD, KATHLEEN, Obesity S/p Gastric Sleeve  and Hiatal Hernia 5/2022 with recurrence of Hiatal Hernia 6/30/22 requiring RTOR for repair. Over time pain had had persistent reflux-like symptoms. On 1/11/2023 Pt underwent flexible Bronchoscopy by CT surgery, Robotic Assisted exploration of esophagus and mobilization of the esophagus, placment of B/L chest tubes and Robotic Assisted conversion of Sleeve gastrectomy to RY Gastric Bypass, Hiatal Hernia Repair.  Bariatric ERAS protocol followed to include preoperative and perioperative use of DVT and SSI prophylaxis as well as multi-modal non-opioid analgesia and TAP Block. Patient tolerated the procedure well, was extubated in the operating room, and then transferred to CT ICU in stable condition. Once hemodynamically stable and effective pain control, the patient was able to ambulate with assistance. Indwelling blood catheter placed intraop remained in place.  Pain management included IV multi-modal non-opioid analgesia.     On POD #1, patient remained stable with no acute events overnight, and has effective pain control. Blood removed and patient voided without difficulty. Denies nausea and vomiting and she started bariatric clear liquid. On POD # 1 left CT removed  and right on POD # 3. Patient is ambulating independently and voiding as expected. The rest of the hospital course was uneventful, patient met 8-Point Bariatric Surgery D/C criteria and subsequently cleared for discharge home on POD#..... The patient will follow a protocol-derived staged meal progression supervised by the dietitian in the outpatient setting. Patient will follow-up with Dr. Omar Rodriguez and Rashida in 7-10 days, medical doctor and dietitian in 30 days. [facility] prior to discharge. 46 yo male, PMH asthma GERD, KATHLEEN, Obesity S/p Gastric Sleeve  and Hiatal Hernia 5/2022 with recurrence of Hiatal Hernia 6/30/22 requiring RTOR for repair. Over time pain had had persistent reflux-like symptoms. On 1/11/2023 Pt underwent flexible Bronchoscopy by CT surgery, Robotic Assisted exploration of esophagus and mobilization of the esophagus, placment of B/L chest tubes and Robotic Assisted conversion of Sleeve gastrectomy to RY Gastric Bypass, Hiatal Hernia Repair.  Bariatric ERAS protocol followed to include preoperative and perioperative use of DVT and SSI prophylaxis as well as multi-modal non-opioid analgesia and TAP Block. Patient tolerated the procedure well, was extubated in the operating room, and then transferred to CT ICU in stable condition. Once hemodynamically stable and effective pain control, the patient was able to ambulate with assistance. Indwelling blood catheter placed intraop remained in place.  Pain management included IV multi-modal non-opioid analgesia and Dilaudid PCA pump.     On POD #1, patient remained stable with no acute events overnight, and has effective pain control. Blood removed and patient voided without difficulty. Denies nausea and vomiting and she started bariatric clear liquid. On POD # 1 PCA discontinued as well left CT removed. On POD # 3 she progressed clinically w/o complaints. Right CT removed. Patient is ambulating independently and voiding as expected. The rest of the hospital course was uneventful, patient met 8[facility] Bariatric Surgery D/C criteria and subsequently cleared for discharge home on POD#..... The patient will follow a protocol-derived staged meal progression supervised by the dietitian in the outpatient setting. Patient will follow-up with Dr. Omar Rodriguez and Rashida in 7-10 days, medical doctor and dietitian in 30 days. All appropriate [facility] prior to discharge. 46 yo male, PMH asthma GERD, KATHLEEN, Obesity S/p Gastric Sleeve  and Hiatal Hernia 5/2022 with recurrence of Hiatal Hernia 6/30/22 requiring RTOR for repair. Over time pain had had persistent reflux-like symptoms. On 1/11/2023 Pt underwent flexible Bronchoscopy by CT surgery, Robotic Assisted exploration of esophagus and mobilization of the esophagus, placment of B/L chest tubes and Robotic Assisted conversion of Sleeve gastrectomy to RY Gastric Bypass, Hiatal Hernia Repair.  Bariatric ERAS protocol followed to include preoperative and perioperative use of DVT and SSI prophylaxis as well as multi-modal non-opioid analgesia and TAP Block. Patient tolerated the procedure well, was extubated in the operating room, and then transferred to CT ICU in stable condition. Once hemodynamically stable and effective pain control, the patient was able to ambulate with assistance. Indwelling blood catheter placed intraop remained in place.  Pain management included IV multi-modal non-opioid analgesia and Dilaudid PCA pump.     On POD #1, patient remained stable with no acute events overnight, and has effective pain control. Blood removed and patient voided without difficulty. Denies nausea and vomiting and she started bariatric clear liquid. On POD # 1 PCA discontinued as well left CT removed. On POD # 3 she progressed clinically w/o complaints. Right CT removed. Patient is ambulating independently and voiding as expected. The rest of the hospital course was uneventful, patient met [facility] Bariatric [facility] D/C criteria and subsequently cleared for discharge home on POD#4 and tolerating PO liquids The patient will follow a protocol-derived staged meal progression supervised by the dietitian in the outpatient setting. Patient will follow-up with Dr. Omar Rodriguez and Rashida in 7-10 days, medical doctor and dietitian in 30 days. All appropriate [facility] from [facility] prior to discharge. 48 yo male, PMH asthma GERD, KATHLEEN, Obesity S/p Gastric Sleeve  and Hiatal Hernia 5/2022 with recurrence of Hiatal Hernia 6/30/22 requiring RTOR for repair. Over time pain had had persistent reflux-like symptoms. On 1/11/2023 Pt underwent flexible Bronchoscopy by CT surgery, Robotic Assisted exploration of esophagus and mobilization of the esophagus, placment of B/L chest tubes and Robotic Assisted conversion of Sleeve gastrectomy to RY Gastric Bypass, Hiatal Hernia Repair.  Bariatric ERAS protocol followed to include preoperative and perioperative use of DVT and SSI prophylaxis as well as multi-modal non-opioid analgesia and TAP Block. Patient tolerated the procedure well, was extubated in the operating room, and then transferred to CT ICU in stable condition. Once hemodynamically stable and effective pain control, the patient was able to ambulate with assistance. Indwelling blood catheter placed intraop remained in place.  Pain management included IV multi-modal non-opioid analgesia and Dilaudid PCA pump.     On POD #1, patient remained stable with no acute events overnight, and has effective pain control. Blood removed and patient voided without difficulty. Denies nausea and vomiting and she started bariatric clear liquid. On POD # 1 PCA discontinued as well left CT removed. On POD # 3 she progressed clinically w/o complaints. Right CT removed. Patient is ambulating independently and voiding as expected. The rest of the hospital course was uneventful, patient met [facility]Point Bariatric [facility] D/C criteria and subsequently cleared for discharge home on POD#4 and tolerated greater than 360cc of PO liquids The patient will follow a protocol-derived staged meal progression supervised by the dietitian in the outpatient setting. Patient will follow-up with Dr. Omar Rodriguez and Rashida in 7-10 days, medical doctor and dietitian in 30 days. All appropriate [facility] obtained from [facility] prior to discharge.

## 2023-01-13 NOTE — PROGRESS NOTE ADULT - ASSESSMENT
47 year old female PMH GERD, Asthma, Sleep apnea, and PSH of hysterectomy and previous gastric sleeve May 2022 and laparoscopic hiatal hernia repair in June 2022 w/ Dr. Field presents for Robotic assisted [place] gastric bypass + hiatal hernia repair.     Plan:  - Appreciate Care Per [facility]  - Bariatric clears   - Pain control PRN (no whole pills)  - [facility]  - DVT ppx

## 2023-01-14 ENCOUNTER — TRANSCRIPTION ENCOUNTER (OUTPATIENT)
Age: 48
End: 2023-01-14

## 2023-01-14 VITALS
OXYGEN SATURATION: 99 % | HEART RATE: 82 BPM | RESPIRATION RATE: 26 BRPM | SYSTOLIC BLOOD PRESSURE: 128 MMHG | DIASTOLIC BLOOD PRESSURE: 72 MMHG

## 2023-01-14 LAB
ANION GAP SERPL CALC-SCNC: 11 MMOL/L — SIGNIFICANT CHANGE UP (ref 7–14)
BUN SERPL-MCNC: 6 MG/DL — LOW (ref 7–23)
CALCIUM SERPL-MCNC: 9.2 MG/DL — SIGNIFICANT CHANGE UP (ref 8.4–10.5)
CHLORIDE SERPL-SCNC: 101 MMOL/L — SIGNIFICANT CHANGE UP (ref 98–107)
CO2 SERPL-SCNC: 20 MMOL/L — LOW (ref 22–31)
CREAT SERPL-MCNC: 0.58 MG/DL — SIGNIFICANT CHANGE UP (ref 0.5–1.3)
EGFR: 111 ML/MIN/1.73M2 — SIGNIFICANT CHANGE UP
GLUCOSE SERPL-MCNC: 73 MG/DL — SIGNIFICANT CHANGE UP (ref 70–99)
HCT VFR BLD CALC: 36 % — SIGNIFICANT CHANGE UP (ref 34.5–45)
HGB BLD-MCNC: 11.7 G/DL — SIGNIFICANT CHANGE UP (ref 11.5–15.5)
MAGNESIUM SERPL-MCNC: 1.9 MG/DL — SIGNIFICANT CHANGE UP (ref 1.6–2.6)
MCHC RBC-ENTMCNC: 32.2 PG — SIGNIFICANT CHANGE UP (ref 27–34)
MCHC RBC-ENTMCNC: 32.5 GM/DL — SIGNIFICANT CHANGE UP (ref 32–36)
MCV RBC AUTO: 99.2 FL — SIGNIFICANT CHANGE UP (ref 80–100)
NRBC # BLD: 0 /100 WBCS — SIGNIFICANT CHANGE UP (ref 0–0)
NRBC # FLD: 0 K/UL — SIGNIFICANT CHANGE UP (ref 0–0)
PHOSPHATE SERPL-MCNC: 3.3 MG/DL — SIGNIFICANT CHANGE UP (ref 2.5–4.5)
PLATELET # BLD AUTO: 177 K/UL — SIGNIFICANT CHANGE UP (ref 150–400)
POTASSIUM SERPL-MCNC: 4.6 MMOL/L — SIGNIFICANT CHANGE UP (ref 3.5–5.3)
POTASSIUM SERPL-SCNC: 4.6 MMOL/L — SIGNIFICANT CHANGE UP (ref 3.5–5.3)
RBC # BLD: 3.63 M/UL — LOW (ref 3.8–5.2)
RBC # FLD: 12 % — SIGNIFICANT CHANGE UP (ref 10.3–14.5)
SODIUM SERPL-SCNC: 132 MMOL/L — LOW (ref 135–145)
WBC # BLD: 6.16 K/UL — SIGNIFICANT CHANGE UP (ref 3.8–10.5)
WBC # FLD AUTO: 6.16 K/UL — SIGNIFICANT CHANGE UP (ref 3.8–10.5)

## 2023-01-14 PROCEDURE — 99233 SBSQ HOSP IP/OBS HIGH 50: CPT

## 2023-01-14 PROCEDURE — 71045 X-RAY EXAM CHEST 1 VIEW: CPT | Mod: 26

## 2023-01-14 RX ADMIN — Medication 3 MILLILITER(S): at 04:35

## 2023-01-14 RX ADMIN — LIDOCAINE 1 PATCH: 4 CREAM TOPICAL at 00:00

## 2023-01-14 RX ADMIN — PANTOPRAZOLE SODIUM 40 MILLIGRAM(S): 20 TABLET, DELAYED RELEASE ORAL at 11:05

## 2023-01-14 RX ADMIN — SODIUM CHLORIDE 75 MILLILITER(S): 9 INJECTION, SOLUTION INTRAVENOUS at 07:19

## 2023-01-14 RX ADMIN — Medication 3 MILLILITER(S): at 11:18

## 2023-01-14 RX ADMIN — SODIUM CHLORIDE 4 MILLILITER(S): 9 INJECTION INTRAMUSCULAR; INTRAVENOUS; SUBCUTANEOUS at 11:18

## 2023-01-14 RX ADMIN — CHLORHEXIDINE GLUCONATE 1 APPLICATION(S): 213 SOLUTION TOPICAL at 11:05

## 2023-01-14 RX ADMIN — HEPARIN SODIUM 5000 UNIT(S): 5000 INJECTION INTRAVENOUS; SUBCUTANEOUS at 00:06

## 2023-01-14 RX ADMIN — HEPARIN SODIUM 5000 UNIT(S): 5000 INJECTION INTRAVENOUS; SUBCUTANEOUS at 07:07

## 2023-01-14 NOTE — DISCHARGE NOTE NURSING/CASE MANAGEMENT/SOCIAL WORK - PATIENT PORTAL LINK FT
You can access the FollowMyHealth Patient Portal offered by [facility] by registering at the following website: [email or web address]. By joining [email or web address]’s FollowMyHealth portal, you will also be able to view your health information using other applications (apps) compatible with our system.

## 2023-01-14 NOTE — PROGRESS NOTE ADULT - PROVIDER SPECIALTY LIST ADULT
Anesthesia
Critical Care
Critical Care
Surgery
Critical Care
Critical Care
Pain Medicine
Surgery
Surgery

## 2023-01-14 NOTE — PROGRESS NOTE ADULT - NUTRITIONAL ASSESSMENT
This patient has been assessed with a concern for Malnutrition and has been determined to have a diagnosis/diagnoses of Severe protein-calorie malnutrition.    This patient is being managed with:   Diet Clear Liquid-  Bariatric Clear Liquid (BARICLLIQ)  Entered: Jan 12 2023  2:30PM    
This patient has been assessed with a concern for Malnutrition and has been determined to have a diagnosis/diagnoses of Severe protein-calorie malnutrition.    This patient is being managed with:   Diet NPO-  With Ice Chips/Sips of Water  Entered: Jan 11 2023  6:13PM    
This patient has been assessed with a concern for Malnutrition and has been determined to have a diagnosis/diagnoses of Severe protein-calorie malnutrition.    This patient is being managed with:   Diet Clear Liquid-  Bariatric Clear Liquid (BARICLLIQ)  Entered: Jan 12 2023  2:30PM

## 2023-01-14 NOTE — PROGRESS NOTE ADULT - SUBJECTIVE AND OBJECTIVE BOX
CHIEF COMPLAINT: FOLLOW UP IN ICU FOR POSTOPERATIVE CARE OF PATIENT WHO IS S/P hiatal hernia repair and Armen-Lila gastric bypass      PROCEDURES:     RVATS, Mobilization of the esophagus by thoracic surgery and Robotic lap assisted hiatal hernia repair with closure of baltazar. RnYGB with armen limb 150cm, and PB limb 60cm.robot-assisted by bariatric surgery team 11-Jan-2023       ISSUES:     Recurrent hiatal hernia  Postoperative pain  Chest tubes in place  GERD  Obesity with BMI 32    INTERVAL EVENTS:   OR today. Extubated in OR. Transferred to CTICU.      HISTORY:   Patient reports moderate pain at chest wall incision sites which is worse with coughing and deep breathing without associated fever or dyspnea. Pain is improved with use of pain meds.     PHYSICAL EXAM:   Gen: Comfortable, No acute distress  Eyes: Sclera white, Conjunctiva normal, Eyelids normal, Pupils symmetrical   ENT: Mucous membranes moist,  ,  ,    Neck: Trachea midline,  ,  ,  ,  ,  ,    CV: Rate regular, Rhythm regular,  ,  ,    Resp: Breath sounds clear, No accessory muscles use, b/l chest tubes in place,  ,    Abd: Soft, Non-distended, Non-tender, Bowel sounds normal,  ,  ,    Skin: Warm, No peripheral edema of lower extremities,  ,    :  blood  Neuro: Moving all 4 extremities,    Psych: A&Ox3      ASSESSMENT AND PLAN:     NEURO:  Post-operative Pain - Stable. Pain control with PCA and Tylenol IV PRN.              RESPIRATORY:  Hypoxia - Wean nasal cannula for goal O2sat above 92. Obtain CXR. Incentive spirometry. Chest PT and frequent suctioning. Continue bronchodilators. OOB to chair & ambulate w/ assistance. Continuous pulse oximetry for support & to prevent decompensation.       Chest tubes – Pleurevac regulated suctioning. Monitor chest tube output.            CARDIOVASCULAR:  Hemodynamically stable - Not on pressors. Continue hemodynamic monitoring.  Telemetry (medical test) - Reviewed by me today independently. Normal sinus rhythm.              RENAL:  Stable - Monitor IOs and electrolytes. Keep K above 4.0 and Mg above 2.0.          GASTROINTESTINAL:  GI prophylaxis   Zofran and Reglan IV PRN for nausea  NPO except ice chips           HEMATOLOGIC:  No signs of active bleeding. Monitor Hgb in CBC in AM  DVT prophylaxis with heparin subQ and SCDs.               INFECTIOUS DISEASE:  All surgical sites appear clean. No signs of active infection. Will monitor for fever and leukocytosis.                 ENDOCRINE:  Stable – Monitor glucose fingersticks for goal 120-180.             Pertinent clinical, laboratory, radiographic, hemodynamic, echocardiographic, respiratory data, microbiologic data and chart were reviewed by myself and analyzed frequently throughout the course of the day and night by myself.    Plan discussed at length with the CTICU staff and Attending CT Surgeon -   Dr Omar Rodriguez.    Patient's status was discussed with patient at bedside.       ________________________________________________    _________________________  VITAL SIGNS:  Vital Signs Last 24 Hrs  T(C): 36.8 (11 Jan 2023 18:00), Max: 37 (11 Jan 2023 06:43)  T(F): 98.2 (11 Jan 2023 18:00), Max: 98.6 (11 Jan 2023 06:43)  HR: 92 (11 Jan 2023 18:30) (66 - 100)  BP: 98/60 (11 Jan 2023 18:05) (96/81 - 121/74)  BP(mean): 73 (11 Jan 2023 18:05) (73 - 87)  RR: 13 (11 Jan 2023 18:30) (13 - 30)  SpO2: 100% (11 Jan 2023 18:30) (100% - 100%)    Parameters below as of 11 Jan 2023 19:00  Patient On (Oxygen Delivery Method): nasal cannula w/ humidification  O2 Flow (L/min): 4    I/Os:   I&O's Detail    11 Jan 2023 07:01  -  11 Jan 2023 18:59  --------------------------------------------------------  IN:    IV PiggyBack: 50 mL    Lactated Ringers: 150 mL  Total IN: 200 mL    OUT:  Total OUT: 0 mL    Total NET: 200 mL              MEDICATIONS:  MEDICATIONS  (STANDING):  acetaminophen   IVPB .. 1000 milliGRAM(s) IV Intermittent once  acetaminophen   IVPB .. 1000 milliGRAM(s) IV Intermittent once  ceFAZolin   IVPB 2000 milliGRAM(s) IV Intermittent every 8 hours  folic acid Injectable 1 milliGRAM(s) IV Push once  heparin   Injectable 5000 Unit(s) SubCutaneous every 8 hours  HYDROmorphone PCA (1 mG/mL) 30 milliLiter(s) PCA Continuous PCA Continuous  lactated ringers. 1000 milliLiter(s) (150 mL/Hr) IV Continuous <Continuous>  pantoprazole  Injectable 40 milliGRAM(s) IV Push daily  potassium chloride  10 mEq/100 mL IVPB 10 milliEquivalent(s) IV Intermittent every 1 hour  sodium chloride 0.9% 1000 milliLiter(s) (250 mL/Hr) IV Continuous <Continuous>  thiamine Injectable 100 milliGRAM(s) IV Push once    MEDICATIONS  (PRN):  aluminum hydroxide/magnesium hydroxide/simethicone Suspension 30 milliLiter(s) Oral every 4 hours PRN Dyspepsia  diphenhydrAMINE Injectable 25 milliGRAM(s) IV Push every 4 hours PRN Pruritus  HYDROmorphone  Injectable 0.5 milliGRAM(s) IV Push every 3 hours PRN Severe Pain (7 - 10)  HYDROmorphone PCA (1 mG/mL) Rescue Clinician Bolus 0.5 milliGRAM(s) IV Push every 15 minutes PRN for Pain Scale GREATER THAN 6  naloxone Injectable 0.1 milliGRAM(s) IV Push every 3 minutes PRN For ANY of the following changes in patient status:  A. RR LESS THAN 10 breaths per minute, B. Oxygen saturation LESS THAN 90%, C. Sedation score of 6  ondansetron Injectable 4 milliGRAM(s) IV Push every 6 hours PRN Nausea  ondansetron Injectable 4 milliGRAM(s) IV Push every 6 hours PRN Nausea      LABS:  Laboratory data was independently reviewed by me today.                       RADIOLOGY:   Radiology images were independently reviewed by me today. Reports were reviewed by me today.      
Anesthesia Pain Management Service    SUBJECTIVE: Pt doing well with IV PCA without problems reported.    Therapy:	  [ X] IV PCA	   [ ] Epidural           [ ] s/p Spinal Opoid              [ ] Postpartum infusion	  [ ] Patient controlled regional anesthesia (PCRA)    [ ] prn Analgesics    Allergies    No Known Allergies    Intolerances      MEDICATIONS  (STANDING):  acetaminophen   IVPB .. 750 milliGRAM(s) IV Intermittent every 6 hours  albuterol/ipratropium for Nebulization 3 milliLiter(s) Nebulizer every 6 hours  folic acid Injectable 1 milliGRAM(s) IV Push once  heparin   Injectable 5000 Unit(s) SubCutaneous every 8 hours  lactated ringers. 1000 milliLiter(s) (150 mL/Hr) IV Continuous <Continuous>  pantoprazole  Injectable 40 milliGRAM(s) IV Push daily  sodium chloride 0.9% 1000 milliLiter(s) (250 mL/Hr) IV Continuous <Continuous>  sodium chloride 3%  Inhalation 4 milliLiter(s) Inhalation every 12 hours  thiamine Injectable 100 milliGRAM(s) IV Push once    MEDICATIONS  (PRN):  aluminum hydroxide/magnesium hydroxide/simethicone Suspension 30 milliLiter(s) Oral every 4 hours PRN Dyspepsia  diphenhydrAMINE Injectable 25 milliGRAM(s) IV Push every 4 hours PRN Pruritus  ketorolac   Injectable 15 milliGRAM(s) IV Push every 6 hours PRN Moderate Pain (4 - 6)  naloxone Injectable 0.1 milliGRAM(s) IV Push every 3 minutes PRN For ANY of the following changes in patient status:  A. RR LESS THAN 10 breaths per minute, B. Oxygen saturation LESS THAN 90%, C. Sedation score of 6  ondansetron Injectable 4 milliGRAM(s) IV Push every 6 hours PRN Nausea  ondansetron Injectable 4 milliGRAM(s) IV Push every 6 hours PRN Nausea      OBJECTIVE:   [X] No new signs     [ ] Other:    Side Effects:  [X ] None			[ ] Other:    Assessment of Catheter Site:		[ ] Intact		[ ] Other:    ASSESSMENT/PLAN  [ X] Continue current therapy. Recommend non-opioid adjuvant analgesics to be used when possible and when allowed by primary surgical team.    [ ] Therapy changed to:    [ ] IV PCA       [ ] Epidural     [ ] prn Analgesics     Comments:    Progress Note written now but Patient was seen earlier.
B Team (General Surgery) Daily Progress Note    SUBJECTIVE:  Pt seen and examined, and is resting comfortably in bed. No acute events overnight. Pain is adequately controlled on current regimen. Pt has no complaints at this time. Pt reports passing gas, denies bowel movements. Pt denies dysphagia or nausea. R Chest tube removed yesterday.    OBJECTIVE:  Vital Signs Last 24 Hrs  T(C): 36.6 (14 Jan 2023 08:00), Max: 37 (14 Jan 2023 04:00)  T(F): 97.8 (14 Jan 2023 08:00), Max: 98.6 (14 Jan 2023 04:00)  HR: 75 (14 Jan 2023 08:00) (62 - 98)  BP: 122/80 (14 Jan 2023 08:00) (104/78 - 131/70)  BP(mean): 93 (14 Jan 2023 08:00) (77 - 101)  RR: 25 (14 Jan 2023 08:00) (12 - 31)  SpO2: 98% (14 Jan 2023 08:00) (95% - 100%)    Parameters below as of 14 Jan 2023 09:00  Patient On (Oxygen Delivery Method): room air        I&O's Detail    13 Jan 2023 07:01  -  14 Jan 2023 07:00  --------------------------------------------------------  IN:    Lactated Ringers w/ Additives: 1800 mL    Oral Fluid: 200 mL  Total IN: 2000 mL    OUT:    Chest Tube (mL): 30 mL    Voided (mL): 5750 mL  Total OUT: 5780 mL    Total NET: -3780 mL      14 Jan 2023 07:01  -  14 Jan 2023 09:47  --------------------------------------------------------  IN:    Lactated Ringers w/ Additives: 75 mL  Total IN: 75 mL    OUT:  Total OUT: 0 mL    Total NET: 75 mL        Exam:  GEN: A&Ox3, NAD  HEENT: atraumatic, normocephalic  CV: no JVD  RESP: no increased work of breathing, no use of accessory muscles  GI/ABD: soft, appropriately tender, mildly distended, no rebound or guarding,   : deferred  EXTREMITIES: warm, pink, well-perfused                        11.7   6.16  )-----------( 177      ( 14 Jan 2023 04:30 )             36.0       01-14    132<L>  |  101  |  6<L>  ----------------------------<  73  4.6   |  20<L>  |  0.58    Ca    9.2      14 Jan 2023 04:30  Phos  3.3     01-14  Mg     1.90     01-14            ASSESSMENT:  47 year old female PMH GERD, Asthma, Sleep apnea, and PSH of hysterectomy and previous gastric sleeve May 2022 and laparoscopic hiatal hernia repair in June 2022 w/ Dr. Field presents for Robotic assisted Poncho n Y gastric bypass + hiatal hernia repair.     Plan:  - Appreciate Care Per CTICU  - Bariatric clears   - Pain control PRN (no whole pills)  - IVF  - DVT ppx        B Team Surgery  pager: [phone]      
NORBERT COONEY                     [id number]    HPI:  48 y/o female with hx of obesity, hiatal hernia, s/p repair of hiatla hernia and gastric  sleeve surgery May 2022.  Pt report hiatal hernia returned, and once again  repaired June 2022.  Pt reports hiatal hernia returned, and she has severe gastric reflux.   Scheduled for Robotic Assisted laparoscopic Rou-En Gastric Bypass.  (30 Dec 2022 11:21)    CHIEF COMPLAINT: FOLLOW UP IN ICU FOR POSTOPERATIVE CARE OF PATIENT WHO IS S/P hiatal hernia repair and Armen-Lila gastric bypass      PROCEDURES:   RVATS, Mobilization of the esophagus by thoracic surgery and Robotic lap assisted hiatal hernia repair with closure of baltazar. RnYGB with armen limb 150cm, and PB limb 60cm.robot-assisted by bariatric surgery team 11-Jan-2023       ISSUES:     Recurrent hiatal hernia  Postoperative pain  Chest tubes in place  GERD  Obesity with BMI 32      PAST MEDICAL & SURGICAL HISTORY:  Asthma  ~&gt;20 yrs ago last exacerbation, never hospitalized or intubated      Fibroids      Obesity      Chronic GERD      Hiatal hernia      S/P hysterectomy  2015      History of repair of hiatal hernia  laparoscopic      History of sleeve gastrectomy                VITAL SIGNS:  Vital Signs Last 24 Hrs  T(C): 36.8 (12 Jan 2023 12:00), Max: 36.9 (11 Jan 2023 20:00)  T(F): 98.2 (12 Jan 2023 12:00), Max: 98.4 (11 Jan 2023 20:00)  HR: 85 (12 Jan 2023 13:00) (78 - 106)  BP: 94/55 (12 Jan 2023 12:00) (94/55 - 142/102)  BP(mean): 65 (12 Jan 2023 12:00) (60 - 115)  RR: 21 (12 Jan 2023 12:00) (11 - 30)  SpO2: 98% (12 Jan 2023 12:00) (93% - 100%)    Parameters below as of 12 Jan 2023 13:00  Patient On (Oxygen Delivery Method): room air        I/Os:   I&O's Detail    11 Jan 2023 07:01  -  12 Jan 2023 07:00  --------------------------------------------------------  IN:    IV PiggyBack: 450 mL    Lactated Ringers: 2100 mL  Total IN: 2550 mL    OUT:    Chest Tube (mL): 65 mL    Chest Tube (mL): 30 mL    Indwelling Catheter - Urethral (mL): 1075 mL  Total OUT: 1170 mL    Total NET: 1380 mL      12 Jan 2023 07:01  -  12 Jan 2023 13:19  --------------------------------------------------------  IN:    IV PiggyBack: 50 mL    Lactated Ringers: 450 mL    Lactated Ringers w/ Additives: 250 mL  Total IN: 750 mL    OUT:    Chest Tube (mL): 70 mL    Chest Tube (mL): 10 mL    Indwelling Catheter - Urethral (mL): 370 mL  Total OUT: 450 mL    Total NET: 300 mL          CAPILLARY BLOOD GLUCOSE      POCT Blood Glucose.: 108 mg/dL (12 Jan 2023 12:11)      =======================MEDICATIONS===================  MEDICATIONS  (STANDING):  acetaminophen   IVPB .. 750 milliGRAM(s) IV Intermittent every 6 hours  albuterol/ipratropium for Nebulization 3 milliLiter(s) Nebulizer every 6 hours  chlorhexidine 2% Cloths 1 Application(s) Topical daily  heparin   Injectable 5000 Unit(s) SubCutaneous every 8 hours  lactated ringers 1000 milliLiter(s) (125 mL/Hr) IV Continuous <Continuous>  lidocaine   4% Patch 1 Patch Transdermal every 24 hours  pantoprazole  Injectable 40 milliGRAM(s) IV Push daily  sodium chloride 3%  Inhalation 4 milliLiter(s) Inhalation every 12 hours    MEDICATIONS  (PRN):  aluminum hydroxide/magnesium hydroxide/simethicone Suspension 30 milliLiter(s) Oral every 4 hours PRN Dyspepsia  diphenhydrAMINE Injectable 25 milliGRAM(s) IV Push every 4 hours PRN Pruritus  ketorolac   Injectable 15 milliGRAM(s) IV Push every 6 hours PRN Moderate Pain (4 - 6)  naloxone Injectable 0.1 milliGRAM(s) IV Push every 3 minutes PRN For ANY of the following changes in patient status:  A. RR LESS THAN 10 breaths per minute, B. Oxygen saturation LESS THAN 90%, C. Sedation score of 6  ondansetron Injectable 4 milliGRAM(s) IV Push every 6 hours PRN Nausea  ondansetron Injectable 4 milliGRAM(s) IV Push every 6 hours PRN Nausea        PHYSICAL EXAM============================  General:                         Awake, alert, not in any distress  Neuro:                            Moving all extremities to commands.   Respiratory:	Air entry fair and  bilateral conducted sounds                                           Effort even and unlabored.  CV:		Regular rate and rhythm. Normal S1/S2                                          Distal pulses present.  Abdomen:	                     Soft, non-distended. Bowel sounds present   Skin:		No rash.  Extremities:	Warm, no cyanosis or edema.  Palpable pulses    ============================LABS=========================                        11.6   13.11 )-----------( 210      ( 12 Jan 2023 02:40 )             35.8     01-12    137  |  105  |  10  ----------------------------<  135<H>  3.9   |  23  |  0.51    Ca    8.4      12 Jan 2023 02:40  Phos  2.6     01-12  Mg     1.50     01-12      ASSESSMENT AND PLAN:     NEURO: Nonfocal  Post-operative Pain - Stable. Pain control with PCA and Tylenol IV PRN.        RESPIRATORY:  Hypoxia - Wean nasal cannula for goal O2sat above 92. Obtain CXR. Incentive spirometry. Chest PT and frequent suctioning. Continue bronchodilators. OOB to chair & ambulate w/ assistance. Continuous pulse oximetry for support & to prevent decompensation.       Chest tubes – Pleurevac regulated suctioning. Monitor chest tube output.        CARDIOVASCULAR:  Hemodynamically stable - Not on pressors. Continue hemodynamic monitoring.  Telemetry (medical test) - Reviewed by me today independently. Normal sinus rhythm.              RENAL:  Stable - Monitor IOs and electrolytes. Keep K above 4.0 and Mg above 2.0.          GASTROINTESTINAL:  GI prophylaxis   Zofran and Reglan IV PRN for nausea  NPO except ice chips, gen surg f/u       HEMATOLOGIC:  No signs of active bleeding. Monitor Hgb in CBC in AM  DVT prophylaxis with heparin subQ and SCDs.          INFECTIOUS DISEASE:  All surgical sites appear clean. No signs of active infection. Will monitor for fever and leukocytosis.                 ENDOCRINE:  Stable – Monitor glucose fingersticks for goal 120-180.             Pertinent clinical, laboratory, radiographic, hemodynamic, echocardiographic, respiratory data, microbiologic data and chart were reviewed by myself and analyzed frequently throughout the course of the day and night by myself.    Plan discussed at length with the CTICU staff and Attending CT Surgeon -   Dr Omar Rodriguez.    Patient's status was discussed with patient at bedside.         Sergey Ramirez DO, FACEP    
CHIEF COMPLAINT: FOLLOW UP IN ICU FOR POSTOPERATIVE CARE OF PATIENT WHO IS S/P hiatal hernia repair and Armen-Lila gastric bypass      PROCEDURES:     RVATS, Mobilization of the esophagus by thoracic surgery and Robotic lap assisted hiatal hernia repair with closure of baltazar. RnYGB with armen limb 150cm, and PB limb 60cm.robot-assisted by bariatric surgery team 11-Jan-2023       ISSUES:     Recurrent hiatal hernia  Postoperative pain  GERD  Obesity with BMI 32    INTERVAL EVENTS:     Chest tubes removed yesterday  on RA, afebrile, tolerating bariatric clears, passed some gas this morning  no reflux, tolerating liquid by mouth      HISTORY:   Patient reports moderate pain at chest wall incision sites which is worse with coughing and deep breathing without associated fever or dyspnea. Pain is improved with use of pain meds.     PHYSICAL EXAM:   Gen: Comfortable, No acute distress  Eyes: Sclera white, Conjunctiva normal, Eyelids normal, Pupils symmetrical   ENT: Mucous membranes moist,  ,  ,    Neck: Trachea midline,  ,  ,  ,  ,  ,    CV: Rate regular, Rhythm regular,  ,  ,    Resp: Breath sounds clear, No accessory muscles use,    Abd: Soft, Non-distended, Non-tender, Bowel sounds normal,  ,  ,    Skin: Warm, No peripheral edema of lower extremities,  ,    : No  blood  Neuro: Moving all 4 extremities,    Psych: A&Ox3      ASSESSMENT AND PLAN:     NEURO:  Post-operative Pain - Stable. Pain control with  Tylenol               RESPIRATORY:  No issues            CARDIOVASCULAR:  Hemodynamically stable - Not on pressors. Continue hemodynamic monitoring.  Telemetry (medical test) - Reviewed by me today independently. Normal sinus rhythm.              RENAL:  Stable - Monitor IOs and electrolytes. Keep K above 4.0 and Mg above 2.0.          GASTROINTESTINAL:  GI prophylaxis   Zofran and Reglan IV PRN for nausea  Bariatric liquid diet, passing some gas, non distended abdomen  F/u bariatric surgery recs           HEMATOLOGIC:  No signs of active bleeding. Monitor Hgb in CBC in AM  DVT prophylaxis with heparin subQ and SCDs.               INFECTIOUS DISEASE:  All surgical sites appear clean. No signs of active infection. Will monitor for fever and leukocytosis.                 ENDOCRINE:  Stable – Monitor glucose fingersticks for goal 120-180.             Pertinent clinical, laboratory, radiographic, hemodynamic, echocardiographic, respiratory data, microbiologic data and chart were reviewed by myself and analyzed frequently throughout the course of the day and night by myself.    Plan discussed at length with the CTICU staff and Attending CT Surgeon -   Dr Omar Rodriguez.    Patient's status was discussed with patient at bedside.    _________________________  VITAL SIGNS:  Vital Signs Last 24 Hrs  T(C): 36.6 (14 Jan 2023 08:00), Max: 37 (14 Jan 2023 04:00)  T(F): 97.8 (14 Jan 2023 08:00), Max: 98.6 (14 Jan 2023 04:00)  HR: 73 (14 Jan 2023 10:00) (67 - 98)  BP: 122/73 (14 Jan 2023 10:00) (104/78 - 138/74)  BP(mean): 87 (14 Jan 2023 10:00) (77 - 101)  RR: 27 (14 Jan 2023 10:00) (12 - 31)  SpO2: 99% (14 Jan 2023 10:00) (95% - 99%)    Parameters below as of 14 Jan 2023 10:00  Patient On (Oxygen Delivery Method): room air      I/Os:   I&O's Detail    13 Jan 2023 07:01  -  14 Jan 2023 07:00  --------------------------------------------------------  IN:    Lactated Ringers w/ Additives: 1800 mL    Oral Fluid: 200 mL  Total IN: 2000 mL    OUT:    Chest Tube (mL): 30 mL    Voided (mL): 5750 mL  Total OUT: 5780 mL    Total NET: -3780 mL      14 Jan 2023 07:01  -  14 Jan 2023 10:57  --------------------------------------------------------  IN:    Lactated Ringers w/ Additives: 75 mL  Total IN: 75 mL    OUT:    Voided (mL): 300 mL  Total OUT: 300 mL    Total NET: -225 mL              MEDICATIONS:  MEDICATIONS  (STANDING):  acetaminophen   IVPB .. 1000 milliGRAM(s) IV Intermittent once  albuterol/ipratropium for Nebulization 3 milliLiter(s) Nebulizer every 6 hours  chlorhexidine 2% Cloths 1 Application(s) Topical daily  heparin   Injectable 5000 Unit(s) SubCutaneous every 8 hours  lactated ringers 1000 milliLiter(s) (75 mL/Hr) IV Continuous <Continuous>  lidocaine   4% Patch 1 Patch Transdermal every 24 hours  pantoprazole  Injectable 40 milliGRAM(s) IV Push daily  sodium chloride 3%  Inhalation 4 milliLiter(s) Inhalation every 12 hours    MEDICATIONS  (PRN):  aluminum hydroxide/magnesium hydroxide/simethicone Suspension 30 milliLiter(s) Oral every 4 hours PRN Dyspepsia  diphenhydrAMINE Injectable 25 milliGRAM(s) IV Push every 4 hours PRN Pruritus  HYDROmorphone  Injectable 0.5 milliGRAM(s) IV Push every 4 hours PRN severe breakthrough pain not allevaited with oral opiates  ketorolac   Injectable 15 milliGRAM(s) IV Push every 6 hours PRN Moderate Pain (4 - 6)  naloxone Injectable 0.1 milliGRAM(s) IV Push every 3 minutes PRN For ANY of the following changes in patient status:  A. RR LESS THAN 10 breaths per minute, B. Oxygen saturation LESS THAN 90%, C. Sedation score of 6  ondansetron Injectable 4 milliGRAM(s) IV Push every 6 hours PRN Nausea  ondansetron Injectable 4 milliGRAM(s) IV Push every 6 hours PRN Nausea  oxyCODONE    Solution 5 milliGRAM(s) Oral every 4 hours PRN Moderate Pain (4 - 6)  oxyCODONE    Solution 10 milliGRAM(s) Oral every 4 hours PRN Severe Pain (7 - 10)      LABS:  Laboratory data was independently reviewed by me today.                           11.7   6.16  )-----------( 177      ( 14 Jan 2023 04:30 )             36.0     01-14    132<L>  |  101  |  6<L>  ----------------------------<  73  4.6   |  20<L>  |  0.58    Ca    9.2      14 Jan 2023 04:30  Phos  3.3     01-14  Mg     1.90     01-14                RADIOLOGY:   Radiology images were independently reviewed by me today. Reports were reviewed by me today.    Xray Chest 1 View- PORTABLE-Urgent:   ACC: [id number] EXAM:  XR CHEST PORTABLE URGENT 1V                          PROCEDURE DATE:  01/13/2023          INTERPRETATION:  INDICATION: Status post chest tube removal    COMPARISON: Chest radiograph 1/13/2023    FINDINGS:  Heart/Vascular: The cardiac silhouette is normal in size. Right IJ   smallbore catheter terminating within the distal SVC.  Pulmonary: No focal consolidation, pleural effusion or pneumothorax.   Interval removal of right chest tube with residual right chest wall   subcutaneous emphysema.  Bones: The visualized osseous structures are unremarkable.    Impression:  Status post right chest tube removal without pneumothorax.    --- End of Report ---          STEPHANIE JARRETT DO; Resident Radiologist  This document has been electronically signed.  AMY VILLAFUERTE MD; Attending Radiologist  This document has been electronically signed. Jan 13 2023 11:28AM (01-13-23 @ 09:25)  Xray Chest 1 View- PORTABLE-Routine:   ACC: [id number] EXAM:  XR CHEST PORTABLE ROUTINE 1V                          PROCEDURE DATE:  01/13/2023          INTERPRETATION:  CLINICAL INFORMATION: Postop    TIME OF EXAMINATION: January 13 at 5:14 AM    EXAM: Portable chest    FINDINGS:  Right chest tube and right IJ line in place. Low lung volumes but no   focal consolidations. No pneumothorax. Residual subcutaneous air in the   right chest.        COMPARISON: January 12        IMPRESSION: Status post right thoracotomy with chest tube and no   pneumothorax.    --- End of Report ---            AMY VILLAFUERTE MD; Attending Radiologist  This document has been electronically signed. Jan 13 2023  3:02PM (01-13-23 @ 05:42)  Xray Chest 1 View- PORTABLE-Urgent:   ACC: [id number] EXAM:  XR CHEST PORTABLE URGENT 1V                        ACC: [id number] EXAM:  XR CHEST PORTABLE URGENT 1V                          PROCEDURE DATE:  01/12/2023          INTERPRETATION:  INDICATION: Chest tube removal    COMPARISON: Chest radiograph 1/12/2023    FINDINGS:    1/12/2023 12:05 PM:  Heart/Vascular: The cardiac silhouette is normal in size. Right IJ   smallbore catheter with tip terminating within the distal SVC.  Pulmonary: Bilateral chest tubes in place with associated chest wall   subcutaneous emphysema. No focal consolidation, pleural effusion or large   pneumothorax.  Bones: The visualized osseous structures are unremarkable.    1/12/2023 1:40 PM: Interval removal of left chest tube. No pneumothorax.    Impression:  Status post left chest tube removal without pneumothorax.          --- End of Report ---          STEPHANIE JARRETT DO; Resident Radiologist  This document has been electronically signed.  AMY VILLAFUERTE MD; Attending Radiologist  This document has been electronically signed. Jan 12 2023  3:16PM (01-12-23 @ 13:59)       
EROSNORBERT      47y   Female   MRN-[id number]         No Known Allergies             Daily     Daily Weight in k (2023 20:00)Drug Dosing Weight  Height (cm): 160 (2023 06:43)  Weight (kg): 83.5 (2023 06:43)  BMI (kg/m2): 32.6 (2023 06:43)  BSA (m2): 1.87 (2023 06:43)    HPI:  48 y/o female with hx of obesity, hiatal hernia, s/p repair of hiatla hernia and gastric  sleeve surgery May 2022.  Pt report hiatal hernia returned, and once again  repaired 2022.  Pt reports hiatal hernia returned, and she has severe gastric reflux.   Scheduled for Robotic Assisted laparoscopic Rou-En Gastric Bypass.  (30 Dec 2022 11:21)      CHIEF COMPLAINT: Follow up in ICU  for postoperative care of patient who is s/p RVATS, Mobilization of the esophagus by thoracic surgery and Robotic lap assisted hiatal hernia repair with closure of baltazar. RnYGB with armen limb 150cm, and PB limb 60cm.    Procedure: RVATS, Mobilization of the esophagus by thoracic surgery and Robotic lap assisted hiatal hernia repair with closure of baltazar. RnYGB with armen limb 150cm, and PB limb 60cm.robot-assisted by bariatric surgery team 2023                        Issues:  Recurrent hiatal hernia  Postoperative pain  Chest tubes in place  GERD  Obesity with BMI 32  Hx of Asthma    Postop course:     Patient reports moderate pain at chest wall incision sites which is worse with coughing and deep breathing without associated fever or dyspnea. Pain is improved with use of PCA and  oral pain meds.         Home Medications:  PriLOSEC 40 mg oral delayed release capsule: 1 cap(s) orally once a day AM Do not swallow whole. Open capsule and take in applesauce (2023 10:29)    PAST MEDICAL & SURGICAL HISTORY:  Asthma  ~&gt;20 yrs ago last exacerbation, never hospitalized or intubated      Fibroids      Obesity      Chronic GERD      Hiatal hernia      S/P hysterectomy        History of repair of hiatal hernia  laparoscopic      History of sleeve gastrectomy        Vital Signs Last 24 Hrs  T(C): 37.3 (2023 08:00), Max: 37.3 (2023 08:00)  T(F): 99.1 (2023 08:00), Max: 99.1 (2023 08:00)  HR: 98 (2023 11:00) (62 - 867)  BP: 113/62 (2023 11:00) (93/73 - 122/75)  BP(mean): 78 (2023 11:00) (65 - 93)  RR: 17 (2023 11:00) (14 - 29)  SpO2: 95% (2023 11:00) (95% - 100%)    Parameters below as of 2023 11:00  Patient On (Oxygen Delivery Method): room air      I&O's Detail    2023 07:01  -  2023 07:00  --------------------------------------------------------  IN:    IV PiggyBack: 250 mL    Lactated Ringers: 450 mL    Lactated Ringers w/ Additives: 2625 mL    Oral Fluid: 500 mL  Total IN: 3825 mL    OUT:    Chest Tube (mL): 10 mL    Chest Tube (mL): 130 mL    Indwelling Catheter - Urethral (mL): 370 mL    Voided (mL): 1300 mL  Total OUT: 1810 mL    Total NET: 2015 mL      2023 07:01  -  2023 11:42  --------------------------------------------------------  IN:    Lactated Ringers w/ Additives: 300 mL  Total IN: 300 mL    OUT:    Chest Tube (mL): 30 mL    Voided (mL): 1400 mL  Total OUT: 1430 mL    Total NET: -1130 mL        CAPILLARY BLOOD GLUCOSE      POCT Blood Glucose.: 108 mg/dL (2023 12:11)    Home Medications:  PriLOSEC 40 mg oral delayed release capsule: 1 cap(s) orally once a day AM Do not swallow whole. Open capsule and take in applesauce (2023 10:29)    MEDICATIONS  (STANDING):  acetaminophen   IVPB .. 1000 milliGRAM(s) IV Intermittent once  albuterol/ipratropium for Nebulization 3 milliLiter(s) Nebulizer every 6 hours  chlorhexidine 2% Cloths 1 Application(s) Topical daily  heparin   Injectable 5000 Unit(s) SubCutaneous every 8 hours  lactated ringers 1000 milliLiter(s) (75 mL/Hr) IV Continuous <Continuous>  lidocaine   4% Patch 1 Patch Transdermal every 24 hours  pantoprazole  Injectable 40 milliGRAM(s) IV Push daily  sodium chloride 3%  Inhalation 4 milliLiter(s) Inhalation every 12 hours    MEDICATIONS  (PRN):  aluminum hydroxide/magnesium hydroxide/simethicone Suspension 30 milliLiter(s) Oral every 4 hours PRN Dyspepsia  diphenhydrAMINE Injectable 25 milliGRAM(s) IV Push every 4 hours PRN Pruritus  HYDROmorphone  Injectable 0.5 milliGRAM(s) IV Push every 4 hours PRN severe breakthrough pain not allevaited with oral opiates  ketorolac   Injectable 15 milliGRAM(s) IV Push every 6 hours PRN Moderate Pain (4 - 6)  naloxone Injectable 0.1 milliGRAM(s) IV Push every 3 minutes PRN For ANY of the following changes in patient status:  A. RR LESS THAN 10 breaths per minute, B. Oxygen saturation LESS THAN 90%, C. Sedation score of 6  ondansetron Injectable 4 milliGRAM(s) IV Push every 6 hours PRN Nausea  ondansetron Injectable 4 milliGRAM(s) IV Push every 6 hours PRN Nausea  oxyCODONE    Solution 5 milliGRAM(s) Oral every 4 hours PRN Moderate Pain (4 - 6)  oxyCODONE    Solution 10 milliGRAM(s) Oral every 4 hours PRN Severe Pain (7 - 10)        Physical exam:                             General:               Pt is awake, alert,  appears to be in pain but not in distress                                                  Neuro:                  Nonfocal                             Psych:                   A&Ox3                          Cardiovascular:   S1 & S2, regular                           Respiratory:         Air entry is fair and equal on both sides, has bilateral conducted sounds                           GI:                          Soft, nondistended and nontender, Bowel sounds absent                           Ext:                        No cyanosis or edema     Labs:                                                                           11.0   8.13  )-----------( 178      ( 2023 03:40 )             34.9             01-13    139  |  103  |  8   ----------------------------<  94  4.2   |  26  |  0.64    Ca    8.9      2023 03:40  Phos  2.9       Mg     2.20                           CXR:  < from: Xray Chest 1 View- PORTABLE-Urgent (Xray Chest 1 View- PORTABLE-Urgent .) (23 @ 09:25) >  Heart/Vascular: The cardiac silhouette is normal in size. Right IJ   smallbore catheter terminating within the distal SVC.  Pulmonary: No focal consolidation, pleural effusion or pneumothorax.   Interval removal of right chest tube with residual right chest wall   subcutaneous emphysema.  Bones: The visualized osseous structures are unremarkable.    Impression:  Status post right chest tube removal without pneumothorax.      Plan:  General: 47yFemale s/p RVATS, Mobilization of the esophagus by thoracic surgery and Robotic lap assisted hiatal hernia repair with closure of baltazar. RnYGB with armen limb 150cm, and PB limb 60cm.robot-assisted by bariatric surgery team 2023  , experiencing  pain with deep breathing.                             Neuro:                                         Pain control with PCA  /  Tylenol PRN                            Cardiovascular:                                          Telemetry (medical test) - Reviewed by me today independently. Normal sinus rhythm .                                          Continue hemodynamic monitoring to prevent decompensation.                            Respiratory:                                         Postop hypoxemia -Resolved. On RA and looks comfortable                                              Obtain CXR . Encourage incentive spirometry.                                                   Chest PT and frequent suctioning. Continue bronchodilators, Pulmozyme and inhaled 3% saline inhalations.                                                      OOB to chair & ambulate w/ assistance.                                                           Continuous pulse oximetry for support & to prevent decompensation.                                                           Chest tube d/cd today                            GI                                         On bariatric clears                                         Continue Zofran / Reglan for nausea - PRN                                                                 Renal:                                         Continue LR  75cc/hr                                         Monitor I/Os and electrolytes                                                                                        Hem/ Onc:                                         DVT prophylaxis with SQ Heparin and SCDs                                         Follow CBC in AM                           Infectious disease:                                            Monitor for fever / leukocytosis.                                          All surgical incision / chest tube  sites look clean                            Endocrine                                             Continue Accu-Checks with coverage                                                 Pertinent clinical, laboratory, radiographic, hemodynamic, echocardiographic, respiratory data, microbiologic data and chart were reviewed and analyzed frequently throughout the course of the day and night.     Patient seen, examined and plan discussed with CT Surgeon Dr. Cason  / CTICU team during rounds.    OOB to chair and ambulate with physical therapy as tolerated.     Status discussed with patient and updated plan of care.     I have spent 40 minutes with this patient including 20 minutes of time coordinating care in the ICU.            Luis Dunn MD                                                                    
POST ANESTHESIA EVALUATION    47y Female POSTOP DAY 1      MENTAL STATUS: Patient participation [ x ] Awake     [  ] Arousable     [  ] Sedated    AIRWAY PATENCY: [x  ] Satisfactory  [  ] Other:     Vital Signs Last 24 Hrs  T(C): 36.7 (12 Jan 2023 08:00), Max: 36.9 (11 Jan 2023 20:00)  T(F): 98 (12 Jan 2023 08:00), Max: 98.4 (11 Jan 2023 20:00)  HR: 89 (12 Jan 2023 10:01) (78 - 100)  BP: 96/52 (12 Jan 2023 10:00) (94/60 - 142/102)  BP(mean): 60 (12 Jan 2023 10:00) (60 - 115)  RR: 19 (12 Jan 2023 10:00) (11 - 30)  SpO2: 100% (12 Jan 2023 10:01) (93% - 100%)    Parameters below as of 12 Jan 2023 10:01  Patient On (Oxygen Delivery Method): room air      I&O's Summary    11 Jan 2023 07:01  -  12 Jan 2023 07:00  --------------------------------------------------------  IN: 2550 mL / OUT: 1170 mL / NET: 1380 mL    12 Jan 2023 07:01  -  12 Jan 2023 10:18  --------------------------------------------------------  IN: 500 mL / OUT: 290 mL / NET: 210 mL          NAUSEA/ VOMITTING:  [ x ] NONE  [  ] CONTROLLED [  ] OTHER     PAIN: [ x ] CONTROLLED WITH CURRENT REGIMEN  [  ] OTHER    [ x ] NO APPARENT ANESTHESIA COMPLICATIONS      Comments: 
Surgery Progress Note  Patient is a 47y old  Female who presents with a chief complaint of Diaphragmatic hernia without obstruction and without gangrene     (12 Jan 2023 10:32)      Overnight Events: No acute events overnight.  SUBJECTIVE: Patient seen and examined at bedside with surgical team, patient without complaints. Denies fever, chills, CP, SOB nausea, vomiting, dizziness.    START [id number]    139  |  103  |  8   ----------------------------<  94  4.2   |  26  |  0.64    Ca    8.9      13 Jan 2023 03:40  Phos  2.9     01-13  Mg     2.20     01-13    POCT Blood Glucose.: 108 mg/dL (01-12-23 @ 12:11)  A1C with Estimated Average Glucose Result: 5.1 % (04-22-22 @ 21:47)  END CHEMFISH  START MEDSACTIVEMEDICATIONS  (STANDING):  acetaminophen   IVPB .. 1000 milliGRAM(s) IV Intermittent once  albuterol/ipratropium for Nebulization 3 milliLiter(s) Nebulizer every 6 hours  chlorhexidine 2% Cloths 1 Application(s) Topical daily  heparin   Injectable 5000 Unit(s) SubCutaneous every 8 hours  lactated ringers 1000 milliLiter(s) (125 mL/Hr) IV Continuous <Continuous>  lidocaine   4% Patch 1 Patch Transdermal every 24 hours  pantoprazole  Injectable 40 milliGRAM(s) IV Push daily  sodium chloride 3%  Inhalation 4 milliLiter(s) Inhalation every 12 hours    MEDICATIONS  (PRN):  aluminum hydroxide/magnesium hydroxide/simethicone Suspension 30 milliLiter(s) Oral every 4 hours PRN Dyspepsia  diphenhydrAMINE Injectable 25 milliGRAM(s) IV Push every 4 hours PRN Pruritus  HYDROmorphone  Injectable 0.5 milliGRAM(s) IV Push every 4 hours PRN severe breakthrough pain not allevaited with oral opiates  ketorolac   Injectable 15 milliGRAM(s) IV Push every 6 hours PRN Moderate Pain (4 - 6)  naloxone Injectable 0.1 milliGRAM(s) IV Push every 3 minutes PRN For ANY of the following changes in patient status:  A. RR LESS THAN 10 breaths per minute, B. Oxygen saturation LESS THAN 90%, C. Sedation score of 6  ondansetron Injectable 4 milliGRAM(s) IV Push every 6 hours PRN Nausea  ondansetron Injectable 4 milliGRAM(s) IV Push every 6 hours PRN Nausea  oxyCODONE    Solution 5 milliGRAM(s) Oral every 4 hours PRN Moderate Pain (4 - 6)  oxyCODONE    Solution 10 milliGRAM(s) Oral every 4 hours PRN Severe Pain (7 - 10)  END MEDSACTIVE  START DIETORDERDiet, Clear Liquid:   Bariatric Clear Liquid (BARICLLIQ) (01-12-23 @ 14:31)  END DIETORDER  START ADMITDXDiaphragmatic hernia without obstruction and without gangrene    END ADMITDX  START IOFS  01-11-23 @ 07:01  -  01-12-23 @ 07:00  --------------------------------------------------------  OUT:    Chest Tube (mL): 65 mL    Chest Tube (mL): 30 mL  Total OUT: 95 mL    Total NET: -95 mL      01-12-23 @ 07:01  -  01-13-23 @ 05:18  --------------------------------------------------------  OUT:    Chest Tube (mL): 10 mL    Chest Tube (mL): 130 mL  Total OUT: 140 mL    Total NET: -140 mL      END IOFS  START SKINPUEND SKINPU    OBJECTIVE:    Vital Signs Last 24 Hrs  T(C): 36.8 (13 Jan 2023 00:00), Max: 36.9 (12 Jan 2023 20:00)  T(F): 98.3 (13 Jan 2023 00:00), Max: 98.4 (12 Jan 2023 20:00)  HR: 73 (13 Jan 2023 02:00) (73 - 867)  BP: 111/71 (13 Jan 2023 02:00) (94/55 - 122/75)  BP(mean): 84 (13 Jan 2023 02:00) (60 - 96)  RR: 15 (13 Jan 2023 02:00) (12 - 29)  SpO2: 99% (13 Jan 2023 02:00) (95% - 100%)    Parameters below as of 13 Jan 2023 02:00  Patient On (Oxygen Delivery Method): room air    I&O's Detail    11 Jan 2023 07:01  -  12 Jan 2023 07:00  --------------------------------------------------------  IN:    IV PiggyBack: 450 mL    Lactated Ringers: 2100 mL  Total IN: 2550 mL    OUT:    Chest Tube (mL): 65 mL    Chest Tube (mL): 30 mL    Indwelling Catheter - Urethral (mL): 1075 mL  Total OUT: 1170 mL    Total NET: 1380 mL      12 Jan 2023 07:01  -  13 Jan 2023 05:18  --------------------------------------------------------  IN:    IV PiggyBack: 250 mL    Lactated Ringers: 450 mL    Lactated Ringers w/ Additives: 2000 mL    Oral Fluid: 500 mL  Total IN: 3200 mL    OUT:    Chest Tube (mL): 10 mL    Chest Tube (mL): 130 mL    Indwelling Catheter - Urethral (mL): 370 mL    Voided (mL): 600 mL  Total OUT: 1110 mL    Total NET: 2090 mL      MEDICATIONS  (STANDING):  acetaminophen   IVPB .. 1000 milliGRAM(s) IV Intermittent once  albuterol/ipratropium for Nebulization 3 milliLiter(s) Nebulizer every 6 hours  chlorhexidine 2% Cloths 1 Application(s) Topical daily  heparin   Injectable 5000 Unit(s) SubCutaneous every 8 hours  lactated ringers 1000 milliLiter(s) (125 mL/Hr) IV Continuous <Continuous>  lidocaine   4% Patch 1 Patch Transdermal every 24 hours  pantoprazole  Injectable 40 milliGRAM(s) IV Push daily  sodium chloride 3%  Inhalation 4 milliLiter(s) Inhalation every 12 hours    MEDICATIONS  (PRN):  aluminum hydroxide/magnesium hydroxide/simethicone Suspension 30 milliLiter(s) Oral every 4 hours PRN Dyspepsia  diphenhydrAMINE Injectable 25 milliGRAM(s) IV Push every 4 hours PRN Pruritus  HYDROmorphone  Injectable 0.5 milliGRAM(s) IV Push every 4 hours PRN severe breakthrough pain not allevaited with oral opiates  ketorolac   Injectable 15 milliGRAM(s) IV Push every 6 hours PRN Moderate Pain (4 - 6)  naloxone Injectable 0.1 milliGRAM(s) IV Push every 3 minutes PRN For ANY of the following changes in patient status:  A. RR LESS THAN 10 breaths per minute, B. Oxygen saturation LESS THAN 90%, C. Sedation score of 6  ondansetron Injectable 4 milliGRAM(s) IV Push every 6 hours PRN Nausea  ondansetron Injectable 4 milliGRAM(s) IV Push every 6 hours PRN Nausea  oxyCODONE    Solution 5 milliGRAM(s) Oral every 4 hours PRN Moderate Pain (4 - 6)  oxyCODONE    Solution 10 milliGRAM(s) Oral every 4 hours PRN Severe Pain (7 - 10)      PHYSICAL EXAM:  Constitutional: A&Ox3, NAD  Respiratory: Unlabored breathing, Right chest tube still in place draining SS  Abdomen: Soft, mildly tender, mildly distended,   Extremities: WWP, GUARDADO spontaneously    LABS:                        11.0   8.13  )-----------( 178      ( 13 Jan 2023 03:40 )             34.9     01-13    139  |  103  |  8   ----------------------------<  94  4.2   |  26  |  0.64    Ca    8.9      13 Jan 2023 03:40  Phos  2.9     01-13  Mg     2.20     01-13                IMAGING:    
Surgery Progress Note  Patient is a 47y old  Female who presents with a chief complaint of Recurrent hiatal hernia (11 Jan 2023 18:58)      Overnight Events: No acute events overnight.  SUBJECTIVE: Patient seen and examined at bedside with surgical team, patient still having some post-op abd pain. Denies fever, chills, CP, SOB nausea, vomiting, dizziness.    START [id number]    137  |  105  |  10  ----------------------------<  135<H>  3.9   |  23  |  0.51    Ca    8.4      12 Jan 2023 02:40  Phos  2.6     01-12  Mg     1.50     01-12    A1C with Estimated Average Glucose Result: 5.1 % (04-22-22 @ 21:47)  END CHEMFISH  START MEDSACTIVEMEDICATIONS  (STANDING):  acetaminophen   IVPB .. 750 milliGRAM(s) IV Intermittent every 6 hours  albuterol/ipratropium for Nebulization 3 milliLiter(s) Nebulizer every 6 hours  folic acid Injectable 1 milliGRAM(s) IV Push once  heparin   Injectable 5000 Unit(s) SubCutaneous every 8 hours  lactated ringers. 1000 milliLiter(s) (150 mL/Hr) IV Continuous <Continuous>  pantoprazole  Injectable 40 milliGRAM(s) IV Push daily  sodium chloride 0.9% 1000 milliLiter(s) (250 mL/Hr) IV Continuous <Continuous>  sodium chloride 3%  Inhalation 4 milliLiter(s) Inhalation every 12 hours  thiamine Injectable 100 milliGRAM(s) IV Push once    MEDICATIONS  (PRN):  aluminum hydroxide/magnesium hydroxide/simethicone Suspension 30 milliLiter(s) Oral every 4 hours PRN Dyspepsia  diphenhydrAMINE Injectable 25 milliGRAM(s) IV Push every 4 hours PRN Pruritus  ketorolac   Injectable 15 milliGRAM(s) IV Push every 6 hours PRN Moderate Pain (4 - 6)  naloxone Injectable 0.1 milliGRAM(s) IV Push every 3 minutes PRN For ANY of the following changes in patient status:  A. RR LESS THAN 10 breaths per minute, B. Oxygen saturation LESS THAN 90%, C. Sedation score of 6  ondansetron Injectable 4 milliGRAM(s) IV Push every 6 hours PRN Nausea  ondansetron Injectable 4 milliGRAM(s) IV Push every 6 hours PRN Nausea  END MEDSACTIVE  START DIETORDERDiet, NPO:   With Ice Chips/Sips of Water (01-11-23 @ 18:14)  END DIETORDER  START ADMITDXDiaphragmatic hernia without obstruction and without gangrene    END ADMITDX  START IOFS  01-11-23 @ 07:01  -  01-12-23 @ 07:00  --------------------------------------------------------  OUT:    Chest Tube (mL): 65 mL    Chest Tube (mL): 30 mL  Total OUT: 95 mL    Total NET: -95 mL      01-12-23 @ 07:01  -  01-12-23 @ 10:05  --------------------------------------------------------  OUT:    Chest Tube (mL): 30 mL    Chest Tube (mL): 10 mL  Total OUT: 40 mL    Total NET: -40 mL      END IOFS  START SKINPUEND SKINPU    OBJECTIVE:    Vital Signs Last 24 Hrs  T(C): 36.7 (12 Jan 2023 08:00), Max: 36.9 (11 Jan 2023 20:00)  T(F): 98 (12 Jan 2023 08:00), Max: 98.4 (11 Jan 2023 20:00)  HR: 84 (12 Jan 2023 10:00) (78 - 100)  BP: 96/52 (12 Jan 2023 10:00) (94/60 - 142/102)  BP(mean): 60 (12 Jan 2023 10:00) (60 - 115)  RR: 19 (12 Jan 2023 10:00) (11 - 30)  SpO2: 100% (12 Jan 2023 10:00) (93% - 100%)    Parameters below as of 12 Jan 2023 10:00  Patient On (Oxygen Delivery Method): room air    I&O's Detail    11 Jan 2023 07:01  -  12 Jan 2023 07:00  --------------------------------------------------------  IN:    IV PiggyBack: 450 mL    Lactated Ringers: 2100 mL  Total IN: 2550 mL    OUT:    Chest Tube (mL): 65 mL    Chest Tube (mL): 30 mL    Indwelling Catheter - Urethral (mL): 1075 mL  Total OUT: 1170 mL    Total NET: 1380 mL      12 Jan 2023 07:01  -  12 Jan 2023 10:05  --------------------------------------------------------  IN:    IV PiggyBack: 50 mL    Lactated Ringers: 150 mL  Total IN: 200 mL    OUT:    Chest Tube (mL): 30 mL    Chest Tube (mL): 10 mL    Indwelling Catheter - Urethral (mL): 150 mL  Total OUT: 190 mL    Total NET: 10 mL      MEDICATIONS  (STANDING):  acetaminophen   IVPB .. 750 milliGRAM(s) IV Intermittent every 6 hours  albuterol/ipratropium for Nebulization 3 milliLiter(s) Nebulizer every 6 hours  folic acid Injectable 1 milliGRAM(s) IV Push once  heparin   Injectable 5000 Unit(s) SubCutaneous every 8 hours  lactated ringers. 1000 milliLiter(s) (150 mL/Hr) IV Continuous <Continuous>  pantoprazole  Injectable 40 milliGRAM(s) IV Push daily  sodium chloride 0.9% 1000 milliLiter(s) (250 mL/Hr) IV Continuous <Continuous>  sodium chloride 3%  Inhalation 4 milliLiter(s) Inhalation every 12 hours  thiamine Injectable 100 milliGRAM(s) IV Push once    MEDICATIONS  (PRN):  aluminum hydroxide/magnesium hydroxide/simethicone Suspension 30 milliLiter(s) Oral every 4 hours PRN Dyspepsia  diphenhydrAMINE Injectable 25 milliGRAM(s) IV Push every 4 hours PRN Pruritus  ketorolac   Injectable 15 milliGRAM(s) IV Push every 6 hours PRN Moderate Pain (4 - 6)  naloxone Injectable 0.1 milliGRAM(s) IV Push every 3 minutes PRN For ANY of the following changes in patient status:  A. RR LESS THAN 10 breaths per minute, B. Oxygen saturation LESS THAN 90%, C. Sedation score of 6  ondansetron Injectable 4 milliGRAM(s) IV Push every 6 hours PRN Nausea  ondansetron Injectable 4 milliGRAM(s) IV Push every 6 hours PRN Nausea      PHYSICAL EXAM:  Constitutional: A&Ox3, NAD  Respiratory: Unlabored breathing  CV: Bilateral Chest tubes draining SS fluid  Abdomen: Soft, NT, mildly distended, incisions c/d/i  RINKU: Crawford catheter in place  Extremities: WWP, GUARDADO spontaneously    LABS:                        11.6   13.11 )-----------( 210      ( 12 Jan 2023 02:40 )             35.8     01-12    137  |  105  |  10  ----------------------------<  135<H>  3.9   |  23  |  0.51    Ca    8.4      12 Jan 2023 02:40  Phos  2.6     01-12  Mg     1.50     01-12                IMAGING:

## 2023-01-14 NOTE — CHART NOTE - NSCHARTNOTEFT_GEN_A_CORE
POD3 s/p robot-assisted trans-thoracic HHR and conversion of sleeve to Poncho-en-Y gastric bypass.  Continues to do well.  R chest tube was removed yesterday.  Her breathing is comfortable and sats normal on room air.  Passed a bit of flatus.  Tolerating clears.  Feels some abdominal bloating/pressure but no significant pain.  No reflux/dysphagia/nausea reported.    Vitals nl  Abd soft, nontender, nondistended  Incisions healing well    Transfer out of ICU as per thoracic  Continue bariatric clears  Encouraged more ambulation  GI/DVT ppx    Rahul Field MD
STATUS POST:  Robotic lap assisted hiatal hernia repair and RnYGB     POST OPERATIVE DAY #: 0    SUBJECTIVE: Pt seen at CTICU, resting comfortably. Denies N/V. Pain controlled. Doing well.       Vital Signs Last 24 Hrs  T(C): 36.9 (2023 20:00), Max: 37 (2023 06:43)  T(F): 98.4 (2023 20:00), Max: 98.6 (2023 06:43)  HR: 90 (2023 20:00) (66 - 100)  BP: 124/87 (2023 20:00) (96/81 - 124/87)  BP(mean): 98 (2023 20:00) (73 - 98)  RR: 12 (:00) (12 - 30)  SpO2: 100% (:00) (100% - 100%)    Parameters below as of 2023 20:00  Patient On (Oxygen Delivery Method): nasal cannula w/ humidification  O2 Flow (L/min): 4    I&O's Summary    2023 07:  -  2023 21:46  --------------------------------------------------------  IN: 750 mL / OUT: 200 mL / NET: 550 mL      I&O's Detail    2023 07:01  -  2023 21:46  --------------------------------------------------------  IN:    IV PiggyBack: 150 mL    Lactated Ringers: 600 mL  Total IN: 750 mL    OUT:    Chest Tube (mL): 20 mL    Chest Tube (mL): 30 mL    Indwelling Catheter - Urethral (mL): 150 mL  Total OUT: 200 mL    Total NET: 550 mL          MEDICATIONS  (STANDING):  acetaminophen   IVPB .. 1000 milliGRAM(s) IV Intermittent once  acetaminophen   IVPB .. 1000 milliGRAM(s) IV Intermittent once  ceFAZolin   IVPB 2000 milliGRAM(s) IV Intermittent every 8 hours  folic acid Injectable 1 milliGRAM(s) IV Push once  heparin   Injectable 5000 Unit(s) SubCutaneous every 8 hours  HYDROmorphone PCA (1 mG/mL) 30 milliLiter(s) PCA Continuous PCA Continuous  lactated ringers. 1000 milliLiter(s) (150 mL/Hr) IV Continuous <Continuous>  pantoprazole  Injectable 40 milliGRAM(s) IV Push daily  potassium chloride  10 mEq/100 mL IVPB 10 milliEquivalent(s) IV Intermittent every 1 hour  sodium chloride 0.9% 1000 milliLiter(s) (250 mL/Hr) IV Continuous <Continuous>  thiamine Injectable 100 milliGRAM(s) IV Push once    MEDICATIONS  (PRN):  aluminum hydroxide/magnesium hydroxide/simethicone Suspension 30 milliLiter(s) Oral every 4 hours PRN Dyspepsia  diphenhydrAMINE Injectable 25 milliGRAM(s) IV Push every 4 hours PRN Pruritus  HYDROmorphone  Injectable 0.5 milliGRAM(s) IV Push every 3 hours PRN Severe Pain (7 - 10)  HYDROmorphone PCA (1 mG/mL) Rescue Clinician Bolus 0.5 milliGRAM(s) IV Push every 15 minutes PRN for Pain Scale GREATER THAN 6  naloxone Injectable 0.1 milliGRAM(s) IV Push every 3 minutes PRN For ANY of the following changes in patient status:  A. RR LESS THAN 10 breaths per minute, B. Oxygen saturation LESS THAN 90%, C. Sedation score of 6  ondansetron Injectable 4 milliGRAM(s) IV Push every 6 hours PRN Nausea  ondansetron Injectable 4 milliGRAM(s) IV Push every 6 hours PRN Nausea      LABS:    PE:  Gen: AAOx3, non-toxic  Head: NCAT  HEENT: EOMI, oral mucosa moist, normal conjunctiva  LunL NC. B/L CT SA output.   Abd: soft, NTND, no guarding.   MSK: no visible deformities  Neuro: No focal sensory or motor deficits  : Light yellow urine Crawford     A/P: 47y Female s/p Robotic lap assisted hiatal hernia repair and RnYGB    - IVF/NPO w/sips will notify when to adv to carloz-clears   - Encourage early, safe, mobilization OOB as anthony   - IV Abx   - Pain ctrl   - DVT ppx  - Care per primary team
POD1 s/p robotic repair of re-recurrent hiatal hernia, and conversion of sleeve gastrectomy to RYGB.  No acute events overnight.  C/o discomfort at thoracic incisions/chest tube sites.  Using IS.  Did not drink anything overnight.  (-) flatus    Vitals reviewed - stable  NAD  Sitting up in chair  B/L chest tubes in place  Abd soft, nondistended, incisions clean and dry      Appreciate continued CT ICU care  Primary management for now as per Dr. Rodirguez's team  Start bariatric clears PO today  Consider removing blood  Encouraged frequent Incentive spirometer use    Rahul Field MD
POD2 s/p robotic HHR, conversion of sleeve to armen-en-Y gastric bypass  Doing well, feels more comfortable, breathing well  Off supplemental O2  L chest tube out  Reports she is tolerating clears, no nausea, no abdominal pain  (-) flatus reported    VSS  R CT in place  Abdomen soft  Inc c/d/i    Cont bariatric protocol -- carloz clears PO  Encouraged ambulation  GI/DVT ppx  CT management per thoracic    Rahul Field MD

## 2023-01-14 NOTE — DISCHARGE NOTE NURSING/CASE MANAGEMENT/SOCIAL WORK - NSDCPEFALRISK_GEN_ALL_CORE
For information on Fall & Injury Prevention, visit: [email or web address] OR  [email or web address] OR  https://www.cdc.gov/steadi/patient.html

## 2023-01-17 ENCOUNTER — INPATIENT (INPATIENT)
Facility: HOSPITAL | Age: 48
LOS: 2 days | Discharge: HOME | DRG: 392 | End: 2023-01-20
Attending: SURGERY | Admitting: SURGERY
Payer: MEDICAID

## 2023-01-17 VITALS
TEMPERATURE: 98 F | HEART RATE: 102 BPM | DIASTOLIC BLOOD PRESSURE: 75 MMHG | SYSTOLIC BLOOD PRESSURE: 113 MMHG | WEIGHT: 169.98 LBS | RESPIRATION RATE: 20 BRPM | HEIGHT: 63 IN | OXYGEN SATURATION: 99 %

## 2023-01-17 DIAGNOSIS — Z98.890 OTHER SPECIFIED POSTPROCEDURAL STATES: Chronic | ICD-10-CM

## 2023-01-17 DIAGNOSIS — Z90.710 ACQUIRED ABSENCE OF BOTH CERVIX AND UTERUS: Chronic | ICD-10-CM

## 2023-01-17 DIAGNOSIS — Z90.3 ACQUIRED ABSENCE OF STOMACH [PART OF]: Chronic | ICD-10-CM

## 2023-01-17 LAB
ALBUMIN SERPL ELPH-MCNC: 4.5 G/DL — SIGNIFICANT CHANGE UP (ref 3.3–5)
ALP SERPL-CCNC: 78 U/L — SIGNIFICANT CHANGE UP (ref 40–120)
ALT FLD-CCNC: 22 U/L — SIGNIFICANT CHANGE UP (ref 10–45)
ANION GAP SERPL CALC-SCNC: 22 MMOL/L — HIGH (ref 5–17)
AST SERPL-CCNC: 21 U/L — SIGNIFICANT CHANGE UP (ref 10–40)
BASE EXCESS BLDV CALC-SCNC: -5.8 MMOL/L — LOW (ref -2–3)
BASOPHILS # BLD AUTO: 0.03 K/UL — SIGNIFICANT CHANGE UP (ref 0–0.2)
BASOPHILS NFR BLD AUTO: 0.4 % — SIGNIFICANT CHANGE UP (ref 0–2)
BILIRUB SERPL-MCNC: 0.6 MG/DL — SIGNIFICANT CHANGE UP (ref 0.2–1.2)
BUN SERPL-MCNC: 11 MG/DL — SIGNIFICANT CHANGE UP (ref 7–23)
CA-I SERPL-SCNC: 1.3 MMOL/L — SIGNIFICANT CHANGE UP (ref 1.15–1.33)
CALCIUM SERPL-MCNC: 10.6 MG/DL — HIGH (ref 8.4–10.5)
CHLORIDE BLDV-SCNC: 96 MMOL/L — SIGNIFICANT CHANGE UP (ref 96–108)
CHLORIDE SERPL-SCNC: 96 MMOL/L — SIGNIFICANT CHANGE UP (ref 96–108)
CO2 BLDV-SCNC: 22 MMOL/L — SIGNIFICANT CHANGE UP (ref 22–26)
CO2 SERPL-SCNC: 16 MMOL/L — LOW (ref 22–31)
CREAT SERPL-MCNC: 0.72 MG/DL — SIGNIFICANT CHANGE UP (ref 0.5–1.3)
EGFR: 102 ML/MIN/1.73M2 — SIGNIFICANT CHANGE UP
EOSINOPHIL # BLD AUTO: 0.3 K/UL — SIGNIFICANT CHANGE UP (ref 0–0.5)
EOSINOPHIL NFR BLD AUTO: 4.2 % — SIGNIFICANT CHANGE UP (ref 0–6)
FLUAV AG NPH QL: SIGNIFICANT CHANGE UP
FLUBV AG NPH QL: SIGNIFICANT CHANGE UP
GAS PNL BLDV: 130 MMOL/L — LOW (ref 136–145)
GAS PNL BLDV: SIGNIFICANT CHANGE UP
GLUCOSE BLDV-MCNC: 99 MG/DL — SIGNIFICANT CHANGE UP (ref 70–99)
GLUCOSE SERPL-MCNC: 94 MG/DL — SIGNIFICANT CHANGE UP (ref 70–99)
HCG SERPL-ACNC: 3.2 MIU/ML — SIGNIFICANT CHANGE UP
HCO3 BLDV-SCNC: 21 MMOL/L — LOW (ref 22–29)
HCT VFR BLD CALC: 45 % — SIGNIFICANT CHANGE UP (ref 34.5–45)
HCT VFR BLDA CALC: 47 % — HIGH (ref 34.5–46.5)
HGB BLD CALC-MCNC: 15.5 G/DL — SIGNIFICANT CHANGE UP (ref 11.7–16.1)
HGB BLD-MCNC: 15.1 G/DL — SIGNIFICANT CHANGE UP (ref 11.5–15.5)
IMM GRANULOCYTES NFR BLD AUTO: 0.8 % — SIGNIFICANT CHANGE UP (ref 0–0.9)
LACTATE BLDV-MCNC: 1.5 MMOL/L — SIGNIFICANT CHANGE UP (ref 0.5–2)
LIDOCAIN IGE QN: 15 U/L — SIGNIFICANT CHANGE UP (ref 7–60)
LYMPHOCYTES # BLD AUTO: 1.56 K/UL — SIGNIFICANT CHANGE UP (ref 1–3.3)
LYMPHOCYTES # BLD AUTO: 21.8 % — SIGNIFICANT CHANGE UP (ref 13–44)
MCHC RBC-ENTMCNC: 32.1 PG — SIGNIFICANT CHANGE UP (ref 27–34)
MCHC RBC-ENTMCNC: 33.6 GM/DL — SIGNIFICANT CHANGE UP (ref 32–36)
MCV RBC AUTO: 95.7 FL — SIGNIFICANT CHANGE UP (ref 80–100)
MONOCYTES # BLD AUTO: 1.07 K/UL — HIGH (ref 0–0.9)
MONOCYTES NFR BLD AUTO: 15 % — HIGH (ref 2–14)
NEUTROPHILS # BLD AUTO: 4.12 K/UL — SIGNIFICANT CHANGE UP (ref 1.8–7.4)
NEUTROPHILS NFR BLD AUTO: 57.8 % — SIGNIFICANT CHANGE UP (ref 43–77)
NRBC # BLD: 0 /100 WBCS — SIGNIFICANT CHANGE UP (ref 0–0)
PCO2 BLDV: 43 MMHG — HIGH (ref 39–42)
PH BLDV: 7.29 — LOW (ref 7.32–7.43)
PLATELET # BLD AUTO: 326 K/UL — SIGNIFICANT CHANGE UP (ref 150–400)
PO2 BLDV: 20 MMHG — LOW (ref 25–45)
POTASSIUM BLDV-SCNC: 4.7 MMOL/L — SIGNIFICANT CHANGE UP (ref 3.5–5.1)
POTASSIUM SERPL-MCNC: 4.5 MMOL/L — SIGNIFICANT CHANGE UP (ref 3.5–5.3)
POTASSIUM SERPL-SCNC: 4.5 MMOL/L — SIGNIFICANT CHANGE UP (ref 3.5–5.3)
PROT SERPL-MCNC: 8.2 G/DL — SIGNIFICANT CHANGE UP (ref 6–8.3)
RBC # BLD: 4.7 M/UL — SIGNIFICANT CHANGE UP (ref 3.8–5.2)
RBC # FLD: 11.7 % — SIGNIFICANT CHANGE UP (ref 10.3–14.5)
RSV RNA NPH QL NAA+NON-PROBE: SIGNIFICANT CHANGE UP
SAO2 % BLDV: 29.8 % — LOW (ref 67–88)
SARS-COV-2 RNA SPEC QL NAA+PROBE: SIGNIFICANT CHANGE UP
SODIUM SERPL-SCNC: 134 MMOL/L — LOW (ref 135–145)
WBC # BLD: 7.14 K/UL — SIGNIFICANT CHANGE UP (ref 3.8–10.5)
WBC # FLD AUTO: 7.14 K/UL — SIGNIFICANT CHANGE UP (ref 3.8–10.5)

## 2023-01-17 PROCEDURE — 36000 PLACE NEEDLE IN VEIN: CPT

## 2023-01-17 PROCEDURE — 99285 EMERGENCY DEPT VISIT HI MDM: CPT | Mod: 25

## 2023-01-17 PROCEDURE — 74177 CT ABD & PELVIS W/CONTRAST: CPT | Mod: 26,MA

## 2023-01-17 RX ORDER — ONDANSETRON 8 MG/1
4 TABLET, FILM COATED ORAL ONCE
Refills: 0 | Status: COMPLETED | OUTPATIENT
Start: 2023-01-17 | End: 2023-01-17

## 2023-01-17 RX ORDER — MORPHINE SULFATE 50 MG/1
4 CAPSULE, EXTENDED RELEASE ORAL ONCE
Refills: 0 | Status: DISCONTINUED | OUTPATIENT
Start: 2023-01-17 | End: 2023-01-17

## 2023-01-17 RX ADMIN — MORPHINE SULFATE 4 MILLIGRAM(S): 50 CAPSULE, EXTENDED RELEASE ORAL at 21:05

## 2023-01-17 RX ADMIN — ONDANSETRON 4 MILLIGRAM(S): 8 TABLET, FILM COATED ORAL at 20:30

## 2023-01-17 RX ADMIN — MORPHINE SULFATE 4 MILLIGRAM(S): 50 CAPSULE, EXTENDED RELEASE ORAL at 20:29

## 2023-01-17 NOTE — ED PROVIDER NOTE - OBJECTIVE STATEMENT
48 y/o female with hx of obesity, hiatal hernia, s/p repair of hiatal hernia and gastric sleeve surgery May 2022, hiatal hernia returned and repaired June 2022 with return again, 1/11/2023 Pt underwent flexible Bronchoscopy by CT surgery, Robotic Assisted exploration of esophagus and mobilization of the esophagus, placement of B/L chest tubes and Robotic Assisted conversion of Sleeve gastrectomy to RY Gastric Bypass, Hiatal Hernia Repair. presents to the ED with nausea and cramping like abdominal pain. states started yesterday evening after having one her protein shakes. denies vomiting. denies f/d, CP, SOB, LOC, abdominal pain, HA, dizziness, numbness, tingling.

## 2023-01-17 NOTE — ED ADULT NURSE REASSESSMENT NOTE - NS ED NURSE REASSESS COMMENT FT1
SHWETHA Duarte made aware that pt needs US guided IV as her RAC IV infiltrated. Dr. Melendez at bedside performing US guided IV.

## 2023-01-17 NOTE — CONSULT NOTE ADULT - CONSULT REASON
nausea/vomiting w/ recent hiatal hernia repair nausea/vomiting POD6 robotic large hiatal hernia repair

## 2023-01-17 NOTE — ED ADULT NURSE NOTE - OBJECTIVE STATEMENT
48 y/o female PMH gastric sleeve May 2022, hiatal hernia s/p repair (returned and repaired June 2022, returned again 1/11/2023 and underwent flexible bronchoscopy by CT surgery, robotic assisted exploration of esophagus and mobilization of the esophagus, placement of B/L chest tubes and robotic assisted conversion of sleeve gastrectomy to RY Gastric Bypass) presents to ED from home c/o nausea, abdominal pain that began yesterday evening after having a protein shake. States is cramping and intermittent. Denying fever, chills, chest pain, SOB, headache, dizziness. Pt is A&O x 4. Breathing even and unlabored. Abdomen is soft, nondistended, +tender to palpation. Gross motor and neuro intact. Safety and comfort provided.

## 2023-01-17 NOTE — ED PROVIDER NOTE - RAPID ASSESSMENT
46 yo male, PMH asthma GERD, KATHLEEN, Obesity S/p Gastric Sleeve  and Hiatal Hernia 5/2022 with recurrence of Hiatal Hernia 6/30/22 requiring RTOR for repair.  On 1/11/2023 Pt underwent flexible Bronchoscopy by CT surgery, Robotic Assisted exploration of esophagus and mobilization of the esophagus, placement of B/L chest tubes and Robotic Assisted conversion of Sleeve gastrectomy to RY Gastric Bypass, Hiatal Hernia Repair. DC home on Jan 14th. Was doing well until last night when she started to have severe intermittent crampy mid abd pain, persistent nausea, retching/dry heaving, but no vomiting. Last BM was this AM.   Bariatric Surgeon- Dr Field  Gen Surgeon Dr Omar Rodriguez

## 2023-01-17 NOTE — CONSULT NOTE ADULT - ASSESSMENT
46 yo female, PMH asthma GERD, KATHLEEN, Obesity S/p Gastric Sleeve  and Hiatal Hernia 5/2022 with recurrence of Hiatal Hernia 6/30/22 requiring RTOR for repair. Now POD6 robotic hiatal hernia repair returned to ER for severe n/v with mild abd pain for 1 day.       Plan:  - suggest CTPE protocol, CTAP with oral and iv contrast, will need a little contrast right before lying on the table to assess the esophagus.   - IVF  - pain control prn  - surgery will follow up  - plan discussed with Dr. Castaneda on behalf of [facility] Conti PGY-2  Green team Surgery  [id number]

## 2023-01-17 NOTE — CONSULT NOTE ADULT - SUBJECTIVE AND OBJECTIVE BOX
GENERAL SURGERY CONSULT NOTE  --------------------------------------------------------------------------------------------    HPI:  48 yo male, PMH asthma GERD, KATHLEEN, Obesity S/p Gastric Sleeve  and Hiatal Hernia 5/2022 with recurrence of Hiatal Hernia 6/30/22 requiring RTOR for repair. Over time pain had had persistent reflux-like symptoms. On 1/11/2023 Pt underwent flexible Bronchoscopy by CT surgery, Robotic Assisted exploration of esophagus and mobilization of the esophagus, placment of B/L chest tubes and Robotic Assisted conversion of Sleeve gastrectomy to RY Gastric Bypass, Hiatal Hernia Repair.  Bariatric ERAS protocol followed to include preoperative and perioperative use of DVT and SSI prophylaxis as well as multi-modal non-opioid analgesia and TAP Block. Patient tolerated the procedure well, was extubated in the operating room, and then transferred to CT ICU in stable condition. Once hemodynamically stable and effective pain control, the patient was able to ambulate with assistance. Indwelling blood catheter placed intraop remained in place.  Pain management included IV multi-modal non-opioid analgesia and Dilaudid PCA pump.     On POD #1, patient remained stable with no acute events overnight, and has effective pain control. Blood removed and patient voided without difficulty. Denies nausea and vomiting and she started bariatric clear liquid. On POD # 1 PCA discontinued as well left CT removed. On POD # 3 s    Patient denies fevers/chills, denies lightheadedness/dizziness, denies SOB/chest pain, denies nausea/vomiting, denies constipation/diarrhea.  ***    ROS: 10-system review is otherwise negative except HPI above.      PAST MEDICAL & SURGICAL HISTORY:  Asthma  ~&gt;20 yrs ago last exacerbation, never hospitalized or intubated      Fibroids      Obesity      Chronic GERD      Hiatal hernia      S/P hysterectomy  2015      History of repair of hiatal hernia  laparoscopic      History of sleeve gastrectomy        FAMILY HISTORY:    [] Family history not pertinent as reviewed with the patient and family    SOCIAL HISTORY:  ***    ALLERGIES: No Known Allergies      HOME MEDICATIONS: ***    CURRENT MEDICATIONS  MEDICATIONS (STANDING): morphine  - Injectable 4 milliGRAM(s) IV Push Once  ondansetron Injectable 4 milliGRAM(s) IV Push once    MEDICATIONS (PRN):  --------------------------------------------------------------------------------------------    Vitals:   T(C): 36.5 (01-17-23 @ 19:24), Max: 36.9 (01-17-23 @ 16:02)  HR: 77 (01-17-23 @ 19:24) (72 - 102)  BP: 125/84 (01-17-23 @ 19:24) (113/75 - 125/84)  RR: 18 (01-17-23 @ 19:24) (18 - 20)  SpO2: 100% (01-17-23 @ 19:24) (99% - 100%)  CAPILLARY BLOOD GLUCOSE        CAPILLARY BLOOD GLUCOSE          Height (cm): 160 (01-17 @ 16:02)  Weight (kg): 77.1 (01-17 @ 16:02)  BMI (kg/m2): 30.1 (01-17 @ 16:02)  BSA (m2): 1.8 (01-17 @ 16:02)    PHYSICAL EXAM: ***  General: NAD, Lying in bed comfortably  Neuro: A+Ox3  HEENT: NC/AT, EOMI  Neck: Soft, supple  Cardio: RRR, nml S1/S2  Resp: Good effort, CTA b/l  Thorax: No chest wall tenderness  Breast: No lesions/masses, no drainage  GI/Abd: Soft, NT/ND, no rebound/guarding, no masses palpated  Vascular: All 4 extremities warm.  Skin: Intact, no breakdown  Lymphatic/Nodes: No palpable lymphadenopathy  Musculoskeletal: All 4 extremities moving spontaneously, no limitations  --------------------------------------------------------------------------------------------    LABS  CBC (01-17 @ 16:47)                              15.1                           7.14    )----------------(  326        57.8  % Neutrophils, 21.8  % Lymphocytes, ANC: 4.12                                45.0      BMP (01-17 @ 16:47)             134<L>  |  96      |  11    		Ca++ --      Ca 10.6<H>             ---------------------------------( 94    		Mg --                 4.5     |  16<L>   |  0.72  			Ph --        LFTs (01-17 @ 16:47)      TPro 8.2 / Alb 4.5 / TBili 0.6 / DBili -- / AST 21 / ALT 22 / AlkPhos 78          VBG (01-17 @ 16:47)     7.29<L> / 43<H> / 20<L> / 21<L> / -5.8<L> / 29.8<L>%     Lactate: 1.5    --------------------------------------------------------------------------------------------    MICROBIOLOGY      --------------------------------------------------------------------------------------------    IMAGING   GENERAL SURGERY CONSULT NOTE  --------------------------------------------------------------------------------------------    HPI:  48 yo female, PMH asthma GERD, KATHLEEN, Obesity S/p Gastric Sleeve  and Hiatal Hernia 2022 with recurrence of Hiatal Hernia 22 requiring RTOR for repair. Over time pain had had persistent reflux-like symptoms. On 2023 Pt underwent flexible Bronchoscopy by CT surgery, Robotic Assisted exploration of esophagus and mobilization of the esophagus, placment of B/L chest tubes and Robotic Assisted conversion of Sleeve gastrectomy to RY Gastric Bypass, Hiatal Hernia Repair. Patient was discharged on POD3 after tolerating liquid diet. Patient presented today for severe nausea/vomiting since 6pm last night after having protein shake. Was tolerating liquid diet before. Patient complains of mild abd pain, consistent severe nausea,  no dysphasia, no reflex, not passing gas since last night, 1 BM this am.     Surgery is consulted to eval surgical related abd pain/n/v. At bedside, patient is HDS, mild SOB when talking, abd soft, NTND.      ROS: 10-system review is otherwise negative except HPI above.      PAST MEDICAL & SURGICAL HISTORY:  Asthma  ~&gt;20 yrs ago last exacerbation, never hospitalized or intubated      Fibroids      Obesity      Chronic GERD      Hiatal hernia      S/P hysterectomy        History of repair of hiatal hernia  laparoscopic      History of sleeve gastrectomy        FAMILY HISTORY:    [] Family history not pertinent as reviewed with the patient and family    SOCIAL HISTORY:  nonsmoker, no alcohol use    ALLERGIES: No Known Allergies      HOME MEDICATIONS:  · 	oxyCODONE 5 mg/5 mL oral solution: 5 milliliter(s) orally every 4 hours, As Needed -Severe Pain (7 - 10) MDD:30 ml  · 	ondansetron 4 mg oral tablet, disintegratin tab(s) orally every 6 hours, As Needed   · 	PriLOSEC 40 mg oral delayed release capsule: 1 cap(s) orally once a day AM Do not swallow whole. Open capsule and take in applesauce    CURRENT MEDICATIONS  MEDICATIONS (STANDING): morphine  - Injectable 4 milliGRAM(s) IV Push Once  ondansetron Injectable 4 milliGRAM(s) IV Push once    MEDICATIONS (PRN):  --------------------------------------------------------------------------------------------    Vitals:   T(C): 36.5 (23 @ 19:24), Max: 36.9 (23 @ 16:02)  HR: 77 (23 @ 19:24) (72 - 102)  BP: 125/84 (23 @ 19:24) (113/75 - 125/84)  RR: 18 (23 19:24) (18 - 20)  SpO2: 100% (23 @ 19:24) (99% - 100%)  CAPILLARY BLOOD GLUCOSE        CAPILLARY BLOOD GLUCOSE          Height (cm): 160 ( @ 16:02)  Weight (kg): 77.1 ( 16:02)  BMI (kg/m2): 30.1 ( 16:02)  BSA (m2): 1.8 ( 16:02)    PHYSICAL EXAM:   General: NAD, sitting in bed in mild distress  Neuro: A+Ox3  HEENT: NC/AT, EOMI  Neck: Soft, supple  Cardio: RRR, nml S1/S2  Resp: Good effort, CTA b/l  Thorax: No chest wall tenderness  Breast: No lesions/masses, no drainage  GI/Abd: Soft, NT/ND, no rebound/guarding, no masses palpated  Vascular: All 4 extremities warm.  Skin: Intact, no breakdown  Musculoskeletal: All 4 extremities moving spontaneously, no limitations  --------------------------------------------------------------------------------------------    LABS  CBC (:47)                              15.1                           7.14    )----------------(  326        57.8  % Neutrophils, 21.8  % Lymphocytes, ANC: 4.12                                45.0      BMP ( 16:47)             134<L>  |  96      |  11    		Ca++ --      Ca 10.6<H>             ---------------------------------( 94    		Mg --                 4.5     |  16<L>   |  0.72  			Ph --        LFTs ( 16:47)      TPro 8.2 / Alb 4.5 / TBili 0.6 / DBili -- / AST 21 / ALT 22 / AlkPhos 78          VBG ( 16:47)     7.29<L> / 43<H> / 20<L> / 21<L> / -5.8<L> / 29.8<L>%     Lactate: 1.5    --------------------------------------------------------------------------------------------    MICROBIOLOGY      --------------------------------------------------------------------------------------------    IMAGING

## 2023-01-17 NOTE — ED PROVIDER NOTE - ATTENDING APP SHARED VISIT CONTRIBUTION OF CARE
attending Lona: 47yF h/o obesity, hiatal hernia, s/p repair of hiatal hernia and gastric sleeve surgery May 2022, hiatal hernia returned and repaired June 2022 with return again, 1/11/2023 Pt underwent flexible Bronchoscopy by CT surgery, Robotic Assisted exploration of esophagus and mobilization of the esophagus, placement of B/L chest tubes and Robotic Assisted conversion of Sleeve gastrectomy to RY Gastric Bypass, Hiatal Hernia Repair p/w nausea and crampy abdominal pain since last night. Examination as above. Will review prior hospitalization records, obtain labs, symptomatic treatment, CT A/p with PO and IV contrast, bariatric surgery evaluation in ED

## 2023-01-17 NOTE — ED ADULT TRIAGE NOTE - CHIEF COMPLAINT QUOTE
persistent nausea since last night s/p bariatric surgery repair, slight upper abdominal pain;  no relief with zofran

## 2023-01-17 NOTE — ED PROVIDER NOTE - PROGRESS NOTE DETAILS
Sulaiman TADEO: labs and imaging reviewed. spoke with bariatrics. request abdominal xray. pending imaging at this time. Sulaiman TADEO: xray results reviewed. bariatrics paged to discuss plan and results. pending return call at this time. Sulaiman Duarte PA: surgery reviewed imaging. patient stable at this time. pending surgery attending evaluation at this time. discussed with ED attending. Sulaiman TADEO: labs and imaging reviewed. spoke with bariatrics. requesting patient be placed in CDU. no cdu beds available at this time. bariatrics made aware. at this time, bariatrics requesting abdominal xray. pending imaging at this time. Sulaiman TADEO: Bariatrics team at bedside. pending recommendations at this time. Jose Bourgeois MD: Patient evaluated by attending in the a.m.  The patient has been admitted multiple hours ago.  Apparently there is miscommunication regarding final disposition of the patient.  Initial conversation regarding physician was at the surgery team with the patient he placed on observation, there were no observation beds available but did not want admit the patient at that time. Spoke with Fellow who thought he had communicated to the resident to admit the patient to the surgery team.

## 2023-01-18 DIAGNOSIS — R11.0 NAUSEA: ICD-10-CM

## 2023-01-18 PROBLEM — K44.9 DIAPHRAGMATIC HERNIA WITHOUT OBSTRUCTION OR GANGRENE: Chronic | Status: ACTIVE | Noted: 2022-12-30

## 2023-01-18 PROBLEM — E66.9 OBESITY, UNSPECIFIED: Chronic | Status: ACTIVE | Noted: 2022-12-30

## 2023-01-18 PROBLEM — K21.9 GASTRO-ESOPHAGEAL REFLUX DISEASE WITHOUT ESOPHAGITIS: Chronic | Status: ACTIVE | Noted: 2022-12-30

## 2023-01-18 LAB
ALBUMIN SERPL ELPH-MCNC: 4 G/DL — SIGNIFICANT CHANGE UP (ref 3.3–5)
ALP SERPL-CCNC: 68 U/L — SIGNIFICANT CHANGE UP (ref 40–120)
ALT FLD-CCNC: 16 U/L — SIGNIFICANT CHANGE UP (ref 10–45)
ANION GAP SERPL CALC-SCNC: 15 MMOL/L — SIGNIFICANT CHANGE UP (ref 5–17)
AST SERPL-CCNC: 11 U/L — SIGNIFICANT CHANGE UP (ref 10–40)
BILIRUB SERPL-MCNC: 0.6 MG/DL — SIGNIFICANT CHANGE UP (ref 0.2–1.2)
BUN SERPL-MCNC: 7 MG/DL — SIGNIFICANT CHANGE UP (ref 7–23)
CALCIUM SERPL-MCNC: 9.8 MG/DL — SIGNIFICANT CHANGE UP (ref 8.4–10.5)
CHLORIDE SERPL-SCNC: 98 MMOL/L — SIGNIFICANT CHANGE UP (ref 96–108)
CO2 SERPL-SCNC: 24 MMOL/L — SIGNIFICANT CHANGE UP (ref 22–31)
CREAT SERPL-MCNC: 0.65 MG/DL — SIGNIFICANT CHANGE UP (ref 0.5–1.3)
EGFR: 108 ML/MIN/1.73M2 — SIGNIFICANT CHANGE UP
GLUCOSE SERPL-MCNC: 85 MG/DL — SIGNIFICANT CHANGE UP (ref 70–99)
HCT VFR BLD CALC: 40.2 % — SIGNIFICANT CHANGE UP (ref 34.5–45)
HGB BLD-MCNC: 13.5 G/DL — SIGNIFICANT CHANGE UP (ref 11.5–15.5)
MAGNESIUM SERPL-MCNC: 1.9 MG/DL — SIGNIFICANT CHANGE UP (ref 1.6–2.6)
MCHC RBC-ENTMCNC: 31.9 PG — SIGNIFICANT CHANGE UP (ref 27–34)
MCHC RBC-ENTMCNC: 33.6 GM/DL — SIGNIFICANT CHANGE UP (ref 32–36)
MCV RBC AUTO: 95 FL — SIGNIFICANT CHANGE UP (ref 80–100)
NRBC # BLD: 0 /100 WBCS — SIGNIFICANT CHANGE UP (ref 0–0)
PHOSPHATE SERPL-MCNC: 2.7 MG/DL — SIGNIFICANT CHANGE UP (ref 2.5–4.5)
PLATELET # BLD AUTO: 327 K/UL — SIGNIFICANT CHANGE UP (ref 150–400)
POTASSIUM SERPL-MCNC: 4.6 MMOL/L — SIGNIFICANT CHANGE UP (ref 3.5–5.3)
POTASSIUM SERPL-SCNC: 4.6 MMOL/L — SIGNIFICANT CHANGE UP (ref 3.5–5.3)
PROT SERPL-MCNC: 7.1 G/DL — SIGNIFICANT CHANGE UP (ref 6–8.3)
RBC # BLD: 4.23 M/UL — SIGNIFICANT CHANGE UP (ref 3.8–5.2)
RBC # FLD: 11.9 % — SIGNIFICANT CHANGE UP (ref 10.3–14.5)
SODIUM SERPL-SCNC: 137 MMOL/L — SIGNIFICANT CHANGE UP (ref 135–145)
WBC # BLD: 6.91 K/UL — SIGNIFICANT CHANGE UP (ref 3.8–10.5)
WBC # FLD AUTO: 6.91 K/UL — SIGNIFICANT CHANGE UP (ref 3.8–10.5)

## 2023-01-18 PROCEDURE — 74018 RADEX ABDOMEN 1 VIEW: CPT | Mod: 26

## 2023-01-18 RX ORDER — FOLIC ACID 0.8 MG
1 TABLET ORAL ONCE
Refills: 0 | Status: COMPLETED | OUTPATIENT
Start: 2023-01-18 | End: 2023-01-18

## 2023-01-18 RX ORDER — ONDANSETRON 8 MG/1
4 TABLET, FILM COATED ORAL ONCE
Refills: 0 | Status: COMPLETED | OUTPATIENT
Start: 2023-01-18 | End: 2023-01-18

## 2023-01-18 RX ORDER — SODIUM CHLORIDE 9 MG/ML
1000 INJECTION, SOLUTION INTRAVENOUS
Refills: 0 | Status: DISCONTINUED | OUTPATIENT
Start: 2023-01-18 | End: 2023-01-20

## 2023-01-18 RX ORDER — HEPARIN SODIUM 5000 [USP'U]/ML
5000 INJECTION INTRAVENOUS; SUBCUTANEOUS EVERY 8 HOURS
Refills: 0 | Status: DISCONTINUED | OUTPATIENT
Start: 2023-01-18 | End: 2023-01-20

## 2023-01-18 RX ORDER — ONDANSETRON 8 MG/1
4 TABLET, FILM COATED ORAL EVERY 6 HOURS
Refills: 0 | Status: DISCONTINUED | OUTPATIENT
Start: 2023-01-18 | End: 2023-01-20

## 2023-01-18 RX ORDER — ACETAMINOPHEN 500 MG
1000 TABLET ORAL EVERY 6 HOURS
Refills: 0 | Status: COMPLETED | OUTPATIENT
Start: 2023-01-18 | End: 2023-01-19

## 2023-01-18 RX ORDER — KETOROLAC TROMETHAMINE 30 MG/ML
15 SYRINGE (ML) INJECTION EVERY 6 HOURS
Refills: 0 | Status: DISCONTINUED | OUTPATIENT
Start: 2023-01-18 | End: 2023-01-20

## 2023-01-18 RX ORDER — SODIUM CHLORIDE 9 MG/ML
1000 INJECTION, SOLUTION INTRAVENOUS
Refills: 0 | Status: DISCONTINUED | OUTPATIENT
Start: 2023-01-18 | End: 2023-01-18

## 2023-01-18 RX ORDER — SODIUM CHLORIDE 9 MG/ML
1000 INJECTION, SOLUTION INTRAVENOUS
Refills: 0 | Status: COMPLETED | OUTPATIENT
Start: 2023-01-18 | End: 2023-01-18

## 2023-01-18 RX ORDER — THIAMINE MONONITRATE (VIT B1) 100 MG
100 TABLET ORAL ONCE
Refills: 0 | Status: COMPLETED | OUTPATIENT
Start: 2023-01-18 | End: 2023-01-18

## 2023-01-18 RX ORDER — METOCLOPRAMIDE HCL 10 MG
10 TABLET ORAL EVERY 8 HOURS
Refills: 0 | Status: DISCONTINUED | OUTPATIENT
Start: 2023-01-18 | End: 2023-01-20

## 2023-01-18 RX ADMIN — Medication 15 MILLIGRAM(S): at 17:30

## 2023-01-18 RX ADMIN — Medication 400 MILLIGRAM(S): at 23:48

## 2023-01-18 RX ADMIN — Medication 1 MILLIGRAM(S): at 04:31

## 2023-01-18 RX ADMIN — Medication 10 MILLIGRAM(S): at 21:03

## 2023-01-18 RX ADMIN — Medication 400 MILLIGRAM(S): at 17:30

## 2023-01-18 RX ADMIN — ONDANSETRON 4 MILLIGRAM(S): 8 TABLET, FILM COATED ORAL at 14:07

## 2023-01-18 RX ADMIN — SODIUM CHLORIDE 1000 MILLILITER(S): 9 INJECTION, SOLUTION INTRAVENOUS at 04:31

## 2023-01-18 RX ADMIN — ONDANSETRON 4 MILLIGRAM(S): 8 TABLET, FILM COATED ORAL at 05:37

## 2023-01-18 RX ADMIN — Medication 100 MILLIGRAM(S): at 04:31

## 2023-01-18 RX ADMIN — SODIUM CHLORIDE 100 MILLILITER(S): 9 INJECTION, SOLUTION INTRAVENOUS at 08:17

## 2023-01-18 RX ADMIN — ONDANSETRON 4 MILLIGRAM(S): 8 TABLET, FILM COATED ORAL at 20:19

## 2023-01-18 RX ADMIN — Medication 10 MILLIGRAM(S): at 09:01

## 2023-01-18 RX ADMIN — SODIUM CHLORIDE 100 MILLILITER(S): 9 INJECTION, SOLUTION INTRAVENOUS at 17:30

## 2023-01-18 NOTE — PATIENT PROFILE ADULT - FALL HARM RISK - RISK INTERVENTIONS

## 2023-01-18 NOTE — CHART NOTE - NSCHARTNOTEFT_GEN_A_CORE
1 week s/p robot-assisted trans-thoracic repair of recurrent hiatal hernia and conversion of sleeve to RYGB  Presented to ED for worsening nausea and lower abdominal cramping  Cramping improved, still nauseous  No vomiting  + flatus  No BMs since yesterday    VSS  Breathing comfortably  Abdomen soft, nondistended, nontender  Incisions well healed    CT reviewed -- no obstruction or other pathology, staple lines all look intact  Abd XR several hours after CT shows passage of contrast to cecum and nonobstructive bowel gas pattern    Impression  Post-op nausea, dehydration  Admit for IVF and antiemetics  Discussed with patient, all questions answered.    Rahul Field MD

## 2023-01-18 NOTE — PROGRESS NOTE ADULT - SUBJECTIVE AND OBJECTIVE BOX
SURGERY DAILY PROGRESS NOTE:       SUBJECTIVE/ROS: Patient seen and evaluated on AM rounds. Patient reports some nausea, no vomiting, no flatus. Last BM yesterday morning. Denies chest pain and shortness of breath. Has been ambulating.          OBJECTIVE:    Vital Signs Last 24 Hrs  T(C): 36.6 (18 Jan 2023 04:40), Max: 36.9 (17 Jan 2023 16:02)  T(F): 97.9 (18 Jan 2023 04:40), Max: 98.5 (17 Jan 2023 16:02)  HR: 80 (18 Jan 2023 04:40) (72 - 102)  BP: 113/70 (18 Jan 2023 04:40) (110/79 - 125/84)  BP(mean): --  RR: 18 (18 Jan 2023 04:40) (16 - 20)  SpO2: 99% (18 Jan 2023 04:40) (97% - 100%)    Parameters below as of 18 Jan 2023 04:40  Patient On (Oxygen Delivery Method): room air      I&O's Detail    Daily Height in cm: 160.02 (17 Jan 2023 16:02)    Daily   MEDICATIONS  (STANDING):    MEDICATIONS  (PRN):      LABS:                        15.1   7.14  )-----------( 326      ( 17 Jan 2023 16:47 )             45.0     01-17    134<L>  |  96  |  11  ----------------------------<  94  4.5   |  16<L>  |  0.72    Ca    10.6<H>      17 Jan 2023 16:47    TPro  8.2  /  Alb  4.5  /  TBili  0.6  /  DBili  x   /  AST  21  /  ALT  22  /  AlkPhos  78  01-17                  PHYSICAL EXAM:  General: NAD, resting comfortably  Respiratory: non labored breathing on room air  Abdomen: soft, nontender, obese, steri strips in place   Extremities: no calf tenderness           SURGERY DAILY PROGRESS NOTE:       SUBJECTIVE/ROS: Patient seen and evaluated on AM rounds. Patient reports some nausea, no vomiting, no flatus. Last BM yesterday morning. Denies chest pain and shortness of breath. Has been ambulating.          OBJECTIVE:    Vital Signs Last 24 Hrs  T(C): 36.6 (18 Jan 2023 04:40), Max: 36.9 (17 Jan 2023 16:02)  T(F): 97.9 (18 Jan 2023 04:40), Max: 98.5 (17 Jan 2023 16:02)  HR: 80 (18 Jan 2023 04:40) (72 - 102)  BP: 113/70 (18 Jan 2023 04:40) (110/79 - 125/84)  BP(mean): --  RR: 18 (18 Jan 2023 04:40) (16 - 20)  SpO2: 99% (18 Jan 2023 04:40) (97% - 100%)    Parameters below as of 18 Jan 2023 04:40  Patient On (Oxygen Delivery Method): room air      I&O's Detail    Daily Height in cm: 160.02 (17 Jan 2023 16:02)    Daily   MEDICATIONS  (STANDING):    MEDICATIONS  (PRN):      LABS:                        15.1   7.14  )-----------( 326      ( 17 Jan 2023 16:47 )             45.0     01-17    134<L>  |  96  |  11  ----------------------------<  94  4.5   |  16<L>  |  0.72    Ca    10.6<H>      17 Jan 2023 16:47    TPro  8.2  /  Alb  4.5  /  TBili  0.6  /  DBili  x   /  AST  21  /  ALT  22  /  AlkPhos  78  01-17            PHYSICAL EXAM:  General: NAD, resting comfortably  Respiratory: non labored breathing on room air  Abdomen: soft, nontender, obese, steri strips in place   Extremities: no calf tenderness

## 2023-01-18 NOTE — PROGRESS NOTE ADULT - ASSESSMENT
48 yo female, PMH asthma GERD, KATHLEEN, Obesity S/p Gastric Sleeve  and Hiatal Hernia 5/2022 with recurrence of Hiatal Hernia 6/30/22 requiring RTOR for repair. Now POD6 robotic hiatal hernia repair returned to ER for severe n/v with mild abd pain for 1 day.       Plan:  - IVF  - pain control prn  - PO challenge       [place] team Surgery  [id number]    48 yo female, PMH asthma GERD, KATHLEEN, Obesity S/p Gastric Sleeve  and Hiatal Hernia 5/2022 with recurrence of Hiatal Hernia 6/30/22 requiring RTOR for repair. Now POD6 robotic hiatal hernia repair returned to ER for severe n/v with mild abd pain for 1 day.       Plan:  - admit to Dr. Field   - IVF/NPO   - pain control prn  - Reglan/Zofran   - VTE ppx with heparin       [facility] team Surgery  [id number]

## 2023-01-19 LAB
ANION GAP SERPL CALC-SCNC: 19 MMOL/L — HIGH (ref 5–17)
BUN SERPL-MCNC: 5 MG/DL — LOW (ref 7–23)
CALCIUM SERPL-MCNC: 9.6 MG/DL — SIGNIFICANT CHANGE UP (ref 8.4–10.5)
CHLORIDE SERPL-SCNC: 96 MMOL/L — SIGNIFICANT CHANGE UP (ref 96–108)
CO2 SERPL-SCNC: 20 MMOL/L — LOW (ref 22–31)
CREAT SERPL-MCNC: 0.55 MG/DL — SIGNIFICANT CHANGE UP (ref 0.5–1.3)
EGFR: 112 ML/MIN/1.73M2 — SIGNIFICANT CHANGE UP
GLUCOSE SERPL-MCNC: 81 MG/DL — SIGNIFICANT CHANGE UP (ref 70–99)
HCT VFR BLD CALC: 38.7 % — SIGNIFICANT CHANGE UP (ref 34.5–45)
HGB BLD-MCNC: 13.2 G/DL — SIGNIFICANT CHANGE UP (ref 11.5–15.5)
MAGNESIUM SERPL-MCNC: 1.6 MG/DL — SIGNIFICANT CHANGE UP (ref 1.6–2.6)
MCHC RBC-ENTMCNC: 32.3 PG — SIGNIFICANT CHANGE UP (ref 27–34)
MCHC RBC-ENTMCNC: 34.1 GM/DL — SIGNIFICANT CHANGE UP (ref 32–36)
MCV RBC AUTO: 94.6 FL — SIGNIFICANT CHANGE UP (ref 80–100)
NRBC # BLD: 0 /100 WBCS — SIGNIFICANT CHANGE UP (ref 0–0)
PHOSPHATE SERPL-MCNC: 3.2 MG/DL — SIGNIFICANT CHANGE UP (ref 2.5–4.5)
PLATELET # BLD AUTO: 315 K/UL — SIGNIFICANT CHANGE UP (ref 150–400)
POTASSIUM SERPL-MCNC: 3.8 MMOL/L — SIGNIFICANT CHANGE UP (ref 3.5–5.3)
POTASSIUM SERPL-SCNC: 3.8 MMOL/L — SIGNIFICANT CHANGE UP (ref 3.5–5.3)
RBC # BLD: 4.09 M/UL — SIGNIFICANT CHANGE UP (ref 3.8–5.2)
RBC # FLD: 11.7 % — SIGNIFICANT CHANGE UP (ref 10.3–14.5)
SODIUM SERPL-SCNC: 135 MMOL/L — SIGNIFICANT CHANGE UP (ref 135–145)
WBC # BLD: 6.73 K/UL — SIGNIFICANT CHANGE UP (ref 3.8–10.5)
WBC # FLD AUTO: 6.73 K/UL — SIGNIFICANT CHANGE UP (ref 3.8–10.5)

## 2023-01-19 RX ORDER — MAGNESIUM SULFATE 500 MG/ML
2 VIAL (ML) INJECTION ONCE
Refills: 0 | Status: COMPLETED | OUTPATIENT
Start: 2023-01-19 | End: 2023-01-19

## 2023-01-19 RX ORDER — ACETAMINOPHEN 500 MG
975 TABLET ORAL EVERY 6 HOURS
Refills: 0 | Status: DISCONTINUED | OUTPATIENT
Start: 2023-01-19 | End: 2023-01-19

## 2023-01-19 RX ORDER — ACETAMINOPHEN 500 MG
975 TABLET ORAL EVERY 6 HOURS
Refills: 0 | Status: DISCONTINUED | OUTPATIENT
Start: 2023-01-19 | End: 2023-01-20

## 2023-01-19 RX ADMIN — Medication 15 MILLIGRAM(S): at 13:00

## 2023-01-19 RX ADMIN — Medication 15 MILLIGRAM(S): at 12:19

## 2023-01-19 RX ADMIN — SODIUM CHLORIDE 100 MILLILITER(S): 9 INJECTION, SOLUTION INTRAVENOUS at 05:15

## 2023-01-19 RX ADMIN — Medication 400 MILLIGRAM(S): at 05:15

## 2023-01-19 RX ADMIN — SODIUM CHLORIDE 125 MILLILITER(S): 9 INJECTION, SOLUTION INTRAVENOUS at 09:15

## 2023-01-19 RX ADMIN — Medication 10 MILLIGRAM(S): at 21:36

## 2023-01-19 RX ADMIN — Medication 10 MILLIGRAM(S): at 05:16

## 2023-01-19 RX ADMIN — Medication 10 MILLIGRAM(S): at 13:33

## 2023-01-19 RX ADMIN — SODIUM CHLORIDE 125 MILLILITER(S): 9 INJECTION, SOLUTION INTRAVENOUS at 12:19

## 2023-01-19 RX ADMIN — Medication 15 MILLIGRAM(S): at 18:00

## 2023-01-19 RX ADMIN — Medication 15 MILLIGRAM(S): at 17:54

## 2023-01-19 RX ADMIN — Medication 25 GRAM(S): at 12:19

## 2023-01-19 NOTE — PROVIDER CONTACT NOTE (OTHER) - ASSESSMENT
pt refuses heparin. Pt educated on risks and benefits of medication. Pt continues to refuse. pt has been ambulating around the unit numerous times.

## 2023-01-19 NOTE — PROGRESS NOTE ADULT - ASSESSMENT
48 yo female, PMH asthma GERD, KATHLEEN, Obesity S/p Gastric Sleeve  and Hiatal Hernia 5/2022 with recurrence of Hiatal Hernia 6/30/22 requiring RTOR for repair. Now POD6 robotic hiatal hernia repair returned to ER for severe n/v with mild abd pain for 1 day.       Plan:  - CLD  - pain control prn  - Reglan/Zofran   - VTE ppx with heparin       Green team Surgery  [id number]  46 yo female, PMH asthma GERD, KATHLEEN, Obesity S/p Gastric Sleeve  and Hiatal Hernia 5/2022 with recurrence of Hiatal Hernia 6/30/22 requiring RTOR for repair. Now POD7 robotic hiatal hernia repair and conversion to RNYGB at [facility] with presentation to [facility] ED for severe n/v with mild abd pain for 1 day.       Plan:  - CLD  - pain control  - Reglan/Zofran   - VTE ppx with heparin       Green team Surgery  [id number]  48 yo female, PMH asthma GERD, KATHLEEN, Obesity S/p Gastric Sleeve and Hiatal Hernia 5/2022 with recurrence of Hiatal Hernia 6/30/22 requiring RTOR for repair. Now POD7 robotic hiatal hernia repair and conversion to RNYGB at [facility] with presentation to [facility] ED for severe n/v with mild abd pain for 1 day.       Plan:  - f/u nutrition panel  - continue CLD  - continue IVF  - pain control  - Reglan/Zofran   - VTE ppx with heparin       Green team Surgery  [id number]

## 2023-01-19 NOTE — PROGRESS NOTE ADULT - SUBJECTIVE AND OBJECTIVE BOX
Surgery Progress Note  Patient is a 47y old  Female who presents with a chief complaint of     Overnight Events: No acute events overnight.  SUBJECTIVE: Patient seen and examined at bedside with surgical team, patient without complaints. Denies fever, chills, CP, SOB nausea, vomiting, dizziness.    START [id number]    137  |  98  |  7   ----------------------------<  85  4.6   |  24  |  0.65    Ca    9.8      18 Jan 2023 12:42  Phos  2.7     01-18  Mg     1.9     01-18    TPro  7.1  /  Alb  4.0  /  TBili  0.6  /  DBili  x   /  AST  11  /  ALT  16  /  AlkPhos  68  01-18  A1C with Estimated Average Glucose Result: 5.1 % (04-22-22 @ 21:47)  END CHEMFISH  START MEDSACTIVEMEDICATIONS  (STANDING):  acetaminophen   IVPB .. 1000 milliGRAM(s) IV Intermittent every 6 hours  heparin   Injectable 5000 Unit(s) SubCutaneous every 8 hours  lactated ringers. 1000 milliLiter(s) (100 mL/Hr) IV Continuous <Continuous>    MEDICATIONS  (PRN):  ketorolac   Injectable 15 milliGRAM(s) IV Push every 6 hours PRN Moderate Pain (4 - 6)  metoclopramide Injectable 10 milliGRAM(s) IV Push every 8 hours PRN Nausea and/or Vomiting  ondansetron Injectable 4 milliGRAM(s) IV Push every 6 hours PRN Nausea  END MEDSACTIVE  START DIETORDERDiet, Clear Liquid:   Bariatric Clear Liquid (BARICLLIQ) (01-18-23 @ 18:21)  END DIETORDER  START ADMITDXNausea    END ADMITDX  START IOFSEND IOFS  START SKINPUEND SKINPU    OBJECTIVE:    Vital Signs Last 24 Hrs  T(C): 36.7 (18 Jan 2023 23:30), Max: 36.9 (18 Jan 2023 16:07)  T(F): 98 (18 Jan 2023 23:30), Max: 98.5 (18 Jan 2023 16:07)  HR: 72 (18 Jan 2023 23:30) (66 - 97)  BP: 119/76 (18 Jan 2023 23:30) (102/72 - 127/88)  BP(mean): 82 (18 Jan 2023 06:56) (82 - 82)  RR: 18 (18 Jan 2023 23:30) (16 - 18)  SpO2: 99% (18 Jan 2023 23:30) (95% - 100%)    Parameters below as of 18 Jan 2023 23:30  Patient On (Oxygen Delivery Method): room air    I&O's Detail  MEDICATIONS  (STANDING):  acetaminophen   IVPB .. 1000 milliGRAM(s) IV Intermittent every 6 hours  heparin   Injectable 5000 Unit(s) SubCutaneous every 8 hours  lactated ringers. 1000 milliLiter(s) (100 mL/Hr) IV Continuous <Continuous>    MEDICATIONS  (PRN):  ketorolac   Injectable 15 milliGRAM(s) IV Push every 6 hours PRN Moderate Pain (4 - 6)  metoclopramide Injectable 10 milliGRAM(s) IV Push every 8 hours PRN Nausea and/or Vomiting  ondansetron Injectable 4 milliGRAM(s) IV Push every 6 hours PRN Nausea      PHYSICAL EXAM:  Constitutional: A&Ox3, NAD  Respiratory: Unlabored breathing  Abdomen: Soft, nondistended, NTTP. No rebound or guarding.  Extremities: WWP, GUARDADO spontaneously    LABS:                        13.5   6.91  )-----------( 327      ( 18 Jan 2023 12:42 )             40.2     01-18    137  |  98  |  7   ----------------------------<  85  4.6   |  24  |  0.65    Ca    9.8      18 Jan 2023 12:42  Phos  2.7     01-18  Mg     1.9     01-18    TPro  7.1  /  Alb  4.0  /  TBili  0.6  /  DBili  x   /  AST  11  /  ALT  16  /  AlkPhos  68  01-18      LIVER FUNCTIONS - ( 18 Jan 2023 12:42 )  Alb: 4.0 g/dL / Pro: 7.1 g/dL / ALK PHOS: 68 U/L / ALT: 16 U/L / AST: 11 U/L / GGT: x                 IMAGING:     Surgery Progress Note  Patient is a 47y old  Female who presents with a chief complaint of     Overnight Events: No acute events overnight.    SUBJECTIVE: Patient seen and examined at bedside with surgical team, patient reports that pain is improving. Tolerated small amount of sips overnight. Denies nausea, vomiting.    START [id number]    137  |  98  |  7   ----------------------------<  85  4.6   |  24  |  0.65    Ca    9.8      18 Jan 2023 12:42  Phos  2.7     01-18  Mg     1.9     01-18    TPro  7.1  /  Alb  4.0  /  TBili  0.6  /  DBili  x   /  AST  11  /  ALT  16  /  AlkPhos  68  01-18  A1C with Estimated Average Glucose Result: 5.1 % (04-22-22 @ 21:47)  END CHEMFISH  START MEDSACTIVEMEDICATIONS  (STANDING):  acetaminophen   IVPB .. 1000 milliGRAM(s) IV Intermittent every 6 hours  heparin   Injectable 5000 Unit(s) SubCutaneous every 8 hours  lactated ringers. 1000 milliLiter(s) (100 mL/Hr) IV Continuous <Continuous>    MEDICATIONS  (PRN):  ketorolac   Injectable 15 milliGRAM(s) IV Push every 6 hours PRN Moderate Pain (4 - 6)  metoclopramide Injectable 10 milliGRAM(s) IV Push every 8 hours PRN Nausea and/or Vomiting  ondansetron Injectable 4 milliGRAM(s) IV Push every 6 hours PRN Nausea  END MEDSACTIVE  START DIETORDERDiet, Clear Liquid:   Bariatric Clear Liquid (BARICLLIQ) (01-18-23 @ 18:21)  END DIETORDER  START ADMITDXNausea    END ADMITDX  START IOFSEND IOFS  START SKINPUEND SKINPU    OBJECTIVE:    Vital Signs Last 24 Hrs  T(C): 36.7 (18 Jan 2023 23:30), Max: 36.9 (18 Jan 2023 16:07)  T(F): 98 (18 Jan 2023 23:30), Max: 98.5 (18 Jan 2023 16:07)  HR: 72 (18 Jan 2023 23:30) (66 - 97)  BP: 119/76 (18 Jan 2023 23:30) (102/72 - 127/88)  BP(mean): 82 (18 Jan 2023 06:56) (82 - 82)  RR: 18 (18 Jan 2023 23:30) (16 - 18)  SpO2: 99% (18 Jan 2023 23:30) (95% - 100%)    Parameters below as of 18 Jan 2023 23:30  Patient On (Oxygen Delivery Method): room air    I&O's Detail  MEDICATIONS  (STANDING):  acetaminophen   IVPB .. 1000 milliGRAM(s) IV Intermittent every 6 hours  heparin   Injectable 5000 Unit(s) SubCutaneous every 8 hours  lactated ringers. 1000 milliLiter(s) (100 mL/Hr) IV Continuous <Continuous>    MEDICATIONS  (PRN):  ketorolac   Injectable 15 milliGRAM(s) IV Push every 6 hours PRN Moderate Pain (4 - 6)  metoclopramide Injectable 10 milliGRAM(s) IV Push every 8 hours PRN Nausea and/or Vomiting  ondansetron Injectable 4 milliGRAM(s) IV Push every 6 hours PRN Nausea      PHYSICAL EXAM:  Constitutional: A&Ox3, NAD  Respiratory: Unlabored breathing  Abdomen: Soft, nondistended, nontender, port site steri strips intact  Extremities: WWP, GUARDADO spontaneously    LABS:                        13.5   6.91  )-----------( 327      ( 18 Jan 2023 12:42 )             40.2     01-18    137  |  98  |  7   ----------------------------<  85  4.6   |  24  |  0.65    Ca    9.8      18 Jan 2023 12:42  Phos  2.7     01-18  Mg     1.9     01-18    TPro  7.1  /  Alb  4.0  /  TBili  0.6  /  DBili  x   /  AST  11  /  ALT  16  /  AlkPhos  68  01-18      LIVER FUNCTIONS - ( 18 Jan 2023 12:42 )  Alb: 4.0 g/dL / Pro: 7.1 g/dL / ALK PHOS: 68 U/L / ALT: 16 U/L / AST: 11 U/L / GGT: x                 IMAGING:

## 2023-01-20 ENCOUNTER — TRANSCRIPTION ENCOUNTER (OUTPATIENT)
Age: 48
End: 2023-01-20

## 2023-01-20 VITALS
DIASTOLIC BLOOD PRESSURE: 83 MMHG | TEMPERATURE: 98 F | RESPIRATION RATE: 18 BRPM | OXYGEN SATURATION: 100 % | HEART RATE: 62 BPM | SYSTOLIC BLOOD PRESSURE: 134 MMHG

## 2023-01-20 LAB
ANION GAP SERPL CALC-SCNC: 18 MMOL/L — HIGH (ref 5–17)
BUN SERPL-MCNC: <4 MG/DL — LOW (ref 7–23)
CALCIUM SERPL-MCNC: 9.6 MG/DL — SIGNIFICANT CHANGE UP (ref 8.4–10.5)
CHLORIDE SERPL-SCNC: 97 MMOL/L — SIGNIFICANT CHANGE UP (ref 96–108)
CO2 SERPL-SCNC: 22 MMOL/L — SIGNIFICANT CHANGE UP (ref 22–31)
CREAT SERPL-MCNC: 0.54 MG/DL — SIGNIFICANT CHANGE UP (ref 0.5–1.3)
EGFR: 113 ML/MIN/1.73M2 — SIGNIFICANT CHANGE UP
FOLATE SERPL-MCNC: 17.1 NG/ML — SIGNIFICANT CHANGE UP
GLUCOSE SERPL-MCNC: 81 MG/DL — SIGNIFICANT CHANGE UP (ref 70–99)
HCT VFR BLD CALC: 38.7 % — SIGNIFICANT CHANGE UP (ref 34.5–45)
HGB BLD-MCNC: 13.3 G/DL — SIGNIFICANT CHANGE UP (ref 11.5–15.5)
MAGNESIUM SERPL-MCNC: 1.8 MG/DL — SIGNIFICANT CHANGE UP (ref 1.6–2.6)
MCHC RBC-ENTMCNC: 32.1 PG — SIGNIFICANT CHANGE UP (ref 27–34)
MCHC RBC-ENTMCNC: 34.4 GM/DL — SIGNIFICANT CHANGE UP (ref 32–36)
MCV RBC AUTO: 93.5 FL — SIGNIFICANT CHANGE UP (ref 80–100)
NRBC # BLD: 0 /100 WBCS — SIGNIFICANT CHANGE UP (ref 0–0)
PHOSPHATE SERPL-MCNC: 3 MG/DL — SIGNIFICANT CHANGE UP (ref 2.5–4.5)
PLATELET # BLD AUTO: 327 K/UL — SIGNIFICANT CHANGE UP (ref 150–400)
POTASSIUM SERPL-MCNC: 3.7 MMOL/L — SIGNIFICANT CHANGE UP (ref 3.5–5.3)
POTASSIUM SERPL-SCNC: 3.7 MMOL/L — SIGNIFICANT CHANGE UP (ref 3.5–5.3)
PREALB SERPL-MCNC: 16 MG/DL — LOW (ref 20–40)
RBC # BLD: 4.14 M/UL — SIGNIFICANT CHANGE UP (ref 3.8–5.2)
RBC # FLD: 11.7 % — SIGNIFICANT CHANGE UP (ref 10.3–14.5)
SODIUM SERPL-SCNC: 137 MMOL/L — SIGNIFICANT CHANGE UP (ref 135–145)
VIT B12 SERPL-MCNC: 877 PG/ML — SIGNIFICANT CHANGE UP (ref 232–1245)
WBC # BLD: 6.38 K/UL — SIGNIFICANT CHANGE UP (ref 3.8–10.5)
WBC # FLD AUTO: 6.38 K/UL — SIGNIFICANT CHANGE UP (ref 3.8–10.5)

## 2023-01-20 PROCEDURE — 85025 COMPLETE CBC W/AUTO DIFF WBC: CPT

## 2023-01-20 PROCEDURE — 85014 HEMATOCRIT: CPT

## 2023-01-20 PROCEDURE — 82330 ASSAY OF CALCIUM: CPT

## 2023-01-20 PROCEDURE — 99285 EMERGENCY DEPT VISIT HI MDM: CPT

## 2023-01-20 PROCEDURE — 82947 ASSAY GLUCOSE BLOOD QUANT: CPT

## 2023-01-20 PROCEDURE — 82607 VITAMIN B-12: CPT

## 2023-01-20 PROCEDURE — 87637 SARSCOV2&INF A&B&RSV AMP PRB: CPT

## 2023-01-20 PROCEDURE — 96374 THER/PROPH/DIAG INJ IV PUSH: CPT

## 2023-01-20 PROCEDURE — 74177 CT ABD & PELVIS W/CONTRAST: CPT | Mod: MA

## 2023-01-20 PROCEDURE — G0378: CPT

## 2023-01-20 PROCEDURE — 82803 BLOOD GASES ANY COMBINATION: CPT

## 2023-01-20 PROCEDURE — 84134 ASSAY OF PREALBUMIN: CPT

## 2023-01-20 PROCEDURE — 36415 COLL VENOUS BLD VENIPUNCTURE: CPT

## 2023-01-20 PROCEDURE — 84295 ASSAY OF SERUM SODIUM: CPT

## 2023-01-20 PROCEDURE — 83605 ASSAY OF LACTIC ACID: CPT

## 2023-01-20 PROCEDURE — 82435 ASSAY OF BLOOD CHLORIDE: CPT

## 2023-01-20 PROCEDURE — 74018 RADEX ABDOMEN 1 VIEW: CPT

## 2023-01-20 PROCEDURE — 96375 TX/PRO/DX INJ NEW DRUG ADDON: CPT

## 2023-01-20 PROCEDURE — 85018 HEMOGLOBIN: CPT

## 2023-01-20 PROCEDURE — 82746 ASSAY OF FOLIC ACID SERUM: CPT

## 2023-01-20 PROCEDURE — 82652 VIT D 1 25-DIHYDROXY: CPT

## 2023-01-20 PROCEDURE — 83735 ASSAY OF MAGNESIUM: CPT

## 2023-01-20 PROCEDURE — 80048 BASIC METABOLIC PNL TOTAL CA: CPT

## 2023-01-20 PROCEDURE — 85027 COMPLETE CBC AUTOMATED: CPT

## 2023-01-20 PROCEDURE — 80053 COMPREHEN METABOLIC PANEL: CPT

## 2023-01-20 PROCEDURE — 84132 ASSAY OF SERUM POTASSIUM: CPT

## 2023-01-20 PROCEDURE — 84100 ASSAY OF PHOSPHORUS: CPT

## 2023-01-20 PROCEDURE — 84702 CHORIONIC GONADOTROPIN TEST: CPT

## 2023-01-20 PROCEDURE — 83690 ASSAY OF LIPASE: CPT

## 2023-01-20 RX ORDER — METOCLOPRAMIDE HCL 10 MG
1 TABLET ORAL
Qty: 30 | Refills: 0
Start: 2023-01-20 | End: 2023-01-29

## 2023-01-20 RX ORDER — ONDANSETRON 8 MG/1
1 TABLET, FILM COATED ORAL
Qty: 42 | Refills: 0
Start: 2023-01-20 | End: 2023-02-02

## 2023-01-20 NOTE — DISCHARGE NOTE PROVIDER - NSDCMRMEDTOKEN_GEN_ALL_CORE_FT
ondansetron 4 mg oral tablet, disintegratin tab(s) orally every 6 hours, As Needed   PriLOSEC 40 mg oral delayed release capsule: 1 cap(s) orally once a day AM Do not swallow whole. Open capsule and take in applesauce   PriLOSEC 40 mg oral delayed release capsule: 1 cap(s) orally once a day AM Do not swallow whole. Open capsule and take in applesauce

## 2023-01-20 NOTE — DISCHARGE NOTE PROVIDER - HOSPITAL COURSE
48 yo female, PMH asthma GERD, KATHLEEN, Obesity S/p Gastric Sleeve  and Hiatal Hernia 5/2022 with recurrence of Hiatal Hernia 6/30/22 requiring RTOR for repair at [facility]. Over time pain had persistent reflux-like symptoms. On 1/11/2023  at [facility] she underwent flexible Bronchoscopy by CT surgery, Robotic Assisted exploration of esophagus and mobilization of the esophagus, placement of B/L chest tubes and Robotic Assisted conversion of Sleeve gastrectomy to RY Gastric Bypass, Hiatal Hernia Repair. Patient was discharged on POD# 3 after tolerating liquid diet.     On 1/18/2022 she presented to [facility] ED for persistent nausea/vomiting and abdominal pain after having protein shake.  Abdominal CT was obtained and no internal hernia, or obstruction noted.  She was admitted and treated conservatively. She subsequently tolerated bariatric liquid diet, passed gas and has no further episodes of nausea or vomiting and remained comfortable for the  remainder of her hospital course. She was cleared for discharged home on 1/20/2022 and to follow up with Dr Field  in 2-3 weeks. All medications. Procedures specific discharge instructions explained including diet for the next couple of weeks. She ws encouraged to drink a liter of water daily to maintain hydration.

## 2023-01-20 NOTE — PROGRESS NOTE ADULT - SUBJECTIVE AND OBJECTIVE BOX
Surgery Progress Note  Patient is a 47y old  Female who presents with a chief complaint of     Overnight Events: No acute events overnight.  SUBJECTIVE: Patient seen and examined at bedside with surgical team, patient without complaints. Denies fever, chills, CP, SOB nausea, vomiting, dizziness.    START [id number]    135  |  96  |  5<L>  ----------------------------<  81  3.8   |  20<L>  |  0.55    Ca    9.6      19 Jan 2023 09:48  Phos  3.2     01-19  Mg     1.6     01-19    TPro  7.1  /  Alb  4.0  /  TBili  0.6  /  DBili  x   /  AST  11  /  ALT  16  /  AlkPhos  68  01-18  A1C with Estimated Average Glucose Result: 5.1 % (04-22-22 @ 21:47)  END CHEMFISH  START MEDSACTIVEMEDICATIONS  (STANDING):  acetaminophen    Suspension .. 975 milliGRAM(s) Oral every 6 hours  heparin   Injectable 5000 Unit(s) SubCutaneous every 8 hours  lactated ringers. 1000 milliLiter(s) (125 mL/Hr) IV Continuous <Continuous>    MEDICATIONS  (PRN):  ketorolac   Injectable 15 milliGRAM(s) IV Push every 6 hours PRN Moderate Pain (4 - 6)  metoclopramide Injectable 10 milliGRAM(s) IV Push every 8 hours PRN Nausea and/or Vomiting  ondansetron Injectable 4 milliGRAM(s) IV Push every 6 hours PRN Nausea  END MEDSACTIVE  START DIETORDEREND DIETORDER  START ADMITDXNausea    END ADMITDX  START IOFS  END IOFS  START SKINPUEND SKINPU    OBJECTIVE:    Vital Signs Last 24 Hrs  T(C): 37.2 (20 Jan 2023 05:06), Max: 37.2 (19 Jan 2023 20:51)  T(F): 99 (20 Jan 2023 05:06), Max: 99 (20 Jan 2023 05:06)  HR: 65 (20 Jan 2023 05:06) (53 - 81)  BP: 120/78 (20 Jan 2023 05:06) (120/78 - 157/87)  BP(mean): --  RR: 18 (20 Jan 2023 05:06) (18 - 18)  SpO2: 100% (20 Jan 2023 05:06) (100% - 100%)    Parameters below as of 20 Jan 2023 05:06  Patient On (Oxygen Delivery Method): room air    I&O's Detail    18 Jan 2023 07:01  -  19 Jan 2023 07:00  --------------------------------------------------------  IN:  Total IN: 0 mL    OUT:    Voided (mL): 700 mL  Total OUT: 700 mL    Total NET: -700 mL      19 Jan 2023 07:01  -  20 Jan 2023 05:22  --------------------------------------------------------  IN:    Oral Fluid: 75 mL  Total IN: 75 mL    OUT:  Total OUT: 0 mL    Total NET: 75 mL      MEDICATIONS  (STANDING):  acetaminophen    Suspension .. 975 milliGRAM(s) Oral every 6 hours  heparin   Injectable 5000 Unit(s) SubCutaneous every 8 hours  lactated ringers. 1000 milliLiter(s) (125 mL/Hr) IV Continuous <Continuous>    MEDICATIONS  (PRN):  ketorolac   Injectable 15 milliGRAM(s) IV Push every 6 hours PRN Moderate Pain (4 - 6)  metoclopramide Injectable 10 milliGRAM(s) IV Push every 8 hours PRN Nausea and/or Vomiting  ondansetron Injectable 4 milliGRAM(s) IV Push every 6 hours PRN Nausea      PHYSICAL EXAM:  Constitutional: A&Ox3, NAD  Respiratory: Unlabored breathing  Abdomen: Soft, nondistended, NTTP. No rebound or guarding. ****incisions c/d/i  Extremities: WWP, GUARDADO spontaneously    LABS:                        13.2   6.73  )-----------( 315      ( 19 Jan 2023 09:48 )             38.7     01-19    135  |  96  |  5<L>  ----------------------------<  81  3.8   |  20<L>  |  0.55    Ca    9.6      19 Jan 2023 09:48  Phos  3.2     01-19  Mg     1.6     01-19    TPro  7.1  /  Alb  4.0  /  TBili  0.6  /  DBili  x   /  AST  11  /  ALT  16  /  AlkPhos  68  01-18      LIVER FUNCTIONS - ( 18 Jan 2023 12:42 )  Alb: 4.0 g/dL / Pro: 7.1 g/dL / ALK PHOS: 68 U/L / ALT: 16 U/L / AST: 11 U/L / GGT: x                 IMAGING:     Surgery Progress Note    Overnight Events: No acute events overnight.  SUBJECTIVE: Patient seen and examined at bedside with surgical team, patient without complaints this AM. Nausea resolving. Admits to passing flatus. +OOB, +Voiding. Denies fever, chills, CP, SOB nausea, vomiting, dizziness.    OBJECTIVE:    Vital Signs Last 24 Hrs  T(C): 37.2 (20 Jan 2023 05:06), Max: 37.2 (19 Jan 2023 20:51)  T(F): 99 (20 Jan 2023 05:06), Max: 99 (20 Jan 2023 05:06)  HR: 65 (20 Jan 2023 05:06) (53 - 81)  BP: 120/78 (20 Jan 2023 05:06) (120/78 - 157/87)  BP(mean): --  RR: 18 (20 Jan 2023 05:06) (18 - 18)  SpO2: 100% (20 Jan 2023 05:06) (100% - 100%)    Parameters below as of 20 Jan 2023 05:06  Patient On (Oxygen Delivery Method): room air    I&O's Detail    18 Jan 2023 07:01  -  19 Jan 2023 07:00  --------------------------------------------------------  IN:  Total IN: 0 mL    OUT:    Voided (mL): 700 mL  Total OUT: 700 mL    Total NET: -700 mL      19 Jan 2023 07:01  -  20 Jan 2023 05:22  --------------------------------------------------------  IN:    Oral Fluid: 75 mL  Total IN: 75 mL    OUT:  Total OUT: 0 mL    Total NET: 75 mL      MEDICATIONS  (STANDING):  acetaminophen    Suspension .. 975 milliGRAM(s) Oral every 6 hours  heparin   Injectable 5000 Unit(s) SubCutaneous every 8 hours  lactated ringers. 1000 milliLiter(s) (125 mL/Hr) IV Continuous <Continuous>    MEDICATIONS  (PRN):  ketorolac   Injectable 15 milliGRAM(s) IV Push every 6 hours PRN Moderate Pain (4 - 6)  metoclopramide Injectable 10 milliGRAM(s) IV Push every 8 hours PRN Nausea and/or Vomiting  ondansetron Injectable 4 milliGRAM(s) IV Push every 6 hours PRN Nausea      PHYSICAL EXAM:  Constitutional: A&Ox3, NAD  Respiratory: Unlabored breathing  Abdomen: Soft, nondistended, NTTP. No rebound or guarding. Incisions with steris - intact and dry.   Extremities: WWP, GUARDADO spontaneously    LABS:                        13.2   6.73  )-----------( 315      ( 19 Jan 2023 09:48 )             38.7     01-19    135  |  96  |  5<L>  ----------------------------<  81  3.8   |  20<L>  |  0.55    Ca    9.6      19 Jan 2023 09:48  Phos  3.2     01-19  Mg     1.6     01-19    TPro  7.1  /  Alb  4.0  /  TBili  0.6  /  DBili  x   /  AST  11  /  ALT  16  /  AlkPhos  68  01-18      LIVER FUNCTIONS - ( 18 Jan 2023 12:42 )  Alb: 4.0 g/dL / Pro: 7.1 g/dL / ALK PHOS: 68 U/L / ALT: 16 U/L / AST: 11 U/L / GGT: x

## 2023-01-20 NOTE — DISCHARGE NOTE PROVIDER - NSDCCPTREATMENT_GEN_ALL_CORE_FT
PRINCIPAL PROCEDURE  Procedure: Robot-assisted laparoscopic creation of gastric bypass  Findings and Treatment: Bariatric liquid diet, increase dietary supplement and hydration, anti-nausea follow up care

## 2023-01-20 NOTE — DISCHARGE NOTE NURSING/CASE MANAGEMENT/SOCIAL WORK - FLU SEASON?
Yes... Xray L. hip, pelvis, femur no evidence of acute fracture  fall precautions, PT rec home w/ home PT  G + rods in 1 bottle - contaminant, no need to treat or w/u   still refusing rehab - case dw SW and CM this morning at bedside, daughter called and left  for SW - safety issue with pt's refusal for rehab and multiple falls and readmissions. pt remains medically stable for DC - awaiting d/w daughter on dispo Pt was hospitalized from August 15th - 19th 2017 with c/o sob

## 2023-01-20 NOTE — DISCHARGE NOTE NURSING/CASE MANAGEMENT/SOCIAL WORK - PATIENT PORTAL LINK FT
You can access the FollowMyHealth Patient Portal offered by [facility] by registering at the following website: [email or web address]. By joining [facility]’s FollowMyHealth portal, you will also be able to view your health information using other applications (apps) compatible with our system.

## 2023-01-20 NOTE — PROGRESS NOTE ADULT - ATTENDING COMMENTS
Feels better - nausea resolved.  Tolerating liquids PO.  No dysphagia, reflux, or abdominal pain.  Abdomen remains soft, NT, ND  + flatus    Plan for discharge home today on carloz liquids  F/u with me in office next week.    Rahul Field MD
No acute events overnight.  Still with nausea, but improved on zofran/reglan  Abd exam benign  Will continue antiemetics, IVF hydration, monitor  Check nutrition panel    Rahul Field MD

## 2023-01-20 NOTE — PROGRESS NOTE ADULT - SUBJECTIVE AND OBJECTIVE BOX
Post Op Day#: 1    Subjective: Doings a lot better, no Nausea or abdominal discofort"    Objective: No acute events overnight, nausea and abdominal discomfort resolved. Tolerating bariatric liquid diet. Continuous pulse oximetry at bedside functioning,  v/s stable, afebrile. Labs within acceptable range. Ambulated independently around the unit and voiding.                                              Vital Signs Last 24 Hrs  T(C): 37.2 (20 Jan 2023 05:06), Max: 37.2 (19 Jan 2023 20:51)  T(F): 99 (20 Jan 2023 05:06), Max: 99 (20 Jan 2023 05:06)  HR: 65 (20 Jan 2023 05:06) (53 - 81)  BP: 120/78 (20 Jan 2023 05:06) (120/78 - 157/87)  BP(mean): --  RR: 18 (20 Jan 2023 05:06) (18 - 18)  SpO2: 100% (20 Jan 2023 05:06) (100% - 100%)    Parameters below as of 20 Jan 2023 05:06  Patient On (Oxygen Delivery Method): room air                                                   I&O's Summary    19 Jan 2023 07:01  -  20 Jan 2023 07:00  --------------------------------------------------------  IN: 1575 mL / OUT: 450 mL / NET: 1125 mL                                                                          13.2   6.73  )-----------( 315      ( 19 Jan 2023 09:48 )             38.7                                                 01-19    135  |  96  |  5<L>  ----------------------------<  81  3.8   |  20<L>  |  0.55    Ca    9.6      19 Jan 2023 09:48  Phos  3.2     01-19  Mg     1.6     01-19    TPro  7.1  /  Alb  4.0  /  TBili  0.6  /  DBili  x   /  AST  11  /  ALT  16  /  AlkPhos  68  01-18    acetaminophen    Suspension .. 975 milliGRAM(s) Oral every 6 hours  heparin   Injectable 5000 Unit(s) SubCutaneous every 8 hours  ketorolac   Injectable 15 milliGRAM(s) IV Push every 6 hours PRN  lactated ringers. 1000 milliLiter(s) IV Continuous <Continuous>  metoclopramide Injectable 10 milliGRAM(s) IV Push every 8 hours PRN  ondansetron Injectable 4 milliGRAM(s) IV Push every 6 hours PRN      Physical Exam:         Lungs:  clear breath sounds b/l       Heart:  Regular rate & rhythm       Abdomen:  Soft, non-distended.  Scopes sites clean, dry and intact. + bs, - flatus, no rebound or guarding       Skin:  intact, pannus w/o rash       Extremities: + pulses, no edema, no calf tenderness, negative alejandro's     VTE Extended Risk Assessment Scores             [facility]  VTE Predicted RISK: Low   ( <1% ),     Assessment and Plan: 47 year old female admitted with abdominal pain and nausea after RYGB at [facility] on 1/11/2023. Abdominal CT without obstruction of leak.    - Bariatric liquid diet today then protocol derived staged meal progression supervised by RD in outpatient setting  - GI prophylaxis, Incentive spirometry  - Ambulate as tolerated  - Procedure specific education including postop complications, medications and side effects, diet, vitamins and VTE prevention. Written materials given  - Medication reviewed and reconciled,   - D/C home today Bariatric 8-Point d/c criteria met  - Follow up with Dr Field in 7-10 days, Dietitian and PMD in 30 days.      Roxann Pierre, JEAN CLAUDE, ANP  [phone] Post Op Day#: 1    Subjective: Doings a lot better, no Nausea or abdominal discofort"    Objective: No acute events overnight, nausea and abdominal discomfort resolved. Tolerating bariatric liquid diet. Continuous pulse oximetry at bedside functioning,  v/s stable, afebrile. Labs within acceptable range. Ambulated independently around the unit and voiding.                                              Vital Signs Last 24 Hrs  T(C): 37.2 (20 Jan 2023 05:06), Max: 37.2 (19 Jan 2023 20:51)  T(F): 99 (20 Jan 2023 05:06), Max: 99 (20 Jan 2023 05:06)  HR: 65 (20 Jan 2023 05:06) (53 - 81)  BP: 120/78 (20 Jan 2023 05:06) (120/78 - 157/87)  BP(mean): --  RR: 18 (20 Jan 2023 05:06) (18 - 18)  SpO2: 100% (20 Jan 2023 05:06) (100% - 100%)    Parameters below as of 20 Jan 2023 05:06  Patient On (Oxygen Delivery Method): room air                                                   I&O's Summary    19 Jan 2023 07:01  -  20 Jan 2023 07:00  --------------------------------------------------------  IN: 1575 mL / OUT: 450 mL / NET: 1125 mL                                                                          13.2   6.73  )-----------( 315      ( 19 Jan 2023 09:48 )             38.7                                                 01-19    135  |  96  |  5<L>  ----------------------------<  81  3.8   |  20<L>  |  0.55    Ca    9.6      19 Jan 2023 09:48  Phos  3.2     01-19  Mg     1.6     01-19    TPro  7.1  /  Alb  4.0  /  TBili  0.6  /  DBili  x   /  AST  11  /  ALT  16  /  AlkPhos  68  01-18    acetaminophen    Suspension .. 975 milliGRAM(s) Oral every 6 hours  heparin   Injectable 5000 Unit(s) SubCutaneous every 8 hours  ketorolac   Injectable 15 milliGRAM(s) IV Push every 6 hours PRN  lactated ringers. 1000 milliLiter(s) IV Continuous <Continuous>  metoclopramide Injectable 10 milliGRAM(s) IV Push every 8 hours PRN  ondansetron Injectable 4 milliGRAM(s) IV Push every 6 hours PRN      Physical Exam:         Lungs:  clear breath sounds b/l       Heart:  Regular rate & rhythm       Abdomen:  Soft, non-distended.  Scopes sites clean, dry and intact. + bs, - flatus, no rebound or guarding       Skin:  intact, pannus w/o rash       Extremities: + pulses, no edema, no calf tenderness, negative alejandro's     VTE Extended Risk Assessment Scores             [facility]  VTE Predicted RISK: Low  (<1% ),     Assessment and Plan: 47 year old female admitted with abdominal pain and nausea after RYGB at [facility] on 1/11/2023. Abdominal CT without obstruction of leak.    - Bariatric liquid diet today then protocol derived staged meal progression supervised by RD in outpatient setting  - GI prophylaxis, Incentive spirometry  - Ambulate as tolerated  - Procedure specific education including postop complications, medications and side effects, diet, vitamins and VTE prevention. Written materials given  - Counseled patient on importance of adequate hydration and nutrients and dietary supplement to prevent acute and long-term complication  - Medication reviewed and reconciled,   - D/C home today Bariatric 8-Point d/c criteria met  - Follow up with Dr Field in 7-10 days, Dietitian and PMD in 30 days.      Roxann Pierre, JEAN CLAUDE, ANP  [phone]

## 2023-01-20 NOTE — PROGRESS NOTE ADULT - ASSESSMENT
48 yo female, PMH asthma GERD, KATHLEEN, Obesity S/p Gastric Sleeve and Hiatal Hernia 5/2022 with recurrence of Hiatal Hernia 6/30/22 requiring RTOR for repair. Now POD7 robotic hiatal hernia repair and conversion to RNYGB at [facility] with presentation to [facility] ED for severe n/v with mild abd pain for 1 day.       Plan:  - f/u nutrition panel  - continue CLD  - continue IVF  - pain control  - Reglan/Zofran   - VTE ppx with heparin       Green team Surgery  [id number]  48 yo female, PMH asthma GERD, KATHLEEN, Obesity S/p Gastric Sleeve and Hiatal Hernia 5/2022 with recurrence of Hiatal Hernia 6/30/22 requiring RTOR for repair. Now POD7 robotic hiatal hernia repair and conversion to RNYGB at [facility] with presentation to [facility] ED for severe n/v with mild abd pain for 1 day.       Plan:  - f/u nutrition panel, AM labs  - continue carloz CLD  - continue IVF  - pain control  - Reglan/Zofran   - VTE ppx with heparin   - Plan for discharge later today       Green team Surgery  [id number]

## 2023-01-20 NOTE — DISCHARGE NOTE NURSING/CASE MANAGEMENT/SOCIAL WORK - NSDCPEFALRISK_GEN_ALL_CORE
For information on Fall & Injury Prevention, visit: [email or web address] OR  [email or web address] OR  https://www.cdc.gov/steadi/patient.html

## 2023-01-24 LAB — VIT D25+D1,25 OH+D1,25 PNL SERPL-MCNC: 87.3 PG/ML — HIGH (ref 19.9–79.3)

## 2023-01-25 ENCOUNTER — NON-APPOINTMENT (OUTPATIENT)
Age: 48
End: 2023-01-25

## 2023-01-30 ENCOUNTER — APPOINTMENT (OUTPATIENT)
Dept: SURGERY | Facility: CLINIC | Age: 48
End: 2023-01-30
Payer: MEDICAID

## 2023-01-30 VITALS
TEMPERATURE: 97 F | SYSTOLIC BLOOD PRESSURE: 108 MMHG | RESPIRATION RATE: 18 BRPM | WEIGHT: 168.31 LBS | HEIGHT: 63 IN | OXYGEN SATURATION: 99 % | BODY MASS INDEX: 29.82 KG/M2 | HEART RATE: 76 BPM | DIASTOLIC BLOOD PRESSURE: 74 MMHG

## 2023-01-30 DIAGNOSIS — Z98.890 OTHER SPECIFIED POSTPROCEDURAL STATES: ICD-10-CM

## 2023-01-30 PROCEDURE — 99024 POSTOP FOLLOW-UP VISIT: CPT

## 2023-01-30 NOTE — PLAN
[FreeTextEntry1] : [] Continue pureed diet\par [] emphasized importance of maintaining excellent hydration, monitoring urine output and color, and keeping a bottle of water available at all times\par [] 1-2 protein shakes per day \par [] walk as much as tolerated\par [] multivitamins recommended daily\par \par \par F/u with me 1 month

## 2023-01-30 NOTE — PHYSICAL EXAM
[Obese] : obese [Normal] : affect appropriate [de-identified] : Right chest wall incisions healing well, sutures in place, no erythema or induration [de-identified] : Soft, ND, NT, incisions healing well, no erythema or drainage.

## 2023-01-30 NOTE — ASSESSMENT
[FreeTextEntry1] : 47-year-old female with history of sleeve gastrectomy and hiatal hernia repair in 5/3/2022, with recurrent hiatal hernia repair 6/3/2022, now s/p robot-assisted laparoscopic Poncho-en-Y gastric bypass with re-recurrent hiatal hernia repair on 1/13/2023, doing well.

## 2023-01-30 NOTE — HISTORY OF PRESENT ILLNESS
[de-identified] : Katina is a 47 year old female here for follow up visit, s/p robot-assisted laparoscopic Poncho-en-Y gastric bypass and recurrent hiatal hernia repair in conjunction with Dr. Rodriguez (Thoracic Surgery) on 01/11/2023. \par \par Patient is overall doing well. She was admitted post-operatively for nausea control, which completely resolved after discharge a day later. Since then, she has had no further issues with nausea, no vomiting. Tolerating liquids and now pureed diet for last 2 days, without dysphagia or reflux. Complains of constipation, with last bowel movement on 1/14. Has been passing gas. Denies abdominal pain, fevers, chills, chest pain, shortness of breath, dysuria.

## 2023-01-30 NOTE — REVIEW OF SYSTEMS
[Constipation] : constipation [Negative] : Allergic/Immunologic [Abdominal Pain] : no abdominal pain [Vomiting] : no vomiting [Diarrhea] : no diarrhea [Reflux/Heartburn] : no reflux/ heartburn [Hernia] : no hernia

## 2023-02-01 RX ORDER — METOCLOPRAMIDE 10 MG/1
10 TABLET ORAL
Qty: 30 | Refills: 0 | Status: ACTIVE | COMMUNITY
Start: 2023-01-20

## 2023-02-01 RX ORDER — ONDANSETRON 4 MG/1
4 TABLET, ORALLY DISINTEGRATING ORAL
Qty: 9 | Refills: 0 | Status: ACTIVE | COMMUNITY
Start: 2023-01-13

## 2023-02-02 ENCOUNTER — OUTPATIENT (OUTPATIENT)
Dept: OUTPATIENT SERVICES | Facility: HOSPITAL | Age: 48
LOS: 1 days | End: 2023-02-02
Payer: MEDICAID

## 2023-02-02 ENCOUNTER — APPOINTMENT (OUTPATIENT)
Dept: RADIOLOGY | Facility: HOSPITAL | Age: 48
End: 2023-02-02

## 2023-02-02 ENCOUNTER — RESULT REVIEW (OUTPATIENT)
Age: 48
End: 2023-02-02

## 2023-02-02 ENCOUNTER — APPOINTMENT (OUTPATIENT)
Dept: THORACIC SURGERY | Facility: CLINIC | Age: 48
End: 2023-02-02
Payer: MEDICAID

## 2023-02-02 VITALS
DIASTOLIC BLOOD PRESSURE: 73 MMHG | WEIGHT: 170 LBS | OXYGEN SATURATION: 90 % | HEIGHT: 63 IN | HEART RATE: 71 BPM | BODY MASS INDEX: 30.12 KG/M2 | SYSTOLIC BLOOD PRESSURE: 107 MMHG

## 2023-02-02 VITALS — OXYGEN SATURATION: 92 %

## 2023-02-02 DIAGNOSIS — K44.9 DIAPHRAGMATIC HERNIA WITHOUT OBSTRUCTION OR GANGRENE: ICD-10-CM

## 2023-02-02 DIAGNOSIS — K21.9 GASTRO-ESOPHAGEAL REFLUX DISEASE W/OUT ESOPHAGITIS: ICD-10-CM

## 2023-02-02 DIAGNOSIS — J90 PLEURAL EFFUSION, NOT ELSEWHERE CLASSIFIED: ICD-10-CM

## 2023-02-02 PROCEDURE — 71046 X-RAY EXAM CHEST 2 VIEWS: CPT | Mod: 26

## 2023-02-02 PROCEDURE — 99024 POSTOP FOLLOW-UP VISIT: CPT

## 2023-02-03 PROBLEM — K21.9 GERD (GASTROESOPHAGEAL REFLUX DISEASE): Status: ACTIVE | Noted: 2022-09-14

## 2023-02-03 NOTE — CONSULT LETTER
[Dear  ___] : Dear  [unfilled], [Consult Letter:] : I had the pleasure of evaluating your patient, [unfilled]. [( Thank you for referring [unfilled] for consultation for _____ )] : Thank you for referring [unfilled] for consultation for [unfilled] [Please see my note below.] : Please see my note below. [Consult Closing:] : Thank you very much for allowing me to participate in the care of this patient.  If you have any questions, please do not hesitate to contact me. [Sincerely,] : Sincerely, [FreeTextEntry2] : Rahul Field MD (Bariatric Sx) [FreeTextEntry3] : Omar Rodriguez MD, MPH \par System Director of Thoracic Surgery \par Director of Comprehensive Lung and Foregut [place] \par Professor Cardiovascular & Thoracic Surgery  \par [employer] [facility]\par

## 2023-02-03 NOTE — PHYSICAL EXAM
[] : no respiratory distress [Respiration, Rhythm And Depth] : normal respiratory rhythm and effort [Exaggerated Use Of Accessory Muscles For Inspiration] : no accessory muscle use [Auscultation Breath Sounds / Voice Sounds] : lungs were clear to auscultation bilaterally [Site: ___] : Site: [unfilled] [Clean] : clean [Dry] : dry [Healing Well] : healing well [FreeTextEntry2] : Bilateral  [Bleeding] : no active bleeding [Foul Odor] : no foul smell [Purulent Drainage] : no purulent drainage [Serosanguinous Drainage] : no serosanguinous drainage [Erythema] : not erythematous [Warm] : not warm [Tender] : not tender

## 2023-02-03 NOTE — REASON FOR VISIT
[de-identified] : Laparoscopic revision of sleeve gastrectomy to Poncho-en-Y gastric bypass (by Dr. Field), and Rt VATS R.A. mobilization of the esophagus and recurrent H.H., redo laparoscopy, R.A. repair of recurrent H.H., EGD, placement of Lt chest tube (by Dr. Rodriguez)  [de-identified] : 1/11/23

## 2023-02-03 NOTE — ASSESSMENT
[FreeTextEntry1] : Ms. NORBERT COONEY, 47 year old female, never smoker, w/ hx of s/p RA, Lap, sleeve gastrectomy and hiatal hernia repair on 5/3/22 by Dr. Rahul Field, patient went back to ED on 06/02/2022 c/o dysphagia and poor PO tolerance, CT A/P showed concern for hiatal hernia vs migration of gastric sleeve, s/p Lap repair of recurrent hiatal hernia repair on 060/3/2022, patient went back to ED on 06/21/2022 for dysphagia, and again on 08/02/2022 for CP and dysphagia. \par \par CT C/A/P on 08/02/2022:\par - Status post sleeve gastrectomy. No bowel obstruction\par - Mild rightward displacement and narrowing of the trachea by extrinsic mass effect from left thyroid nodule.\par -  2.9 cm left thyroid nodule with dystrophic calcifications.\par \par Upper GI series on 08/03/2022:\par - Suggestion of a short segment narrowing in the proximal third of the stomach, status post sleeve gastrectomy. Correlation with endoscopy could be performed.\par - Small hiatal hernia with stasis and reflux of contrast in the esophagus.\par \par Patient is s/p EGD on 08/04/2022 showed Normal esophagus; Z-line regular, 31 cm from the incisors; LA Grade A reflux esophagitis; 2 cm hiatal hernia; A sleeve gastrectomy was found, characterized by healthy appearing mucosa. Mild narrowing in mid stomach, but widely patent; Normal examined duodenum; \par \par Bravo on 9/29/22: DeMeester score of 48\par \par Manometry was not performed. \par \par Now s/p EGD with biopsy on 9/29/22. There is 2 cm hiatal hernia seen and grade C esophagitis. There is a light bend in the gastric sleeve conduit with mild narrowing, but scope was able to pass. \par \par Now 3 wks s/p Laparoscopic revision of sleeve gastrectomy to Poncho-en-Y gastric bypass (by Dr. Field), and Rt VATS R.A. mobilization of the esophagus and recurrent H.H., redo laparoscopy, R.A. repair of recurrent H.H., EGD, placement of Lt chest tube (by Dr. Rodriguez) on 1/11/23.\par \par CXR today - Stable, post op changes\par \par I have reviewed the patient's medical records and diagnostic images at time of this office consultation and have made the following recommendation:\par 1. Patient overall recovering well. Tolerating soft diet. CXR today with stable post op findings. Recommendation to return to clinic in 3 months with upper GI series. \par 2. Instructed on the importance of bowel regimen. No straining with BMs\par 3. Follow up with Dr. Field as per his recommendations. \par \par Recommendations reviewed with patient during this office visit, and all questions answered; Patient instructed on the importance of follow up and verbalizes understanding.\par \par \par I, Dr. RODRIGUEZ, EDWARD REIS, personally performed the evaluation and management (E/M) services for this established patient. That E/M includes conducting the examination, assessing all new/exacerbated conditions, and establishing a new plan of care. Today, My ACP, Zarina Greene, was here to observe my evaluation and management services for this patient to be followed going forward.\par \par \par \par \par \par

## 2023-02-15 NOTE — DIETITIAN INITIAL EVALUATION ADULT - WEIGHT USED FOR CALCULATIONS
normal/ROM intact/normal gait/strength 5/5 bilateral upper extremities/strength 5/5 bilateral lower extremities IBW negative

## 2023-02-27 ENCOUNTER — APPOINTMENT (OUTPATIENT)
Dept: SURGERY | Facility: CLINIC | Age: 48
End: 2023-02-27
Payer: MEDICAID

## 2023-03-02 ENCOUNTER — APPOINTMENT (OUTPATIENT)
Dept: SURGERY | Facility: CLINIC | Age: 48
End: 2023-03-02
Payer: MEDICAID

## 2023-03-20 ENCOUNTER — APPOINTMENT (OUTPATIENT)
Dept: SURGERY | Facility: CLINIC | Age: 48
End: 2023-03-20
Payer: MEDICAID

## 2023-03-20 VITALS
HEART RATE: 80 BPM | DIASTOLIC BLOOD PRESSURE: 79 MMHG | SYSTOLIC BLOOD PRESSURE: 118 MMHG | TEMPERATURE: 98 F | WEIGHT: 159.31 LBS | RESPIRATION RATE: 18 BRPM | HEIGHT: 63 IN | OXYGEN SATURATION: 98 % | BODY MASS INDEX: 28.23 KG/M2

## 2023-03-20 PROCEDURE — 99024 POSTOP FOLLOW-UP VISIT: CPT

## 2023-03-20 NOTE — PLAN
[FreeTextEntry1] : [] 3-month labs ordered\par [] cont bariatric diet and consult with nutritionist as needed\par [] goal 30 min cardiovascular exercise daily, with light weight resistance training as tolerated\par [] continue multivitamins\par [] f/u 3 months

## 2023-03-20 NOTE — PHYSICAL EXAM
[Normal] : affect appropriate [de-identified] : Normal respirations [de-identified] : Soft, nontender, nondistended.  Well-healed laparoscopic incision sites.

## 2023-03-20 NOTE — ASSESSMENT
[FreeTextEntry1] : 47-year-old female with history of sleeve gastrectomy and hiatal hernia repair in 5/3/2022, with recurrent hiatal hernia repair 6/3/2022, now s/p robot-assisted laparoscopic Poncho-en-Y gastric bypass with re-recurrent hiatal hernia repair on 1/13/2023 with Dr. Rodriguez of thoracic surgery, doing well.

## 2023-03-20 NOTE — HISTORY OF PRESENT ILLNESS
[de-identified] : Katina is a 47 year old female here for follow up visit, s/p robot-assisted laparoscopic conversion of sleeve gastrectomy to Poncho-en-Y gastric bypass and recurrent hiatal hernia repair in conjunction with Dr. Rodriguez (Thoracic Surgery) on 01/11/2023. 2 month post op visit. \par \par She is doing very well, with complete resolution of reflux symptoms.  She reports no dysphagia and is tolerating soft diet without problem.  She reports normal bowel function.  No dumping symptoms reported.\par

## 2023-03-21 ENCOUNTER — LABORATORY RESULT (OUTPATIENT)
Age: 48
End: 2023-03-21

## 2023-03-24 ENCOUNTER — NON-APPOINTMENT (OUTPATIENT)
Age: 48
End: 2023-03-24

## 2023-03-28 ENCOUNTER — NON-APPOINTMENT (OUTPATIENT)
Age: 48
End: 2023-03-28

## 2023-05-01 ENCOUNTER — APPOINTMENT (OUTPATIENT)
Dept: INTERNAL MEDICINE | Facility: CLINIC | Age: 48
End: 2023-05-01
Payer: MEDICAID

## 2023-05-01 ENCOUNTER — NON-APPOINTMENT (OUTPATIENT)
Age: 48
End: 2023-05-01

## 2023-05-01 VITALS
SYSTOLIC BLOOD PRESSURE: 118 MMHG | WEIGHT: 159 LBS | BODY MASS INDEX: 28.17 KG/M2 | HEART RATE: 54 BPM | HEIGHT: 63 IN | TEMPERATURE: 98.7 F | DIASTOLIC BLOOD PRESSURE: 78 MMHG | OXYGEN SATURATION: 97 % | RESPIRATION RATE: 18 BRPM

## 2023-05-01 DIAGNOSIS — H10.021 OTHER MUCOPURULENT CONJUNCTIVITIS, RIGHT EYE: ICD-10-CM

## 2023-05-01 PROCEDURE — 99213 OFFICE O/P EST LOW 20 MIN: CPT

## 2023-05-01 RX ORDER — NEOMYCIN SULFATE, POLYMYXIN B SULFATE AND DEXAMETHASONE 3.5; 10000; 1 MG/ML; [USP'U]/ML; MG/ML
3.5-10000-0.1 SUSPENSION OPHTHALMIC 3 TIMES DAILY
Qty: 1 | Refills: 0 | Status: ACTIVE | COMMUNITY
Start: 2023-05-01 | End: 1900-01-01

## 2023-05-03 ENCOUNTER — TRANSCRIPTION ENCOUNTER (OUTPATIENT)
Age: 48
End: 2023-05-03

## 2023-05-04 ENCOUNTER — TRANSCRIPTION ENCOUNTER (OUTPATIENT)
Age: 48
End: 2023-05-04

## 2023-05-04 ENCOUNTER — INPATIENT (INPATIENT)
Facility: HOSPITAL | Age: 48
LOS: 2 days | Discharge: HOME | DRG: 337 | End: 2023-05-07
Attending: SURGERY | Admitting: SURGERY
Payer: MEDICAID

## 2023-05-04 VITALS
RESPIRATION RATE: 18 BRPM | TEMPERATURE: 99 F | OXYGEN SATURATION: 98 % | SYSTOLIC BLOOD PRESSURE: 130 MMHG | HEART RATE: 71 BPM | WEIGHT: 158.95 LBS | DIASTOLIC BLOOD PRESSURE: 87 MMHG

## 2023-05-04 DIAGNOSIS — K56.609 UNSPECIFIED INTESTINAL OBSTRUCTION, UNSPECIFIED AS TO PARTIAL VERSUS COMPLETE OBSTRUCTION: ICD-10-CM

## 2023-05-04 DIAGNOSIS — Z90.3 ACQUIRED ABSENCE OF STOMACH [PART OF]: Chronic | ICD-10-CM

## 2023-05-04 DIAGNOSIS — Z90.710 ACQUIRED ABSENCE OF BOTH CERVIX AND UTERUS: Chronic | ICD-10-CM

## 2023-05-04 DIAGNOSIS — Z98.890 OTHER SPECIFIED POSTPROCEDURAL STATES: Chronic | ICD-10-CM

## 2023-05-04 LAB
ALBUMIN SERPL ELPH-MCNC: 5 G/DL — SIGNIFICANT CHANGE UP (ref 3.3–5)
ALP SERPL-CCNC: 89 U/L — SIGNIFICANT CHANGE UP (ref 40–120)
ALT FLD-CCNC: 12 U/L — SIGNIFICANT CHANGE UP (ref 10–45)
ANION GAP SERPL CALC-SCNC: 14 MMOL/L — SIGNIFICANT CHANGE UP (ref 5–17)
ANION GAP SERPL CALC-SCNC: 18 MMOL/L — HIGH (ref 5–17)
APTT BLD: 30.3 SEC — SIGNIFICANT CHANGE UP (ref 27.5–35.5)
AST SERPL-CCNC: 18 U/L — SIGNIFICANT CHANGE UP (ref 10–40)
BASE EXCESS BLDV CALC-SCNC: 2.4 MMOL/L — SIGNIFICANT CHANGE UP (ref -2–3)
BASOPHILS # BLD AUTO: 0.02 K/UL — SIGNIFICANT CHANGE UP (ref 0–0.2)
BASOPHILS NFR BLD AUTO: 0.2 % — SIGNIFICANT CHANGE UP (ref 0–2)
BILIRUB SERPL-MCNC: 0.7 MG/DL — SIGNIFICANT CHANGE UP (ref 0.2–1.2)
BLD GP AB SCN SERPL QL: NEGATIVE — SIGNIFICANT CHANGE UP
BLOOD GAS VENOUS - CREATININE: SIGNIFICANT CHANGE UP MG/DL (ref 0.5–1.3)
BUN SERPL-MCNC: 8 MG/DL — SIGNIFICANT CHANGE UP (ref 7–23)
BUN SERPL-MCNC: 9 MG/DL — SIGNIFICANT CHANGE UP (ref 7–23)
CA-I SERPL-SCNC: 1.11 MMOL/L — LOW (ref 1.15–1.33)
CALCIUM SERPL-MCNC: 10.8 MG/DL — HIGH (ref 8.4–10.5)
CALCIUM SERPL-MCNC: 8.9 MG/DL — SIGNIFICANT CHANGE UP (ref 8.4–10.5)
CHLORIDE BLDV-SCNC: 101 MMOL/L — SIGNIFICANT CHANGE UP (ref 96–108)
CHLORIDE SERPL-SCNC: 105 MMOL/L — SIGNIFICANT CHANGE UP (ref 96–108)
CHLORIDE SERPL-SCNC: 98 MMOL/L — SIGNIFICANT CHANGE UP (ref 96–108)
CO2 BLDV-SCNC: 30 MMOL/L — HIGH (ref 22–26)
CO2 SERPL-SCNC: 22 MMOL/L — SIGNIFICANT CHANGE UP (ref 22–31)
CO2 SERPL-SCNC: 25 MMOL/L — SIGNIFICANT CHANGE UP (ref 22–31)
CREAT SERPL-MCNC: 0.56 MG/DL — SIGNIFICANT CHANGE UP (ref 0.5–1.3)
CREAT SERPL-MCNC: 0.66 MG/DL — SIGNIFICANT CHANGE UP (ref 0.5–1.3)
EGFR: 107 ML/MIN/1.73M2 — SIGNIFICANT CHANGE UP
EGFR: 112 ML/MIN/1.73M2 — SIGNIFICANT CHANGE UP
EOSINOPHIL # BLD AUTO: 0 K/UL — SIGNIFICANT CHANGE UP (ref 0–0.5)
EOSINOPHIL NFR BLD AUTO: 0 % — SIGNIFICANT CHANGE UP (ref 0–6)
GAS PNL BLDV: 133 MMOL/L — LOW (ref 136–145)
GAS PNL BLDV: SIGNIFICANT CHANGE UP
GAS PNL BLDV: SIGNIFICANT CHANGE UP
GLUCOSE BLDV-MCNC: 122 MG/DL — HIGH (ref 70–99)
GLUCOSE SERPL-MCNC: 136 MG/DL — HIGH (ref 70–99)
GLUCOSE SERPL-MCNC: 138 MG/DL — HIGH (ref 70–99)
HCG SERPL-ACNC: 2.8 MIU/ML — SIGNIFICANT CHANGE UP
HCO3 BLDV-SCNC: 29 MMOL/L — SIGNIFICANT CHANGE UP (ref 22–29)
HCT VFR BLD CALC: 40.4 % — SIGNIFICANT CHANGE UP (ref 34.5–45)
HCT VFR BLD CALC: 43.6 % — SIGNIFICANT CHANGE UP (ref 34.5–45)
HCT VFR BLDA CALC: 39 % — SIGNIFICANT CHANGE UP (ref 34.5–46.5)
HGB BLD CALC-MCNC: 13 G/DL — SIGNIFICANT CHANGE UP (ref 11.7–16.1)
HGB BLD-MCNC: 13.3 G/DL — SIGNIFICANT CHANGE UP (ref 11.5–15.5)
HGB BLD-MCNC: 14.5 G/DL — SIGNIFICANT CHANGE UP (ref 11.5–15.5)
IMM GRANULOCYTES NFR BLD AUTO: 0.4 % — SIGNIFICANT CHANGE UP (ref 0–0.9)
INR BLD: 0.99 RATIO — SIGNIFICANT CHANGE UP (ref 0.88–1.16)
LACTATE BLDV-MCNC: 1.9 MMOL/L — SIGNIFICANT CHANGE UP (ref 0.5–2)
LIDOCAIN IGE QN: 8 U/L — SIGNIFICANT CHANGE UP (ref 7–60)
LYMPHOCYTES # BLD AUTO: 0.76 K/UL — LOW (ref 1–3.3)
LYMPHOCYTES # BLD AUTO: 9.4 % — LOW (ref 13–44)
MAGNESIUM SERPL-MCNC: 2.6 MG/DL — SIGNIFICANT CHANGE UP (ref 1.6–2.6)
MCHC RBC-ENTMCNC: 31.7 PG — SIGNIFICANT CHANGE UP (ref 27–34)
MCHC RBC-ENTMCNC: 32.1 PG — SIGNIFICANT CHANGE UP (ref 27–34)
MCHC RBC-ENTMCNC: 32.9 GM/DL — SIGNIFICANT CHANGE UP (ref 32–36)
MCHC RBC-ENTMCNC: 33.3 GM/DL — SIGNIFICANT CHANGE UP (ref 32–36)
MCV RBC AUTO: 95.4 FL — SIGNIFICANT CHANGE UP (ref 80–100)
MCV RBC AUTO: 97.6 FL — SIGNIFICANT CHANGE UP (ref 80–100)
MONOCYTES # BLD AUTO: 0.7 K/UL — SIGNIFICANT CHANGE UP (ref 0–0.9)
MONOCYTES NFR BLD AUTO: 8.6 % — SIGNIFICANT CHANGE UP (ref 2–14)
NEUTROPHILS # BLD AUTO: 6.61 K/UL — SIGNIFICANT CHANGE UP (ref 1.8–7.4)
NEUTROPHILS NFR BLD AUTO: 81.4 % — HIGH (ref 43–77)
NRBC # BLD: 0 /100 WBCS — SIGNIFICANT CHANGE UP (ref 0–0)
NRBC # BLD: 0 /100 WBCS — SIGNIFICANT CHANGE UP (ref 0–0)
PCO2 BLDV: 51 MMHG — HIGH (ref 39–42)
PH BLDV: 7.36 — SIGNIFICANT CHANGE UP (ref 7.32–7.43)
PHOSPHATE SERPL-MCNC: 4 MG/DL — SIGNIFICANT CHANGE UP (ref 2.5–4.5)
PLATELET # BLD AUTO: 271 K/UL — SIGNIFICANT CHANGE UP (ref 150–400)
PLATELET # BLD AUTO: 318 K/UL — SIGNIFICANT CHANGE UP (ref 150–400)
PO2 BLDV: 27 MMHG — SIGNIFICANT CHANGE UP (ref 25–45)
POTASSIUM BLDV-SCNC: 8.1 MMOL/L — CRITICAL HIGH (ref 3.5–5.1)
POTASSIUM SERPL-MCNC: 3.4 MMOL/L — LOW (ref 3.5–5.3)
POTASSIUM SERPL-MCNC: 3.7 MMOL/L — SIGNIFICANT CHANGE UP (ref 3.5–5.3)
POTASSIUM SERPL-SCNC: 3.4 MMOL/L — LOW (ref 3.5–5.3)
POTASSIUM SERPL-SCNC: 3.7 MMOL/L — SIGNIFICANT CHANGE UP (ref 3.5–5.3)
PROT SERPL-MCNC: 8.5 G/DL — HIGH (ref 6–8.3)
PROTHROM AB SERPL-ACNC: 11.4 SEC — SIGNIFICANT CHANGE UP (ref 10.5–13.4)
RBC # BLD: 4.14 M/UL — SIGNIFICANT CHANGE UP (ref 3.8–5.2)
RBC # BLD: 4.57 M/UL — SIGNIFICANT CHANGE UP (ref 3.8–5.2)
RBC # FLD: 12.1 % — SIGNIFICANT CHANGE UP (ref 10.3–14.5)
RBC # FLD: 12.3 % — SIGNIFICANT CHANGE UP (ref 10.3–14.5)
RH IG SCN BLD-IMP: POSITIVE — SIGNIFICANT CHANGE UP
SAO2 % BLDV: 29.4 % — LOW (ref 67–88)
SODIUM SERPL-SCNC: 141 MMOL/L — SIGNIFICANT CHANGE UP (ref 135–145)
SODIUM SERPL-SCNC: 141 MMOL/L — SIGNIFICANT CHANGE UP (ref 135–145)
WBC # BLD: 12.32 K/UL — HIGH (ref 3.8–10.5)
WBC # BLD: 8.12 K/UL — SIGNIFICANT CHANGE UP (ref 3.8–10.5)
WBC # FLD AUTO: 12.32 K/UL — HIGH (ref 3.8–10.5)
WBC # FLD AUTO: 8.12 K/UL — SIGNIFICANT CHANGE UP (ref 3.8–10.5)

## 2023-05-04 PROCEDURE — 76937 US GUIDE VASCULAR ACCESS: CPT | Mod: 26

## 2023-05-04 PROCEDURE — 36000 PLACE NEEDLE IN VEIN: CPT

## 2023-05-04 PROCEDURE — 99285 EMERGENCY DEPT VISIT HI MDM: CPT

## 2023-05-04 PROCEDURE — 74177 CT ABD & PELVIS W/CONTRAST: CPT | Mod: 26,MA

## 2023-05-04 PROCEDURE — 71046 X-RAY EXAM CHEST 2 VIEWS: CPT | Mod: 26

## 2023-05-04 RX ORDER — METOCLOPRAMIDE HCL 10 MG
5 TABLET ORAL ONCE
Refills: 0 | Status: DISCONTINUED | OUTPATIENT
Start: 2023-05-04 | End: 2023-05-04

## 2023-05-04 RX ORDER — NALOXONE HYDROCHLORIDE 4 MG/.1ML
0.1 SPRAY NASAL
Refills: 0 | Status: DISCONTINUED | OUTPATIENT
Start: 2023-05-04 | End: 2023-05-07

## 2023-05-04 RX ORDER — SODIUM CHLORIDE 9 MG/ML
1000 INJECTION INTRAMUSCULAR; INTRAVENOUS; SUBCUTANEOUS ONCE
Refills: 0 | Status: COMPLETED | OUTPATIENT
Start: 2023-05-04 | End: 2023-05-04

## 2023-05-04 RX ORDER — ONDANSETRON 8 MG/1
4 TABLET, FILM COATED ORAL EVERY 6 HOURS
Refills: 0 | Status: DISCONTINUED | OUTPATIENT
Start: 2023-05-04 | End: 2023-05-07

## 2023-05-04 RX ORDER — METOCLOPRAMIDE HCL 10 MG
10 TABLET ORAL ONCE
Refills: 0 | Status: COMPLETED | OUTPATIENT
Start: 2023-05-04 | End: 2023-05-04

## 2023-05-04 RX ORDER — SODIUM CHLORIDE 9 MG/ML
1000 INJECTION, SOLUTION INTRAVENOUS
Refills: 0 | Status: DISCONTINUED | OUTPATIENT
Start: 2023-05-04 | End: 2023-05-04

## 2023-05-04 RX ORDER — HYDROMORPHONE HYDROCHLORIDE 2 MG/ML
0.5 INJECTION INTRAMUSCULAR; INTRAVENOUS; SUBCUTANEOUS
Refills: 0 | Status: DISCONTINUED | OUTPATIENT
Start: 2023-05-04 | End: 2023-05-04

## 2023-05-04 RX ORDER — PANTOPRAZOLE SODIUM 20 MG/1
40 TABLET, DELAYED RELEASE ORAL
Refills: 0 | Status: DISCONTINUED | OUTPATIENT
Start: 2023-05-04 | End: 2023-05-04

## 2023-05-04 RX ORDER — ACETAMINOPHEN 500 MG
1000 TABLET ORAL ONCE
Refills: 0 | Status: COMPLETED | OUTPATIENT
Start: 2023-05-04 | End: 2023-05-04

## 2023-05-04 RX ORDER — SODIUM CHLORIDE 9 MG/ML
1000 INJECTION, SOLUTION INTRAVENOUS
Refills: 0 | Status: DISCONTINUED | OUTPATIENT
Start: 2023-05-04 | End: 2023-05-05

## 2023-05-04 RX ORDER — HEPARIN SODIUM 5000 [USP'U]/ML
5000 INJECTION INTRAVENOUS; SUBCUTANEOUS EVERY 8 HOURS
Refills: 0 | Status: DISCONTINUED | OUTPATIENT
Start: 2023-05-04 | End: 2023-05-06

## 2023-05-04 RX ORDER — FAMOTIDINE 10 MG/ML
20 INJECTION INTRAVENOUS ONCE
Refills: 0 | Status: COMPLETED | OUTPATIENT
Start: 2023-05-04 | End: 2023-05-04

## 2023-05-04 RX ORDER — ONDANSETRON 8 MG/1
4 TABLET, FILM COATED ORAL ONCE
Refills: 0 | Status: COMPLETED | OUTPATIENT
Start: 2023-05-04 | End: 2023-05-04

## 2023-05-04 RX ORDER — ACETAMINOPHEN 500 MG
1000 TABLET ORAL EVERY 6 HOURS
Refills: 0 | Status: COMPLETED | OUTPATIENT
Start: 2023-05-04 | End: 2023-05-05

## 2023-05-04 RX ORDER — HYDROMORPHONE HYDROCHLORIDE 2 MG/ML
0.5 INJECTION INTRAMUSCULAR; INTRAVENOUS; SUBCUTANEOUS
Refills: 0 | Status: DISCONTINUED | OUTPATIENT
Start: 2023-05-04 | End: 2023-05-06

## 2023-05-04 RX ORDER — HYDROMORPHONE HYDROCHLORIDE 2 MG/ML
1 INJECTION INTRAMUSCULAR; INTRAVENOUS; SUBCUTANEOUS
Refills: 0 | Status: DISCONTINUED | OUTPATIENT
Start: 2023-05-04 | End: 2023-05-04

## 2023-05-04 RX ORDER — HYDROMORPHONE HYDROCHLORIDE 2 MG/ML
30 INJECTION INTRAMUSCULAR; INTRAVENOUS; SUBCUTANEOUS
Refills: 0 | Status: DISCONTINUED | OUTPATIENT
Start: 2023-05-04 | End: 2023-05-06

## 2023-05-04 RX ORDER — ENOXAPARIN SODIUM 100 MG/ML
40 INJECTION SUBCUTANEOUS EVERY 24 HOURS
Refills: 0 | Status: DISCONTINUED | OUTPATIENT
Start: 2023-05-04 | End: 2023-05-04

## 2023-05-04 RX ORDER — KETOROLAC TROMETHAMINE 30 MG/ML
15 SYRINGE (ML) INJECTION EVERY 6 HOURS
Refills: 0 | Status: DISCONTINUED | OUTPATIENT
Start: 2023-05-04 | End: 2023-05-06

## 2023-05-04 RX ADMIN — SODIUM CHLORIDE 1000 MILLILITER(S): 9 INJECTION INTRAMUSCULAR; INTRAVENOUS; SUBCUTANEOUS at 11:30

## 2023-05-04 RX ADMIN — SODIUM CHLORIDE 125 MILLILITER(S): 9 INJECTION, SOLUTION INTRAVENOUS at 19:29

## 2023-05-04 RX ADMIN — HYDROMORPHONE HYDROCHLORIDE 30 MILLILITER(S): 2 INJECTION INTRAMUSCULAR; INTRAVENOUS; SUBCUTANEOUS at 22:54

## 2023-05-04 RX ADMIN — HYDROMORPHONE HYDROCHLORIDE 30 MILLILITER(S): 2 INJECTION INTRAMUSCULAR; INTRAVENOUS; SUBCUTANEOUS at 19:35

## 2023-05-04 RX ADMIN — HYDROMORPHONE HYDROCHLORIDE 0.5 MILLIGRAM(S): 2 INJECTION INTRAMUSCULAR; INTRAVENOUS; SUBCUTANEOUS at 19:29

## 2023-05-04 RX ADMIN — Medication 10 MILLIGRAM(S): at 14:20

## 2023-05-04 RX ADMIN — HEPARIN SODIUM 5000 UNIT(S): 5000 INJECTION INTRAVENOUS; SUBCUTANEOUS at 21:39

## 2023-05-04 RX ADMIN — HYDROMORPHONE HYDROCHLORIDE 30 MILLILITER(S): 2 INJECTION INTRAMUSCULAR; INTRAVENOUS; SUBCUTANEOUS at 22:14

## 2023-05-04 RX ADMIN — ONDANSETRON 4 MILLIGRAM(S): 8 TABLET, FILM COATED ORAL at 11:31

## 2023-05-04 RX ADMIN — SODIUM CHLORIDE 125 MILLILITER(S): 9 INJECTION, SOLUTION INTRAVENOUS at 21:40

## 2023-05-04 RX ADMIN — FAMOTIDINE 20 MILLIGRAM(S): 10 INJECTION INTRAVENOUS at 11:31

## 2023-05-04 RX ADMIN — SODIUM CHLORIDE 1000 MILLILITER(S): 9 INJECTION INTRAMUSCULAR; INTRAVENOUS; SUBCUTANEOUS at 13:30

## 2023-05-04 RX ADMIN — Medication 1000 MILLIGRAM(S): at 13:20

## 2023-05-04 RX ADMIN — HYDROMORPHONE HYDROCHLORIDE 0.5 MILLIGRAM(S): 2 INJECTION INTRAMUSCULAR; INTRAVENOUS; SUBCUTANEOUS at 19:45

## 2023-05-04 RX ADMIN — Medication 400 MILLIGRAM(S): at 13:02

## 2023-05-04 RX ADMIN — Medication 1000 MILLIGRAM(S): at 13:30

## 2023-05-04 NOTE — ED PROVIDER NOTE - OBJECTIVE STATEMENT
47-year-old female with a history of Poncho-en-Y, gastric bypass and hiatal hernia repair in January 2023 who presents to the ED with 2 days of lower abdominal cramping now with persistent pain, nausea and vomiting, nonbloody multiple times today and yesterday, 4 episodes diarrhea last night.  Patient notes she has had dumping syndrome in the past that usually resolved after she had her bowel movement.  Denying any chest pain, shortness of breath, fevers, chills.

## 2023-05-04 NOTE — H&P ADULT - HISTORY OF PRESENT ILLNESS
Ms Sanchez is a 47F PMH asthma GERD, KATHLEEN, Obesity S/p Gastric Sleeve  and Hiatal Hernia 5/2022 with recurrence of Hiatal Hernia 6/30/22 requiring RTOR for repair, w subsequent Robotic assisted exploration of esophagus and mobilization of the esophagus, placment of B/L chest tubes and Robotic Assisted conversion of Sleeve gastrectomy to RnY Gastric Bypass 1/11/2023 w Hiatal Hernia Repair who presents with acute onset abdominal pain. Reports onset of pain yesterday after eating. Felt like prior episodes of dumping syndrome. Had multiple episodes of non bloody emesis. Pain, nausea, vomiting persisted, prompting presentation to ED. Denies CP, SOB, hematemesis. Last flatus 1.5 day ago. Last BM yesterday.

## 2023-05-04 NOTE — H&P ADULT - NSHPPHYSICALEXAM_GEN_ALL_CORE
General: NAD  Neuro: A/Ox4  HEENT: NC/AT  Respiratory: RA, no increased work of breathing  CV: RRR  Abdomen: diffusely tender to palpation with guarding, no rebound tenderness   Extremities: WWP, w/o gross deformity, GUARDADO  Vascular: b/l radial pulses palpable,   Skin: intact, w/o breakdown

## 2023-05-04 NOTE — CHART NOTE - NSCHARTNOTEFT_GEN_A_CORE
POST-OP NOTE    NORBERT COONEY | [id number] | [facility] 2MON 225 W1    Procedure: s/p Ex-lap, DONNA    Subjective: Patient lonnie and examined at bedside, denies pain, nausea or vomiting.    Vital Signs Last 24 Hrs  T(C): 36.8 (04 May 2023 22:54), Max: 37.1 (04 May 2023 09:17)  T(F): 98.3 (04 May 2023 22:54), Max: 98.7 (04 May 2023 09:17)  HR: 67 (04 May 2023 22:54) (60 - 84)  BP: 125/78 (04 May 2023 22:54) (122/71 - 159/91)  BP(mean): 99 (04 May 2023 21:52) (99 - 110)  RR: 18 (04 May 2023 22:54) (15 - 18)  SpO2: 100% (04 May 2023 22:54) (98% - 100%)    Parameters below as of 04 May 2023 22:54    O2 Flow (L/min): 2    I&O's Summary    04 May 2023 07:01  -  05 May 2023 00:00  --------------------------------------------------------  IN: 625 mL / OUT: 150 mL / NET: 475 mL                            13.3   12.32 )-----------( 271      ( 04 May 2023 20:40 )             40.4     05-04    141  |  105  |  8   ----------------------------<  136<H>  3.7   |  22  |  0.56    Ca    8.9      04 May 2023 20:40  Phos  4.0     05-04  Mg     2.6     05-04    TPro  8.5<H>  /  Alb  5.0  /  TBili  0.7  /  DBili  x   /  AST  18  /  ALT  12  /  AlkPhos  89  05-04   PT/INR - ( 04 May 2023 15:40 )   PT: 11.4 sec;   INR: 0.99 ratio         PTT - ( 04 May 2023 15:40 )  PTT:30.3 sec    PHYSICAL EXAM:  Gen: NAD  Resp: breathing easily, no stridor  CV: RRR  Abdomen: soft, nontender, nondistended. Midline incision with c/d/i dressing.         Assesment  46 y/o Female s/p Ex-lap, DONNA    PLAN:  - NPO sips and chips  - CLD when passing gas  - Pain control  - Monitor I&O  - DTV at 2h30 AM        green team surgery

## 2023-05-04 NOTE — ED PROVIDER NOTE - ATTENDING CONTRIBUTION TO CARE
Mohan Chan MD:  I personally saw the patient and performed a substantive portion of the visit including all aspects of the medical decision making.    MDM: 47-year-old female with history of GERD, KATHLEEN, obesity status post gastric sleeve and hiatal hernia 5/2022 with recurrence of hiatal hernia 6/30/2022 requiring RTOR for repair, sleeve gastrectomy to Poncho-en-Y gastric bypass, hiatal hernia repair, who presents with lower abdominal pain, nausea, vomiting, diarrhea, nonbloody, nonbilious, nonbloody watery.  Symptoms for the last 2 days.    On examination patient is uncomfortable appearing, stable vitals.  Cardiac RRR, lungs CTAB.  Abdomen with well-healed surgical scars, but with generalized tenderness.  There is no guarding/rebound/rigidity.  No CVA tenderness.    Will obtain CT Abd/Pelv to assess for acute intraabdominal surgical and infectious pathology.  Will obtain labs to evaluate for hematologic disorder, metabolic derangements, hepatic and renal function, and screen for infection.    My independent interpretation of the EKG shows:  Normal sinus rhythm with rate of 67 bpm, normal axis, T wave inversions noted in inferior leads as well as atypical complex noted in V3, overall similar to 1/17/2023.     Differential includes but is not limited to: See above    Patient with new problems requiring additional work-up and treatment, following orders: see above  Discussed case with: Bariatric surgery team  Obtained and reviewed external records: Discharge note from 1/20/2023  Additional history obtained from: Son at bedside  Chronic conditions and social determinants of health affecting care: See above Mohan Chan MD:  I personally saw the patient and performed a substantive portion of the visit including all aspects of the medical decision making.    MDM: 47-year-old female with history of GERD, KATHLEEN, obesity status post gastric sleeve and hiatal hernia 5/2022 with recurrence of hiatal hernia 6/30/2022 requiring RTOR for repair, sleeve gastrectomy to Poncho-en-Y gastric bypass, hiatal hernia repair, who presents with lower abdominal pain, nausea, vomiting, diarrhea, nonbloody, nonbilious, nonbloody watery.  Symptoms for the last 2 days.    On examination patient is uncomfortable appearing, stable vitals.  Cardiac RRR, lungs CTAB.  Abdomen with well-healed surgical scars, but with generalized tenderness.  There is no guarding/rebound/rigidity.  No CVA tenderness.    Will obtain CT Abd/Pelv to assess for acute intraabdominal surgical and infectious pathology.  Will obtain labs to evaluate for hematologic disorder, metabolic derangements, hepatic and renal function, and screen for infection.    My independent interpretation of the EKG shows:  Normal sinus rhythm with rate of 67 bpm, normal axis, T wave inversions noted in inferior leads as well as atypical complex noted in V3, overall similar to 1/17/2023.     Labs reviewed, patient with mild hypokalemia and elevated anion gap.  CT abdomen showed small bowel obstruction of the Poncho limb with transition point proximal to the JJ anastomosis.  Bariatric/general surgery consultation was already called prior, consultant was updated regarding CT findings.    Signed out at 1500 pending surgery consultation and admission.    Differential includes but is not limited to: See above    Patient with new problems requiring additional work-up and treatment, following orders: see above  Discussed case with: Bariatric surgery team  Obtained and reviewed external records: Discharge note from 1/20/2023  Additional history obtained from: Son at bedside  Chronic conditions and social determinants of health affecting care: See above

## 2023-05-04 NOTE — H&P ADULT - NSHPLABSRESULTS_GEN_ALL_CORE
14.5   8.12  )-----------( 318      ( 04 May 2023 11:44 )             43.6   05-04    141  |  98  |  9   ----------------------------<  138<H>  3.4<L>   |  25  |  0.66    Ca    10.8<H>      04 May 2023 11:44    TPro  8.5<H>  /  Alb  5.0  /  TBili  0.7  /  DBili  x   /  AST  18  /  ALT  12  /  AlkPhos  89  05-04  < from: CT Abdomen and Pelvis w/ IV Cont (05.04.23 @ 14:33) >      ACC: [id number] EXAM:  CT ABDOMEN AND PELVIS IC   ORDERED BY: MARY KATE SOLANO     PROCEDURE DATE:  05/04/2023          INTERPRETATION:  CLINICAL INFORMATION: History of underlying, hiatal   hernia repair, gastric bypass 4 months ago.    COMPARISON: CT abdomen pelvis 1/17/2023.    CONTRAST/COMPLICATIONS:  IV Contrast: Omnipaque 350  90 cc administered   10 cc discarded  Oral Contrast: NONE  Complications: None reported at time of study completion    PROCEDURE:  CT of the Abdomen and Pelvis was performed.  Sagittal and coronal reformats were performed.    FINDINGS:  LOWER CHEST: Within normal limits.    LIVER: Within normal limits.  BILE DUCTS: Normal caliber.  GALLBLADDER: Within normal limits.  SPLEEN: Within normal limits.  PANCREAS: Withinnormal limits.  ADRENALS: Within normal limits.  KIDNEYS/URETERS: Horseshoe kidney.    BLADDER: Within normal limits.  REPRODUCTIVE ORGANS: Uterus and adnexa within normal limits.    BOWEL: Status post gastric bypass. Fluid distention of the distal   esophagus and gastric pouch with dilation of the Poncho limb with   transition point in the midabdomen (2, 54), proximal to the JJ   anastomosis which is in the left hemiabdomen. Appendix is normal.  PERITONEUM: No ascites.  VESSELS: Pinching of the SMV (2, 51).  RETROPERITONEUM/LYMPH NODES: No lymphadenopathy.  ABDOMINAL WALL: Surgical changes.  BONES: Within normal limits.    IMPRESSION:  Status post gastric bypass. Small bowel obstruction of the Poncho limb with   transition point proximal to the JJ anastomosis. Pinching of the SMV,   raises concern for internal hernia.    Findings were discussed with Dr. SOLANO on  5/4/2023 3:21 PM by Dr. Aggarwal with read back confirmation.    --- End of Report ---        ARTHUR AGGARWAL MD; Attending Radiologist  This document has been electronically signed. May  4 2023  3:21PM    < end of copied text >

## 2023-05-04 NOTE — ED PROVIDER NOTE - PHYSICAL EXAMINATION
Const: Well-nourished, Well-developed, appearing stated age.  Eyes: no conjunctival injection, and symmetrical lids.  HEENT: Head NCAT, no lesions. Atraumatic external nose and ears. Moist MM.  Neck: Symmetric, trachea midline.   RESP: Unlabored respiratory effort.   GI:   Generalized abdominal tenderness, nondistended.  MSK: Normocephalic/Atraumatic, Lower Extremities w/o calf tenderness or edema b/l.   Skin: Warm, dry and intact.   Neuro: CNs II-XII grossly intact. Motor & Sensation grossly intact.  Psych: Awake, Alert, & Oriented (AAO) x3. Appropriate mood and affect.

## 2023-05-04 NOTE — ED ADULT NURSE REASSESSMENT NOTE - NS ED NURSE REASSESS COMMENT FT1
Pt to OR.  Tablet and  placed in valuables envelope and given to red desk to secure.  1 bag containing clothing secured and placed in OR cabinet in ED.
Surgery team placed NG tube to suction. Tolerated well. Approximately 30cc gastric drainage noted upon insertion. Awaiting transport to OR.

## 2023-05-04 NOTE — BRIEF OPERATIVE NOTE - OPERATION/FINDINGS
Initial abdominal entry with concern for compromised bowel.   Exploratory laparotomy performed with findings of adhesion near J-J anastomosis causing kinking and obstruction of Poncho limb.   Adhesiolysis performed, small bowel viable. Initial abdominal entry with concern for ischemic bowel.   Exploratory laparotomy performed with findings of adhesion near J-J anastomosis causing kinking and obstruction of Poncho limb.   After adhesiolysis performed, small bowel viable. Initial abdominal entry with concern for ischemic bowel.   Exploratory laparotomy performed with findings of adhesion near J-J anastomosis causing kinking and obstruction of Poncho limb.   Adhesiolysis performed, small bowel inspected and all bowel viable.

## 2023-05-04 NOTE — H&P ADULT - ATTENDING COMMENTS
47yF hx sleeve/HHR, repair of recurrent hiatal hernia, then repair of re-recurrent hiatal hernia and conversion to RYGB    1 d abd pain, N/V  Tender on exam  CT with dilated armen limb, possible internal hernia    To OR emergently  discussed risks, benefits, possibility of conversion to open, bowel resection, etc  Informed consent obtained    Rahul Field MD

## 2023-05-04 NOTE — ED PROVIDER NOTE - CLINICAL SUMMARY MEDICAL DECISION MAKING FREE TEXT BOX
7-year-old female with a history of Pocnho-en-Y, gastric bypass and hiatal hernia repair in January 2023 who presents to the ED with 2 days of lower abdominal cramping now with persistent pain, nausea and vomiting, nonbloody multiple times today and yesterday, 4 episodes diarrhea last night.  Patient notes she has had dumping syndrome in the past that usually resolved after she had her bowel movement.  Denying any chest pain, shortness of breath, fevers, chills.    We will get labs to evaluate for electrolyte abnormalities, CT to evaluate for intra-abdominal pathology including obstruction, colitis.  We will get meds to help treat symptoms

## 2023-05-04 NOTE — ED PROVIDER NOTE - CHIEF COMPLAINT
"This is a chronic problem for the patient that is uncontrolled.  The patient's weight today is 262 pounds with a BMI of 41.04.  Patient reports that one year ago he had his weight down to 212 pounds.  Over the last year he has been \"lazy\" in regards to his diet and exercise.  The patient has cut back on his alcohol intake as he had a DUI 6 months ago and is on probation for this.  The patient is planning another trip to the [facility] to see his girlfriend in February 2019 and plans to start eating healthy and return to the gym to use the treadmill in hopes of losing some weight before this vacation.  " The patient is a 47y Female complaining of ab pain

## 2023-05-04 NOTE — ED ADULT NURSE NOTE - ED STAT RN HANDOFF WHERE
anticipated discharge recommendation/anticipated equipment needs at discharge/return to Amb Ct senior care , pt has RW
gold

## 2023-05-04 NOTE — ED ADULT NURSE NOTE - OBJECTIVE STATEMENT
1000 47 yr old BF c/o N/V and abd cramps since 2pm yesterday. s/p bariatric surg Gastric Bypass Jan 2023, last yr Gastric sleeve, and3 hiatal hernia repairs. States diagnosed with dumping syndrome. Was vomiting all night. Drowsy. Looks weak Oriented x 3. Skin W&D. Lips and nailbeds pink. Abd soft. Sore to palp diffusely. Dr lobo pt 1000 47 yr old BF c/o N/V and abd cramps since 2pm yesterday. s/p bariatric surg Gastric Bypass Jan 2023, last yr Gastric sleeve, and 3 hiatal hernia repairs. States diagnosed with dumping syndrome. Was vomiting all night. Drowsy. Looks weak Oriented x 3. Skin W&D. Lips and nailbeds pink. Abd soft. Sore to palp diffusely. Dr lobo pt. Denies dizziness or SOB. States her throat feels sore from vomiting multiple times all night long

## 2023-05-04 NOTE — ED PROVIDER NOTE - NS ED MD DISPO ISOLATION TYPES
EITAN TRUJILLO; First Name: Joanie GA 28.2 weeks;     Age: 27 d;   PMA: 32.1   BW:  ___770g___   MRN: [id number]    COURSE: 28 wk AGA,  Apnea of prematurity, temp and feeding support,  S/P RDS,  AEDV, S/P hypoglycemia, S/P metabolic acidosis , S/P hyponatremia,     s/p  neutropenia, mec plug, thrombocytopenia, hyperbilirubinemia    INTERVAL EVENTS:    Multiple ABD - anemia - PRBC transfusion       Weight (g):   1115 - 20         Intake (ml/kg/day): 153  Urine output (ml/kg/hr or frequency):   X 8                       Stools (frequency): x 5  Other:     Growth:    HC (cm): 26.5 (3/22)  11  %ile       [03-03]  Length (cm):  37; 5  % ile  Papo weight %  _8 % at birth ___ ; ADWG g/day)  __16___ .  **************************************************************************************************************************************************************************  Apnea of prematurity:  RA (3/25) on caffeine  Last episode is 3/19.   CV: Stable hemodynamics. Continue cardiorespiratory monitoring. Observe for the signs of PDA, once PVR decreases.  Hem: A+/A+/C neg  Anemia of Prematurity:  Hct 3/29: 24 % - transfused PRBC  s/p  photo 3/6  for hyperbilirubinemia due to prematurity, stable bilis    Neutropenia  resolved Thrombocytopenia at birth possible due to Mg and poor placental perfusion, s/p plt tx 3/6, now stable .   FEN:   EHM24   22 ml OG q 3 hrs  (158/126l) over 60 minutes     S/P 3/20 NPO re distension 3/19.    S/P Early, asymptomatic hypoglycemia, responded to IVFs and x 2 D10W boluses.   Hypoglycemia on 3/22 resolved.   ACCESS: UAC d/c'd 3/4    UVC d/c'd 3/9  PICC out 3/18.     ID:    MRSA swab 3/4 neg    MSSA colonized  S/P mupirocin  with no growth  Neuro: At risk for IVH/PVL. Serial HUS. initial at 1 week of age (3/9, 3/16): WNL Repeat 3/30.   NDE PTD.   Ophtho: At risk for ROP. Screening at 4 weeks of age.  Thermal: Immature thermoregulation, requires incubator   Meds: caffeine, Vits, Fe   Social:  mother updated 3/25  (AS)      Assessment/Plan: Monitor for ABD. May need to resume CPAP.     Labs/Images/Studies: 3/30 - Hct, nutrition   3/30 - Head US None

## 2023-05-04 NOTE — PRE-OP CHECKLIST - 2.
Patient wearing contact lenses. BL contact lenses removed in SDA AND stored in SDA CLOSET for patient. Patient aware. All other belongings as per patient are in ED.

## 2023-05-04 NOTE — H&P ADULT - ASSESSMENT
Ms Sanchez is a 47F PMH asthma GERD, KATHLEEN, Obesity S/p Gastric Sleeve and Hiatal Hernia 5/2022 with recurrence of Hiatal Hernia 6/30/22 requiring RTOR for repair, w subsequent Robotic assisted exploration of esophagus and mobilization of the esophagus, placment of B/L chest tubes and Robotic Assisted conversion of Sleeve gastrectomy to RnY Gastric Bypass  w Hiatal Hernia Repair 1/11/2023 who presents with acute onset abdominal pain. Found to have SBO, TP proximal to JJ anastomosis, w concern for internal hernia. Abdominal exam with peritoneal signs.     Plan:   -Admit to [facility], Dr. Field   -Emergent OR: Dx Lap, poss ex lap, poss bowel resection, poss G tube   -NPO/IVF  -NGT placed at bedside, f/u CXR   -Consented  -POC   -AM Labs   -Needs med rec     Discussed with Attending Surgeon Dr. Rashida Walls MD PGY2  [facility] [id number]

## 2023-05-04 NOTE — PATIENT PROFILE ADULT - FALL HARM RISK - HARM RISK INTERVENTIONS

## 2023-05-05 ENCOUNTER — TRANSCRIPTION ENCOUNTER (OUTPATIENT)
Age: 48
End: 2023-05-05

## 2023-05-05 LAB
ANION GAP SERPL CALC-SCNC: 12 MMOL/L — SIGNIFICANT CHANGE UP (ref 5–17)
BUN SERPL-MCNC: 9 MG/DL — SIGNIFICANT CHANGE UP (ref 7–23)
CALCIUM SERPL-MCNC: 9.1 MG/DL — SIGNIFICANT CHANGE UP (ref 8.4–10.5)
CHLORIDE SERPL-SCNC: 105 MMOL/L — SIGNIFICANT CHANGE UP (ref 96–108)
CO2 SERPL-SCNC: 23 MMOL/L — SIGNIFICANT CHANGE UP (ref 22–31)
CREAT SERPL-MCNC: 0.62 MG/DL — SIGNIFICANT CHANGE UP (ref 0.5–1.3)
EGFR: 109 ML/MIN/1.73M2 — SIGNIFICANT CHANGE UP
GLUCOSE SERPL-MCNC: 104 MG/DL — HIGH (ref 70–99)
MAGNESIUM SERPL-MCNC: 2.6 MG/DL — SIGNIFICANT CHANGE UP (ref 1.6–2.6)
PHOSPHATE SERPL-MCNC: 4.7 MG/DL — HIGH (ref 2.5–4.5)
POTASSIUM SERPL-MCNC: 3.8 MMOL/L — SIGNIFICANT CHANGE UP (ref 3.5–5.3)
POTASSIUM SERPL-SCNC: 3.8 MMOL/L — SIGNIFICANT CHANGE UP (ref 3.5–5.3)
SODIUM SERPL-SCNC: 140 MMOL/L — SIGNIFICANT CHANGE UP (ref 135–145)

## 2023-05-05 RX ORDER — PANTOPRAZOLE SODIUM 20 MG/1
40 TABLET, DELAYED RELEASE ORAL DAILY
Refills: 0 | Status: DISCONTINUED | OUTPATIENT
Start: 2023-05-05 | End: 2023-05-07

## 2023-05-05 RX ORDER — ACETAMINOPHEN 500 MG
1000 TABLET ORAL EVERY 6 HOURS
Refills: 0 | Status: DISCONTINUED | OUTPATIENT
Start: 2023-05-05 | End: 2023-05-07

## 2023-05-05 RX ORDER — DEXTROSE MONOHYDRATE, SODIUM CHLORIDE, AND POTASSIUM CHLORIDE 50; .745; 4.5 G/1000ML; G/1000ML; G/1000ML
1000 INJECTION, SOLUTION INTRAVENOUS
Refills: 0 | Status: DISCONTINUED | OUTPATIENT
Start: 2023-05-05 | End: 2023-05-06

## 2023-05-05 RX ADMIN — Medication 15 MILLIGRAM(S): at 09:07

## 2023-05-05 RX ADMIN — Medication 1000 MILLIGRAM(S): at 06:00

## 2023-05-05 RX ADMIN — Medication 1000 MILLIGRAM(S): at 18:00

## 2023-05-05 RX ADMIN — Medication 15 MILLIGRAM(S): at 14:43

## 2023-05-05 RX ADMIN — Medication 400 MILLIGRAM(S): at 01:02

## 2023-05-05 RX ADMIN — PANTOPRAZOLE SODIUM 40 MILLIGRAM(S): 20 TABLET, DELAYED RELEASE ORAL at 11:43

## 2023-05-05 RX ADMIN — Medication 15 MILLIGRAM(S): at 20:10

## 2023-05-05 RX ADMIN — Medication 15 MILLIGRAM(S): at 03:38

## 2023-05-05 RX ADMIN — Medication 15 MILLIGRAM(S): at 08:37

## 2023-05-05 RX ADMIN — Medication 400 MILLIGRAM(S): at 11:43

## 2023-05-05 RX ADMIN — Medication 400 MILLIGRAM(S): at 05:31

## 2023-05-05 RX ADMIN — Medication 15 MILLIGRAM(S): at 20:40

## 2023-05-05 RX ADMIN — Medication 1000 MILLIGRAM(S): at 23:41

## 2023-05-05 RX ADMIN — Medication 1000 MILLIGRAM(S): at 02:02

## 2023-05-05 RX ADMIN — HEPARIN SODIUM 5000 UNIT(S): 5000 INJECTION INTRAVENOUS; SUBCUTANEOUS at 05:32

## 2023-05-05 RX ADMIN — HEPARIN SODIUM 5000 UNIT(S): 5000 INJECTION INTRAVENOUS; SUBCUTANEOUS at 21:02

## 2023-05-05 RX ADMIN — HYDROMORPHONE HYDROCHLORIDE 30 MILLILITER(S): 2 INJECTION INTRAMUSCULAR; INTRAVENOUS; SUBCUTANEOUS at 19:27

## 2023-05-05 RX ADMIN — Medication 1000 MILLIGRAM(S): at 23:11

## 2023-05-05 RX ADMIN — Medication 1000 MILLIGRAM(S): at 12:13

## 2023-05-05 RX ADMIN — Medication 15 MILLIGRAM(S): at 14:13

## 2023-05-05 RX ADMIN — HEPARIN SODIUM 5000 UNIT(S): 5000 INJECTION INTRAVENOUS; SUBCUTANEOUS at 14:13

## 2023-05-05 RX ADMIN — Medication 15 MILLIGRAM(S): at 02:43

## 2023-05-05 RX ADMIN — HYDROMORPHONE HYDROCHLORIDE 30 MILLILITER(S): 2 INJECTION INTRAMUSCULAR; INTRAVENOUS; SUBCUTANEOUS at 07:37

## 2023-05-05 RX ADMIN — Medication 400 MILLIGRAM(S): at 17:30

## 2023-05-05 NOTE — DISCHARGE NOTE PROVIDER - HOSPITAL COURSE
HPI:  Ms Sanchez is a 47F PMH asthma GERD, KATHLEEN, Obesity S/p Gastric Sleeve  and Hiatal Hernia 5/2022 with recurrence of Hiatal Hernia 6/30/22 requiring RTOR for repair, w subsequent Robotic assisted exploration of esophagus and mobilization of the esophagus, placment of B/L chest tubes and Robotic Assisted conversion of Sleeve gastrectomy to RnY Gastric Bypass 1/11/2023 w Hiatal Hernia Repair who presents with acute onset abdominal pain. Reports onset of pain yesterday after eating. Felt like prior episodes of dumping syndrome. Had multiple episodes of non bloody emesis. Pain, nausea, vomiting persisted, prompting presentation to ED. Denies CP, SOB, hematemesis. Last flatus 1.5 day ago. Last BM yesterday.  (04 May 2023 16:31)    Imaging shows SBO, patient admitted to surgical service. Kept NPO and booked for emergent procedure. Patient underwent ex lap with DONNA without complication and was transferred to the PACU. When PACU criteria was met, patient transferred to the floor for observation. Postop, patient pain well controlled with PCA, then transitioned to PO pain medications. Diet advanced as tolerated as GI function returned. Ambulation and incentive spirometry encouraged. Labs trended and lytes repleted as needed. DVT ppx with SQH. At time of discharge, patient hemodynamically stable, diet tolerated and pain well controlled.

## 2023-05-05 NOTE — DISCHARGE NOTE PROVIDER - NSDCFUSCHEDAPPT_GEN_ALL_CORE_FT
[facility]  DIAGRAD [address]  Scheduled Appointment: 05/18/2023    Omar Rodriguez  [facility]  THORSUR[address]  Scheduled Appointment: 05/18/2023    Rahul Field  [facility]  GENSURG [address]  Scheduled Appointment: 06/16/2023

## 2023-05-05 NOTE — DISCHARGE NOTE PROVIDER - CARE PROVIDER_API CALL
Rahul Field  General Surgery  [address] [place] [address], [address]  [place], [place] [id number]  Phone: [phone]  Fax: [phone]  Follow Up Time: 1 week

## 2023-05-05 NOTE — PROGRESS NOTE ADULT - SUBJECTIVE AND OBJECTIVE BOX
Day 1 of Anesthesia Pain Management Service    SUBJECTIVE: I'm doing ok    Pain Scale Score:	[X] Refer to charted pain scores    THERAPY:    [ ] IV PCA Morphine		[ ] 5 mg/mL	[ ] 1 mg/mL  [X] IV PCA Hydromorphone	[ ] 5 mg/mL	[X] 1 mg/mL  [ ] IV PCA Fentanyl		[ ] 50 micrograms/mL    Demand dose: 0.3 mg     Lockout: 6 minutes   Continuous Rate: 0 mg/hr  4 Hour Limit: 4 mg    MEDICATIONS  (STANDING):  acetaminophen   IVPB  1000 milliGRAM(s) IV Intermittent every 6 hours  heparin   Injectable 5000 Unit(s) SubCutaneous every 8 hours  HYDROmorphone PCA (1 mG/mL) 30 milliLiter(s) PCA Continuous PCA Continuous  ketorolac   Injectable 15 milliGRAM(s) IV Push every 6 hours  lactated ringers. 1000 milliLiter(s) (125 mL/Hr) IV Continuous <Continuous>  pantoprazole  Injectable 40 milliGRAM(s) IV Push daily    MEDICATIONS  (PRN):  HYDROmorphone PCA (1 mG/mL) Rescue Clinician Bolus 0.5 milliGRAM(s) IV Push every 15 minutes PRN for Pain Scale GREATER THAN 6  naloxone Injectable 0.1 milliGRAM(s) IV Push every 3 minutes PRN For ANY of the following changes in patient status:  A. RR LESS THAN 10 breaths per minute, B. Oxygen saturation LESS THAN 90%, C. Sedation score of 6  ondansetron Injectable 4 milliGRAM(s) IV Push every 6 hours PRN Nausea      OBJECTIVE:    Sedation Score:	[ X] Alert 	[ ] Drowsy 	[ ] Arousable	[ ] Asleep	[ ] Unresponsive    Side Effects:	[X ] None	[ ] Nausea	[ ] Vomiting	[ ] Pruritus  		[ ] Other:    Vital Signs Last 24 Hrs  T(C): 36.6 (05 May 2023 04:36), Max: 37.1 (04 May 2023 09:17)  T(F): 97.9 (05 May 2023 04:36), Max: 98.7 (04 May 2023 09:17)  HR: 58 (05 May 2023 04:36) (57 - 84)  BP: 106/58 (05 May 2023 04:36) (106/58 - 159/91)  BP(mean): 99 (04 May 2023 21:52) (99 - 110)  RR: 18 (05 May 2023 04:36) (15 - 18)  SpO2: 100% (05 May 2023 04:36) (98% - 100%)    Parameters below as of 05 May 2023 04:36  Patient On (Oxygen Delivery Method): room air        ASSESSMENT/ PLAN    Therapy to  be:               [X] Continued   [ ] Discontinued   [ ] Changed to PRN Analgesics    Documentation and Verification of current medications:   [X] Done	[ ] Not done, not eligible    Comments: Endorsing good analgesia. Total 2.6mg / 13hour. Reeducated to use.

## 2023-05-05 NOTE — PROGRESS NOTE ADULT - ASSESSMENT
47F PMH asthma GERD, KATHLEEN, Obesity S/p Gastric Sleeve and Hiatal Hernia 5/2022 with recurrence of Hiatal Hernia 6/30/22 requiring RTOR for repair,s/p Robotic assisted conversion of Sleeve gastrectomy to RnY Gastric Bypass  w Hiatal Hernia Repair 1/11/2023 who presents with acute onset abdominal pain. Found to have SBO, TP proximal to JJ anastomosis, w concern for internal hernia. Now  s/p Ex-lap, DONNA    PLAN:  - NPO sips and chips  - CLD when passing gas  - Pain control  - Monitor I&O          [facility] surgery   1614 47F PMH asthma GERD, KATHLEEN, Obesity S/p Gastric Sleeve and Hiatal Hernia 5/2022 with recurrence of Hiatal Hernia 6/30/22 requiring RTOR for repair,s/p Robotic assisted conversion of Sleeve gastrectomy to RnY Gastric Bypass  w Hiatal Hernia Repair 1/11/2023 who presents with acute onset abdominal pain. Found to have SBO, TP proximal to JJ anastomosis, w concern for internal hernia. Now  s/p Ex-lap, DONNA    PLAN:  - NPO sips and chips  - IVF  - CLD when passing gas  - Pain control  - Monitor I&O  - DVT ppx          [facility] surgery   [id number]

## 2023-05-05 NOTE — PROGRESS NOTE ADULT - ATTENDING COMMENTS
POD1 s/p ex-lap, DONNA for treatment of SBO  Adhesions found at armen limb just proximal to J-J; no internal hernia, no mesenteric defects.  Patient is feeling better, no nausea.  Pain well controlled with PCA.  No flatus reported    Vitals normal  Abdomen soft, appropriately tender  Nondistended  Incision with dressing clean and dry    Plan  GI/DVT ppx  sips and meds for now, awaiting return of bowel function  Encouraged ambulation  IV PCA for pain    Rahul Field MD

## 2023-05-05 NOTE — DISCHARGE NOTE PROVIDER - NSDCMRMEDTOKEN_GEN_ALL_CORE_FT
PriLOSEC 40 mg oral delayed release capsule: 1 cap(s) orally once a day AM Do not swallow whole. Open capsule and take in applesauce   omeprazole 40 mg oral delayed release capsule: 1 orally once a day  oxyCODONE 5 mg oral tablet: 1 tab(s) orally every 6 hours as needed for  severe pain MDD: 4

## 2023-05-05 NOTE — DISCHARGE NOTE PROVIDER - NSDCCPCAREPLAN_GEN_ALL_CORE_FT
PRINCIPAL DISCHARGE DIAGNOSIS  Diagnosis: SBO (small bowel obstruction)  Assessment and Plan of Treatment: WOUND CARE: DO NOT remove steri-strips. Change dry dressing as needed.  BATHING: Please do not submerge wound underwater. No swimming pools, no hot tubs, no tub baths. You may shower and/or sponge bathe in 24 hours. Let soapy water run over incisions. DO NOT scrub or soak incision sites. Pat dry.   ACTIVITY: No heavy lifting more than 10-15lbs or straining. Otherwise, you may return to your usual level of physical activity. You may complete your daily activities. Take frequent walks. If you are taking narcotic pain medication (such as Oxycodone), do NOT drive a car, operate machinery or make important decisions.  DIET: Low fiber diet as tolerated.  NOTIFY YOUR SURGEON IF: You have any bleeding that does not stop, any pus draining from your wound, any fever (over 100.4 F) or chills, persistent nausea/vomiting with inability to tolerate food or liquids, persistent diarrhea, or if your pain is not controlled on your discharge pain medications.  FOLLOW-UP:  1. Please call to make a follow-up appointment with Dr. Field within one week of discharge   2. Please follow up with your primary care physician in one week regarding your hospitalization.     PRINCIPAL DISCHARGE DIAGNOSIS  Diagnosis: SBO (small bowel obstruction)  Assessment and Plan of Treatment: WOUND CARE: DO NOT remove steri-strips. Change dry dressing as needed.  BATHING: Please do not submerge wound underwater. No swimming pools, no hot tubs, no tub baths. You may shower and/or sponge bathe in 24 hours. Let soapy water run over incisions. DO NOT scrub or soak incision sites. Pat dry.   ACTIVITY: No heavy lifting more than 10-15lbs or straining. Otherwise, you may return to your usual level of physical activity. You may complete your daily activities. Take frequent walks. If you are taking narcotic pain medication (such as Oxycodone), do NOT drive a car, operate machinery or make important decisions.  DIET: PUREED DIET until follow up with Dr. Field in 1 week.   NOTIFY YOUR SURGEON IF: You have any bleeding that does not stop, any pus draining from your wound, any fever (over 100.4 F) or chills, persistent nausea/vomiting with inability to tolerate food or liquids, persistent diarrhea, or if your pain is not controlled on your discharge pain medications.  FOLLOW-UP:  1. Please call to make a follow-up appointment with Dr. Field within one week of discharge   2. Please follow up with your primary care physician in one week regarding your hospitalization.

## 2023-05-05 NOTE — PROGRESS NOTE ADULT - SUBJECTIVE AND OBJECTIVE BOX
Surgery Progress Note    INTERVAL EVENTS:   No acute events overnight.    SUBJECTIVE: Patient seen and examined at bedside with surgical team,     OBJECTIVE:    Vital Signs Last 24 Hrs  T(C): 36.7 (04 May 2023 23:54), Max: 37.1 (04 May 2023 09:17)  T(F): 98.1 (04 May 2023 23:54), Max: 98.7 (04 May 2023 09:17)  HR: 75 (04 May 2023 23:54) (60 - 84)  BP: 124/67 (04 May 2023 23:54) (122/71 - 159/91)  BP(mean): 99 (04 May 2023 21:52) (99 - 110)  RR: 18 (04 May 2023 23:54) (15 - 18)  SpO2: 98% (04 May 2023 23:54) (98% - 100%)    Parameters below as of 04 May 2023 23:54  Patient On (Oxygen Delivery Method): room air    I&O's Detail    04 May 2023 07:01  -  05 May 2023 02:57  --------------------------------------------------------  IN:    Lactated Ringers: 625 mL  Total IN: 625 mL    OUT:    Voided (mL): 150 mL  Total OUT: 150 mL    Total NET: 475 mL      MEDICATIONS  (STANDING):  acetaminophen   IVPB .. 1000 milliGRAM(s) IV Intermittent every 6 hours  heparin   Injectable 5000 Unit(s) SubCutaneous every 8 hours  HYDROmorphone PCA (1 mG/mL) 30 milliLiter(s) PCA Continuous PCA Continuous  ketorolac   Injectable 15 milliGRAM(s) IV Push every 6 hours  lactated ringers. 1000 milliLiter(s) (125 mL/Hr) IV Continuous <Continuous>  pantoprazole  Injectable 40 milliGRAM(s) IV Push daily    MEDICATIONS  (PRN):  HYDROmorphone PCA (1 mG/mL) Rescue Clinician Bolus 0.5 milliGRAM(s) IV Push every 15 minutes PRN for Pain Scale GREATER THAN 6  naloxone Injectable 0.1 milliGRAM(s) IV Push every 3 minutes PRN For ANY of the following changes in patient status:  A. RR LESS THAN 10 breaths per minute, B. Oxygen saturation LESS THAN 90%, C. Sedation score of 6  ondansetron Injectable 4 milliGRAM(s) IV Push every 6 hours PRN Nausea      PHYSICAL EXAM:  Constitutional: NAD  Respiratory: Unlabored breathing  Abdomen: Soft, nondistended, ATTP. No rebound or guarding. Midline incision dressing c/d/i.  Extremities: WWP, GUARDADO spontaneously    LABS:                        13.3   12.32 )-----------( 271      ( 04 May 2023 20:40 )             40.4     05-04    141  |  105  |  8   ----------------------------<  136<H>  3.7   |  22  |  0.56    Ca    8.9      04 May 2023 20:40  Phos  4.0     05-04  Mg     2.6     05-04    TPro  8.5<H>  /  Alb  5.0  /  TBili  0.7  /  DBili  x   /  AST  18  /  ALT  12  /  AlkPhos  89  05-04    PT/INR - ( 04 May 2023 15:40 )   PT: 11.4 sec;   INR: 0.99 ratio         PTT - ( 04 May 2023 15:40 )  PTT:30.3 sec  LIVER FUNCTIONS - ( 04 May 2023 11:44 )  Alb: 5.0 g/dL / Pro: 8.5 g/dL / ALK PHOS: 89 U/L / ALT: 12 U/L / AST: 18 U/L / GGT: x             ABO Interpretation: O (05-04-23 @ 15:45)      IMAGING:     Surgery Progress Note    INTERVAL EVENTS:   No acute events overnight.    SUBJECTIVE: Patient seen and examined at bedside with surgical team, pain well controlled overnight. -/-. Denies nausea or vomiting.     OBJECTIVE:    Vital Signs Last 24 Hrs  T(C): 36.7 (04 May 2023 23:54), Max: 37.1 (04 May 2023 09:17)  T(F): 98.1 (04 May 2023 23:54), Max: 98.7 (04 May 2023 09:17)  HR: 75 (04 May 2023 23:54) (60 - 84)  BP: 124/67 (04 May 2023 23:54) (122/71 - 159/91)  BP(mean): 99 (04 May 2023 21:52) (99 - 110)  RR: 18 (04 May 2023 23:54) (15 - 18)  SpO2: 98% (04 May 2023 23:54) (98% - 100%)    Parameters below as of 04 May 2023 23:54  Patient On (Oxygen Delivery Method): room air    I&O's Detail    04 May 2023 07:01  -  05 May 2023 02:57  --------------------------------------------------------  IN:    Lactated Ringers: 625 mL  Total IN: 625 mL    OUT:    Voided (mL): 150 mL  Total OUT: 150 mL    Total NET: 475 mL      MEDICATIONS  (STANDING):  acetaminophen   IVPB .. 1000 milliGRAM(s) IV Intermittent every 6 hours  heparin   Injectable 5000 Unit(s) SubCutaneous every 8 hours  HYDROmorphone PCA (1 mG/mL) 30 milliLiter(s) PCA Continuous PCA Continuous  ketorolac   Injectable 15 milliGRAM(s) IV Push every 6 hours  lactated ringers. 1000 milliLiter(s) (125 mL/Hr) IV Continuous <Continuous>  pantoprazole  Injectable 40 milliGRAM(s) IV Push daily    MEDICATIONS  (PRN):  HYDROmorphone PCA (1 mG/mL) Rescue Clinician Bolus 0.5 milliGRAM(s) IV Push every 15 minutes PRN for Pain Scale GREATER THAN 6  naloxone Injectable 0.1 milliGRAM(s) IV Push every 3 minutes PRN For ANY of the following changes in patient status:  A. RR LESS THAN 10 breaths per minute, B. Oxygen saturation LESS THAN 90%, C. Sedation score of 6  ondansetron Injectable 4 milliGRAM(s) IV Push every 6 hours PRN Nausea      PHYSICAL EXAM:  Constitutional: NAD  Respiratory: Unlabored breathing  Abdomen: Soft, nondistended, ATTP. No rebound or guarding. Midline incision dressing c/d/i.  Extremities: WWP, GUARDADO spontaneously    LABS:                        13.3   12.32 )-----------( 271      ( 04 May 2023 20:40 )             40.4     05-04    141  |  105  |  8   ----------------------------<  136<H>  3.7   |  22  |  0.56    Ca    8.9      04 May 2023 20:40  Phos  4.0     05-04  Mg     2.6     05-04    TPro  8.5<H>  /  Alb  5.0  /  TBili  0.7  /  DBili  x   /  AST  18  /  ALT  12  /  AlkPhos  89  05-04    PT/INR - ( 04 May 2023 15:40 )   PT: 11.4 sec;   INR: 0.99 ratio         PTT - ( 04 May 2023 15:40 )  PTT:30.3 sec  LIVER FUNCTIONS - ( 04 May 2023 11:44 )  Alb: 5.0 g/dL / Pro: 8.5 g/dL / ALK PHOS: 89 U/L / ALT: 12 U/L / AST: 18 U/L / GGT: x             ABO Interpretation: O (05-04-23 @ 15:45)      IMAGING:

## 2023-05-06 LAB
ANION GAP SERPL CALC-SCNC: 11 MMOL/L — SIGNIFICANT CHANGE UP (ref 5–17)
BUN SERPL-MCNC: 9 MG/DL — SIGNIFICANT CHANGE UP (ref 7–23)
CALCIUM SERPL-MCNC: 8.8 MG/DL — SIGNIFICANT CHANGE UP (ref 8.4–10.5)
CHLORIDE SERPL-SCNC: 103 MMOL/L — SIGNIFICANT CHANGE UP (ref 96–108)
CO2 SERPL-SCNC: 24 MMOL/L — SIGNIFICANT CHANGE UP (ref 22–31)
CREAT SERPL-MCNC: 0.69 MG/DL — SIGNIFICANT CHANGE UP (ref 0.5–1.3)
EGFR: 106 ML/MIN/1.73M2 — SIGNIFICANT CHANGE UP
GLUCOSE SERPL-MCNC: 84 MG/DL — SIGNIFICANT CHANGE UP (ref 70–99)
HCT VFR BLD CALC: 35.2 % — SIGNIFICANT CHANGE UP (ref 34.5–45)
HCT VFR BLD CALC: 37.9 % — SIGNIFICANT CHANGE UP (ref 34.5–45)
HGB BLD-MCNC: 11.1 G/DL — LOW (ref 11.5–15.5)
HGB BLD-MCNC: 11.7 G/DL — SIGNIFICANT CHANGE UP (ref 11.5–15.5)
MAGNESIUM SERPL-MCNC: 2 MG/DL — SIGNIFICANT CHANGE UP (ref 1.6–2.6)
MCHC RBC-ENTMCNC: 30.9 GM/DL — LOW (ref 32–36)
MCHC RBC-ENTMCNC: 31.5 GM/DL — LOW (ref 32–36)
MCHC RBC-ENTMCNC: 31.5 PG — SIGNIFICANT CHANGE UP (ref 27–34)
MCHC RBC-ENTMCNC: 32.2 PG — SIGNIFICANT CHANGE UP (ref 27–34)
MCV RBC AUTO: 101.9 FL — HIGH (ref 80–100)
MCV RBC AUTO: 102 FL — HIGH (ref 80–100)
NRBC # BLD: 0 /100 WBCS — SIGNIFICANT CHANGE UP (ref 0–0)
NRBC # BLD: 0 /100 WBCS — SIGNIFICANT CHANGE UP (ref 0–0)
PHOSPHATE SERPL-MCNC: 3.1 MG/DL — SIGNIFICANT CHANGE UP (ref 2.5–4.5)
PLATELET # BLD AUTO: 195 K/UL — SIGNIFICANT CHANGE UP (ref 150–400)
PLATELET # BLD AUTO: 207 K/UL — SIGNIFICANT CHANGE UP (ref 150–400)
POTASSIUM SERPL-MCNC: 3.2 MMOL/L — LOW (ref 3.5–5.3)
POTASSIUM SERPL-SCNC: 3.2 MMOL/L — LOW (ref 3.5–5.3)
RBC # BLD: 3.45 M/UL — LOW (ref 3.8–5.2)
RBC # BLD: 3.72 M/UL — LOW (ref 3.8–5.2)
RBC # FLD: 12.5 % — SIGNIFICANT CHANGE UP (ref 10.3–14.5)
RBC # FLD: 12.6 % — SIGNIFICANT CHANGE UP (ref 10.3–14.5)
SODIUM SERPL-SCNC: 138 MMOL/L — SIGNIFICANT CHANGE UP (ref 135–145)
WBC # BLD: 6.68 K/UL — SIGNIFICANT CHANGE UP (ref 3.8–10.5)
WBC # BLD: 6.86 K/UL — SIGNIFICANT CHANGE UP (ref 3.8–10.5)
WBC # FLD AUTO: 6.68 K/UL — SIGNIFICANT CHANGE UP (ref 3.8–10.5)
WBC # FLD AUTO: 6.86 K/UL — SIGNIFICANT CHANGE UP (ref 3.8–10.5)

## 2023-05-06 RX ORDER — OXYCODONE HYDROCHLORIDE 5 MG/1
5 TABLET ORAL EVERY 4 HOURS
Refills: 0 | Status: DISCONTINUED | OUTPATIENT
Start: 2023-05-06 | End: 2023-05-07

## 2023-05-06 RX ORDER — POTASSIUM CHLORIDE 20 MEQ
10 PACKET (EA) ORAL ONCE
Refills: 0 | Status: COMPLETED | OUTPATIENT
Start: 2023-05-06 | End: 2023-05-06

## 2023-05-06 RX ORDER — ENOXAPARIN SODIUM 100 MG/ML
40 INJECTION SUBCUTANEOUS EVERY 24 HOURS
Refills: 0 | Status: DISCONTINUED | OUTPATIENT
Start: 2023-05-06 | End: 2023-05-07

## 2023-05-06 RX ADMIN — Medication 1000 MILLIGRAM(S): at 22:44

## 2023-05-06 RX ADMIN — Medication 15 MILLIGRAM(S): at 14:18

## 2023-05-06 RX ADMIN — Medication 15 MILLIGRAM(S): at 14:30

## 2023-05-06 RX ADMIN — Medication 15 MILLIGRAM(S): at 02:05

## 2023-05-06 RX ADMIN — HYDROMORPHONE HYDROCHLORIDE 30 MILLILITER(S): 2 INJECTION INTRAMUSCULAR; INTRAVENOUS; SUBCUTANEOUS at 07:41

## 2023-05-06 RX ADMIN — Medication 1000 MILLIGRAM(S): at 14:30

## 2023-05-06 RX ADMIN — OXYCODONE HYDROCHLORIDE 5 MILLIGRAM(S): 5 TABLET ORAL at 23:04

## 2023-05-06 RX ADMIN — Medication 100 MILLIEQUIVALENT(S): at 18:05

## 2023-05-06 RX ADMIN — Medication 1000 MILLIGRAM(S): at 14:17

## 2023-05-06 RX ADMIN — Medication 15 MILLIGRAM(S): at 01:35

## 2023-05-06 RX ADMIN — Medication 1000 MILLIGRAM(S): at 23:00

## 2023-05-06 RX ADMIN — Medication 1000 MILLIGRAM(S): at 05:42

## 2023-05-06 RX ADMIN — HEPARIN SODIUM 5000 UNIT(S): 5000 INJECTION INTRAVENOUS; SUBCUTANEOUS at 14:17

## 2023-05-06 RX ADMIN — PANTOPRAZOLE SODIUM 40 MILLIGRAM(S): 20 TABLET, DELAYED RELEASE ORAL at 14:17

## 2023-05-06 RX ADMIN — HEPARIN SODIUM 5000 UNIT(S): 5000 INJECTION INTRAVENOUS; SUBCUTANEOUS at 05:42

## 2023-05-06 NOTE — PROGRESS NOTE ADULT - ASSESSMENT
47F PMH asthma GERD, KATHLEEN, Obesity S/p Gastric Sleeve and Hiatal Hernia 5/2022 with recurrence of Hiatal Hernia 6/30/22 requiring RTOR for repair,s/p Robotic assisted conversion of Sleeve gastrectomy to RnY Gastric Bypass  w Hiatal Hernia Repair 1/11/2023 who presents with acute onset abdominal pain. Found to have SBO, TP proximal to JJ anastomosis, w concern for internal hernia. Now  s/p Ex-lap, DONNA    PLAN:  - CLD  - IVF  - Pain control  - Monitor I&O  - DVT ppx          [facility]   [id number] 47F PMH asthma GERD, KATHLEEN, Obesity S/p Gastric Sleeve and Hiatal Hernia 5/2022 with recurrence of Hiatal Hernia 6/30/22 requiring RTOR for repair,s/p Robotic assisted conversion of Sleeve gastrectomy to RnY Gastric Bypass  w Hiatal Hernia Repair 1/11/2023 who presents with acute onset abdominal pain. Found to have SBO, TP proximal to JJ anastomosis, w concern for internal hernia. Now  s/p Ex-lap, DONNA    PLAN:  - Puree diet  - d/c PCA  - IVF  - Pain control  - Monitor I&O  - DVT ppx  - Dispo: home tomorrow          Green tea surgery   [id number]

## 2023-05-06 NOTE — DIETITIAN INITIAL EVALUATION ADULT - PERTINENT MEDS FT
MEDICATIONS  (STANDING):  acetaminophen   Oral Liquid .. 1000 milliGRAM(s) Oral every 6 hours  dextrose 5% + sodium chloride 0.45% with potassium chloride 20 mEq/L 1000 milliLiter(s) (75 mL/Hr) IV Continuous <Continuous>  heparin   Injectable 5000 Unit(s) SubCutaneous every 8 hours  ketorolac   Injectable 15 milliGRAM(s) IV Push every 6 hours  pantoprazole  Injectable 40 milliGRAM(s) IV Push daily    MEDICATIONS  (PRN):  naloxone Injectable 0.1 milliGRAM(s) IV Push every 3 minutes PRN For ANY of the following changes in patient status:  A. RR LESS THAN 10 breaths per minute, B. Oxygen saturation LESS THAN 90%, C. Sedation score of 6  ondansetron Injectable 4 milliGRAM(s) IV Push every 6 hours PRN Nausea  oxyCODONE    IR 5 milliGRAM(s) Oral every 4 hours PRN Severe Pain (7 - 10)

## 2023-05-06 NOTE — DIETITIAN INITIAL EVALUATION ADULT - ORAL INTAKE PTA/DIET HISTORY
pt was on Regular texture diet. low carbohydrate wraps, can, eggs, cheese for breakfast. meatloaf, cabbage, chili, high protein for lunch. ribs, chicken, turkey, cabbage, broccoli, cauliflower for dinner. snacked on popcorn, pretzels, pop corners.

## 2023-05-06 NOTE — DIETITIAN INITIAL EVALUATION ADULT - NS FNS ENTERAL CURRENT ORDER NOT
Current diet order does not meet estimated nutrient requirements Localized Dermabrasion With Wire Brush Text: The patient was draped in routine manner.  Localized dermabrasion using 3 x 17 mm wire brush was performed in routine manner to papillary dermis. This spot dermabrasion is being performed to complete skin cancer reconstruction. It also will eliminate the other sun damaged precancerous cells that are known to be part of the regional effect of a lifetime's worth of sun exposure. This localized dermabrasion is therapeutic and should not be considered cosmetic in any regard. Localized Dermabrasion Text: The patient was draped in routine manner.  Localized dermabrasion using 3 x 17 mm wire brush was performed in routine manner to papillary dermis. This spot dermabrasion is being performed to complete skin cancer reconstruction. It also will eliminate the other sun damaged precancerous cells that are known to be part of the regional effect of a lifetime's worth of sun exposure. This localized dermabrasion is therapeutic and should not be considered cosmetic in any regard.

## 2023-05-06 NOTE — PROGRESS NOTE ADULT - SUBJECTIVE AND OBJECTIVE BOX
Surgery Progress Note    INTERVAL EVENTS:   No acute events overnight.    SUBJECTIVE: Patient seen and examined at bedside with surgical team,     OBJECTIVE:    Vital Signs Last 24 Hrs  T(C): 36.7 (06 May 2023 00:56), Max: 37.3 (05 May 2023 20:35)  T(F): 98 (06 May 2023 00:56), Max: 99.1 (05 May 2023 20:35)  HR: 58 (06 May 2023 00:56) (58 - 69)  BP: 96/65 (06 May 2023 00:56) (96/65 - 111/65)  BP(mean): --  RR: 18 (06 May 2023 00:56) (18 - 18)  SpO2: 97% (06 May 2023 00:56) (94% - 100%)    Parameters below as of 06 May 2023 00:56  Patient On (Oxygen Delivery Method): room air    I&O's Detail    04 May 2023 07:01  -  05 May 2023 07:00  --------------------------------------------------------  IN:    IV PiggyBack: 200 mL    Lactated Ringers: 1625 mL  Total IN: 1825 mL    OUT:    Voided (mL): 150 mL  Total OUT: 150 mL    Total NET: 1675 mL      05 May 2023 07:01  -  06 May 2023 03:52  --------------------------------------------------------  IN:    IV PiggyBack: 100 mL    Oral Fluid: 480 mL  Total IN: 580 mL    OUT:    Voided (mL): 900 mL  Total OUT: 900 mL    Total NET: -320 mL      MEDICATIONS  (STANDING):  acetaminophen   Oral Liquid .. 1000 milliGRAM(s) Oral every 6 hours  dextrose 5% + sodium chloride 0.45% with potassium chloride 20 mEq/L 1000 milliLiter(s) (75 mL/Hr) IV Continuous <Continuous>  heparin   Injectable 5000 Unit(s) SubCutaneous every 8 hours  HYDROmorphone PCA (1 mG/mL) 30 milliLiter(s) PCA Continuous PCA Continuous  ketorolac   Injectable 15 milliGRAM(s) IV Push every 6 hours  pantoprazole  Injectable 40 milliGRAM(s) IV Push daily    MEDICATIONS  (PRN):  HYDROmorphone PCA (1 mG/mL) Rescue Clinician Bolus 0.5 milliGRAM(s) IV Push every 15 minutes PRN for Pain Scale GREATER THAN 6  naloxone Injectable 0.1 milliGRAM(s) IV Push every 3 minutes PRN For ANY of the following changes in patient status:  A. RR LESS THAN 10 breaths per minute, B. Oxygen saturation LESS THAN 90%, C. Sedation score of 6  ondansetron Injectable 4 milliGRAM(s) IV Push every 6 hours PRN Nausea      PHYSICAL EXAM:  Constitutional: NAD  Respiratory: Unlabored breathing  Abdomen:: Soft, nondistended, ATTP. No rebound or guarding. Midline incision dressing c/d/i.  Extremities: WWP, GUARDADO spontaneously    LABS:                        13.3   12.32 )-----------( 271      ( 04 May 2023 20:40 )             40.4     05-05    140  |  105  |  9   ----------------------------<  104<H>  3.8   |  23  |  0.62    Ca    9.1      05 May 2023 07:18  Phos  4.7     05-05  Mg     2.6     05-05    TPro  8.5<H>  /  Alb  5.0  /  TBili  0.7  /  DBili  x   /  AST  18  /  ALT  12  /  AlkPhos  89  05-04    PT/INR - ( 04 May 2023 15:40 )   PT: 11.4 sec;   INR: 0.99 ratio         PTT - ( 04 May 2023 15:40 )  PTT:30.3 sec  LIVER FUNCTIONS - ( 04 May 2023 11:44 )  Alb: 5.0 g/dL / Pro: 8.5 g/dL / ALK PHOS: 89 U/L / ALT: 12 U/L / AST: 18 U/L / GGT: x                 IMAGING:     Surgery Progress Note    INTERVAL EVENTS:   No acute events overnight.    SUBJECTIVE: Patient seen and examined at bedside with surgical team, feeling well this AM, pain controlled. No nausea or vomiting.     OBJECTIVE:    Vital Signs Last 24 Hrs  T(C): 36.7 (06 May 2023 00:56), Max: 37.3 (05 May 2023 20:35)  T(F): 98 (06 May 2023 00:56), Max: 99.1 (05 May 2023 20:35)  HR: 58 (06 May 2023 00:56) (58 - 69)  BP: 96/65 (06 May 2023 00:56) (96/65 - 111/65)  BP(mean): --  RR: 18 (06 May 2023 00:56) (18 - 18)  SpO2: 97% (06 May 2023 00:56) (94% - 100%)    Parameters below as of 06 May 2023 00:56  Patient On (Oxygen Delivery Method): room air    I&O's Detail    04 May 2023 07:01  -  05 May 2023 07:00  --------------------------------------------------------  IN:    IV PiggyBack: 200 mL    Lactated Ringers: 1625 mL  Total IN: 1825 mL    OUT:    Voided (mL): 150 mL  Total OUT: 150 mL    Total NET: 1675 mL      05 May 2023 07:01  -  06 May 2023 03:52  --------------------------------------------------------  IN:    IV PiggyBack: 100 mL    Oral Fluid: 480 mL  Total IN: 580 mL    OUT:    Voided (mL): 900 mL  Total OUT: 900 mL    Total NET: -320 mL      MEDICATIONS  (STANDING):  acetaminophen   Oral Liquid .. 1000 milliGRAM(s) Oral every 6 hours  dextrose 5% + sodium chloride 0.45% with potassium chloride 20 mEq/L 1000 milliLiter(s) (75 mL/Hr) IV Continuous <Continuous>  heparin   Injectable 5000 Unit(s) SubCutaneous every 8 hours  HYDROmorphone PCA (1 mG/mL) 30 milliLiter(s) PCA Continuous PCA Continuous  ketorolac   Injectable 15 milliGRAM(s) IV Push every 6 hours  pantoprazole  Injectable 40 milliGRAM(s) IV Push daily    MEDICATIONS  (PRN):  HYDROmorphone PCA (1 mG/mL) Rescue Clinician Bolus 0.5 milliGRAM(s) IV Push every 15 minutes PRN for Pain Scale GREATER THAN 6  naloxone Injectable 0.1 milliGRAM(s) IV Push every 3 minutes PRN For ANY of the following changes in patient status:  A. RR LESS THAN 10 breaths per minute, B. Oxygen saturation LESS THAN 90%, C. Sedation score of 6  ondansetron Injectable 4 milliGRAM(s) IV Push every 6 hours PRN Nausea      PHYSICAL EXAM:  Constitutional: NAD  Respiratory: Unlabored breathing  Abdomen:: Soft, nondistended, ATTP. No rebound or guarding. Midline incision dressing c/d/i.  Extremities: WWP, GUARDADO spontaneously    LABS:                        13.3   12.32 )-----------( 271      ( 04 May 2023 20:40 )             40.4     05-05    140  |  105  |  9   ----------------------------<  104<H>  3.8   |  23  |  0.62    Ca    9.1      05 May 2023 07:18  Phos  4.7     05-05  Mg     2.6     05-05    TPro  8.5<H>  /  Alb  5.0  /  TBili  0.7  /  DBili  x   /  AST  18  /  ALT  12  /  AlkPhos  89  05-04    PT/INR - ( 04 May 2023 15:40 )   PT: 11.4 sec;   INR: 0.99 ratio         PTT - ( 04 May 2023 15:40 )  PTT:30.3 sec  LIVER FUNCTIONS - ( 04 May 2023 11:44 )  Alb: 5.0 g/dL / Pro: 8.5 g/dL / ALK PHOS: 89 U/L / ALT: 12 U/L / AST: 18 U/L / GGT: x                 IMAGING:

## 2023-05-06 NOTE — PROGRESS NOTE ADULT - SUBJECTIVE AND OBJECTIVE BOX
Anesthesia Pain Management Service    SUBJECTIVE: Patient is doing well with IV PCA and no significant problems reported.    Pain Scale Score	At rest: ___ 	With Activity: ___ 	[X ] Refer to charted pain scores    THERAPY:    [ ] IV PCA Morphine		[ ] 5 mg/mL	[ ] 1 mg/mL  [X ] IV PCA Hydromorphone	[ ] 5 mg/mL	[X ] 1 mg/mL  [ ] IV PCA Fentanyl		[ ] 50 micrograms/mL    Demand dose __0.2_ lockout __6_ (minutes) Continuous Rate _0__ Total: ___   mg used (in past 24 hrs)      MEDICATIONS  (STANDING):  acetaminophen   Oral Liquid .. 1000 milliGRAM(s) Oral every 6 hours  dextrose 5% + sodium chloride 0.45% with potassium chloride 20 mEq/L 1000 milliLiter(s) (75 mL/Hr) IV Continuous <Continuous>  heparin   Injectable 5000 Unit(s) SubCutaneous every 8 hours  ketorolac   Injectable 15 milliGRAM(s) IV Push every 6 hours  pantoprazole  Injectable 40 milliGRAM(s) IV Push daily  potassium chloride  10 mEq/100 mL IVPB 10 milliEquivalent(s) IV Intermittent once    MEDICATIONS  (PRN):  naloxone Injectable 0.1 milliGRAM(s) IV Push every 3 minutes PRN For ANY of the following changes in patient status:  A. RR LESS THAN 10 breaths per minute, B. Oxygen saturation LESS THAN 90%, C. Sedation score of 6  ondansetron Injectable 4 milliGRAM(s) IV Push every 6 hours PRN Nausea  oxyCODONE    IR 5 milliGRAM(s) Oral every 4 hours PRN Severe Pain (7 - 10)      OBJECTIVE:    Sedation Score:	[ X] Alert	[ ] Drowsy 	[ ] Arousable	[ ] Asleep	[ ] Unresponsive    Side Effects:	[X ] None	[ ] Nausea	[ ] Vomiting	[ ] Pruritus  		[ ] Other:    Vital Signs Last 24 Hrs  T(C): 37.1 (06 May 2023 11:10), Max: 37.3 (05 May 2023 20:35)  T(F): 98.7 (06 May 2023 11:10), Max: 99.1 (05 May 2023 20:35)  HR: 60 (06 May 2023 11:10) (58 - 69)  BP: 98/64 (06 May 2023 11:10) (96/63 - 111/65)  BP(mean): --  RR: 18 (06 May 2023 11:10) (18 - 18)  SpO2: 97% (06 May 2023 11:10) (94% - 98%)    Parameters below as of 06 May 2023 11:10  Patient On (Oxygen Delivery Method): room air        ASSESSMENT/ PLAN    Therapy to  be:	[ ] Continue   [ X] Discontinued   [X ] Change to prn Analgesics    Documentation and Verification of current medications:   [X] Done	[ ] Not done, not elligible    Comments: PRN Oral/IV opioids and/or Adjuvant non-opioid medication to be ordered at this point.    Progress Note written now but Patient was seen earlier.

## 2023-05-06 NOTE — DIETITIAN INITIAL EVALUATION ADULT - PERTINENT LABORATORY DATA
05-06    138  |  103  |  9   ----------------------------<  84  3.2<L>   |  24  |  0.69    Ca    8.8      06 May 2023 07:12  Phos  3.1     05-06  Mg     2.0     05-06    TPro  8.5<H>  /  Alb  5.0  /  TBili  0.7  /  DBili  x   /  AST  18  /  ALT  12  /  AlkPhos  89  05-04

## 2023-05-06 NOTE — PROGRESS NOTE ADULT - ATTENDING COMMENTS
POD2 s/p ex-lap/DONNA for SBO  Feeling well, tolerating liquids  No nausea  + flatus    Vitals normal  Abd soft, nondistended  Incision healing well    Plan  Advance to pureed diet  D/c PCA, transition to PO meds  Discharge planning    Rahul Field MD

## 2023-05-07 ENCOUNTER — TRANSCRIPTION ENCOUNTER (OUTPATIENT)
Age: 48
End: 2023-05-07

## 2023-05-07 VITALS
OXYGEN SATURATION: 98 % | TEMPERATURE: 98 F | HEART RATE: 60 BPM | SYSTOLIC BLOOD PRESSURE: 120 MMHG | RESPIRATION RATE: 18 BRPM | DIASTOLIC BLOOD PRESSURE: 79 MMHG

## 2023-05-07 LAB
ANION GAP SERPL CALC-SCNC: 13 MMOL/L — SIGNIFICANT CHANGE UP (ref 5–17)
BUN SERPL-MCNC: 6 MG/DL — LOW (ref 7–23)
CALCIUM SERPL-MCNC: 9 MG/DL — SIGNIFICANT CHANGE UP (ref 8.4–10.5)
CHLORIDE SERPL-SCNC: 105 MMOL/L — SIGNIFICANT CHANGE UP (ref 96–108)
CO2 SERPL-SCNC: 22 MMOL/L — SIGNIFICANT CHANGE UP (ref 22–31)
CREAT SERPL-MCNC: 0.64 MG/DL — SIGNIFICANT CHANGE UP (ref 0.5–1.3)
EGFR: 108 ML/MIN/1.73M2 — SIGNIFICANT CHANGE UP
GLUCOSE SERPL-MCNC: 87 MG/DL — SIGNIFICANT CHANGE UP (ref 70–99)
HCT VFR BLD CALC: 42 % — SIGNIFICANT CHANGE UP (ref 34.5–45)
HGB BLD-MCNC: 13.4 G/DL — SIGNIFICANT CHANGE UP (ref 11.5–15.5)
MAGNESIUM SERPL-MCNC: 1.9 MG/DL — SIGNIFICANT CHANGE UP (ref 1.6–2.6)
MCHC RBC-ENTMCNC: 31.9 GM/DL — LOW (ref 32–36)
MCHC RBC-ENTMCNC: 32.5 PG — SIGNIFICANT CHANGE UP (ref 27–34)
MCV RBC AUTO: 101.9 FL — HIGH (ref 80–100)
NRBC # BLD: 0 /100 WBCS — SIGNIFICANT CHANGE UP (ref 0–0)
PHOSPHATE SERPL-MCNC: 3.4 MG/DL — SIGNIFICANT CHANGE UP (ref 2.5–4.5)
PLATELET # BLD AUTO: 191 K/UL — SIGNIFICANT CHANGE UP (ref 150–400)
POTASSIUM SERPL-MCNC: 3.6 MMOL/L — SIGNIFICANT CHANGE UP (ref 3.5–5.3)
POTASSIUM SERPL-SCNC: 3.6 MMOL/L — SIGNIFICANT CHANGE UP (ref 3.5–5.3)
RBC # BLD: 4.12 M/UL — SIGNIFICANT CHANGE UP (ref 3.8–5.2)
RBC # FLD: 12.3 % — SIGNIFICANT CHANGE UP (ref 10.3–14.5)
SODIUM SERPL-SCNC: 140 MMOL/L — SIGNIFICANT CHANGE UP (ref 135–145)
WBC # BLD: 7.36 K/UL — SIGNIFICANT CHANGE UP (ref 3.8–10.5)
WBC # FLD AUTO: 7.36 K/UL — SIGNIFICANT CHANGE UP (ref 3.8–10.5)

## 2023-05-07 PROCEDURE — 74177 CT ABD & PELVIS W/CONTRAST: CPT | Mod: MA

## 2023-05-07 PROCEDURE — 36415 COLL VENOUS BLD VENIPUNCTURE: CPT

## 2023-05-07 PROCEDURE — 84484 ASSAY OF TROPONIN QUANT: CPT

## 2023-05-07 PROCEDURE — 82803 BLOOD GASES ANY COMBINATION: CPT

## 2023-05-07 PROCEDURE — 85027 COMPLETE CBC AUTOMATED: CPT

## 2023-05-07 PROCEDURE — 84295 ASSAY OF SERUM SODIUM: CPT

## 2023-05-07 PROCEDURE — 80053 COMPREHEN METABOLIC PANEL: CPT

## 2023-05-07 PROCEDURE — 83690 ASSAY OF LIPASE: CPT

## 2023-05-07 PROCEDURE — 76937 US GUIDE VASCULAR ACCESS: CPT

## 2023-05-07 PROCEDURE — 82947 ASSAY GLUCOSE BLOOD QUANT: CPT

## 2023-05-07 PROCEDURE — 83735 ASSAY OF MAGNESIUM: CPT

## 2023-05-07 PROCEDURE — 84100 ASSAY OF PHOSPHORUS: CPT

## 2023-05-07 PROCEDURE — 85610 PROTHROMBIN TIME: CPT

## 2023-05-07 PROCEDURE — 96365 THER/PROPH/DIAG IV INF INIT: CPT

## 2023-05-07 PROCEDURE — 86901 BLOOD TYPING SEROLOGIC RH(D): CPT

## 2023-05-07 PROCEDURE — 80048 BASIC METABOLIC PNL TOTAL CA: CPT

## 2023-05-07 PROCEDURE — 85018 HEMOGLOBIN: CPT

## 2023-05-07 PROCEDURE — 86900 BLOOD TYPING SEROLOGIC ABO: CPT

## 2023-05-07 PROCEDURE — C9399: CPT

## 2023-05-07 PROCEDURE — 71046 X-RAY EXAM CHEST 2 VIEWS: CPT

## 2023-05-07 PROCEDURE — 82330 ASSAY OF CALCIUM: CPT

## 2023-05-07 PROCEDURE — 96375 TX/PRO/DX INJ NEW DRUG ADDON: CPT

## 2023-05-07 PROCEDURE — 84702 CHORIONIC GONADOTROPIN TEST: CPT

## 2023-05-07 PROCEDURE — 83605 ASSAY OF LACTIC ACID: CPT

## 2023-05-07 PROCEDURE — 85014 HEMATOCRIT: CPT

## 2023-05-07 PROCEDURE — 84132 ASSAY OF SERUM POTASSIUM: CPT

## 2023-05-07 PROCEDURE — 99285 EMERGENCY DEPT VISIT HI MDM: CPT

## 2023-05-07 PROCEDURE — 86850 RBC ANTIBODY SCREEN: CPT

## 2023-05-07 PROCEDURE — 82565 ASSAY OF CREATININE: CPT

## 2023-05-07 PROCEDURE — 85730 THROMBOPLASTIN TIME PARTIAL: CPT

## 2023-05-07 PROCEDURE — 82435 ASSAY OF BLOOD CHLORIDE: CPT

## 2023-05-07 PROCEDURE — 85025 COMPLETE CBC W/AUTO DIFF WBC: CPT

## 2023-05-07 RX ORDER — OXYCODONE HYDROCHLORIDE 5 MG/1
1 TABLET ORAL
Qty: 8 | Refills: 0
Start: 2023-05-07

## 2023-05-07 RX ORDER — OMEPRAZOLE 10 MG/1
1 CAPSULE, DELAYED RELEASE ORAL
Qty: 0 | Refills: 0 | DISCHARGE
Start: 2023-05-07

## 2023-05-07 RX ORDER — METOCLOPRAMIDE HCL 10 MG
1 TABLET ORAL
Qty: 1 | Refills: 0
Start: 2023-05-07 | End: 2023-05-16

## 2023-05-07 RX ORDER — OMEPRAZOLE 10 MG/1
1 CAPSULE, DELAYED RELEASE ORAL
Qty: 0 | Refills: 0 | DISCHARGE

## 2023-05-07 RX ADMIN — OXYCODONE HYDROCHLORIDE 5 MILLIGRAM(S): 5 TABLET ORAL at 00:00

## 2023-05-07 RX ADMIN — OXYCODONE HYDROCHLORIDE 5 MILLIGRAM(S): 5 TABLET ORAL at 06:00

## 2023-05-07 RX ADMIN — OXYCODONE HYDROCHLORIDE 5 MILLIGRAM(S): 5 TABLET ORAL at 05:24

## 2023-05-07 RX ADMIN — Medication 1000 MILLIGRAM(S): at 05:08

## 2023-05-07 RX ADMIN — ENOXAPARIN SODIUM 40 MILLIGRAM(S): 100 INJECTION SUBCUTANEOUS at 05:08

## 2023-05-07 RX ADMIN — Medication 1000 MILLIGRAM(S): at 06:00

## 2023-05-07 NOTE — DISCHARGE NOTE NURSING/CASE MANAGEMENT/SOCIAL WORK - PATIENT PORTAL LINK FT
You can access the FollowMyHealth Patient Portal offered by [facility] by registering at the following website: [email or web address]. By joining [employer]’s FollowMyHealth portal, you will also be able to view your health information using other applications (apps) compatible with our system.

## 2023-05-07 NOTE — PROGRESS NOTE ADULT - ASSESSMENT
47F PMH asthma GERD, KATHLEEN, Obesity S/p Gastric Sleeve and Hiatal Hernia 5/2022 with recurrence of Hiatal Hernia 6/30/22 requiring RTOR for repair,s/p Robotic assisted conversion of Sleeve gastrectomy to RnY Gastric Bypass  w Hiatal Hernia Repair 1/11/2023 who presents with acute onset abdominal pain. Found to have SBO, TP proximal to JJ anastomosis, w concern for internal hernia. Now s/p Ex-lap, DONNA.     PLAN:  - Puree diet  - IVF  - Pain control  - Monitor I&O  - DVT ppx  - Likely D/C home today     Green Team, [id number]

## 2023-05-07 NOTE — PROGRESS NOTE ADULT - SUBJECTIVE AND OBJECTIVE BOX
Surgery Progress Note    Overnight events: None     SUBJECTIVE: Pt seen and examined at bedside in AM by surgical team. Patient comfortable and in no-apparent distress.     Vital Signs Last 24 Hrs  T(C): 37.1 (06 May 2023 20:59), Max: 37.1 (06 May 2023 11:10)  T(F): 98.7 (06 May 2023 20:59), Max: 98.7 (06 May 2023 11:10)  HR: 65 (06 May 2023 20:59) (58 - 73)  BP: 104/65 (06 May 2023 20:59) (96/61 - 108/73)  BP(mean): --  RR: 18 (06 May 2023 20:59) (18 - 18)  SpO2: 97% (06 May 2023 20:59) (97% - 98%)    Parameters below as of 06 May 2023 20:59  Patient On (Oxygen Delivery Method): room air        Physical Exam:  General Appearance: Appears well, NAD  Respiratory: No labored breathing  CV: Pulse regularly present  Abdomen: Soft, ATTP, incision c/d/i    LABS:                        11.7   6.86  )-----------( 207      ( 06 May 2023 09:31 )             37.9     05-06    138  |  103  |  9   ----------------------------<  84  3.2<L>   |  24  |  0.69    Ca    8.8      06 May 2023 07:12  Phos  3.1     05-06  Mg     2.0     05-06            INs and OUTs:    05-05-23 @ 07:01  -  05-06-23 @ 07:00  --------------------------------------------------------  IN: 1480 mL / OUT: 1350 mL / NET: 130 mL    05-06-23 @ 07:01  -  05-07-23 @ 00:39  --------------------------------------------------------  IN: 860 mL / OUT: 850 mL / NET: 10 mL     Surgery Progress Note    Overnight events: None     SUBJECTIVE: Pt seen and examined at bedside in AM by surgical team. Patient comfortable and in no-apparent distress. No nausea, no pain, no vomiting. Tolerating diet.     Vital Signs Last 24 Hrs  T(C): 37.1 (06 May 2023 20:59), Max: 37.1 (06 May 2023 11:10)  T(F): 98.7 (06 May 2023 20:59), Max: 98.7 (06 May 2023 11:10)  HR: 65 (06 May 2023 20:59) (58 - 73)  BP: 104/65 (06 May 2023 20:59) (96/61 - 108/73)  BP(mean): --  RR: 18 (06 May 2023 20:59) (18 - 18)  SpO2: 97% (06 May 2023 20:59) (97% - 98%)    Parameters below as of 06 May 2023 20:59  Patient On (Oxygen Delivery Method): room air        Physical Exam:  General Appearance: Appears well, NAD  Respiratory: No labored breathing  CV: Pulse regularly present  Abdomen: Soft, ATTP, incision c/d/i, no masses palpated.     LABS:                        11.7   6.86  )-----------( 207      ( 06 May 2023 09:31 )             37.9     05-06    138  |  103  |  9   ----------------------------<  84  3.2<L>   |  24  |  0.69    Ca    8.8      06 May 2023 07:12  Phos  3.1     05-06  Mg     2.0     05-06            INs and OUTs:    05-05-23 @ 07:01  -  05-06-23 @ 07:00  --------------------------------------------------------  IN: 1480 mL / OUT: 1350 mL / NET: 130 mL    05-06-23 @ 07:01  -  05-07-23 @ 00:39  --------------------------------------------------------  IN: 860 mL / OUT: 850 mL / NET: 10 mL

## 2023-05-10 NOTE — END OF VISIT
[FreeTextEntry4] : I Carolina Clancy am scribing for and in the presence of Dr. Reginald Amador, the following sections: HISTORY OF PRESENT ILLNESS, PAST MEDICAL/FAMILY/SOCIAL HISTORY, REVIEW OF SYSTEMS, PHYSICAL EXAM; DISPOSITION.\par \par I personally performed the services described in the documentation, reviewed the documentation recorded by the scribe in my presence and it accurately and completely records my words and actions.

## 2023-05-10 NOTE — HISTORY OF PRESENT ILLNESS
[FreeTextEntry1] : right eye redness  [de-identified] : 48 y/o female presents with concerns for right eye redness and discharge over the past few days. She states she also had a recent gastric bypass surgery for management of a hiatal hernia, due to history of unsuccessful subsequent revision repairs. She is following with her surgeon; last appointment was about a month ago. She feels otherwise well and reports no other acute complaints or concerns. ROS as documented below.\par

## 2023-05-10 NOTE — REVIEW OF SYSTEMS
[Redness] : redness [Fever] : no fever [Chills] : no chills [Fatigue] : no fatigue [Vision Problems] : no vision problems [Hearing Loss] : no hearing loss [Chest Pain] : no chest pain [Palpitations] : no palpitations [Lower Ext Edema] : no lower extremity edema [Shortness Of Breath] : no shortness of breath [Wheezing] : no wheezing [Cough] : no cough [Dyspnea on Exertion] : no dyspnea on exertion [Abdominal Pain] : no abdominal pain [Constipation] : no constipation [Diarrhea] : diarrhea [Vomiting] : no vomiting [Melena] : no melena [Dysuria] : no dysuria [Hematuria] : no hematuria [Joint Pain] : no joint pain [Muscle Pain] : no muscle pain [Skin Rash] : no skin rash [Headache] : no headache [Anxiety] : no anxiety [Depression] : no depression [Easy Bleeding] : no easy bleeding [Easy Bruising] : no easy bruising

## 2023-05-10 NOTE — PHYSICAL EXAM
[No Acute Distress] : no acute distress [Well Nourished] : well nourished [Well Developed] : well developed [Well-Appearing] : well-appearing [PERRL] : pupils equal round and reactive to light [EOMI] : extraocular movements intact [Normal Outer Ear/Nose] : the outer ears and nose were normal in appearance [Normal Oropharynx] : the oropharynx was normal [No JVD] : no jugular venous distention [No Lymphadenopathy] : no lymphadenopathy [Supple] : supple [Thyroid Normal, No Nodules] : the thyroid was normal and there were no nodules present [No Respiratory Distress] : no respiratory distress  [No Accessory Muscle Use] : no accessory muscle use [Clear to Auscultation] : lungs were clear to auscultation bilaterally [Normal Rate] : normal rate  [Regular Rhythm] : with a regular rhythm [Normal S1, S2] : normal S1 and S2 [No Murmur] : no murmur heard [No Carotid Bruits] : no carotid bruits [No Abdominal Bruit] : a ~M bruit was not heard ~T in the abdomen [No Varicosities] : no varicosities [Pedal Pulses Present] : the pedal pulses are present [No Edema] : there was no peripheral edema [No Palpable Aorta] : no palpable aorta [No Extremity Clubbing/Cyanosis] : no extremity clubbing/cyanosis [Soft] : abdomen soft [Non Tender] : non-tender [Non-distended] : non-distended [No Masses] : no abdominal mass palpated [No HSM] : no HSM [Normal Bowel Sounds] : normal bowel sounds [No CVA Tenderness] : no CVA  tenderness [No Spinal Tenderness] : no spinal tenderness [No Joint Swelling] : no joint swelling [Grossly Normal Strength/Tone] : grossly normal strength/tone [No Rash] : no rash [Coordination Grossly Intact] : coordination grossly intact [No Focal Deficits] : no focal deficits [Normal Gait] : normal gait [Deep Tendon Reflexes (DTR)] : deep tendon reflexes were 2+ and symmetric [Normal Affect] : the affect was normal [Normal Insight/Judgement] : insight and judgment were intact [de-identified] : right eye: Conjunctival injection

## 2023-05-15 ENCOUNTER — APPOINTMENT (OUTPATIENT)
Dept: SURGERY | Facility: CLINIC | Age: 48
End: 2023-05-15

## 2023-05-15 ENCOUNTER — APPOINTMENT (OUTPATIENT)
Dept: SURGERY | Facility: CLINIC | Age: 48
End: 2023-05-15
Payer: MEDICAID

## 2023-05-15 VITALS
DIASTOLIC BLOOD PRESSURE: 72 MMHG | HEIGHT: 63 IN | OXYGEN SATURATION: 97 % | SYSTOLIC BLOOD PRESSURE: 107 MMHG | HEART RATE: 78 BPM | RESPIRATION RATE: 18 BRPM | BODY MASS INDEX: 27.7 KG/M2 | TEMPERATURE: 98 F | WEIGHT: 156.31 LBS

## 2023-05-15 DIAGNOSIS — Z09 ENCOUNTER FOR FOLLOW-UP EXAMINATION AFTER COMPLETED TREATMENT FOR CONDITIONS OTHER THAN MALIGNANT NEOPLASM: ICD-10-CM

## 2023-05-15 PROCEDURE — 99024 POSTOP FOLLOW-UP VISIT: CPT

## 2023-05-15 NOTE — PLAN
[FreeTextEntry1] : Follow-up in 2 weeks for wound check\par Continue post bariatric diet\par Avoid heavy lifting for 8 weeks postoperatively

## 2023-05-15 NOTE — HISTORY OF PRESENT ILLNESS
[de-identified] : Katina is a 47 year old female here for postoperative visit status post ex lap and lysis of adhesions for treatment of small bowel obstruction on 5/4/2023.\par The patient is doing well, tolerating a regular post bariatric diet and reporting no dysphagia or abdominal pain.  She is having normal bowel function.  She has minimal incisional discomfort.

## 2023-05-15 NOTE — PHYSICAL EXAM
[de-identified] : Normal respirations [de-identified] : Soft, nontender, nondistended.  Midline incision is well-healed without erythema or drainage.

## 2023-05-15 NOTE — ASSESSMENT
[FreeTextEntry1] : 47-year-old female with history of gastric bypass and repair of recurrent hiatal hernia, now status post ex lap and lysis of adhesions for treatment of small bowel obstruction on 5/4/2023.  She is recovering well.
show

## 2023-05-18 ENCOUNTER — RESULT REVIEW (OUTPATIENT)
Age: 48
End: 2023-05-18

## 2023-05-18 ENCOUNTER — APPOINTMENT (OUTPATIENT)
Dept: THORACIC SURGERY | Facility: CLINIC | Age: 48
End: 2023-05-18
Payer: MEDICAID

## 2023-05-18 ENCOUNTER — APPOINTMENT (OUTPATIENT)
Dept: RADIOLOGY | Facility: HOSPITAL | Age: 48
End: 2023-05-18

## 2023-05-18 ENCOUNTER — OUTPATIENT (OUTPATIENT)
Dept: OUTPATIENT SERVICES | Facility: HOSPITAL | Age: 48
LOS: 1 days | End: 2023-05-18
Payer: MEDICAID

## 2023-05-18 VITALS
HEART RATE: 53 BPM | WEIGHT: 150 LBS | RESPIRATION RATE: 15 BRPM | HEIGHT: 63 IN | OXYGEN SATURATION: 98 % | SYSTOLIC BLOOD PRESSURE: 123 MMHG | DIASTOLIC BLOOD PRESSURE: 81 MMHG | BODY MASS INDEX: 26.58 KG/M2

## 2023-05-18 DIAGNOSIS — K44.9 DIAPHRAGMATIC HERNIA WITHOUT OBSTRUCTION OR GANGRENE: ICD-10-CM

## 2023-05-18 DIAGNOSIS — Z90.710 ACQUIRED ABSENCE OF BOTH CERVIX AND UTERUS: Chronic | ICD-10-CM

## 2023-05-18 DIAGNOSIS — Z90.3 ACQUIRED ABSENCE OF STOMACH [PART OF]: Chronic | ICD-10-CM

## 2023-05-18 DIAGNOSIS — Z98.890 OTHER SPECIFIED POSTPROCEDURAL STATES: Chronic | ICD-10-CM

## 2023-05-18 DIAGNOSIS — K44.9 DIAPHRAGMATIC HERNIA W/OUT OBSTRUCTION OR GANGRENE: ICD-10-CM

## 2023-05-18 PROCEDURE — 74240 X-RAY XM UPR GI TRC 1CNTRST: CPT | Mod: 26

## 2023-05-18 PROCEDURE — 99214 OFFICE O/P EST MOD 30 MIN: CPT

## 2023-05-19 PROBLEM — K44.9 HIATAL HERNIA: Status: ACTIVE | Noted: 2022-04-06

## 2023-05-19 RX ORDER — OXYCODONE HYDROCHLORIDE 5 MG/5ML
5 SOLUTION ORAL
Qty: 150 | Refills: 0 | Status: COMPLETED | COMMUNITY
Start: 2023-01-13 | End: 2023-05-19

## 2023-05-19 NOTE — HISTORY OF PRESENT ILLNESS
[FreeTextEntry1] : Ms. NORBERT COONEY, 47 year old female, never smoker, w/ hx of s/p RA, Lap, sleeve gastrectomy and hiatal hernia repair on 5/3/22 by Dr. Rahul Field, patient went back to ED on 06/02/2022 c/o dysphagia and poor PO tolerance, CT A/P showed concern for hiatal hernia vs migration of gastric sleeve, s/p Lap repair of recurrent hiatal hernia repair on 060/3/2022, patient went back to ED on 06/21/2022 for dysphagia, and again on 08/02/2022 for CP and dysphagia. \par \par CT C/A/P on 08/02/2022:\par - Status post sleeve gastrectomy. No bowel obstruction\par - Mild rightward displacement and narrowing of the trachea by extrinsic mass effect from left thyroid nodule.\par -  2.9 cm left thyroid nodule with dystrophic calcifications.\par \par Upper GI series on 08/03/2022:\par - Suggestion of a short segment narrowing in the proximal third of the stomach, status post sleeve gastrectomy. Correlation with endoscopy could be performed.\par - Small hiatal hernia with stasis and reflux of contrast in the esophagus.\par \par Patient is s/p EGD on 08/04/2022 showed Normal esophagus; Z-line regular, 31 cm from the incisors; LA Grade A reflux esophagitis; 2 cm hiatal hernia; A sleeve gastrectomy was found, characterized by healthy appearing mucosa. Mild narrowing in mid stomach, but widely patent; Normal examined duodenum; \par \par Bravo on 9/29/22: DeMeester score of 48\par \par Manometry was not performed. \par \par Now s/p EGD with biopsy on 9/29/22. There is 2 cm hiatal hernia seen and grade C esophagitis. There is a light bend in the gastric sleeve conduit with mild narrowing, but scope was able to pass. \par \par Now 4 mo s/p Laparoscopic revision of sleeve gastrectomy to Poncho-en-Y gastric bypass (by Dr. Field), and Rt VATS R.A. mobilization of the esophagus and recurrent H.H., redo laparoscopy, R.A. repair of recurrent H.H., EGD, placement of Lt chest tube (by Dr. Rodriguez) on 1/11/23.\par \par CT Abd w/ contrast on 5/4/23:\par - Status post gastric bypass. \par - Fluid distention of the distal esophagus and gastric pouch with dilation of the Poncho limb with transition point in the midabdomen (2, 54), proximal to the JJ anastomosis which is in the left hemiabdomen. \par \par Upper GI Series on 5/18/23:\par - s/p gastric bypass and Poncho-en-Y \par \par Patient is here today for a follow up. Pt denies dysphagia, acid reflux, weight is stable.

## 2023-05-19 NOTE — CONSULT LETTER
[Dear  ___] : Dear  [unfilled], [Consult Letter:] : I had the pleasure of evaluating your patient, [unfilled]. [( Thank you for referring [unfilled] for consultation for _____ )] : Thank you for referring [unfilled] for consultation for [unfilled] [Please see my note below.] : Please see my note below. [Consult Closing:] : Thank you very much for allowing me to participate in the care of this patient.  If you have any questions, please do not hesitate to contact me. [Sincerely,] : Sincerely, [FreeTextEntry2] : Rahul Field MD (Bariatric Sx) [FreeTextEntry3] : Omar Rodriguez MD, MPH \par System Director of Thoracic Surgery \par Director of [facility] [place] \par Professor Cardiovascular & Thoracic Surgery  \par [facility] [employer] at [facility]\par

## 2023-05-19 NOTE — ASSESSMENT
[FreeTextEntry1] : Ms. NORBERT COONEY, 47 year old female, never smoker, w/ hx of s/p RA, Lap, sleeve gastrectomy and hiatal hernia repair on 5/3/22 by Dr. Rahul Field, patient went back to ED on 06/02/2022 c/o dysphagia and poor PO tolerance, CT A/P showed concern for hiatal hernia vs migration of gastric sleeve, s/p Lap repair of recurrent hiatal hernia repair on 060/3/2022, patient went back to ED on 06/21/2022 for dysphagia, and again on 08/02/2022 for CP and dysphagia. \par \par Now 4 mo s/p Laparoscopic revision of sleeve gastrectomy to Poncho-en-Y gastric bypass (by Dr. Field), and Rt VATS R.A. mobilization of the esophagus and recurrent H.H., redo laparoscopy, R.A. repair of recurrent H.H., EGD, placement of Lt chest tube (by Dr. Rodriguez) on 1/11/23.\par \par Of note, pt is s/p explanatory laparotomy and lysis of adhesions due to small bowel obstruction on 5/4/23 at [facility] by Dr. Field.\par \par Upper GI Series on 5/18/23:\par - s/p gastric bypass and Poncho-en-Y \par \par I have reviewed the patient's medical records and diagnostic images at time of this office consultation and have made the following recommendation:\par 1. Upper GI series reviewed, unremarkable, RTC in 1 yr with upper GI series with small bowel follow through\par \par \par I, EDWARD Gardner, personally performed the evaluation and management (E/M) services for this established patient who presents today with (a) new problem(s)/exacerbation of (an) existing condition(s).  That E/M includes conducting the examination, assessing all new/exacerbated conditions, and establishing a new plan of care.  Today, my ACP, Deidra Caro NP was here to observe my evaluation and management services for this new problem/exacerbated condition to be followed going forward.\par \par \par \par \par \par

## 2023-06-09 ENCOUNTER — APPOINTMENT (OUTPATIENT)
Dept: SURGERY | Facility: CLINIC | Age: 48
End: 2023-06-09
Payer: MEDICAID

## 2023-06-09 VITALS
BODY MASS INDEX: 28.75 KG/M2 | HEIGHT: 63 IN | SYSTOLIC BLOOD PRESSURE: 131 MMHG | HEART RATE: 81 BPM | TEMPERATURE: 97.8 F | RESPIRATION RATE: 17 BRPM | WEIGHT: 162.25 LBS | DIASTOLIC BLOOD PRESSURE: 85 MMHG | OXYGEN SATURATION: 98 %

## 2023-06-09 DIAGNOSIS — Z98.890 OTHER SPECIFIED POSTPROCEDURAL STATES: ICD-10-CM

## 2023-06-09 PROCEDURE — 99024 POSTOP FOLLOW-UP VISIT: CPT

## 2023-06-09 NOTE — ASSESSMENT
[FreeTextEntry1] : 47-year-old female with history of sleeve gastrectomy and hiatal hernia repair in 5/3/2022, with recurrent hiatal hernia repair 6/3/2022, now s/p robot-assisted laparoscopic Poncho-en-Y gastric bypass with re-recurrent hiatal hernia repair on 1/13/2023 with Dr. Rodriguez of thoracic surgery.\par Most recently she is status post exploratory laparotomy and lysis of adhesions on 5/4/2023 for treatment of small bowel obstruction.\par

## 2023-06-09 NOTE — PHYSICAL EXAM
[No Rash or Lesion] : No rash or lesion [de-identified] : Well-appearing, no acute distress [de-identified] : Normal respirations [de-identified] : Soft, nontender, nondistended.  Upper midline incision is well-healed without erythema or drainage.

## 2023-06-09 NOTE — HISTORY OF PRESENT ILLNESS
[de-identified] : Katina is a 47 year old female here for 1 month postoperative visit status post ex lap and lysis of adhesions for treatment of small bowel obstruction on 5/4/2023.\par She is recovering very well, with no incisional pain, tolerating diet, and reporting normal bowel function.\par

## 2023-06-09 NOTE — PLAN
[FreeTextEntry1] : Continue post bariatric diet.\par Can start cardiovascular exercises as tolerated, advised to avoid heavy lifting or weights for at least another 1 month\par \par Follow-up with me in 1 year

## 2023-06-16 ENCOUNTER — APPOINTMENT (OUTPATIENT)
Dept: SURGERY | Facility: CLINIC | Age: 48
End: 2023-06-16

## 2023-08-01 ENCOUNTER — NON-APPOINTMENT (OUTPATIENT)
Age: 48
End: 2023-08-01

## 2023-08-07 ENCOUNTER — APPOINTMENT (OUTPATIENT)
Dept: INTERNAL MEDICINE | Facility: CLINIC | Age: 48
End: 2023-08-07
Payer: MEDICAID

## 2023-08-07 ENCOUNTER — NON-APPOINTMENT (OUTPATIENT)
Age: 48
End: 2023-08-07

## 2023-08-07 VITALS
WEIGHT: 165 LBS | HEART RATE: 85 BPM | SYSTOLIC BLOOD PRESSURE: 124 MMHG | BODY MASS INDEX: 29.23 KG/M2 | RESPIRATION RATE: 17 BRPM | DIASTOLIC BLOOD PRESSURE: 80 MMHG | OXYGEN SATURATION: 97 % | HEIGHT: 63 IN

## 2023-08-07 DIAGNOSIS — Z00.00 ENCOUNTER FOR GENERAL ADULT MEDICAL EXAMINATION W/OUT ABNORMAL FINDINGS: ICD-10-CM

## 2023-08-07 DIAGNOSIS — Z98.84 BARIATRIC SURGERY STATUS: ICD-10-CM

## 2023-08-07 DIAGNOSIS — R79.89 OTHER SPECIFIED ABNORMAL FINDINGS OF BLOOD CHEMISTRY: ICD-10-CM

## 2023-08-07 DIAGNOSIS — Z13.6 ENCOUNTER FOR SCREENING FOR CARDIOVASCULAR DISORDERS: ICD-10-CM

## 2023-08-07 DIAGNOSIS — R71.8 OTHER ABNORMALITY OF RED BLOOD CELLS: ICD-10-CM

## 2023-08-07 PROCEDURE — 36415 COLL VENOUS BLD VENIPUNCTURE: CPT

## 2023-08-07 PROCEDURE — 99214 OFFICE O/P EST MOD 30 MIN: CPT | Mod: 25

## 2023-08-07 PROCEDURE — 99396 PREV VISIT EST AGE 40-64: CPT | Mod: 25

## 2023-08-07 PROCEDURE — 93000 ELECTROCARDIOGRAM COMPLETE: CPT

## 2023-08-14 LAB
25(OH)D3 SERPL-MCNC: 22.7 NG/ML
ALBUMIN SERPL ELPH-MCNC: 4.3 G/DL
ALP BLD-CCNC: 86 U/L
ALT SERPL-CCNC: 15 U/L
ANION GAP SERPL CALC-SCNC: 12 MMOL/L
APPEARANCE: CLEAR
AST SERPL-CCNC: 18 U/L
BILIRUB SERPL-MCNC: 0.6 MG/DL
BILIRUBIN URINE: NEGATIVE
BLOOD URINE: NEGATIVE
BUN SERPL-MCNC: 12 MG/DL
CALCIUM SERPL-MCNC: 9.8 MG/DL
CHLORIDE SERPL-SCNC: 106 MMOL/L
CHOLEST SERPL-MCNC: 141 MG/DL
CO2 SERPL-SCNC: 24 MMOL/L
COLOR: YELLOW
CREAT SERPL-MCNC: 0.68 MG/DL
EGFR: 106 ML/MIN/1.73M2
ESTIMATED AVERAGE GLUCOSE: 74 MG/DL
GLUCOSE QUALITATIVE U: NEGATIVE MG/DL
GLUCOSE SERPL-MCNC: 94 MG/DL
HBA1C MFR BLD HPLC: 4.2 %
HDLC SERPL-MCNC: 75 MG/DL
KETONES URINE: NEGATIVE MG/DL
LDLC SERPL CALC-MCNC: 49 MG/DL
LEUKOCYTE ESTERASE URINE: NEGATIVE
NITRITE URINE: NEGATIVE
NONHDLC SERPL-MCNC: 65 MG/DL
PH URINE: 5.5
POTASSIUM SERPL-SCNC: 4.8 MMOL/L
PROT SERPL-MCNC: 7 G/DL
PROTEIN URINE: NEGATIVE MG/DL
SODIUM SERPL-SCNC: 141 MMOL/L
SPECIFIC GRAVITY URINE: 1.02
TRIGL SERPL-MCNC: 85 MG/DL
TSH SERPL-ACNC: 0.93 UIU/ML
UROBILINOGEN URINE: 0.2 MG/DL

## 2023-08-17 PROBLEM — Z98.84 S/P GASTRIC BYPASS: Status: ACTIVE | Noted: 2023-01-30

## 2023-08-17 PROBLEM — R71.8 ELEVATED MCV: Status: ACTIVE | Noted: 2023-08-17

## 2023-08-17 PROBLEM — R79.89 LOW VITAMIN D LEVEL: Status: ACTIVE | Noted: 2021-12-20

## 2023-08-17 NOTE — END OF VISIT
[FreeTextEntry4] : I Carolina Clancy am scribing for and in the presence of Dr. Reginald Amador, the following sections: HISTORY OF PRESENT ILLNESS, PAST MEDICAL/FAMILY/SOCIAL HISTORY, REVIEW OF SYSTEMS, PHYSICAL EXAM; DISPOSITION.  I personally performed the services described in the documentation, reviewed the documentation recorded by the scribe in my presence and it accurately and completely records my words and actions. [Time Spent: ___ minutes] : I have spent [unfilled] minutes of time on the encounter.

## 2023-08-17 NOTE — HISTORY OF PRESENT ILLNESS
[de-identified] : 47 y/o female presents for annual wellness exam. She feels otherwise well and reports no other acute complaints or concerns. ROS as documented below. [FreeTextEntry1] : annual wellness

## 2023-08-24 ENCOUNTER — APPOINTMENT (OUTPATIENT)
Dept: INTERNAL MEDICINE | Facility: CLINIC | Age: 48
End: 2023-08-24
Payer: MEDICAID

## 2023-08-24 VITALS
RESPIRATION RATE: 18 BRPM | OXYGEN SATURATION: 98 % | BODY MASS INDEX: 30.12 KG/M2 | TEMPERATURE: 97.1 F | SYSTOLIC BLOOD PRESSURE: 116 MMHG | HEIGHT: 63 IN | HEART RATE: 85 BPM | WEIGHT: 170 LBS | DIASTOLIC BLOOD PRESSURE: 82 MMHG

## 2023-08-24 DIAGNOSIS — Z01.419 ENCOUNTER FOR GYNECOLOGICAL EXAMINATION (GENERAL) (ROUTINE) W/OUT ABNORMAL FINDINGS: ICD-10-CM

## 2023-08-24 DIAGNOSIS — N95.2 POSTMENOPAUSAL ATROPHIC VAGINITIS: ICD-10-CM

## 2023-08-24 PROCEDURE — 99213 OFFICE O/P EST LOW 20 MIN: CPT | Mod: 25

## 2023-08-24 PROCEDURE — G0101: CPT

## 2023-08-24 NOTE — HISTORY OF PRESENT ILLNESS
[Other: _____] : [unfilled] [FreeTextEntry1] : papallisonear [de-identified] : pt  presents  for papsmear  pt  is A0    LMP 4 y/o   s/p hysterecomy  2/2  uterine  fibroids , ovaries , cervix spared    pt  is sex active with one  partner   denies  d/c , pelvic pain ,  urinary sx

## 2023-08-24 NOTE — PHYSICAL EXAM
[Normal Appearance] : normal in appearance [No Masses] : no palpable masses [No Nipple Discharge] : no nipple discharge [No Axillary Lymphadenopathy] : no axillary lymphadenopathy [Normal] : soft, non-tender, non-distended, no masses palpated, no HSM and normal bowel sounds [Urethral Meatus] : the meatus of the urethra showed no abnormalities [External Female Genitalia] : normal external genitalia [FreeTextEntry1] : Vaginal wall moist pink free of lesions no cervical motion tenderness lesions discharge uterus surgically absent no ovarian tenderness or masses

## 2023-08-24 NOTE — REVIEW OF SYSTEMS
EMERGENCY DEPARTMENT ENCOUNTER      CHIEF COMPLAINT    Chief Complaint   Patient presents with   • Derm Problem       HPI    Tate Davis is a 9 year old male who presents to the emergency department here for foreign body left buttock, was playing on a wooden seesaw and a piece of wood stuck into his left buttock.  Mom attempted to remove but was unable to.  Vaccination up-to-date.  No bleeding.  No chest pain shortness of breath nausea vomiting diarrhea constipation fever chills abdominal pain.  Localized nonradiating mild-to-moderate constant pain    ALLERGIES    ALLERGIES:  No Known Allergies    CURRENT MEDICATIONS    Current Facility-Administered Medications   Medication Dose Route Frequency Provider Last Rate Last Admin   • LIDOCAINE HCL 1 % IJ SOLN Pyxis Override            • acetaminophen (TYLENOL) 160 MG/5ML suspension 566.4 mg  15 mg/kg Oral Once Nilson T Bagley, DO       • ibuprofen (CHILDRENS ADVIL) 100 MG/5ML suspension 378 mg  10 mg/kg Oral Once Nilson T Bagley, DO       • amoxicillin-clavulanate (AUGMENTIN) 400-57 MG/5ML suspension 800 mg  800 mg Oral Once Nilson T Bagley, DO         Current Outpatient Medications   Medication Sig Dispense Refill   • amoxicillin-clavulanate (AUGMENTIN) 400-57 MG/5ML suspension Take 10 mLs by mouth 2 times daily for 7 days. 140 mL 0   • ibuprofen (Childrens Motrin) 100 MG/5ML suspension Take 18.9 mLs by mouth every 6 hours as needed for Pain. 120 mL 0       PAST MEDICAL HISTORY    History reviewed. No pertinent past medical history.    SURGICAL HISTORY    History reviewed. No pertinent surgical history.    SOCIAL HISTORY    Social History     Tobacco Use   • Smoking status: Never Smoker   Substance Use Topics   • Alcohol use: Never   • Drug use: Never       FAMILY HISTORY    No family history on file.    REVIEW OF SYSTEMS      Constitutional:  Denies fever or chills.   Eyes:  Denies change in visual acuity, no diplopia.  HENT:  Denies nasal congestion or sore throat.   Respiratory:   Denies cough or shortness of breath.   Cardiovascular:  Denies chest pain or palpitations.   GI:  Denies abdominal pain.  No nausea or vomiting.  No diarrhea.  :  Denies dysuria.  No hematuria.  Musculoskeletal:  Denies back pain or joint pain.   Skin:  Denies rash.  No petechia.  Foreign body left buttock  Neurologic:  Denies headache.  No weakness.  No paresthesias.   Endocrine:  Denies polyuria or polydipsia.   Lymphatic:  Denies swollen glands or edema.  Psychiatric:  Denies depression or anxiety.    PHYSICAL EXAM    Vitals:    09/08/21 2033 09/08/21 2120   BP: (!) 123/73    BP Location: LUE - Left upper extremity    Patient Position: Standing    Pulse: 98    Resp: 20    Temp: 98.3 °F (36.8 °C)    TempSrc: Oral    SpO2: 98%    Weight:  37.7 kg       Pulse Ox / Interpretation:  98% room air     Constitutional:  Well developed, well nourished.  No acute distress, non-toxic appearance.   Eyes:  PERRL, EOMI.  Conjunctivae normal.   HENT:  Atraumatic/normocephalic.  External ears normal.  Nose normal.  Oropharynx moist.  No drooling, no stridor.  Neck:  Supple.  Normal range of motion.  No tenderness.  No meningeal signs.  Respiratory:  No respiratory distress, normal breath sounds.  No rales.  No wheezing.  No retractions.  Cardiovascular:  Normal rate, normal rhythm.  No murmurs, gallops, or rubs.  GI:  Soft, nondistended, nonrigid, no guarding.  No tenderness.  Normal bowel sounds.  No hepatomegaly or splenomegaly.  No rebound or guarding.  Negative Verdugo's sign.  Negative McBurney's point.   :  No costovertebral angle tenderness.   Musculoskeletal:  No tenderness.  No edema in all 4 extremities.  No deformities.  Back - no muscular or point vertebral tenderness. No Homans.  Skin:  Warm and dry.  Well hydrated.  No rashes.  No petechiae or purpura.  Wound stick left buttock sticking out, see picture no evidence of cellulitis.  No active bleeding.  Lymphatic:  No anterior or posterior cervical lymphadenopathy  noted.  No submandibular lymphadenopathy.  Neurologic:  Alert & oriented x 3.  Normal motor function.  Normal sensory function.  No focal deficits noted.  GCS 15.  Psychiatric:  Speech and behavior appropriate.   Pulses:  DP/PT 2+, Radial 2+.              RECHECKS   Medications   LIDOCAINE HCL 1 % IJ SOLN Pyxis Override (has no administration in time range)   acetaminophen (TYLENOL) 160 MG/5ML suspension 566.4 mg (has no administration in time range)   ibuprofen (CHILDRENS ADVIL) 100 MG/5ML suspension 378 mg (has no administration in time range)   amoxicillin-clavulanate (AUGMENTIN) 400-57 MG/5ML suspension 800 mg (has no administration in time range)   Patient refusing lidocaine.  Wants me to just remove quickly without it.  Mom okay with plan      PROCEDURES    Foreign Body Removal - Embedded    Date/Time: 9/8/2021 9:17 PM  Performed by: Nilson Bagley DO  Authorized by: Nilson Bagley DO     Consent:     Consent obtained:  Verbal    Consent given by:  Parent and patient    Risks discussed:  Bleeding, infection, pain, incomplete removal, poor cosmetic result and worsening of condition    Alternatives discussed:  No treatment  Location:     Location:  Pelvis    Pelvis location:  L buttock    Depth:  Subcutaneous    Tendon involvement:  None  Pre-procedure details:     Imaging:  None    Neurovascular status: intact      Preparation: Patient was prepped and draped in usual sterile fashion    Anesthesia (see MAR for exact dosages):     Anesthesia method:  None (Patient refused)  Procedure type:     Procedure complexity:  Simple  Procedure details:     Localization method:  Visualized    Removal mechanism:  Hemostat    Foreign bodies recovered:  1    Description:  Wood splinter    Intact foreign body removal: yes (Appears to be)    Post-procedure details:     Neurovascular status: intact      Confirmation:  No additional foreign bodies on visualization    Skin closure:  None    Dressing:  Sterile dressing    Patient  tolerance of procedure:  Tolerated well, no immediate complications            ED COURSE & MEDICAL DECISION MAKING    9 year old male presents to the emergency department with complaints of left foot in splinter foreign body left buttock.  Refused lidocaine.  With hemostat able to remove with 1 attempt.  See picture.  Antibiotics, over-the-counter pain medicine.  Follow-up with PCP.  Discussed with mom that it appears that we have removed all of the foreign body however there is no x-ray that can identify a wooden splinter adequately.  On examination of the foreign body it appears that we have removed all of it.  Patient feels better.  Regardless antibiotics will be given.  Mom understands these limitations.      Discussed with patient/and family results, treatments, and plan.  All questions were answered.  Pt nontoxic, alert, awake, no distress.  Understands to return to ER if symptoms worsen, change, new symptoms.  Strict return precautions given and acknowledged.   Patient agrees to followup with their physician for further outpatient management.     I discussed the possible diagnoses with the patient as well as the warning signs and symptoms.  The patient understands that this is a provisional diagnosis which can and do change.  The diagnosis that the patient was discharged with today is based on the symptoms with which they presented.  If any new symptoms occur or if symptoms worsen, the patient understands that they must seek immediate attention for re-evaluation.  Patient was also provided with discharge instructions and follow up information.  Patient is to follow up with PCP in 1 day with regard to all of the above medical problems.  Patient expressed their understanding and agrees with plan for discharge.  All questions have been answered.      IMPRESSION:    1. Foreign body of buttock, initial encounter         Follow Up:  [facility]  [address] [place] [address]  [address]  [phone]  Schedule an appointment as soon as possible for a visit   or your doctor in 1-2 days for followup    [facility]  [address] [facility] [address]  [phone]    As per our discussion, return if symptoms worsen       Current Discharge Medication List      New Prescriptions    Details   amoxicillin-clavulanate (AUGMENTIN) 400-57 MG/5ML suspension Take 10 mLs by mouth 2 times daily for 7 days.  Qty: 140 mL, Refills: 0      ibuprofen (Childrens Motrin) 100 MG/5ML suspension Take 18.9 mLs by mouth every 6 hours as needed for Pain.  Qty: 120 mL, Refills: 0             Pt is discharged in stable condition.    Disclosure: This patient is being seen during a Public Health Emergency. The [place] of Health and Human Services and Renan Anderson, Governor of the [facility], have declared a Public Health Emergency due to the spread of a novel coronavirus and disease COVID-19. This has led to significant resource scarcity and potential delays in care.           Nilson Bagley,   09/08/21 2128     [Negative] : Heme/Lymph

## 2023-08-24 NOTE — ASSESSMENT
[FreeTextEntry1] : Pap smear cytology HPV bacterial vaginosis Chlamydia gonorrhea done pending.  Annual breast cancer screening discussed with the patient Atrophic vaginitis mild asymptomatic supportive treatment Follow-up pending results

## 2023-08-26 LAB
C TRACH RRNA SPEC QL NAA+PROBE: NOT DETECTED
CANDIDA VAG CYTO: NOT DETECTED
G VAGINALIS+PREV SP MTYP VAG QL MICRO: DETECTED
HPV HIGH+LOW RISK DNA PNL CVX: NOT DETECTED
N GONORRHOEA RRNA SPEC QL NAA+PROBE: NOT DETECTED
SOURCE TP AMPLIFICATION: NORMAL
T VAGINALIS VAG QL WET PREP: NOT DETECTED

## 2023-08-26 RX ORDER — METRONIDAZOLE 7.5 MG/G
0.75 GEL VAGINAL
Qty: 1 | Refills: 0 | Status: ACTIVE | COMMUNITY
Start: 2023-08-26 | End: 1900-01-01

## 2023-08-31 LAB — CYTOLOGY CVX/VAG DOC THIN PREP: NORMAL

## 2023-09-11 NOTE — PROGRESS NOTE ADULT - ATTENDING COMMENTS
"Received recommendations from Radha Ribeiro, MAUREEN, \"Blood pressure readings from home following medications (taken with her cuff that correlates to ours) shows well controlled hypertension. I am unclear what her am symptoms are that she is describing, however she should take her blood pressure prior to her medications a few times per week to look for concerns for significant  hypertension that could be contributing and we can review these at her upcoming visit. No changes recommended at this point.\"     Attempted to call pt with recommendations, no answer or voicemail. Will send in a My Chart message. Gerri العراقي   " Continues to do well  No nausea, tolerating pureed diet  Abdomen soft, nondistended,  incision healing well    Discharge home today on pureed diet  Will f/u with me in 1 week    Rahul Field MD

## 2023-10-30 ENCOUNTER — NON-APPOINTMENT (OUTPATIENT)
Age: 48
End: 2023-10-30

## 2023-11-01 ENCOUNTER — APPOINTMENT (OUTPATIENT)
Dept: INTERNAL MEDICINE | Facility: CLINIC | Age: 48
End: 2023-11-01
Payer: OTHER MISCELLANEOUS

## 2023-11-01 VITALS
TEMPERATURE: 97.1 F | HEART RATE: 94 BPM | DIASTOLIC BLOOD PRESSURE: 78 MMHG | OXYGEN SATURATION: 98 % | WEIGHT: 175 LBS | SYSTOLIC BLOOD PRESSURE: 120 MMHG | HEIGHT: 63 IN | RESPIRATION RATE: 18 BRPM | BODY MASS INDEX: 31.01 KG/M2

## 2023-11-01 DIAGNOSIS — M54.16 RADICULOPATHY, LUMBAR REGION: ICD-10-CM

## 2023-11-01 PROCEDURE — 99214 OFFICE O/P EST MOD 30 MIN: CPT

## 2023-11-01 RX ORDER — CYCLOBENZAPRINE HYDROCHLORIDE 10 MG/1
10 TABLET, FILM COATED ORAL
Qty: 15 | Refills: 0 | Status: ACTIVE | COMMUNITY
Start: 2023-11-01 | End: 1900-01-01

## 2023-11-13 ENCOUNTER — APPOINTMENT (OUTPATIENT)
Dept: INTERNAL MEDICINE | Facility: CLINIC | Age: 48
End: 2023-11-13
Payer: OTHER MISCELLANEOUS

## 2023-11-13 VITALS
DIASTOLIC BLOOD PRESSURE: 80 MMHG | OXYGEN SATURATION: 98 % | SYSTOLIC BLOOD PRESSURE: 118 MMHG | BODY MASS INDEX: 29.23 KG/M2 | HEART RATE: 102 BPM | WEIGHT: 165 LBS | HEIGHT: 63 IN | RESPIRATION RATE: 18 BRPM | TEMPERATURE: 98 F

## 2023-11-13 DIAGNOSIS — S39.92XA UNSPECIFIED INJURY OF LOWER BACK, INITIAL ENCOUNTER: ICD-10-CM

## 2023-11-13 PROCEDURE — 99213 OFFICE O/P EST LOW 20 MIN: CPT

## 2023-11-21 PROBLEM — S39.92XA BACK INJURY: Status: ACTIVE | Noted: 2023-11-13

## 2023-11-25 PROBLEM — M54.16 LUMBAR RADICULOPATHY: Status: ACTIVE | Noted: 2021-08-24

## 2024-01-05 NOTE — PATIENT PROFILE ADULT - IS PATIENT POST-MENOPAUSAL?
01/05/2024   [facility] - Outpatient Clinics  N/A  For additional resource needs, please contact your health insurance member services or your primary care team.  Phone: [phone]   Email: N/A   Address: [facility] [place] [address]   Hours: N/A        Financial Stability       Utility payment assistance  1  [place] [employer] - [place]'s Telephone Assistance Plan (TAP) and Federal Lifeline and Affordable Connectivity Program (ACP) Distance: 3.59 miles      Phone/Virtual   [address] [place] [address]  Language: English  Fees: Free   Phone: (940) 916-8253 Email: consumer.puc@[facility].mn. Website: https://[place]/puc/consumers/telephone/     2  [place] [employer] - Energy and Utilities Distance: 5.32 miles      In-Person, Phone/Virtual   [address] [place] [address]  Language: English  Hours: Mon - Fri 8:30 AM - 4:30 PM  Fees: Free   Phone: (274) 820-1504 Website: https://[place]/Conkweste/energy/consumer-assistance/energy-assistance-program/          Important Numbers & Websites       [facility]   [address] 211itedway.org  Poison Control   (113) 436-3877 [place]poison.[place]  Suicide and Crisis Lifeline   [address].org  Childhelp [place] Child Abuse Hotline   939.130.7537 Childhelphotline.org  National Sexual Assault Hotline   (201) 108-5440 (HOPE) Rainn.org  National Runaway Safeline   (245) 380-6274 (RUNAWAY) 1800runaway.org  Pregnancy & Postpartum Support [place]   Call/text 306-272-2280 Ppsupport[place].org  Substance Abuse National Helpline ([facility]   068-842-HELP (1460) Findtreatment.gov  Emergency Services   911     no

## 2024-01-09 ENCOUNTER — TRANSCRIPTION ENCOUNTER (OUTPATIENT)
Age: 49
End: 2024-01-09

## 2024-01-09 ENCOUNTER — INPATIENT (INPATIENT)
Facility: HOSPITAL | Age: 49
LOS: 3 days | Discharge: HOME | DRG: 418 | End: 2024-01-13
Attending: SURGERY | Admitting: SURGERY
Payer: MEDICAID

## 2024-01-09 VITALS
OXYGEN SATURATION: 97 % | WEIGHT: 164.91 LBS | HEART RATE: 74 BPM | HEIGHT: 63 IN | TEMPERATURE: 98 F | RESPIRATION RATE: 22 BRPM | SYSTOLIC BLOOD PRESSURE: 137 MMHG | DIASTOLIC BLOOD PRESSURE: 82 MMHG

## 2024-01-09 DIAGNOSIS — Z98.890 OTHER SPECIFIED POSTPROCEDURAL STATES: Chronic | ICD-10-CM

## 2024-01-09 DIAGNOSIS — Z90.3 ACQUIRED ABSENCE OF STOMACH [PART OF]: Chronic | ICD-10-CM

## 2024-01-09 DIAGNOSIS — Z98.84 BARIATRIC SURGERY STATUS: Chronic | ICD-10-CM

## 2024-01-09 DIAGNOSIS — Z90.710 ACQUIRED ABSENCE OF BOTH CERVIX AND UTERUS: Chronic | ICD-10-CM

## 2024-01-09 DIAGNOSIS — K80.50 CALCULUS OF BILE DUCT WITHOUT CHOLANGITIS OR CHOLECYSTITIS WITHOUT OBSTRUCTION: ICD-10-CM

## 2024-01-09 LAB
ALBUMIN SERPL ELPH-MCNC: 3.9 G/DL — SIGNIFICANT CHANGE UP (ref 3.3–5)
ALBUMIN SERPL ELPH-MCNC: 3.9 G/DL — SIGNIFICANT CHANGE UP (ref 3.3–5)
ALP SERPL-CCNC: 228 U/L — HIGH (ref 40–120)
ALP SERPL-CCNC: 228 U/L — HIGH (ref 40–120)
ALT FLD-CCNC: 527 U/L — HIGH (ref 10–45)
ALT FLD-CCNC: 527 U/L — HIGH (ref 10–45)
ANION GAP SERPL CALC-SCNC: 16 MMOL/L — SIGNIFICANT CHANGE UP (ref 5–17)
ANION GAP SERPL CALC-SCNC: 16 MMOL/L — SIGNIFICANT CHANGE UP (ref 5–17)
APPEARANCE UR: CLEAR — SIGNIFICANT CHANGE UP
APPEARANCE UR: CLEAR — SIGNIFICANT CHANGE UP
APTT BLD: 28.8 SEC — SIGNIFICANT CHANGE UP (ref 24.5–35.6)
APTT BLD: 28.8 SEC — SIGNIFICANT CHANGE UP (ref 24.5–35.6)
AST SERPL-CCNC: 1364 U/L — HIGH (ref 10–40)
AST SERPL-CCNC: 1364 U/L — HIGH (ref 10–40)
BASE EXCESS BLDV CALC-SCNC: 2.7 MMOL/L — SIGNIFICANT CHANGE UP (ref -2–3)
BASE EXCESS BLDV CALC-SCNC: 2.7 MMOL/L — SIGNIFICANT CHANGE UP (ref -2–3)
BASOPHILS # BLD AUTO: 0.04 K/UL — SIGNIFICANT CHANGE UP (ref 0–0.2)
BASOPHILS # BLD AUTO: 0.04 K/UL — SIGNIFICANT CHANGE UP (ref 0–0.2)
BASOPHILS NFR BLD AUTO: 0.3 % — SIGNIFICANT CHANGE UP (ref 0–2)
BASOPHILS NFR BLD AUTO: 0.3 % — SIGNIFICANT CHANGE UP (ref 0–2)
BILIRUB SERPL-MCNC: 2.2 MG/DL — HIGH (ref 0.2–1.2)
BILIRUB SERPL-MCNC: 2.2 MG/DL — HIGH (ref 0.2–1.2)
BILIRUB UR-MCNC: NEGATIVE — SIGNIFICANT CHANGE UP
BILIRUB UR-MCNC: NEGATIVE — SIGNIFICANT CHANGE UP
BLD GP AB SCN SERPL QL: NEGATIVE — SIGNIFICANT CHANGE UP
BLD GP AB SCN SERPL QL: NEGATIVE — SIGNIFICANT CHANGE UP
BUN SERPL-MCNC: 13 MG/DL — SIGNIFICANT CHANGE UP (ref 7–23)
BUN SERPL-MCNC: 13 MG/DL — SIGNIFICANT CHANGE UP (ref 7–23)
CA-I SERPL-SCNC: 1.27 MMOL/L — SIGNIFICANT CHANGE UP (ref 1.15–1.33)
CA-I SERPL-SCNC: 1.27 MMOL/L — SIGNIFICANT CHANGE UP (ref 1.15–1.33)
CALCIUM SERPL-MCNC: 10.1 MG/DL — SIGNIFICANT CHANGE UP (ref 8.4–10.5)
CALCIUM SERPL-MCNC: 10.1 MG/DL — SIGNIFICANT CHANGE UP (ref 8.4–10.5)
CHLORIDE BLDV-SCNC: 101 MMOL/L — SIGNIFICANT CHANGE UP (ref 96–108)
CHLORIDE BLDV-SCNC: 101 MMOL/L — SIGNIFICANT CHANGE UP (ref 96–108)
CHLORIDE SERPL-SCNC: 102 MMOL/L — SIGNIFICANT CHANGE UP (ref 96–108)
CHLORIDE SERPL-SCNC: 102 MMOL/L — SIGNIFICANT CHANGE UP (ref 96–108)
CO2 BLDV-SCNC: 31 MMOL/L — HIGH (ref 22–26)
CO2 BLDV-SCNC: 31 MMOL/L — HIGH (ref 22–26)
CO2 SERPL-SCNC: 23 MMOL/L — SIGNIFICANT CHANGE UP (ref 22–31)
CO2 SERPL-SCNC: 23 MMOL/L — SIGNIFICANT CHANGE UP (ref 22–31)
COLOR SPEC: SIGNIFICANT CHANGE UP
COLOR SPEC: SIGNIFICANT CHANGE UP
CREAT SERPL-MCNC: 0.63 MG/DL — SIGNIFICANT CHANGE UP (ref 0.5–1.3)
CREAT SERPL-MCNC: 0.63 MG/DL — SIGNIFICANT CHANGE UP (ref 0.5–1.3)
DIFF PNL FLD: NEGATIVE — SIGNIFICANT CHANGE UP
DIFF PNL FLD: NEGATIVE — SIGNIFICANT CHANGE UP
EGFR: 108 ML/MIN/1.73M2 — SIGNIFICANT CHANGE UP
EGFR: 108 ML/MIN/1.73M2 — SIGNIFICANT CHANGE UP
EOSINOPHIL # BLD AUTO: 0.01 K/UL — SIGNIFICANT CHANGE UP (ref 0–0.5)
EOSINOPHIL # BLD AUTO: 0.01 K/UL — SIGNIFICANT CHANGE UP (ref 0–0.5)
EOSINOPHIL NFR BLD AUTO: 0.1 % — SIGNIFICANT CHANGE UP (ref 0–6)
EOSINOPHIL NFR BLD AUTO: 0.1 % — SIGNIFICANT CHANGE UP (ref 0–6)
FLUAV AG NPH QL: SIGNIFICANT CHANGE UP
FLUAV AG NPH QL: SIGNIFICANT CHANGE UP
FLUBV AG NPH QL: SIGNIFICANT CHANGE UP
FLUBV AG NPH QL: SIGNIFICANT CHANGE UP
GAS PNL BLDV: 137 MMOL/L — SIGNIFICANT CHANGE UP (ref 136–145)
GAS PNL BLDV: 137 MMOL/L — SIGNIFICANT CHANGE UP (ref 136–145)
GAS PNL BLDV: SIGNIFICANT CHANGE UP
GAS PNL BLDV: SIGNIFICANT CHANGE UP
GLUCOSE BLDV-MCNC: 134 MG/DL — HIGH (ref 70–99)
GLUCOSE BLDV-MCNC: 134 MG/DL — HIGH (ref 70–99)
GLUCOSE SERPL-MCNC: 138 MG/DL — HIGH (ref 70–99)
GLUCOSE SERPL-MCNC: 138 MG/DL — HIGH (ref 70–99)
GLUCOSE UR QL: NEGATIVE MG/DL — SIGNIFICANT CHANGE UP
GLUCOSE UR QL: NEGATIVE MG/DL — SIGNIFICANT CHANGE UP
HCG SERPL-ACNC: 2.6 MIU/ML — SIGNIFICANT CHANGE UP
HCG SERPL-ACNC: 2.6 MIU/ML — SIGNIFICANT CHANGE UP
HCO3 BLDV-SCNC: 30 MMOL/L — HIGH (ref 22–29)
HCO3 BLDV-SCNC: 30 MMOL/L — HIGH (ref 22–29)
HCT VFR BLD CALC: 40.6 % — SIGNIFICANT CHANGE UP (ref 34.5–45)
HCT VFR BLD CALC: 40.6 % — SIGNIFICANT CHANGE UP (ref 34.5–45)
HCT VFR BLDA CALC: 40 % — SIGNIFICANT CHANGE UP (ref 34.5–46.5)
HCT VFR BLDA CALC: 40 % — SIGNIFICANT CHANGE UP (ref 34.5–46.5)
HGB BLD CALC-MCNC: 13.3 G/DL — SIGNIFICANT CHANGE UP (ref 11.7–16.1)
HGB BLD CALC-MCNC: 13.3 G/DL — SIGNIFICANT CHANGE UP (ref 11.7–16.1)
HGB BLD-MCNC: 13.7 G/DL — SIGNIFICANT CHANGE UP (ref 11.5–15.5)
HGB BLD-MCNC: 13.7 G/DL — SIGNIFICANT CHANGE UP (ref 11.5–15.5)
IMM GRANULOCYTES NFR BLD AUTO: 0.4 % — SIGNIFICANT CHANGE UP (ref 0–0.9)
IMM GRANULOCYTES NFR BLD AUTO: 0.4 % — SIGNIFICANT CHANGE UP (ref 0–0.9)
INR BLD: 0.89 RATIO — SIGNIFICANT CHANGE UP (ref 0.85–1.18)
INR BLD: 0.89 RATIO — SIGNIFICANT CHANGE UP (ref 0.85–1.18)
KETONES UR-MCNC: ABNORMAL MG/DL
KETONES UR-MCNC: ABNORMAL MG/DL
LACTATE BLDV-MCNC: 3.3 MMOL/L — HIGH (ref 0.5–2)
LACTATE BLDV-MCNC: 3.3 MMOL/L — HIGH (ref 0.5–2)
LACTATE SERPL-SCNC: 1.3 MMOL/L — SIGNIFICANT CHANGE UP (ref 0.5–2)
LACTATE SERPL-SCNC: 1.3 MMOL/L — SIGNIFICANT CHANGE UP (ref 0.5–2)
LEUKOCYTE ESTERASE UR-ACNC: NEGATIVE — SIGNIFICANT CHANGE UP
LEUKOCYTE ESTERASE UR-ACNC: NEGATIVE — SIGNIFICANT CHANGE UP
LIDOCAIN IGE QN: 24 U/L — SIGNIFICANT CHANGE UP (ref 7–60)
LIDOCAIN IGE QN: 24 U/L — SIGNIFICANT CHANGE UP (ref 7–60)
LYMPHOCYTES # BLD AUTO: 0.75 K/UL — LOW (ref 1–3.3)
LYMPHOCYTES # BLD AUTO: 0.75 K/UL — LOW (ref 1–3.3)
LYMPHOCYTES # BLD AUTO: 6.1 % — LOW (ref 13–44)
LYMPHOCYTES # BLD AUTO: 6.1 % — LOW (ref 13–44)
MCHC RBC-ENTMCNC: 33.3 PG — SIGNIFICANT CHANGE UP (ref 27–34)
MCHC RBC-ENTMCNC: 33.3 PG — SIGNIFICANT CHANGE UP (ref 27–34)
MCHC RBC-ENTMCNC: 33.7 GM/DL — SIGNIFICANT CHANGE UP (ref 32–36)
MCHC RBC-ENTMCNC: 33.7 GM/DL — SIGNIFICANT CHANGE UP (ref 32–36)
MCV RBC AUTO: 98.8 FL — SIGNIFICANT CHANGE UP (ref 80–100)
MCV RBC AUTO: 98.8 FL — SIGNIFICANT CHANGE UP (ref 80–100)
MONOCYTES # BLD AUTO: 0.79 K/UL — SIGNIFICANT CHANGE UP (ref 0–0.9)
MONOCYTES # BLD AUTO: 0.79 K/UL — SIGNIFICANT CHANGE UP (ref 0–0.9)
MONOCYTES NFR BLD AUTO: 6.4 % — SIGNIFICANT CHANGE UP (ref 2–14)
MONOCYTES NFR BLD AUTO: 6.4 % — SIGNIFICANT CHANGE UP (ref 2–14)
NEUTROPHILS # BLD AUTO: 10.64 K/UL — HIGH (ref 1.8–7.4)
NEUTROPHILS # BLD AUTO: 10.64 K/UL — HIGH (ref 1.8–7.4)
NEUTROPHILS NFR BLD AUTO: 86.7 % — HIGH (ref 43–77)
NEUTROPHILS NFR BLD AUTO: 86.7 % — HIGH (ref 43–77)
NITRITE UR-MCNC: NEGATIVE — SIGNIFICANT CHANGE UP
NITRITE UR-MCNC: NEGATIVE — SIGNIFICANT CHANGE UP
NRBC # BLD: 0 /100 WBCS — SIGNIFICANT CHANGE UP (ref 0–0)
NRBC # BLD: 0 /100 WBCS — SIGNIFICANT CHANGE UP (ref 0–0)
PCO2 BLDV: 55 MMHG — HIGH (ref 39–42)
PCO2 BLDV: 55 MMHG — HIGH (ref 39–42)
PH BLDV: 7.34 — SIGNIFICANT CHANGE UP (ref 7.32–7.43)
PH BLDV: 7.34 — SIGNIFICANT CHANGE UP (ref 7.32–7.43)
PH UR: 6.5 — SIGNIFICANT CHANGE UP (ref 5–8)
PH UR: 6.5 — SIGNIFICANT CHANGE UP (ref 5–8)
PLATELET # BLD AUTO: 237 K/UL — SIGNIFICANT CHANGE UP (ref 150–400)
PLATELET # BLD AUTO: 237 K/UL — SIGNIFICANT CHANGE UP (ref 150–400)
PO2 BLDV: 17 MMHG — LOW (ref 25–45)
PO2 BLDV: 17 MMHG — LOW (ref 25–45)
POTASSIUM BLDV-SCNC: 4.3 MMOL/L — SIGNIFICANT CHANGE UP (ref 3.5–5.1)
POTASSIUM BLDV-SCNC: 4.3 MMOL/L — SIGNIFICANT CHANGE UP (ref 3.5–5.1)
POTASSIUM SERPL-MCNC: 4.6 MMOL/L — SIGNIFICANT CHANGE UP (ref 3.5–5.3)
POTASSIUM SERPL-MCNC: 4.6 MMOL/L — SIGNIFICANT CHANGE UP (ref 3.5–5.3)
POTASSIUM SERPL-SCNC: 4.6 MMOL/L — SIGNIFICANT CHANGE UP (ref 3.5–5.3)
POTASSIUM SERPL-SCNC: 4.6 MMOL/L — SIGNIFICANT CHANGE UP (ref 3.5–5.3)
PROT SERPL-MCNC: 7.5 G/DL — SIGNIFICANT CHANGE UP (ref 6–8.3)
PROT SERPL-MCNC: 7.5 G/DL — SIGNIFICANT CHANGE UP (ref 6–8.3)
PROT UR-MCNC: SIGNIFICANT CHANGE UP MG/DL
PROT UR-MCNC: SIGNIFICANT CHANGE UP MG/DL
PROTHROM AB SERPL-ACNC: 9.8 SEC — SIGNIFICANT CHANGE UP (ref 9.5–13)
PROTHROM AB SERPL-ACNC: 9.8 SEC — SIGNIFICANT CHANGE UP (ref 9.5–13)
RBC # BLD: 4.11 M/UL — SIGNIFICANT CHANGE UP (ref 3.8–5.2)
RBC # BLD: 4.11 M/UL — SIGNIFICANT CHANGE UP (ref 3.8–5.2)
RBC # FLD: 11.4 % — SIGNIFICANT CHANGE UP (ref 10.3–14.5)
RBC # FLD: 11.4 % — SIGNIFICANT CHANGE UP (ref 10.3–14.5)
RH IG SCN BLD-IMP: POSITIVE — SIGNIFICANT CHANGE UP
RH IG SCN BLD-IMP: POSITIVE — SIGNIFICANT CHANGE UP
RSV RNA NPH QL NAA+NON-PROBE: SIGNIFICANT CHANGE UP
RSV RNA NPH QL NAA+NON-PROBE: SIGNIFICANT CHANGE UP
SAO2 % BLDV: 11.4 % — LOW (ref 67–88)
SAO2 % BLDV: 11.4 % — LOW (ref 67–88)
SARS-COV-2 RNA SPEC QL NAA+PROBE: SIGNIFICANT CHANGE UP
SARS-COV-2 RNA SPEC QL NAA+PROBE: SIGNIFICANT CHANGE UP
SODIUM SERPL-SCNC: 141 MMOL/L — SIGNIFICANT CHANGE UP (ref 135–145)
SODIUM SERPL-SCNC: 141 MMOL/L — SIGNIFICANT CHANGE UP (ref 135–145)
SP GR SPEC: 1.02 — SIGNIFICANT CHANGE UP (ref 1–1.03)
SP GR SPEC: 1.02 — SIGNIFICANT CHANGE UP (ref 1–1.03)
UROBILINOGEN FLD QL: 1 MG/DL — SIGNIFICANT CHANGE UP (ref 0.2–1)
UROBILINOGEN FLD QL: 1 MG/DL — SIGNIFICANT CHANGE UP (ref 0.2–1)
WBC # BLD: 12.28 K/UL — HIGH (ref 3.8–10.5)
WBC # BLD: 12.28 K/UL — HIGH (ref 3.8–10.5)
WBC # FLD AUTO: 12.28 K/UL — HIGH (ref 3.8–10.5)
WBC # FLD AUTO: 12.28 K/UL — HIGH (ref 3.8–10.5)

## 2024-01-09 PROCEDURE — 99285 EMERGENCY DEPT VISIT HI MDM: CPT

## 2024-01-09 PROCEDURE — 76705 ECHO EXAM OF ABDOMEN: CPT | Mod: 26

## 2024-01-09 PROCEDURE — 71045 X-RAY EXAM CHEST 1 VIEW: CPT | Mod: 26

## 2024-01-09 PROCEDURE — 74177 CT ABD & PELVIS W/CONTRAST: CPT | Mod: 26,MA

## 2024-01-09 PROCEDURE — 93010 ELECTROCARDIOGRAM REPORT: CPT

## 2024-01-09 RX ORDER — SODIUM CHLORIDE 9 MG/ML
1000 INJECTION, SOLUTION INTRAVENOUS
Refills: 0 | Status: DISCONTINUED | OUTPATIENT
Start: 2024-01-09 | End: 2024-01-10

## 2024-01-09 RX ORDER — PIPERACILLIN AND TAZOBACTAM 4; .5 G/20ML; G/20ML
3.38 INJECTION, POWDER, LYOPHILIZED, FOR SOLUTION INTRAVENOUS ONCE
Refills: 0 | Status: COMPLETED | OUTPATIENT
Start: 2024-01-09 | End: 2024-01-09

## 2024-01-09 RX ORDER — THIAMINE MONONITRATE (VIT B1) 100 MG
100 TABLET ORAL ONCE
Refills: 0 | Status: COMPLETED | OUTPATIENT
Start: 2024-01-09 | End: 2024-01-10

## 2024-01-09 RX ORDER — PIPERACILLIN AND TAZOBACTAM 4; .5 G/20ML; G/20ML
3.38 INJECTION, POWDER, LYOPHILIZED, FOR SOLUTION INTRAVENOUS EVERY 8 HOURS
Refills: 0 | Status: DISCONTINUED | OUTPATIENT
Start: 2024-01-09 | End: 2024-01-10

## 2024-01-09 RX ORDER — ENOXAPARIN SODIUM 100 MG/ML
40 INJECTION SUBCUTANEOUS EVERY 24 HOURS
Refills: 0 | Status: DISCONTINUED | OUTPATIENT
Start: 2024-01-09 | End: 2024-01-10

## 2024-01-09 RX ORDER — FOLIC ACID 0.8 MG
1 TABLET ORAL ONCE
Refills: 0 | Status: COMPLETED | OUTPATIENT
Start: 2024-01-09 | End: 2024-01-10

## 2024-01-09 RX ORDER — SODIUM CHLORIDE 9 MG/ML
1000 INJECTION, SOLUTION INTRAVENOUS
Refills: 0 | Status: DISCONTINUED | OUTPATIENT
Start: 2024-01-09 | End: 2024-01-09

## 2024-01-09 RX ORDER — MORPHINE SULFATE 50 MG/1
4 CAPSULE, EXTENDED RELEASE ORAL ONCE
Refills: 0 | Status: DISCONTINUED | OUTPATIENT
Start: 2024-01-09 | End: 2024-01-09

## 2024-01-09 RX ORDER — OXYCODONE HYDROCHLORIDE 5 MG/1
5 TABLET ORAL EVERY 4 HOURS
Refills: 0 | Status: DISCONTINUED | OUTPATIENT
Start: 2024-01-09 | End: 2024-01-10

## 2024-01-09 RX ORDER — ACETAMINOPHEN 500 MG
1000 TABLET ORAL ONCE
Refills: 0 | Status: COMPLETED | OUTPATIENT
Start: 2024-01-09 | End: 2024-01-09

## 2024-01-09 RX ORDER — ACETAMINOPHEN 500 MG
975 TABLET ORAL EVERY 6 HOURS
Refills: 0 | Status: DISCONTINUED | OUTPATIENT
Start: 2024-01-09 | End: 2024-01-10

## 2024-01-09 RX ORDER — ONDANSETRON 8 MG/1
4 TABLET, FILM COATED ORAL ONCE
Refills: 0 | Status: COMPLETED | OUTPATIENT
Start: 2024-01-09 | End: 2024-01-09

## 2024-01-09 RX ORDER — SODIUM CHLORIDE 9 MG/ML
1000 INJECTION INTRAMUSCULAR; INTRAVENOUS; SUBCUTANEOUS ONCE
Refills: 0 | Status: COMPLETED | OUTPATIENT
Start: 2024-01-09 | End: 2024-01-09

## 2024-01-09 RX ORDER — PIPERACILLIN AND TAZOBACTAM 4; .5 G/20ML; G/20ML
3.38 INJECTION, POWDER, LYOPHILIZED, FOR SOLUTION INTRAVENOUS ONCE
Refills: 0 | Status: DISCONTINUED | OUTPATIENT
Start: 2024-01-09 | End: 2024-01-09

## 2024-01-09 RX ORDER — OXYCODONE HYDROCHLORIDE 5 MG/1
2.5 TABLET ORAL EVERY 4 HOURS
Refills: 0 | Status: DISCONTINUED | OUTPATIENT
Start: 2024-01-09 | End: 2024-01-10

## 2024-01-09 RX ADMIN — PIPERACILLIN AND TAZOBACTAM 25 GRAM(S): 4; .5 INJECTION, POWDER, LYOPHILIZED, FOR SOLUTION INTRAVENOUS at 19:58

## 2024-01-09 RX ADMIN — PIPERACILLIN AND TAZOBACTAM 200 GRAM(S): 4; .5 INJECTION, POWDER, LYOPHILIZED, FOR SOLUTION INTRAVENOUS at 15:18

## 2024-01-09 RX ADMIN — SODIUM CHLORIDE 250 MILLILITER(S): 9 INJECTION, SOLUTION INTRAVENOUS at 22:35

## 2024-01-09 RX ADMIN — Medication 400 MILLIGRAM(S): at 11:14

## 2024-01-09 RX ADMIN — MORPHINE SULFATE 4 MILLIGRAM(S): 50 CAPSULE, EXTENDED RELEASE ORAL at 11:18

## 2024-01-09 RX ADMIN — SODIUM CHLORIDE 1000 MILLILITER(S): 9 INJECTION INTRAMUSCULAR; INTRAVENOUS; SUBCUTANEOUS at 11:12

## 2024-01-09 RX ADMIN — ONDANSETRON 4 MILLIGRAM(S): 8 TABLET, FILM COATED ORAL at 11:13

## 2024-01-09 NOTE — ED ADULT NURSE NOTE - CHIEF COMPLAINT
[facility]   [facility] Progress Note    Terrance Wooten Patient Status:  Inpatient    1943 MRN [id number]   [facility] 8NE-A Attending Ortiz Hutchinson MD   Hosp Day # 5 PCP Marshal Ordoñez MD     Subjective:  STEVENSON    Objective:  BP 1 Glucose-Vitamin C (DEX-4) 4-0.006 g chewable tab 4 tablet 4 tablet Oral Q15 Min PRN   Or      dextrose 50% injection 50 mL 50 mL Intravenous Q15 Min PRN   Or      glucose (DEX4) oral liquid 30 g 30 g Oral Q15 Min PRN   Or      Glucose-Vitamin C (DEX-4) 4 g 1 packet Oral Daily PRN   bisacodyl (DULCOLAX) rectal suppository 10 mg 10 mg Rectal Daily PRN   ondansetron HCl (ZOFRAN) injection 4 mg 4 mg Intravenous Q6H PRN   famoTIDine (PEPCID) tab 20 mg 20 mg Oral BID       Labs:    Lab Results  Component Value D chronic respiratory failure with hypoxia (HCC)     Acute heart failure (HCC)     Constrictive pericarditis     Type 2 diabetes mellitus (HCC)     S/P pericardiocentesis      POD# 5 S/P Pericardectomy     1) Encourage IS  2) RA when tolerates  3) Ambulate H The patient is a 48y Female complaining of abdominal pain.

## 2024-01-09 NOTE — H&P ADULT - NSHPPHYSICALEXAM_GEN_ALL_CORE
VITAL SIGNS:  Vital Signs Last 24 Hrs  T(C): 36.6 (09 Jan 2024 15:00), Max: 36.6 (09 Jan 2024 13:39)  T(F): 97.9 (09 Jan 2024 15:00), Max: 97.9 (09 Jan 2024 13:39)  HR: 86 (09 Jan 2024 15:00) (74 - 89)  BP: 119/81 (09 Jan 2024 15:00) (114/70 - 137/82)  BP(mean): 94 (09 Jan 2024 15:00) (94 - 94)  RR: 20 (09 Jan 2024 15:00) (20 - 22)  SpO2: 100% (09 Jan 2024 15:00) (97% - 100%)    Parameters below as of 09 Jan 2024 15:00  Patient On (Oxygen Delivery Method): room air          PHYSICAL EXAM:    General: NAD, Sitting in bed comfortably  HEENT: NC/AT, EOMI  Neck: Soft, supple  Cardio: RRR, nml S1/S2  Resp: Good effort, CTA b/l  Thorax: No chest wall tenderness  Breast: No lesions/masses, no drainage  GI/Abd: Soft, TTP in RUQ and epigastrum, positive fernández sign, no rebound/guarding, no masses palpated  Vascular: Extremities warm, brisk cap refill, B/l radial pulses palpable, b/l DP/PT palpable, no palpable abdominal pulsatile mass  Skin: Intact, no breakdown  Lymphatic/Nodes: No palpable lymphadenopathy  Musculoskeletal: All 4 extremities moving spontaneously, no limitations

## 2024-01-09 NOTE — ED PROVIDER NOTE - PROGRESS NOTE DETAILS
Genaro Orozco DO (PGY1)  Consulted surgery, will see pt. Genaro Orozco DO (PGY1)  Surgery called to inform admit and requested GI consulted. Consulted GI via email

## 2024-01-09 NOTE — ED PROVIDER NOTE - OBJECTIVE STATEMENT
48-year-old female with past medical history of GERD, KATHLEEN, s/p gastric sleeve (1/11/2023), prior history of SBO (5/23) yesterday for epigastric tenderness that started at 7 AM.  Constant sharp abdominal pain that radiates to the back with associated nausea.  Normal bowel movements.  Similar to prior SBO.  Denies fever, chills, cough, chest pain, SOB, vomiting, hematuria, dysuria, urinary urgency, dark stools. 48-year-old female with past medical history of GERD, KATHLEEN, s/p gastric sleeve (1/11/2023), prior history of SBO (5/23) presents today for epigastric tenderness that started at 7 AM today.  Constant sharp abdominal pain that radiates to the back with associated nausea.  Normal bowel movements.  Similar to prior SBO.  Denies fever, chills, cough, chest pain, SOB, vomiting, hematuria, dysuria, urinary urgency, dark stools.

## 2024-01-09 NOTE — ED PROVIDER NOTE - ATTENDING CONTRIBUTION TO CARE
49 yo female, hx of gastric sleeve, here with abdominal pain.  d/w family @ bedside for collateral.  labs notable for significant elevated LFTs.  ultrasound concerning for choledocolithiasis.  surgery consulted for sleeve patient --> they will admit, antibiotics, further inpatient management. 47 yo female, hx of gastric sleeve, here with abdominal pain.  d/w family @ bedside for collateral.  labs notable for significant elevated LFTs.  ultrasound concerning for choledocolithiasis.  surgery consulted for sleeve patient --> they will admit, antibiotics, further inpatient management.

## 2024-01-09 NOTE — ED PROVIDER NOTE - INTERPRETATION
EKG reviewed for rate, rhythm, axis, intervals and segments, including QRS morphology, P wave appearance T wave appearance, MT interval, and QT interval.  I find the EKG to be unremarkable in all of these regards except as follows: sinus arrhythmia, inferior TWI EKG reviewed for rate, rhythm, axis, intervals and segments, including QRS morphology, P wave appearance T wave appearance, IA interval, and QT interval.  I find the EKG to be unremarkable in all of these regards except as follows: sinus arrhythmia, inferior TWI

## 2024-01-09 NOTE — ED ADULT NURSE NOTE - OBJECTIVE STATEMENT
· Chief Complaint: The patient is a 48y Female complaining of abdominal pain.  · HPI Objective Statement: 48-year-old female with past medical history of GERD, KATHLEEN, s/p gastric sleeve (1/11/2023), prior history of SBO (5/23) presents today for epigastric tenderness that started at 7 AM today.  Constant sharp abdominal pain that radiates to the back with associated nausea.  Normal bowel movements.  Similar to prior SBO.  Denies fever, chills, cough, chest pain, SOB, vomiting, hematuria, dysuria, urinary urgency, dark stools. 48 yr old female to ED via wheelchair with H/O GERD, S/p gastric sleeve, has h/o SBO.  At 7am pt woke up with severe epigastric pain Had BM this AM Denies fever or chills Was fine prior to going to sleep. Denies burn or diff urinating Denies rectal bleeding. NPO.

## 2024-01-09 NOTE — H&P ADULT - HISTORY OF PRESENT ILLNESS
48 year old female with PMH GERD, KATHLEEN, obesity with PSH robotic gastric sleeve (5/22) revised to RnY (1/11/23), SBO s/p DONNA (5/2023), presents to ED today with abdominal pain since this morning with associated nausea. Patient has not recently passed gas and last had a bowel movement yesterday. She reports her stool have been lighter colored recently. The pain radiates across the upper abdomen. She has not vomited.     In the ED patient is HDS, labs with leukocytosis to 12, tbili: 2.2, AST/ALT: 1364/527, lactate 3.3, UA negative. RUQ US with CBD 1.3cm, wall thickening 5mm, cholelithiasis, CTAP with no visible stones and trace pericholecystic fluid.

## 2024-01-09 NOTE — ED PROVIDER NOTE - NS ED MD DISPO DIVISION
[facility]

## 2024-01-09 NOTE — ED PROVIDER NOTE - CARE PLAN
Principal Discharge DX:	Pneumonia  Secondary Diagnosis:	Acute hypoxic respiratory failure   1 Principal Discharge DX:	Choledocholithiasis

## 2024-01-09 NOTE — ED ADULT TRIAGE NOTE - CHIEF COMPLAINT QUOTE
severe abd pain, woke from sleep. history of bowel obstruction. reports nausea. normal BM a few hours ago after pain started.

## 2024-01-09 NOTE — ED PROVIDER NOTE - PHYSICAL EXAMINATION
Gen: NAD, non-toxic appearing  Head: normal appearing  HEENT: normal conjunctiva, oral mucosa moist  Lung: no respiratory distress, speaking in full sentences, CTA b/l     CV: regular rate and rhythm, no murmurs  Abd: soft, non distended, diffusely tender abd no rebound or guarding   MSK: no visible deformities  Neuro: No focal deficits, AAOx3  Skin: Warm  Psych: normal affect

## 2024-01-09 NOTE — H&P ADULT - ASSESSMENT
48 year old female with PMH GERD, KATHLEEN, obesity with PSH robotic gastric sleeve (5/22) revised to RnY (1/11/23), SBO s/p DONNA (5/2023), presents to ED today with abdominal pain since this morning with associated nausea. Exam with RUQ abdominal pain and epigastric pain, positive fernández sign on exam, labs with leukocytosis w/ left shift, transaminitis, and hyperbilirubinemia. RUQ US revealed CBD 1.3cm, 5mm wall thickening, gallstones, and CTAP revealed no visible stones and trace pericholecystic fluid. Findings concerning for cholecystitis and choledocholithiasis. Bariatric surgery consulted for management.     Plan:  - Admit to [facility] under Dr. Ameya Eubanks for abx  - banana bag stat, repeat labs after fluid administration to evaluate lactate  - NPO/IVF  - GI consult placed by ED, f/u recs, may require on table ERCP in OR  - Urgent MRCP  - pain control PRN  - DVT ppx  - monitor abd exam  - Daily CMP with AM labs    Discussed with Bariatric Surgery Fellow on behalf of Dr. Hou    [facility] [id number] 48 year old female with PMH GERD, KATHLEEN, obesity with PSH robotic gastric sleeve (5/22) revised to RnY (1/11/23), SBO s/p DONNA (5/2023), presents to ED today with abdominal pain since this morning with associated nausea. Exam with RUQ abdominal pain and epigastric pain, positive fernández sign on exam, labs with leukocytosis w/ left shift, transaminitis, and hyperbilirubinemia. RUQ US revealed CBD 1.3cm, 5mm wall thickening, gallstones, and CTAP revealed no visible stones and trace pericholecystic fluid. Findings concerning for cholecystitis and choledocholithiasis. Bariatric surgery consulted for management.     Plan:  - Admit to [facility] under Dr. Ameya Eubanks for abx  - banana bag stat, repeat labs after fluid administration to evaluate lactate  - NPO/IVF  - GI consult placed by ED, f/u recs, may require on table ERCP in OR  - Urgent MRCP  - pain control PRN  - DVT ppx  - monitor abd exam  - Daily CMP with AM labs    Discussed with Bariatric Surgery Fellow on behalf of Dr. Hou    [facility] [id number] 48 year old female with PMH GERD, KATHLEEN, obesity with PSH robotic gastric sleeve (5/22) revised to RnY (1/11/23), SBO s/p DONNA (5/2023), presents to ED today with abdominal pain since this morning with associated nausea. Exam with RUQ abdominal pain and epigastric pain, positive fernández sign on exam, labs with leukocytosis w/ left shift, transaminitis, and hyperbilirubinemia. RUQ US revealed CBD 1.3cm, 5mm wall thickening, gallstones, and CTAP revealed no visible stones and trace pericholecystic fluid. Findings concerning for cholecystitis and choledocholithiasis. Bariatric surgery consulted for management.     Plan:  - Admit to [facility] under Dr. Rashida Eubanks for abx  - banana bag stat, repeat labs after fluid administration to evaluate lactate  - NPO/IVF  - GI consult placed by ED, f/u recs, may require on table ERCP in OR  - Urgent MRCP  - pain control PRN  - DVT ppx  - monitor abd exam  - Daily CMP with AM labs    Discussed with Bariatric Surgery Fellow on behalf of Dr. Ameya Squires surgery [id number] 48 year old female with PMH GERD, KATHLEEN, obesity with PSH robotic gastric sleeve (5/22) revised to RnY (1/11/23), SBO s/p DONNA (5/2023), presents to ED today with abdominal pain since this morning with associated nausea. Exam with RUQ abdominal pain and epigastric pain, positive fernández sign on exam, labs with leukocytosis w/ left shift, transaminitis, and hyperbilirubinemia. RUQ US revealed CBD 1.3cm, 5mm wall thickening, gallstones, and CTAP revealed no visible stones and trace pericholecystic fluid. Findings concerning for cholecystitis and choledocholithiasis. Bariatric surgery consulted for management.     Plan:  - Admit to [facility] under Dr. Rashida Eubanks for abx  - banana bag stat, repeat labs after fluid administration to evaluate lactate  - NPO/IVF  - GI consult placed by ED, f/u recs, may require on table ERCP in OR  - Urgent MRCP  - pain control PRN  - DVT ppx  - monitor abd exam  - Daily CMP with AM labs    Discussed with Bariatric Surgery Fellow on behalf of Dr. Ameya Squires surgery [id number]

## 2024-01-09 NOTE — ED ADULT NURSE REASSESSMENT NOTE - NS ED NURSE REASSESS COMMENT FT1
Report taken from Jacqueline BORJA. Patient found in stretcher breathing spontaneously and unlabored on Room Air. No signs of respiratory distress @ this time. The patient is alert and orientated x4 and responds appropriately. The patient presents with a Right 20G PIV that is C/D/I and flushing slowly @ this time. Pt denies chest pain, palpitations, shortness of breath, headache, visual disturbances, numbness/tingling, fever, chills, diaphoresis,  nausea, vomiting, constipation, diarrhea, or urinary symptoms. Safety and comfort measures provided, bed locked and in lowest position, side rails up for safety. VS to order and patient up @ devin to bathroom. Urine samples collected and sent.

## 2024-01-09 NOTE — H&P ADULT - NSHPLABSRESULTS_GEN_ALL_CORE
LABS:                        13.7   12.28 )-----------( 237      ( 2024 11:26 )             40.6         141  |  102  |  13  ----------------------------<  138<H>  4.6   |  23  |  0.63    Ca    10.1      2024 11:26    TPro  7.5  /  Alb  3.9  /  TBili  2.2<H>  /  DBili  x   /  AST  1364<H>  /  ALT  527<H>  /  AlkPhos  228<H>      Lactate: Lactate, Blood: 1.3 mmol/L ( @ 15:53)    @ 11:26  3.3    PT/INR - ( 2024 11:26 )   PT: 9.8 sec;   INR: 0.89 ratio         PTT - ( 2024 11:26 )  PTT:28.8 sec          Urinalysis Basic - ( 2024 16:47 )    Color: Dark Yellow / Appearance: Clear / S.017 / pH: x  Gluc: x / Ketone: Trace mg/dL  / Bili: Negative / Urobili: 1.0 mg/dL   Blood: x / Protein: Trace mg/dL / Nitrite: Negative   Leuk Esterase: Negative / RBC: x / WBC x   Sq Epi: x / Non Sq Epi: x / Bacteria: x        IMAGING:  < from: CT Abdomen and Pelvis w/ Oral Cont and w/ IV Cont (24 @ 16:53) >    FINDINGS:  LOWER CHEST: Within normal limits.    LIVER: Within normal limits.  BILE DUCTS: Normal caliber. CBD measures 7 mm.  GALLBLADDER: No radiopaque gallstones. Possibility of trace   pericholecystic fluid.  SPLEEN: Within normal limits.  PANCREAS: Within normal limits.  ADRENALS: Within normal limits.  KIDNEYS/URETERS: Horseshoe kidney.    BLADDER: Minimally distended.  REPRODUCTIVE ORGANS: Supracervical hysterectomy.    BOWEL: No bowel obstruction. Appendix is normal. Prior gastric bypass.  PERITONEUM: No ascites.  VESSELS: Within normal limits.  RETROPERITONEUM/LYMPH NODES: No lymphadenopathy.  ABDOMINAL WALL: Within normal limits.  BONES: Within normal limits.    IMPRESSION:  No evidence of bowel obstruction.  Trace pericholecystic fluid.  Gastric bypass.  Horseshoe kidney.        --- End of Report ---    < end of copied text >    < from: POCUS ED Abdomen Limited (24 @ 16:38) >        < end of copied text >    < from: POCUS ED Abdomen Limited (24 @ 16:38) >    INTERPRETATION:  Grayscale imaging of the right upper quadrant was   performed.  The gallbladder was visualized and scanned in longitudinal and transverse   planes.  The anterior gallbladder wall measured up to 0.51.cm.  The common bile duct measured up t o 1.3 cm.  Gallstones present.  Sludge not present.  Pericholecystic fluid not present.  Gallbladder wall edema present.  Sonographic Green's sign not present (but patient was given pain   medication prior to examination).    IMPRESSION: Findings concerning for cholecystitis and for CBD obstruction   (suspect choledocholithiasis).    --- End of Report ---IMPRESSION: Findings concerning for cholecystitis and for CBD obstruction   (suspect choledocholithiasis).

## 2024-01-09 NOTE — H&P ADULT - NSICDXPASTSURGICALHX_GEN_ALL_CORE_FT
PAST SURGICAL HISTORY:  History of repair of hiatal hernia laparoscopic    History of Poncho-en-Y gastric bypass     History of sleeve gastrectomy     S/P hysterectomy 2015

## 2024-01-09 NOTE — ED PROVIDER NOTE - CLINICAL SUMMARY MEDICAL DECISION MAKING FREE TEXT BOX
48-year-old female with past medical history of GERD, KATHLEEN, s/p gastric sleeve (1/11/2023), prior history of SBO (5/23) yesterday for epigastric tenderness that started at 7 AM.  Constant sharp abdominal pain that radiates to the back with associated nausea.  Normal bowel movements.  Similar to prior SBO.  Denies fever, chills, cough, chest pain, SOB, vomiting, hematuria, dysuria, urinary urgency, dark stools.  Given prior history of SBO with abdominal surgery presenting here with similar symptoms to prior and diffusely tender admitted on PE will order CBC, CMP, TROP, EKG, UA culture, flu/COVID, lipase, CT abdomen pelvis with oral and IV contrast, CXR to rule out SBO versus gastritis versus ACS.  Will consult surgery for gastric sleeve and pending CT scan.  Given fluids, Zofran, pain medication will reassess. 48-year-old female with past medical history of GERD, KATHLEEN, s/p gastric sleeve (1/11/2023), prior history of SBO (5/23) yesterday for epigastric tenderness that started at 7 AM.  Constant sharp abdominal pain that radiates to the back with associated nausea.  Normal bowel movements.  Similar to prior SBO.  Denies fever, chills, cough, chest pain, SOB, vomiting, hematuria, dysuria, urinary urgency, dark stools.  Given prior history of SBO with abdominal surgery presenting here with similar symptoms to prior and diffusely tender abd on PE will order CBC, CMP, TROP, EKG, UA culture, flu/COVID, lipase, CT abdomen pelvis with oral and IV contrast, CXR to rule out SBO versus gastritis versus ACS.  Will consult surgery for gastric sleeve and pending CT scan.  Given fluids, Zofran, pain medication will reassess. 48-year-old female with past medical history of GERD, KATHLEEN, s/p gastric sleeve (1/11/2023), prior history of SBO (5/23)presents today for epigastric tenderness that started at 7 AM today.  Constant sharp abdominal pain that radiates to the back with associated nausea.  Normal bowel movements.  Similar to prior SBO.  Denies fever, chills, cough, chest pain, SOB, vomiting, hematuria, dysuria, urinary urgency, dark stools.  Given prior history of SBO with abdominal surgery presenting here with similar symptoms to prior and diffusely tender abd on PE will order CBC, CMP, TROP, EKG, UA culture, flu/COVID, lipase, CT abdomen pelvis with oral and IV contrast, CXR to rule out SBO versus gastritis versus ACS.  Will consult surgery for gastric sleeve and pending CT scan.  Given fluids, Zofran, pain medication will reassess. 48-year-old female with past medical history of GERD, KATHLEEN, s/p gastric sleeve (1/11/2023), prior history of SBO (5/23)presents today for epigastric tenderness that started at 7 AM today.  Constant sharp abdominal pain that radiates to the back with associated nausea.  Normal bowel movements.  Similar to prior SBO.  Denies fever, chills, cough, chest pain, SOB, vomiting, hematuria, dysuria, urinary urgency, dark stools.  Given prior history of SBO with abdominal surgery presenting here with similar symptoms to prior and diffusely tender abd on PE will order CBC, CMP, TROP, EKG, UA culture, flu/COVID, lipase, CT abdomen pelvis with oral and IV contrast, CXR to rule out SBO versus gastritis versus ACS.  Will consult surgery for gastric sleeve and pending CT scan.  Given fluids, Zofran, pain medication will reassess.    see attending attestation authored by me, Tank Rendon MD, for further MDM details.

## 2024-01-10 ENCOUNTER — RESULT REVIEW (OUTPATIENT)
Age: 49
End: 2024-01-10

## 2024-01-10 ENCOUNTER — TRANSCRIPTION ENCOUNTER (OUTPATIENT)
Age: 49
End: 2024-01-10

## 2024-01-10 LAB
ALBUMIN SERPL ELPH-MCNC: 3.6 G/DL — SIGNIFICANT CHANGE UP (ref 3.3–5)
ALBUMIN SERPL ELPH-MCNC: 3.6 G/DL — SIGNIFICANT CHANGE UP (ref 3.3–5)
ALP SERPL-CCNC: 243 U/L — HIGH (ref 40–120)
ALP SERPL-CCNC: 243 U/L — HIGH (ref 40–120)
ALT FLD-CCNC: 1226 U/L — HIGH (ref 10–45)
ALT FLD-CCNC: 1226 U/L — HIGH (ref 10–45)
ANION GAP SERPL CALC-SCNC: 11 MMOL/L — SIGNIFICANT CHANGE UP (ref 5–17)
ANION GAP SERPL CALC-SCNC: 11 MMOL/L — SIGNIFICANT CHANGE UP (ref 5–17)
APTT BLD: 27.6 SEC — SIGNIFICANT CHANGE UP (ref 24.5–35.6)
APTT BLD: 27.6 SEC — SIGNIFICANT CHANGE UP (ref 24.5–35.6)
AST SERPL-CCNC: 1142 U/L — HIGH (ref 10–40)
AST SERPL-CCNC: 1142 U/L — HIGH (ref 10–40)
BASE EXCESS BLDV CALC-SCNC: 1.7 MMOL/L — SIGNIFICANT CHANGE UP (ref -2–3)
BASE EXCESS BLDV CALC-SCNC: 1.7 MMOL/L — SIGNIFICANT CHANGE UP (ref -2–3)
BILIRUB SERPL-MCNC: 4.3 MG/DL — HIGH (ref 0.2–1.2)
BILIRUB SERPL-MCNC: 4.3 MG/DL — HIGH (ref 0.2–1.2)
BLD GP AB SCN SERPL QL: NEGATIVE — SIGNIFICANT CHANGE UP
BLD GP AB SCN SERPL QL: NEGATIVE — SIGNIFICANT CHANGE UP
BUN SERPL-MCNC: 5 MG/DL — LOW (ref 7–23)
BUN SERPL-MCNC: 5 MG/DL — LOW (ref 7–23)
CA-I SERPL-SCNC: 1.22 MMOL/L — SIGNIFICANT CHANGE UP (ref 1.15–1.33)
CA-I SERPL-SCNC: 1.22 MMOL/L — SIGNIFICANT CHANGE UP (ref 1.15–1.33)
CALCIUM SERPL-MCNC: 9.1 MG/DL — SIGNIFICANT CHANGE UP (ref 8.4–10.5)
CALCIUM SERPL-MCNC: 9.1 MG/DL — SIGNIFICANT CHANGE UP (ref 8.4–10.5)
CHLORIDE BLDV-SCNC: 104 MMOL/L — SIGNIFICANT CHANGE UP (ref 96–108)
CHLORIDE BLDV-SCNC: 104 MMOL/L — SIGNIFICANT CHANGE UP (ref 96–108)
CHLORIDE SERPL-SCNC: 104 MMOL/L — SIGNIFICANT CHANGE UP (ref 96–108)
CHLORIDE SERPL-SCNC: 104 MMOL/L — SIGNIFICANT CHANGE UP (ref 96–108)
CO2 BLDV-SCNC: 28 MMOL/L — HIGH (ref 22–26)
CO2 BLDV-SCNC: 28 MMOL/L — HIGH (ref 22–26)
CO2 SERPL-SCNC: 24 MMOL/L — SIGNIFICANT CHANGE UP (ref 22–31)
CO2 SERPL-SCNC: 24 MMOL/L — SIGNIFICANT CHANGE UP (ref 22–31)
CREAT SERPL-MCNC: 0.59 MG/DL — SIGNIFICANT CHANGE UP (ref 0.5–1.3)
CREAT SERPL-MCNC: 0.59 MG/DL — SIGNIFICANT CHANGE UP (ref 0.5–1.3)
CULTURE RESULTS: NO GROWTH — SIGNIFICANT CHANGE UP
CULTURE RESULTS: NO GROWTH — SIGNIFICANT CHANGE UP
EGFR: 110 ML/MIN/1.73M2 — SIGNIFICANT CHANGE UP
EGFR: 110 ML/MIN/1.73M2 — SIGNIFICANT CHANGE UP
GAS PNL BLDV: 136 MMOL/L — SIGNIFICANT CHANGE UP (ref 136–145)
GAS PNL BLDV: 136 MMOL/L — SIGNIFICANT CHANGE UP (ref 136–145)
GAS PNL BLDV: SIGNIFICANT CHANGE UP
GAS PNL BLDV: SIGNIFICANT CHANGE UP
GLUCOSE BLDV-MCNC: 114 MG/DL — HIGH (ref 70–99)
GLUCOSE BLDV-MCNC: 114 MG/DL — HIGH (ref 70–99)
GLUCOSE SERPL-MCNC: 110 MG/DL — HIGH (ref 70–99)
GLUCOSE SERPL-MCNC: 110 MG/DL — HIGH (ref 70–99)
HCO3 BLDV-SCNC: 27 MMOL/L — SIGNIFICANT CHANGE UP (ref 22–29)
HCO3 BLDV-SCNC: 27 MMOL/L — SIGNIFICANT CHANGE UP (ref 22–29)
HCT VFR BLD CALC: 37.7 % — SIGNIFICANT CHANGE UP (ref 34.5–45)
HCT VFR BLD CALC: 37.7 % — SIGNIFICANT CHANGE UP (ref 34.5–45)
HCT VFR BLDA CALC: 35 % — SIGNIFICANT CHANGE UP (ref 34.5–46.5)
HCT VFR BLDA CALC: 35 % — SIGNIFICANT CHANGE UP (ref 34.5–46.5)
HGB BLD CALC-MCNC: 11.5 G/DL — LOW (ref 11.7–16.1)
HGB BLD CALC-MCNC: 11.5 G/DL — LOW (ref 11.7–16.1)
HGB BLD-MCNC: 12.5 G/DL — SIGNIFICANT CHANGE UP (ref 11.5–15.5)
HGB BLD-MCNC: 12.5 G/DL — SIGNIFICANT CHANGE UP (ref 11.5–15.5)
INR BLD: 1.05 RATIO — SIGNIFICANT CHANGE UP (ref 0.85–1.18)
INR BLD: 1.05 RATIO — SIGNIFICANT CHANGE UP (ref 0.85–1.18)
LACTATE BLDV-MCNC: 1 MMOL/L — SIGNIFICANT CHANGE UP (ref 0.5–2)
LACTATE BLDV-MCNC: 1 MMOL/L — SIGNIFICANT CHANGE UP (ref 0.5–2)
MAGNESIUM SERPL-MCNC: 1.7 MG/DL — SIGNIFICANT CHANGE UP (ref 1.6–2.6)
MAGNESIUM SERPL-MCNC: 1.7 MG/DL — SIGNIFICANT CHANGE UP (ref 1.6–2.6)
MCHC RBC-ENTMCNC: 33 PG — SIGNIFICANT CHANGE UP (ref 27–34)
MCHC RBC-ENTMCNC: 33 PG — SIGNIFICANT CHANGE UP (ref 27–34)
MCHC RBC-ENTMCNC: 33.2 GM/DL — SIGNIFICANT CHANGE UP (ref 32–36)
MCHC RBC-ENTMCNC: 33.2 GM/DL — SIGNIFICANT CHANGE UP (ref 32–36)
MCV RBC AUTO: 99.5 FL — SIGNIFICANT CHANGE UP (ref 80–100)
MCV RBC AUTO: 99.5 FL — SIGNIFICANT CHANGE UP (ref 80–100)
NRBC # BLD: 0 /100 WBCS — SIGNIFICANT CHANGE UP (ref 0–0)
NRBC # BLD: 0 /100 WBCS — SIGNIFICANT CHANGE UP (ref 0–0)
PCO2 BLDV: 42 MMHG — SIGNIFICANT CHANGE UP (ref 39–42)
PCO2 BLDV: 42 MMHG — SIGNIFICANT CHANGE UP (ref 39–42)
PH BLDV: 7.41 — SIGNIFICANT CHANGE UP (ref 7.32–7.43)
PH BLDV: 7.41 — SIGNIFICANT CHANGE UP (ref 7.32–7.43)
PHOSPHATE SERPL-MCNC: 3 MG/DL — SIGNIFICANT CHANGE UP (ref 2.5–4.5)
PHOSPHATE SERPL-MCNC: 3 MG/DL — SIGNIFICANT CHANGE UP (ref 2.5–4.5)
PLATELET # BLD AUTO: 193 K/UL — SIGNIFICANT CHANGE UP (ref 150–400)
PLATELET # BLD AUTO: 193 K/UL — SIGNIFICANT CHANGE UP (ref 150–400)
PO2 BLDV: 64 MMHG — HIGH (ref 25–45)
PO2 BLDV: 64 MMHG — HIGH (ref 25–45)
POTASSIUM BLDV-SCNC: 3.7 MMOL/L — SIGNIFICANT CHANGE UP (ref 3.5–5.1)
POTASSIUM BLDV-SCNC: 3.7 MMOL/L — SIGNIFICANT CHANGE UP (ref 3.5–5.1)
POTASSIUM SERPL-MCNC: 3.7 MMOL/L — SIGNIFICANT CHANGE UP (ref 3.5–5.3)
POTASSIUM SERPL-MCNC: 3.7 MMOL/L — SIGNIFICANT CHANGE UP (ref 3.5–5.3)
POTASSIUM SERPL-SCNC: 3.7 MMOL/L — SIGNIFICANT CHANGE UP (ref 3.5–5.3)
POTASSIUM SERPL-SCNC: 3.7 MMOL/L — SIGNIFICANT CHANGE UP (ref 3.5–5.3)
PROT SERPL-MCNC: 6.3 G/DL — SIGNIFICANT CHANGE UP (ref 6–8.3)
PROT SERPL-MCNC: 6.3 G/DL — SIGNIFICANT CHANGE UP (ref 6–8.3)
PROTHROM AB SERPL-ACNC: 11 SEC — SIGNIFICANT CHANGE UP (ref 9.5–13)
PROTHROM AB SERPL-ACNC: 11 SEC — SIGNIFICANT CHANGE UP (ref 9.5–13)
RBC # BLD: 3.79 M/UL — LOW (ref 3.8–5.2)
RBC # BLD: 3.79 M/UL — LOW (ref 3.8–5.2)
RBC # FLD: 11.6 % — SIGNIFICANT CHANGE UP (ref 10.3–14.5)
RBC # FLD: 11.6 % — SIGNIFICANT CHANGE UP (ref 10.3–14.5)
RH IG SCN BLD-IMP: POSITIVE — SIGNIFICANT CHANGE UP
RH IG SCN BLD-IMP: POSITIVE — SIGNIFICANT CHANGE UP
SAO2 % BLDV: 90.6 % — HIGH (ref 67–88)
SAO2 % BLDV: 90.6 % — HIGH (ref 67–88)
SODIUM SERPL-SCNC: 139 MMOL/L — SIGNIFICANT CHANGE UP (ref 135–145)
SODIUM SERPL-SCNC: 139 MMOL/L — SIGNIFICANT CHANGE UP (ref 135–145)
SPECIMEN SOURCE: SIGNIFICANT CHANGE UP
SPECIMEN SOURCE: SIGNIFICANT CHANGE UP
WBC # BLD: 6.29 K/UL — SIGNIFICANT CHANGE UP (ref 3.8–10.5)
WBC # BLD: 6.29 K/UL — SIGNIFICANT CHANGE UP (ref 3.8–10.5)
WBC # FLD AUTO: 6.29 K/UL — SIGNIFICANT CHANGE UP (ref 3.8–10.5)
WBC # FLD AUTO: 6.29 K/UL — SIGNIFICANT CHANGE UP (ref 3.8–10.5)

## 2024-01-10 PROCEDURE — 88307 TISSUE EXAM BY PATHOLOGIST: CPT | Mod: 26

## 2024-01-10 PROCEDURE — 74328 X-RAY BILE DUCT ENDOSCOPY: CPT | Mod: 26

## 2024-01-10 PROCEDURE — 99222 1ST HOSP IP/OBS MODERATE 55: CPT | Mod: GC,25

## 2024-01-10 PROCEDURE — 43262 ENDO CHOLANGIOPANCREATOGRAPH: CPT | Mod: 59

## 2024-01-10 PROCEDURE — 88304 TISSUE EXAM BY PATHOLOGIST: CPT | Mod: 26

## 2024-01-10 PROCEDURE — 43264 ERCP REMOVE DUCT CALCULI: CPT

## 2024-01-10 DEVICE — GWIRE VISIGLIDE STRT TIP 270CM .035: Type: IMPLANTABLE DEVICE | Site: ABDOMINAL | Status: FUNCTIONAL

## 2024-01-10 DEVICE — URETERAL CATH OPEN END 5FR 70CM: Type: IMPLANTABLE DEVICE | Site: ABDOMINAL | Status: FUNCTIONAL

## 2024-01-10 DEVICE — STAPLER COVIDIEN TRI-STAPLE 60MM PURPLE RELOAD: Type: IMPLANTABLE DEVICE | Site: ABDOMINAL | Status: FUNCTIONAL

## 2024-01-10 DEVICE — CLIP APPLIER COVIDIEN ENDOCLIP III 5MM: Type: IMPLANTABLE DEVICE | Site: ABDOMINAL | Status: FUNCTIONAL

## 2024-01-10 RX ORDER — OXYCODONE HYDROCHLORIDE 5 MG/1
5 TABLET ORAL EVERY 6 HOURS
Refills: 0 | Status: DISCONTINUED | OUTPATIENT
Start: 2024-01-10 | End: 2024-01-13

## 2024-01-10 RX ORDER — ACETAMINOPHEN 500 MG
1000 TABLET ORAL EVERY 6 HOURS
Refills: 0 | Status: DISCONTINUED | OUTPATIENT
Start: 2024-01-10 | End: 2024-01-13

## 2024-01-10 RX ORDER — HYDROMORPHONE HYDROCHLORIDE 2 MG/ML
0.5 INJECTION INTRAMUSCULAR; INTRAVENOUS; SUBCUTANEOUS
Refills: 0 | Status: DISCONTINUED | OUTPATIENT
Start: 2024-01-10 | End: 2024-01-10

## 2024-01-10 RX ORDER — PIPERACILLIN AND TAZOBACTAM 4; .5 G/20ML; G/20ML
3.38 INJECTION, POWDER, LYOPHILIZED, FOR SOLUTION INTRAVENOUS EVERY 8 HOURS
Refills: 0 | Status: COMPLETED | OUTPATIENT
Start: 2024-01-10 | End: 2024-01-11

## 2024-01-10 RX ORDER — PANTOPRAZOLE SODIUM 20 MG/1
40 TABLET, DELAYED RELEASE ORAL DAILY
Refills: 0 | Status: DISCONTINUED | OUTPATIENT
Start: 2024-01-10 | End: 2024-01-13

## 2024-01-10 RX ORDER — HEPARIN SODIUM 5000 [USP'U]/ML
5000 INJECTION INTRAVENOUS; SUBCUTANEOUS EVERY 8 HOURS
Refills: 0 | Status: DISCONTINUED | OUTPATIENT
Start: 2024-01-10 | End: 2024-01-13

## 2024-01-10 RX ORDER — FENTANYL CITRATE 50 UG/ML
50 INJECTION INTRAVENOUS
Refills: 0 | Status: DISCONTINUED | OUTPATIENT
Start: 2024-01-10 | End: 2024-01-10

## 2024-01-10 RX ORDER — OXYCODONE HYDROCHLORIDE 5 MG/1
2.5 TABLET ORAL EVERY 4 HOURS
Refills: 0 | Status: DISCONTINUED | OUTPATIENT
Start: 2024-01-10 | End: 2024-01-13

## 2024-01-10 RX ORDER — ONDANSETRON 8 MG/1
4 TABLET, FILM COATED ORAL EVERY 6 HOURS
Refills: 0 | Status: DISCONTINUED | OUTPATIENT
Start: 2024-01-10 | End: 2024-01-13

## 2024-01-10 RX ORDER — ONDANSETRON 8 MG/1
4 TABLET, FILM COATED ORAL ONCE
Refills: 0 | Status: DISCONTINUED | OUTPATIENT
Start: 2024-01-10 | End: 2024-01-10

## 2024-01-10 RX ORDER — SODIUM CHLORIDE 9 MG/ML
1000 INJECTION, SOLUTION INTRAVENOUS
Refills: 0 | Status: DISCONTINUED | OUTPATIENT
Start: 2024-01-10 | End: 2024-01-11

## 2024-01-10 RX ADMIN — PIPERACILLIN AND TAZOBACTAM 25 GRAM(S): 4; .5 INJECTION, POWDER, LYOPHILIZED, FOR SOLUTION INTRAVENOUS at 11:06

## 2024-01-10 RX ADMIN — Medication 975 MILLIGRAM(S): at 11:41

## 2024-01-10 RX ADMIN — OXYCODONE HYDROCHLORIDE 5 MILLIGRAM(S): 5 TABLET ORAL at 22:15

## 2024-01-10 RX ADMIN — HYDROMORPHONE HYDROCHLORIDE 0.5 MILLIGRAM(S): 2 INJECTION INTRAMUSCULAR; INTRAVENOUS; SUBCUTANEOUS at 19:00

## 2024-01-10 RX ADMIN — SODIUM CHLORIDE 100 MILLILITER(S): 9 INJECTION, SOLUTION INTRAVENOUS at 02:43

## 2024-01-10 RX ADMIN — Medication 1 MILLIGRAM(S): at 06:17

## 2024-01-10 RX ADMIN — ONDANSETRON 4 MILLIGRAM(S): 8 TABLET, FILM COATED ORAL at 21:31

## 2024-01-10 RX ADMIN — Medication 975 MILLIGRAM(S): at 06:17

## 2024-01-10 RX ADMIN — Medication 975 MILLIGRAM(S): at 11:06

## 2024-01-10 RX ADMIN — PIPERACILLIN AND TAZOBACTAM 25 GRAM(S): 4; .5 INJECTION, POWDER, LYOPHILIZED, FOR SOLUTION INTRAVENOUS at 03:04

## 2024-01-10 RX ADMIN — OXYCODONE HYDROCHLORIDE 5 MILLIGRAM(S): 5 TABLET ORAL at 21:31

## 2024-01-10 RX ADMIN — Medication 100 MILLIGRAM(S): at 06:17

## 2024-01-10 RX ADMIN — PIPERACILLIN AND TAZOBACTAM 25 GRAM(S): 4; .5 INJECTION, POWDER, LYOPHILIZED, FOR SOLUTION INTRAVENOUS at 21:31

## 2024-01-10 RX ADMIN — Medication 975 MILLIGRAM(S): at 07:07

## 2024-01-10 RX ADMIN — HEPARIN SODIUM 5000 UNIT(S): 5000 INJECTION INTRAVENOUS; SUBCUTANEOUS at 21:32

## 2024-01-10 RX ADMIN — HYDROMORPHONE HYDROCHLORIDE 0.5 MILLIGRAM(S): 2 INJECTION INTRAMUSCULAR; INTRAVENOUS; SUBCUTANEOUS at 18:47

## 2024-01-10 NOTE — BRIEF OPERATIVE NOTE - NSICDXBRIEFPROCEDURE_GEN_ALL_CORE_FT
PROCEDURES:  Cholecystectomy, laparoscopic, with ERCP 10-Devon-2024 18:06:36  Zan Parsons  Laparoscopic partial gastrectomy 10-Devon-2024 18:06:44  Zan Parsons  Laparscopic lysis of intestinal adhesions 10-Devon-2024 18:06:50  Zan Parsons

## 2024-01-10 NOTE — BRIEF OPERATIVE NOTE - NSICDXBRIEFPOSTOP_GEN_ALL_CORE_FT
POST-OP DIAGNOSIS:  Choledocholithiasis with acute cholecystitis 10-Devon-2024 18:07:43  Zan Parsons

## 2024-01-10 NOTE — PROGRESS NOTE ADULT - ASSESSMENT
48 year old female with PMH GERD, KATHLEEN, obesity with PSH robotic gastric sleeve (5/22) revised to RnY (1/11/23), SBO s/p DONNA (5/2023), presents to ED today with abdominal pain since this morning with associated nausea. Exam with RUQ abdominal pain and epigastric pain, positive fernández sign on exam, labs with leukocytosis w/ left shift, transaminitis, and hyperbilirubinemia. RUQ US revealed CBD 1.3cm, 5mm wall thickening, gallstones, and CTAP revealed no visible stones and trace pericholecystic fluid. Findings concerning for cholecystitis and choledocholithiasis. Bariatric surgery consulted for management.     Plan:  - Zosyn for abx  - NPO/IVF  - GI consult placed by ED, f/u recs, may require on table ERCP in OR  - Urgent MRCP  - pain control PRN  - DVT ppx  - monitor abd exam  - Daily CMP with AM labs 48 year old female with PMH GERD, KATHLEEN, obesity with PSH robotic gastric sleeve (5/22) revised to RnY (1/11/23), SBO s/p DONNA (5/2023), presents to ED today with abdominal pain since this morning with associated nausea. Exam with RUQ abdominal pain and epigastric pain, positive fernández sign on exam, labs with leukocytosis w/ left shift, transaminitis, and hyperbilirubinemia. RUQ US revealed CBD 1.3cm, 5mm wall thickening, gallstones, and CTAP revealed no visible stones and trace pericholecystic fluid. Findings concerning for cholecystitis and choledocholithiasis. Bariatric surgery consulted for management.     Plan:  - continue with Zosyn  - NPO/IVF  - Urgent MRCP  - pain control PRN  - DVT ppx  - Tbili 2.2>4, consistent with choledocholithiasis  - will need to discuss with GI need for lap assisted ERCP +Cholecystectomy given previous bypass anatomy    Zan Parsons PGY6  MIS/Bariatric Fellow  [facility]  [phone]   48 year old female with PMH GERD, KATHLEEN, obesity with PSH robotic gastric sleeve (5/22) revised to RnY (1/11/23), SBO s/p DONNA (5/2023), presents to ED today with abdominal pain since this morning with associated nausea. Exam with RUQ abdominal pain and epigastric pain, positive fernández sign on exam, labs with leukocytosis w/ left shift, transaminitis, and hyperbilirubinemia. RUQ US revealed CBD 1.3cm, 5mm wall thickening, gallstones, and CTAP revealed no visible stones and trace pericholecystic fluid. Findings concerning for cholecystitis and choledocholithiasis. Bariatric surgery consulted for management.     Plan:  - continue with Zosyn  - NPO/IVF  - Urgent MRCP  - pain control PRN  - DVT ppx  - Tbili 2.2>4, consistent with choledocholithiasis  - will need to discuss with GI need for lap assisted ERCP +Cholecystectomy given previous bypass anatomy    Zan Parsons PGY6  MIS/Bariatric Fellow  [facility]  [id number]

## 2024-01-10 NOTE — CHART NOTE - NSCHARTNOTEFT_GEN_A_CORE
Procedure:  Laparoscopic assisted ERCP/biliary sphincterotomy/extraction of stone  Indication:  Choledocholithiasis on intraoperative cholangiogram  Meds:  [facility]X  Attending:  Bill Rodriguez  Findings:  Choledocholithiasis in a dilated common bile duct, removed as above.    Recommendations:    - Diet per surgery  - LR 1 liter IV bolus followed by 150 mL/hr x 2 liters then reassess.  - Follow CBC/LFTs  - If patient develops fever, severe abdominal pain, recurrent vomiting, or melena tonight, obtain stat CBC, comprehensive metabolic panel, amylase, lipase, upright chest x-ray, abdominal x-ray and page GI team on call.

## 2024-01-10 NOTE — CONSULT NOTE ADULT - ASSESSMENT
Impression:     #Acute cholecystitis  #Choledocholithiasis  RUQ US revealed CBD 1.3cm, 5mm wall thickening, gallstones. Mild elevation in bilirubin and liver enzymes.   - Discussed with surgery, plan to do ERCP while in OR.     Recommendations:   - Will proceed w/ ERCP today if necessary in the OR.   - Abx per primary team.   - Plan for lap tray today.   - Trend LFTs and CBC Daily.     Discussed the case with Dr. Rodriguez.     GI will continue to follow.     All recommendations are tentative until note is attested by an attending.     Anna Prasad, PGY-5  Gastroenterology/Hepatology Fellow  Available on [employer] Teams  [phone] (Short Range Pager)  [phone] (Long Range Pager)    After 5pm, please contact the on-call GI fellow. [phone] Impression:     #Acute cholecystitis  #Choledocholithiasis  RUQ US revealed CBD 1.3cm, 5mm wall thickening, gallstones. Mild elevation in bilirubin and liver enzymes.   - Discussed with surgery, plan to do ERCP while in OR.     Recommendations:   - Will proceed w/ ERCP today if necessary in the OR.   - Abx per primary team.   - Plan for lap tray today.   - Trend LFTs and CBC Daily.     Discussed the case with Dr. Rodriguez.     GI will continue to follow.     All recommendations are tentative until note is attested by an attending.     Anna Prasad, PGY-5  Gastroenterology/Hepatology Fellow  Available on [employer] Teams  [phone] (Short Range Pager)  [phone] (Long Range Pager)    After 5pm, please contact the on-call GI fellow. [phone]

## 2024-01-10 NOTE — PRE-ANESTHESIA EVALUATION ADULT - NSANTHPMHFT_GEN_ALL_CORE
48 year old female with PMH GERD, KATHLEEN, obesity with PSH robotic gastric sleeve (5/22) revised to RnY (1/11/23), SBO s/p DONNA (5/2023)

## 2024-01-10 NOTE — BRIEF OPERATIVE NOTE - NSICDXBRIEFPREOP_GEN_ALL_CORE_FT
PRE-OP DIAGNOSIS:  Acute cholecystitis 10-Devon-2024 18:07:10  Zan Parsons  Choledocholithiasis 10-Devon-2024 18:07:15  Zan Parsons

## 2024-01-10 NOTE — CONSULT NOTE ADULT - ATTENDING COMMENTS
As above     Impression:    #1.  Abdominal pain  #2.  Abnormal LFTs  #3.  Dilated common bile duct  #4.  Cholecystitis  #5.  s/p laparoscopic gastric sleeve, s/p RYGB    Recommendations:    #1.  Will do laparoscopic-assisted ERCP if intraoperative cholangiogram demonstrates choledocholithiasis.  #2.  Follow CBC/LFTs.

## 2024-01-10 NOTE — CHART NOTE - NSCHARTNOTEFT_GEN_A_CORE
Post Operative Note  Patient: NORBERT COONEY 48y (1975) Female   MRN: [id number]  Location: [address]   Visit: 01-09-24 Inpatient  Date: 01-10-24 @ 23:02    Procedure: S/P ***    Subjective: Patient seen and examined post operatively. Reports pain as controlled. Denies nausea, vomiting, fever, chills, chest pain, SOB, cough.      Objective:  Vitals: T(F): 98.3 (01-10-24 @ 23:00), Max: 99.3 (01-09-24 @ 23:55)  HR: 89 (01-10-24 @ 23:00)  BP: 132/84 (01-10-24 @ 23:00) (105/71 - 140/90)  RR: 16 (01-10-24 @ 23:00)  SpO2: 94% (01-10-24 @ 23:00)  Vent Settings:     In:   01-09-24 @ 07:01  -  01-10-24 @ 07:00  --------------------------------------------------------  IN: 0 mL    01-10-24 @ 07:01  -  01-10-24 @ 23:02  --------------------------------------------------------  IN: 0 mL      IV Fluids: lactated ringers. 1000 milliLiter(s) (125 mL/Hr) IV Continuous <Continuous>      Out:   01-09-24 @ 07:01  -  01-10-24 @ 07:00  --------------------------------------------------------  OUT: 1 mL    01-10-24 @ 07:01  -  01-10-24 @ 23:02  --------------------------------------------------------  OUT: 0 mL      EBL:     Voided Urine:   01-09-24 @ 07:01  -  01-10-24 @ 07:00  --------------------------------------------------------  OUT: 1 mL    01-10-24 @ 07:01  -  01-10-24 @ 23:02  --------------------------------------------------------  OUT: 0 mL            Physical Examination:  General: NAD, resting comfortably in bed  HEENT: Normocephalic atraumatic  Respiratory: Nonlabored respirations, normal CW expansion.  Cardio: S1S2, regular rate and rhythm.  Abdomen: softly distended, appropriately tender, surgical incisions are c/d/i with scant SS strikethrough   Vascular: extremities are warm and well perfused.     Imaging:  No post-op imaging studies    Assessment:  48yFemale patient S/P *** for ***    Plan:  - IV Abx:   - Pain control PRN  - Diet:  - EMIR  - STEPHAN Crawford  - Activity:  - DVT ppx: SCD's &     Date/Time: 01-10-24 @ 23:02 Post Operative Note  Patient: NORBERT COONEY 48y (1975) Female   MRN: [id number]  Location: [address]   Visit: 01-09-24 Inpatient  Date: 01-10-24 @ 23:02    Procedure: S/P ***    Subjective: Patient seen and examined post operatively. Reports pain as controlled. Denies nausea, vomiting, fever, chills, chest pain, SOB, cough.      Objective:  Vitals: T(F): 98.3 (01-10-24 @ 23:00), Max: 99.3 (01-09-24 @ 23:55)  HR: 89 (01-10-24 @ 23:00)  BP: 132/84 (01-10-24 @ 23:00) (105/71 - 140/90)  RR: 16 (01-10-24 @ 23:00)  SpO2: 94% (01-10-24 @ 23:00)  Vent Settings:     In:   01-09-24 @ 07:01  -  01-10-24 @ 07:00  --------------------------------------------------------  IN: 0 mL    01-10-24 @ 07:01  -  01-10-24 @ 23:02  --------------------------------------------------------  IN: 0 mL      IV Fluids: lactated ringers. 1000 milliLiter(s) (125 mL/Hr) IV Continuous <Continuous>      Out:   01-09-24 @ 07:01  -  01-10-24 @ 07:00  --------------------------------------------------------  OUT: 1 mL    01-10-24 @ 07:01  -  01-10-24 @ 23:02  --------------------------------------------------------  OUT: 0 mL      EBL:     Voided Urine:   01-09-24 @ 07:01  -  01-10-24 @ 07:00  --------------------------------------------------------  OUT: 1 mL    01-10-24 @ 07:01  -  01-10-24 @ 23:02  --------------------------------------------------------  OUT: 0 mL            Physical Examination:  General: NAD, resting comfortably in bed  HEENT: Normocephalic atraumatic  Respiratory: Nonlabored respirations, normal CW expansion.  Cardio: S1S2, regular rate and rhythm.  Abdomen: softly distended, appropriately tender, surgical incisions are c/d/i with scant SS strikethrough   Vascular: extremities are warm and well perfused.     Imaging:  No post-op imaging studies    Assessment:  48yFemale patient S/P *** for ***    Plan:  - IV Abx:   - Pain control PRN  - Diet:  - EMIR  - STEPHAN Crawford  - Activity:  - DVT ppx: SCD's &     Date/Time: 01-10-24 @ 23:02 Post Operative Note  Patient: NORBERT COONEY 48y (1975) Female   MRN: [id number]  Location: [address]   Visit: 01-09-24 Inpatient  Date: 01-10-24 @ 23:02    Procedure: S/P lap cholecystectomy, partial gastrectomy, and lysis of adhesions.     Subjective: Patient seen and examined post operatively on the surgical floor. Reports pain as controlled, sleeping comfortably prior to arrival. Denies nausea, vomiting, fever, chills, chest pain, SOB, cough.      Objective:  Vitals: T(F): 98.3 (01-10-24 @ 23:00), Max: 99.3 (01-09-24 @ 23:55)  HR: 89 (01-10-24 @ 23:00)  BP: 132/84 (01-10-24 @ 23:00) (105/71 - 140/90)  RR: 16 (01-10-24 @ 23:00)  SpO2: 94% (01-10-24 @ 23:00)  Vent Settings:     In:   01-09-24 @ 07:01  -  01-10-24 @ 07:00  --------------------------------------------------------  IN: 0 mL    01-10-24 @ 07:01  -  01-10-24 @ 23:02  --------------------------------------------------------  IN: 0 mL      IV Fluids: lactated ringers. 1000 milliLiter(s) (125 mL/Hr) IV Continuous <Continuous>      Out:   01-09-24 @ 07:01  -  01-10-24 @ 07:00  --------------------------------------------------------  OUT: 1 mL    01-10-24 @ 07:01  -  01-10-24 @ 23:02  --------------------------------------------------------  OUT: 0 mL      EBL:     Voided Urine:   01-09-24 @ 07:01  -  01-10-24 @ 07:00  --------------------------------------------------------  OUT: 1 mL    01-10-24 @ 07:01  -  01-10-24 @ 23:02  --------------------------------------------------------  OUT: 0 mL            Physical Examination:  General: NAD, resting comfortably in bed  HEENT: Normocephalic atraumatic  Respiratory: Nonlabored respirations, normal CW expansion.  Cardio: S1S2, regular rate and rhythm.  Abdomen: softly distended, appropriately tender, surgical incisions are c/d/i with scant SS strikethrough   Vascular: extremities are warm and well perfused.     Assessment:  48yFemale patient  PMH GERD, KATHLEEN, obesity with PSH robotic gastric sleeve (5/22) revised to RnY (1/11/23), SBO DONNA (5/2023) now S/P laparoscopic cholecystectomy and partial gastrectomy. Patient tolereated the procedure well, is recovering comfortably on the floor, and is currently without complaints.     Plan:  - IV Abx: Zosyn  - Pain control PRN oxy  - Acetaminophhen q6  - Diet: CLD  - IVF  - DTV 0200  - Activity: ambulate as tolerated   - DVT ppx: SCD's & Hsq    Date/Time: 01-10-24 @ 23:02 Post Operative Note  Patient: NORBERT COONEY 48y (1975) Female   MRN: [id number]  Location: [address]   Visit: 01-09-24 Inpatient  Date: 01-10-24 @ 23:02    Procedure: S/P lap cholecystectomy, partial gastrectomy, and lysis of adhesions.     Subjective: Patient seen and examined post operatively on the surgical floor. Reports pain as controlled, sleeping comfortably prior to arrival. Denies nausea, vomiting, fever, chills, chest pain, SOB, cough.      Objective:  Vitals: T(F): 98.3 (01-10-24 @ 23:00), Max: 99.3 (01-09-24 @ 23:55)  HR: 89 (01-10-24 @ 23:00)  BP: 132/84 (01-10-24 @ 23:00) (105/71 - 140/90)  RR: 16 (01-10-24 @ 23:00)  SpO2: 94% (01-10-24 @ 23:00)  Vent Settings:     In:   01-09-24 @ 07:01  -  01-10-24 @ 07:00  --------------------------------------------------------  IN: 0 mL    01-10-24 @ 07:01  -  01-10-24 @ 23:02  --------------------------------------------------------  IN: 0 mL      IV Fluids: lactated ringers. 1000 milliLiter(s) (125 mL/Hr) IV Continuous <Continuous>      Out:   01-09-24 @ 07:01  -  01-10-24 @ 07:00  --------------------------------------------------------  OUT: 1 mL    01-10-24 @ 07:01  -  01-10-24 @ 23:02  --------------------------------------------------------  OUT: 0 mL      EBL:     Voided Urine:   01-09-24 @ 07:01  -  01-10-24 @ 07:00  --------------------------------------------------------  OUT: 1 mL    01-10-24 @ 07:01  -  01-10-24 @ 23:02  --------------------------------------------------------  OUT: 0 mL            Physical Examination:  General: NAD, resting comfortably in bed  HEENT: Normocephalic atraumatic  Respiratory: Nonlabored respirations, normal CW expansion.  Cardio: S1S2, regular rate and rhythm.  Abdomen: softly distended, appropriately tender, surgical incisions are c/d/i with scant SS strikethrough   Vascular: extremities are warm and well perfused.     Assessment:  48yFemale patient  PMH GERD, KATHLEEN, obesity with PSH robotic gastric sleeve (5/22) revised to RnY (1/11/23), SBO DONNA (5/2023) now S/P laparoscopic cholecystectomy and partial gastrectomy. Patient tolereated the procedure well, is recovering comfortably on the floor, and is currently without complaints.     Plan:  - IV Abx: Zosyn  - Pain control PRN oxy  - Acetaminophhen q6  - Diet: CLD  - IVF  - DTV 0200  - Activity: ambulate as tolerated   - DVT ppx: SCD's & Hsq    Date/Time: 01-10-24 @ 23:02

## 2024-01-10 NOTE — PROGRESS NOTE ADULT - SUBJECTIVE AND OBJECTIVE BOX
[facility] Daily Progress Note  =====================================================    SUBJECTIVE: Patient seen and examined at bedside on AM rounds.    ALLERGIES:  No Known Allergies      --------------------------------------------------------------------------------------    MEDICATIONS:    Neurologic Medications  acetaminophen     Tablet .. 975 milliGRAM(s) Oral every 6 hours  oxyCODONE    IR 5 milliGRAM(s) Oral every 4 hours PRN Severe Pain (7 - 10)  oxyCODONE    IR 2.5 milliGRAM(s) Oral every 4 hours PRN Moderate Pain (4 - 6)    Respiratory Medications    Cardiovascular Medications    Gastrointestinal Medications  folic acid Injectable 1 milliGRAM(s) IV Push once  lactated ringers. 1000 milliLiter(s) IV Continuous <Continuous>  sodium chloride 0.9% 1000 milliLiter(s) IV Continuous <Continuous>  thiamine Injectable 100 milliGRAM(s) IV Push once    Genitourinary Medications    Hematologic/Oncologic Medications    Antimicrobial/Immunologic Medications  piperacillin/tazobactam IVPB.. 3.375 Gram(s) IV Intermittent every 8 hours    Endocrine/Metabolic Medications    Topical/Other Medications    --------------------------------------------------------------------------------------    VITAL SIGNS:  Vital Signs Last 24 Hrs  T(C): 37.4 (09 Jan 2024 23:55), Max: 37.4 (09 Jan 2024 23:55)  T(F): 99.3 (09 Jan 2024 23:55), Max: 99.3 (09 Jan 2024 23:55)  HR: 77 (09 Jan 2024 23:55) (71 - 89)  BP: 139/89 (09 Jan 2024 23:55) (114/70 - 144/85)  BP(mean): 104 (09 Jan 2024 23:32) (89 - 104)  RR: 18 (09 Jan 2024 23:55) (17 - 22)  SpO2: 99% (09 Jan 2024 23:55) (97% - 100%)    Parameters below as of 09 Jan 2024 23:55  Patient On (Oxygen Delivery Method): room air      --------------------------------------------------------------------------------------    EXAM    General: NAD, resting in bed comfortably.  Cardiac: regular rate, warm and well perfused  Respiratory: Nonlabored respirations, normal cw expansion.  Abdomen:  Soft, TTP in RUQ and epigastrum, positive fernández sign, no rebound/guarding, no masses palpated  Extremities: normal strength, FROM, no deformities    --------------------------------------------------------------------------------------    LABS                          13.7   12.28 )-----------( 237      ( 09 Jan 2024 11:26 )             40.6   01-09    141  |  102  |  13  ----------------------------<  138<H>  4.6   |  23  |  0.63    Ca    10.1      09 Jan 2024 11:26    TPro  7.5  /  Alb  3.9  /  TBili  2.2<H>  /  DBili  x   /  AST  1364<H>  /  ALT  527<H>  /  AlkPhos  228<H>  01-09      --------------------------------------------------------------------------------------    INS AND OUTS:  I&O's Summary    -------------------------------------------------------------------------------------- [facility] Daily Progress Note  =====================================================    SUBJECTIVE: Patient seen and examined at bedside on AM rounds. No n/v/f/c, still with RUQ abdominal pain but is controlled with medication. No other complaints at this time.    ALLERGIES:  No Known Allergies      --------------------------------------------------------------------------------------    MEDICATIONS:    Neurologic Medications  acetaminophen     Tablet .. 975 milliGRAM(s) Oral every 6 hours  oxyCODONE    IR 5 milliGRAM(s) Oral every 4 hours PRN Severe Pain (7 - 10)  oxyCODONE    IR 2.5 milliGRAM(s) Oral every 4 hours PRN Moderate Pain (4 - 6)    Respiratory Medications    Cardiovascular Medications    Gastrointestinal Medications  folic acid Injectable 1 milliGRAM(s) IV Push once  lactated ringers. 1000 milliLiter(s) IV Continuous <Continuous>  sodium chloride 0.9% 1000 milliLiter(s) IV Continuous <Continuous>  thiamine Injectable 100 milliGRAM(s) IV Push once    Genitourinary Medications    Hematologic/Oncologic Medications    Antimicrobial/Immunologic Medications  piperacillin/tazobactam IVPB.. 3.375 Gram(s) IV Intermittent every 8 hours    Endocrine/Metabolic Medications    Topical/Other Medications    --------------------------------------------------------------------------------------    VITAL SIGNS:  Vital Signs Last 24 Hrs  T(C): 37.4 (09 Jan 2024 23:55), Max: 37.4 (09 Jan 2024 23:55)  T(F): 99.3 (09 Jan 2024 23:55), Max: 99.3 (09 Jan 2024 23:55)  HR: 77 (09 Jan 2024 23:55) (71 - 89)  BP: 139/89 (09 Jan 2024 23:55) (114/70 - 144/85)  BP(mean): 104 (09 Jan 2024 23:32) (89 - 104)  RR: 18 (09 Jan 2024 23:55) (17 - 22)  SpO2: 99% (09 Jan 2024 23:55) (97% - 100%)    Parameters below as of 09 Jan 2024 23:55  Patient On (Oxygen Delivery Method): room air      --------------------------------------------------------------------------------------    EXAM    General: NAD, resting in bed comfortably.  Cardiac: regular rate, warm and well perfused  Respiratory: Nonlabored respirations, normal cw expansion.  Abdomen:  Soft, TTP in RUQ and epigastrum, positive fernández sign, no rebound/guarding, no masses palpated. well healed previous laparoscopic and midline laparotomy incision  Extremities: normal strength, FROM, no deformities    --------------------------------------------------------------------------------------    LABS                          13.7   12.28 )-----------( 237      ( 09 Jan 2024 11:26 )             40.6   01-09    141  |  102  |  13  ----------------------------<  138<H>  4.6   |  23  |  0.63    Ca    10.1      09 Jan 2024 11:26    TPro  7.5  /  Alb  3.9  /  TBili  2.2<H>  /  DBili  x   /  AST  1364<H>  /  ALT  527<H>  /  AlkPhos  228<H>  01-09      --------------------------------------------------------------------------------------    INS AND OUTS:  I&O's Summary    --------------------------------------------------------------------------------------

## 2024-01-10 NOTE — BRIEF OPERATIVE NOTE - OPERATION/FINDINGS
Acutely inflamed gallbladder with stones, dilated CBD noted. Normal Gastric Bypass anatomy, previous sleeve noted. Laparoscopic Cholecystectomy with Intraoperative Cholangiography, Lysis of adhesions, partial gastrectomy, and Laparoscopic assisted ERCP

## 2024-01-10 NOTE — CONSULT NOTE ADULT - SUBJECTIVE AND OBJECTIVE BOX
HPI:    Allergies:  No Known Allergies      Home Medications:    Hospital Medications:  acetaminophen     Tablet .. 975 milliGRAM(s) Oral every 6 hours  lactated ringers. 1000 milliLiter(s) IV Continuous <Continuous>  oxyCODONE    IR 2.5 milliGRAM(s) Oral every 4 hours PRN  oxyCODONE    IR 5 milliGRAM(s) Oral every 4 hours PRN  piperacillin/tazobactam IVPB.. 3.375 Gram(s) IV Intermittent every 8 hours  sodium chloride 0.9% 1000 milliLiter(s) IV Continuous <Continuous>      PMHX/PSHX:  Asthma    Fibroids    Obesity    Chronic GERD    Hiatal hernia    S/P hysterectomy    History of repair of hiatal hernia    History of sleeve gastrectomy    History of Poncho-en-Y gastric bypass        Family history:      Denies family history of colon cancer/polyps, stomach cancer/polyps, pancreatic cancer/masses, liver cancer/disease, ovarian cancer and endometrial cancer.    Social History:   Tob: Denies  EtOH: Denies  Illicit Drugs: Denies    ROS:     General:  No wt loss, fevers, chills, night sweats, fatigue  Eyes:  Good vision, no reported pain  ENT:  No sore throat, pain, runny nose, dysphagia  CV:  No pain, palpitations, hypo/hypertension  Pulm:  No dyspnea, cough, tachypnea, wheezing  GI:  see HPI  :  No pain, bleeding, incontinence, nocturia  Muscle:  No pain, weakness  Neuro:  No weakness, tingling, memory problems  Psych:  No fatigue, insomnia, mood problems, depression  Endocrine:  No polyuria, polydipsia, cold/heat intolerance  Heme:  No petechiae, ecchymosis, easy bruisability  Skin:  No rash, tattoos, scars, edema    PHYSICAL EXAM:     GENERAL:  No acute distress  HEENT:  NCAT, no scleral icterus   CHEST:  no respiratory distress  HEART:  Regular rate and rhythm  ABDOMEN:  Soft, non-tender, non-distended, normoactive bowel sounds,  no masses  EXTREMITIES: No edema  SKIN:  No rash/erythema/ecchymoses/petechiae/wounds/abscess/warm/dry  NEURO:  Alert and oriented x 3, no asterixis    Vital Signs:  Vital Signs Last 24 Hrs  T(C): 36.9 (10 [place] 2024 04:27), Max: 37.4 (09 Jan 2024 23:55)  T(F): 98.5 (10 [place] 2024 04:27), Max: 99.3 (09 Jan 2024 23:55)  HR: 83 (10 [place] 2024 04:27) (71 - 89)  BP: 105/71 (10 [place] 2024 04:27) (105/71 - 144/85)  BP(mean): 104 (09 Jan 2024 23:32) (89 - 104)  RR: 18 (10 [place] 2024 04:27) (17 - 22)  SpO2: 96% (10 [place] 2024 04:27) (96% - 100%)    Parameters below as of 10 [place] 2024 04:27  Patient On (Oxygen Delivery Method): room air      Daily Height in cm: 160.02 (09 Jan 2024 09:22)    Daily     LABS:                        12.5   6.29  )-----------( 193      ( 10 [place] 2024 03:25 )             37.7     Mean Cell Volume: 99.5 fl (01-10-24 @ 03:25)    01-10    139  |  104  |  5<L>  ----------------------------<  110<H>  3.7   |  24  |  0.59    Ca    9.1      10 [place] 2024 03:25  Phos  3.0     01-10  Mg     1.7     01-10    TPro  6.3  /  Alb  3.6  /  TBili  4.3<H>  /  DBili  x   /  AST  1142<H>  /  ALT  1226<H>  /  AlkPhos  243<H>  01-10    LIVER FUNCTIONS - ( 10 [place] 2024 03:25 )  Alb: 3.6 g/dL / Pro: 6.3 g/dL / ALK PHOS: 243 U/L / ALT: 1226 U/L / AST: 1142 U/L / GGT: x           PT/INR - ( 10 [place] 2024 07:36 )   PT: 11.0 sec;   INR: 1.05 ratio         PTT - ( 10 [place] 2024 07:36 )  PTT:27.6 sec  Urinalysis Basic - ( 10 [place] 2024 03:25 )    Color: x / Appearance: x / SG: x / pH: x  Gluc: 110 mg/dL / Ketone: x  / Bili: x / Urobili: x   Blood: x / Protein: x / Nitrite: x   Leuk Esterase: x / RBC: x / WBC x   Sq Epi: x / Non Sq Epi: x / Bacteria: x      Amylase Serum--      Lipase serum24       Ammonia--                          12.5   6.29  )-----------( 193      ( 10 [place] 2024 03:25 )             37.7                         13.7   12.28 )-----------( 237      ( 09 Jan 2024 11:26 )             40.6       Imaging:             HPI:    Ms. Sanchez is a 48 yrs old female with PMH GERD, KATHLEEN, obesity with PSH robotic gastric sleeve (5/22) revised to RnY (1/11/23), SBO s/p DONNA (5/2023), who presented w/ abd pain and nausea. Pain in the epigastric region, radiating to the RUQ region. No vomiting, fevers or chills. On admission, VSS, labs with leukocytosis to 12, tbili: 2.2, AST/ALT: 1364/527, lactate 3.3, UA negative. RUQ US with CBD 1.3cm, wall thickening 5mm, cholelithiasis, CTAP with no visible stones and trace pericholecystic fluid. [facility] consulted for possible choledocholithiasis.     Allergies:  No Known Allergies    Hospital Medications:  acetaminophen     Tablet .. 975 milliGRAM(s) Oral every 6 hours  lactated ringers. 1000 milliLiter(s) IV Continuous <Continuous>  oxyCODONE    IR 2.5 milliGRAM(s) Oral every 4 hours PRN  oxyCODONE    IR 5 milliGRAM(s) Oral every 4 hours PRN  piperacillin/tazobactam IVPB.. 3.375 Gram(s) IV Intermittent every 8 hours  sodium chloride 0.9% 1000 milliLiter(s) IV Continuous <Continuous>      PMHX/PSHX:  Asthma    Fibroids    Obesity    Chronic GERD    Hiatal hernia    S/P hysterectomy    History of repair of hiatal hernia    History of sleeve gastrectomy    History of Poncho-en-Y gastric bypass    Family history:      Denies family history of colon cancer/polyps, stomach cancer/polyps, pancreatic cancer/masses, liver cancer/disease, ovarian cancer and endometrial cancer.    Social History:   Tob: Denies  EtOH: Denies  Illicit Drugs: Denies    ROS:     General:  No wt loss, fevers, chills, night sweats, fatigue  Eyes:  Good vision, no reported pain  ENT:  No sore throat, pain, runny nose, dysphagia  CV:  No pain, palpitations, hypo/hypertension  Pulm:  No dyspnea, cough, tachypnea, wheezing  GI:  see HPI  :  No pain, bleeding, incontinence, nocturia  Muscle:  No pain, weakness  Neuro:  No weakness, tingling, memory problems  Psych:  No fatigue, insomnia, mood problems, depression  Endocrine:  No polyuria, polydipsia, cold/heat intolerance  Heme:  No petechiae, ecchymosis, easy bruisability  Skin:  No rash, tattoos, scars, edema    PHYSICAL EXAM: Unable to examine as the patient went to the OR.     Vital Signs:  Vital Signs Last 24 Hrs  T(C): 36.9 (10 [place] 2024 04:27), Max: 37.4 (09 Jan 2024 23:55)  T(F): 98.5 (10 [place] 2024 04:27), Max: 99.3 (09 Jan 2024 23:55)  HR: 83 (10 [place] 2024 04:27) (71 - 89)  BP: 105/71 (10 [place] 2024 04:27) (105/71 - 144/85)  BP(mean): 104 (09 Jan 2024 23:32) (89 - 104)  RR: 18 (10 [place] 2024 04:27) (17 - 22)  SpO2: 96% (10 [place] 2024 04:27) (96% - 100%)    Parameters below as of 10 [place] 2024 04:27  Patient On (Oxygen Delivery Method): room air      Daily Height in cm: 160.02 (09 Jan 2024 09:22)    Daily     LABS:                        12.5   6.29  )-----------( 193      ( 10 [place] 2024 03:25 )             37.7     Mean Cell Volume: 99.5 fl (01-10-24 @ 03:25)    01-10    139  |  104  |  5<L>  ----------------------------<  110<H>  3.7   |  24  |  0.59    Ca    9.1      10 [place] 2024 03:25  Phos  3.0     01-10  Mg     1.7     01-10    TPro  6.3  /  Alb  3.6  /  TBili  4.3<H>  /  DBili  x   /  AST  1142<H>  /  ALT  1226<H>  /  AlkPhos  243<H>  01-10    LIVER FUNCTIONS - ( 10 [place] 2024 03:25 )  Alb: 3.6 g/dL / Pro: 6.3 g/dL / ALK PHOS: 243 U/L / ALT: 1226 U/L / AST: 1142 U/L / GGT: x           PT/INR - ( 10 [place] 2024 07:36 )   PT: 11.0 sec;   INR: 1.05 ratio         PTT - ( 10 [place] 2024 07:36 )  PTT:27.6 sec  Urinalysis Basic - ( 10 [place] 2024 03:25 )    Color: x / Appearance: x / SG: x / pH: x  Gluc: 110 mg/dL / Ketone: x  / Bili: x / Urobili: x   Blood: x / Protein: x / Nitrite: x   Leuk Esterase: x / RBC: x / WBC x   Sq Epi: x / Non Sq Epi: x / Bacteria: x      Amylase Serum--      Lipase serum24       Ammonia--                          12.5   6.29  )-----------( 193      ( 10 [place] 2024 03:25 )             37.7                         13.7   12.28 )-----------( 237      ( 09 Jan 2024 11:26 )             40.6       Imaging:    CT A/p    FINDINGS:  LOWER CHEST: Within normal limits.    LIVER: Within normal limits.  BILE DUCTS: Normal caliber. CBD measures 7 mm.  GALLBLADDER: No radiopaque gallstones. Possibility of trace   pericholecystic fluid.  SPLEEN: Within normal limits.  PANCREAS: Within normal limits.  ADRENALS: Within normal limits.  KIDNEYS/URETERS: Horseshoe kidney.    BLADDER: Minimally distended.  REPRODUCTIVE ORGANS: Supracervical hysterectomy.    BOWEL: No bowel obstruction. Appendix is normal. Prior gastric bypass.  PERITONEUM: No ascites.  VESSELS: Within normal limits.  RETROPERITONEUM/LYMPH NODES: No lymphadenopathy.  ABDOMINAL WALL: Within normal limits.  BONES: Within normal limits.    IMPRESSION:  No evidence of bowel obstruction.  Trace pericholecystic fluid.  Gastric bypass.  Horseshoe kidney.

## 2024-01-11 LAB
ALBUMIN SERPL ELPH-MCNC: 3.6 G/DL — SIGNIFICANT CHANGE UP (ref 3.3–5)
ALBUMIN SERPL ELPH-MCNC: 3.6 G/DL — SIGNIFICANT CHANGE UP (ref 3.3–5)
ALP SERPL-CCNC: 240 U/L — HIGH (ref 40–120)
ALP SERPL-CCNC: 240 U/L — HIGH (ref 40–120)
ALT FLD-CCNC: 744 U/L — HIGH (ref 10–45)
ALT FLD-CCNC: 744 U/L — HIGH (ref 10–45)
ANION GAP SERPL CALC-SCNC: 12 MMOL/L — SIGNIFICANT CHANGE UP (ref 5–17)
ANION GAP SERPL CALC-SCNC: 12 MMOL/L — SIGNIFICANT CHANGE UP (ref 5–17)
AST SERPL-CCNC: 280 U/L — HIGH (ref 10–40)
AST SERPL-CCNC: 280 U/L — HIGH (ref 10–40)
BASOPHILS # BLD AUTO: 0.03 K/UL — SIGNIFICANT CHANGE UP (ref 0–0.2)
BASOPHILS # BLD AUTO: 0.03 K/UL — SIGNIFICANT CHANGE UP (ref 0–0.2)
BASOPHILS NFR BLD AUTO: 0.2 % — SIGNIFICANT CHANGE UP (ref 0–2)
BASOPHILS NFR BLD AUTO: 0.2 % — SIGNIFICANT CHANGE UP (ref 0–2)
BILIRUB DIRECT SERPL-MCNC: 3.3 MG/DL — HIGH (ref 0–0.3)
BILIRUB DIRECT SERPL-MCNC: 3.3 MG/DL — HIGH (ref 0–0.3)
BILIRUB INDIRECT FLD-MCNC: 0.8 MG/DL — SIGNIFICANT CHANGE UP (ref 0.2–1)
BILIRUB INDIRECT FLD-MCNC: 0.8 MG/DL — SIGNIFICANT CHANGE UP (ref 0.2–1)
BILIRUB SERPL-MCNC: 4.1 MG/DL — HIGH (ref 0.2–1.2)
BILIRUB SERPL-MCNC: 4.1 MG/DL — HIGH (ref 0.2–1.2)
BUN SERPL-MCNC: 4 MG/DL — LOW (ref 7–23)
BUN SERPL-MCNC: 4 MG/DL — LOW (ref 7–23)
CALCIUM SERPL-MCNC: 8.9 MG/DL — SIGNIFICANT CHANGE UP (ref 8.4–10.5)
CALCIUM SERPL-MCNC: 8.9 MG/DL — SIGNIFICANT CHANGE UP (ref 8.4–10.5)
CHLORIDE SERPL-SCNC: 102 MMOL/L — SIGNIFICANT CHANGE UP (ref 96–108)
CHLORIDE SERPL-SCNC: 102 MMOL/L — SIGNIFICANT CHANGE UP (ref 96–108)
CO2 SERPL-SCNC: 24 MMOL/L — SIGNIFICANT CHANGE UP (ref 22–31)
CO2 SERPL-SCNC: 24 MMOL/L — SIGNIFICANT CHANGE UP (ref 22–31)
CREAT SERPL-MCNC: 0.59 MG/DL — SIGNIFICANT CHANGE UP (ref 0.5–1.3)
CREAT SERPL-MCNC: 0.59 MG/DL — SIGNIFICANT CHANGE UP (ref 0.5–1.3)
EGFR: 110 ML/MIN/1.73M2 — SIGNIFICANT CHANGE UP
EGFR: 110 ML/MIN/1.73M2 — SIGNIFICANT CHANGE UP
EOSINOPHIL # BLD AUTO: 0 K/UL — SIGNIFICANT CHANGE UP (ref 0–0.5)
EOSINOPHIL # BLD AUTO: 0 K/UL — SIGNIFICANT CHANGE UP (ref 0–0.5)
EOSINOPHIL NFR BLD AUTO: 0 % — SIGNIFICANT CHANGE UP (ref 0–6)
EOSINOPHIL NFR BLD AUTO: 0 % — SIGNIFICANT CHANGE UP (ref 0–6)
GLUCOSE SERPL-MCNC: 152 MG/DL — HIGH (ref 70–99)
GLUCOSE SERPL-MCNC: 152 MG/DL — HIGH (ref 70–99)
HCT VFR BLD CALC: 38.6 % — SIGNIFICANT CHANGE UP (ref 34.5–45)
HCT VFR BLD CALC: 38.6 % — SIGNIFICANT CHANGE UP (ref 34.5–45)
HGB BLD-MCNC: 12.9 G/DL — SIGNIFICANT CHANGE UP (ref 11.5–15.5)
HGB BLD-MCNC: 12.9 G/DL — SIGNIFICANT CHANGE UP (ref 11.5–15.5)
IMM GRANULOCYTES NFR BLD AUTO: 0.5 % — SIGNIFICANT CHANGE UP (ref 0–0.9)
IMM GRANULOCYTES NFR BLD AUTO: 0.5 % — SIGNIFICANT CHANGE UP (ref 0–0.9)
LIDOCAIN IGE QN: 7 U/L — SIGNIFICANT CHANGE UP (ref 7–60)
LIDOCAIN IGE QN: 7 U/L — SIGNIFICANT CHANGE UP (ref 7–60)
LYMPHOCYTES # BLD AUTO: 0.78 K/UL — LOW (ref 1–3.3)
LYMPHOCYTES # BLD AUTO: 0.78 K/UL — LOW (ref 1–3.3)
LYMPHOCYTES # BLD AUTO: 5.3 % — LOW (ref 13–44)
LYMPHOCYTES # BLD AUTO: 5.3 % — LOW (ref 13–44)
MAGNESIUM SERPL-MCNC: 1.9 MG/DL — SIGNIFICANT CHANGE UP (ref 1.6–2.6)
MAGNESIUM SERPL-MCNC: 1.9 MG/DL — SIGNIFICANT CHANGE UP (ref 1.6–2.6)
MCHC RBC-ENTMCNC: 33.2 PG — SIGNIFICANT CHANGE UP (ref 27–34)
MCHC RBC-ENTMCNC: 33.2 PG — SIGNIFICANT CHANGE UP (ref 27–34)
MCHC RBC-ENTMCNC: 33.4 GM/DL — SIGNIFICANT CHANGE UP (ref 32–36)
MCHC RBC-ENTMCNC: 33.4 GM/DL — SIGNIFICANT CHANGE UP (ref 32–36)
MCV RBC AUTO: 99.2 FL — SIGNIFICANT CHANGE UP (ref 80–100)
MCV RBC AUTO: 99.2 FL — SIGNIFICANT CHANGE UP (ref 80–100)
MONOCYTES # BLD AUTO: 0.93 K/UL — HIGH (ref 0–0.9)
MONOCYTES # BLD AUTO: 0.93 K/UL — HIGH (ref 0–0.9)
MONOCYTES NFR BLD AUTO: 6.4 % — SIGNIFICANT CHANGE UP (ref 2–14)
MONOCYTES NFR BLD AUTO: 6.4 % — SIGNIFICANT CHANGE UP (ref 2–14)
NEUTROPHILS # BLD AUTO: 12.81 K/UL — HIGH (ref 1.8–7.4)
NEUTROPHILS # BLD AUTO: 12.81 K/UL — HIGH (ref 1.8–7.4)
NEUTROPHILS NFR BLD AUTO: 87.6 % — HIGH (ref 43–77)
NEUTROPHILS NFR BLD AUTO: 87.6 % — HIGH (ref 43–77)
NRBC # BLD: 0 /100 WBCS — SIGNIFICANT CHANGE UP (ref 0–0)
NRBC # BLD: 0 /100 WBCS — SIGNIFICANT CHANGE UP (ref 0–0)
PHOSPHATE SERPL-MCNC: 2.8 MG/DL — SIGNIFICANT CHANGE UP (ref 2.5–4.5)
PHOSPHATE SERPL-MCNC: 2.8 MG/DL — SIGNIFICANT CHANGE UP (ref 2.5–4.5)
PLATELET # BLD AUTO: 188 K/UL — SIGNIFICANT CHANGE UP (ref 150–400)
PLATELET # BLD AUTO: 188 K/UL — SIGNIFICANT CHANGE UP (ref 150–400)
POTASSIUM SERPL-MCNC: 3.3 MMOL/L — LOW (ref 3.5–5.3)
POTASSIUM SERPL-MCNC: 3.3 MMOL/L — LOW (ref 3.5–5.3)
POTASSIUM SERPL-SCNC: 3.3 MMOL/L — LOW (ref 3.5–5.3)
POTASSIUM SERPL-SCNC: 3.3 MMOL/L — LOW (ref 3.5–5.3)
PROT SERPL-MCNC: 6.5 G/DL — SIGNIFICANT CHANGE UP (ref 6–8.3)
PROT SERPL-MCNC: 6.5 G/DL — SIGNIFICANT CHANGE UP (ref 6–8.3)
RBC # BLD: 3.89 M/UL — SIGNIFICANT CHANGE UP (ref 3.8–5.2)
RBC # BLD: 3.89 M/UL — SIGNIFICANT CHANGE UP (ref 3.8–5.2)
RBC # FLD: 11.7 % — SIGNIFICANT CHANGE UP (ref 10.3–14.5)
RBC # FLD: 11.7 % — SIGNIFICANT CHANGE UP (ref 10.3–14.5)
SODIUM SERPL-SCNC: 138 MMOL/L — SIGNIFICANT CHANGE UP (ref 135–145)
SODIUM SERPL-SCNC: 138 MMOL/L — SIGNIFICANT CHANGE UP (ref 135–145)
WBC # BLD: 14.62 K/UL — HIGH (ref 3.8–10.5)
WBC # BLD: 14.62 K/UL — HIGH (ref 3.8–10.5)
WBC # FLD AUTO: 14.62 K/UL — HIGH (ref 3.8–10.5)
WBC # FLD AUTO: 14.62 K/UL — HIGH (ref 3.8–10.5)

## 2024-01-11 RX ORDER — POTASSIUM CHLORIDE 20 MEQ
40 PACKET (EA) ORAL ONCE
Refills: 0 | Status: COMPLETED | OUTPATIENT
Start: 2024-01-11 | End: 2024-01-11

## 2024-01-11 RX ORDER — DEXTROSE MONOHYDRATE, SODIUM CHLORIDE, AND POTASSIUM CHLORIDE 50; .745; 4.5 G/1000ML; G/1000ML; G/1000ML
1000 INJECTION, SOLUTION INTRAVENOUS
Refills: 0 | Status: DISCONTINUED | OUTPATIENT
Start: 2024-01-11 | End: 2024-01-12

## 2024-01-11 RX ADMIN — HEPARIN SODIUM 5000 UNIT(S): 5000 INJECTION INTRAVENOUS; SUBCUTANEOUS at 14:00

## 2024-01-11 RX ADMIN — PIPERACILLIN AND TAZOBACTAM 25 GRAM(S): 4; .5 INJECTION, POWDER, LYOPHILIZED, FOR SOLUTION INTRAVENOUS at 23:02

## 2024-01-11 RX ADMIN — OXYCODONE HYDROCHLORIDE 5 MILLIGRAM(S): 5 TABLET ORAL at 06:25

## 2024-01-11 RX ADMIN — PIPERACILLIN AND TAZOBACTAM 25 GRAM(S): 4; .5 INJECTION, POWDER, LYOPHILIZED, FOR SOLUTION INTRAVENOUS at 06:25

## 2024-01-11 RX ADMIN — OXYCODONE HYDROCHLORIDE 5 MILLIGRAM(S): 5 TABLET ORAL at 18:28

## 2024-01-11 RX ADMIN — Medication 1000 MILLIGRAM(S): at 03:00

## 2024-01-11 RX ADMIN — Medication 1000 MILLIGRAM(S): at 11:53

## 2024-01-11 RX ADMIN — DEXTROSE MONOHYDRATE, SODIUM CHLORIDE, AND POTASSIUM CHLORIDE 75 MILLILITER(S): 50; .745; 4.5 INJECTION, SOLUTION INTRAVENOUS at 14:48

## 2024-01-11 RX ADMIN — PANTOPRAZOLE SODIUM 40 MILLIGRAM(S): 20 TABLET, DELAYED RELEASE ORAL at 11:53

## 2024-01-11 RX ADMIN — ONDANSETRON 4 MILLIGRAM(S): 8 TABLET, FILM COATED ORAL at 17:42

## 2024-01-11 RX ADMIN — HEPARIN SODIUM 5000 UNIT(S): 5000 INJECTION INTRAVENOUS; SUBCUTANEOUS at 23:02

## 2024-01-11 RX ADMIN — Medication 1000 MILLIGRAM(S): at 12:23

## 2024-01-11 RX ADMIN — Medication 1000 MILLIGRAM(S): at 18:00

## 2024-01-11 RX ADMIN — OXYCODONE HYDROCHLORIDE 2.5 MILLIGRAM(S): 5 TABLET ORAL at 22:08

## 2024-01-11 RX ADMIN — OXYCODONE HYDROCHLORIDE 5 MILLIGRAM(S): 5 TABLET ORAL at 12:33

## 2024-01-11 RX ADMIN — HEPARIN SODIUM 5000 UNIT(S): 5000 INJECTION INTRAVENOUS; SUBCUTANEOUS at 06:26

## 2024-01-11 RX ADMIN — Medication 1000 MILLIGRAM(S): at 07:26

## 2024-01-11 RX ADMIN — Medication 1000 MILLIGRAM(S): at 07:56

## 2024-01-11 RX ADMIN — Medication 1000 MILLIGRAM(S): at 02:09

## 2024-01-11 RX ADMIN — ONDANSETRON 4 MILLIGRAM(S): 8 TABLET, FILM COATED ORAL at 06:31

## 2024-01-11 RX ADMIN — Medication 1000 MILLIGRAM(S): at 23:02

## 2024-01-11 RX ADMIN — Medication 1000 MILLIGRAM(S): at 18:30

## 2024-01-11 RX ADMIN — PIPERACILLIN AND TAZOBACTAM 25 GRAM(S): 4; .5 INJECTION, POWDER, LYOPHILIZED, FOR SOLUTION INTRAVENOUS at 14:00

## 2024-01-11 RX ADMIN — OXYCODONE HYDROCHLORIDE 5 MILLIGRAM(S): 5 TABLET ORAL at 06:56

## 2024-01-11 RX ADMIN — OXYCODONE HYDROCHLORIDE 5 MILLIGRAM(S): 5 TABLET ORAL at 18:58

## 2024-01-11 RX ADMIN — OXYCODONE HYDROCHLORIDE 5 MILLIGRAM(S): 5 TABLET ORAL at 13:03

## 2024-01-11 RX ADMIN — Medication 40 MILLIEQUIVALENT(S): at 09:31

## 2024-01-11 RX ADMIN — OXYCODONE HYDROCHLORIDE 2.5 MILLIGRAM(S): 5 TABLET ORAL at 23:08

## 2024-01-11 NOTE — PHYSICAL THERAPY INITIAL EVALUATION ADULT - PERTINENT HX OF CURRENT PROBLEM, REHAB EVAL
48 year old female with PMH GERD, KATHLEEN, obesity with PSH robotic gastric sleeve (5/22) revised to RnY (1/11/23), SBO s/p DONNA (5/2023), presents to ED today with abdominal pain since this morning with associated nausea. Patient has not recently passed gas and last had a bowel movement yesterday. She reports her stool have been lighter colored recently. The pain radiates across the upper abdomen. She has not vomited.   In the ED patient is HDS, labs with leukocytosis to 12, tbili: 2.2, AST/ALT: 1364/527, lactate 3.3, UA negative. RUQ US with CBD 1.3cm, wall thickening 5mm, cholelithiasis, CTAP with no visible stones and trace pericholecystic fluid.   Hospital Course: 48 year old female with PMH GERD, KATHLEEN, obesity with PSH robotic gastric sleeve (5/22) revised to RnY (1/11/23), SBO s/p DONNA (5/2023), presents to ED today with abdominal pain since this morning with associated nausea. Patient has not recently passed gas and last had a bowel movement yesterday. She reports her stool have been lighter colored recently. The pain radiates across the upper abdomen. She has not vomited.   In the ED patient is HDS, labs with leukocytosis to 12, tbili: 2.2, AST/ALT: 1364/527, lactate 3.3, UA negative. RUQ US with CBD 1.3cm, wall thickening 5mm, cholelithiasis, CTAP with no visible stones and trace   XRay Chest (1/9): Clear lungs. CT Abdomen & Pelvis (1/9): No evidence of bowel obstruction. Trace pericholecystic fluid. Gastric bypass. Horseshoe kidney.

## 2024-01-11 NOTE — PHYSICAL THERAPY INITIAL EVALUATION ADULT - ADDITIONAL COMMENTS
Pt lives with her children in an apartment on the 11th floor with elevator access. No steps to enter building. PTA pt was independent with all ADLs and functional mobility. No DME

## 2024-01-11 NOTE — PROGRESS NOTE ADULT - ASSESSMENT
Impression:     #Acute cholecystitis  #Choledocholithiasis  RUQ US revealed CBD 1.3cm, 5mm wall thickening, gallstones. Mild elevation in bilirubin and liver enzymes.   - s/p ERCP on 1/10 in the OR during the lap tray - removed stones from the CBD.   - Bili has been downtrending 4.3 to 4.1, and decreasing liver enzymes.     Recommendations:   - No further endoscopic intervention for now.   - Diet as per surgery.   - continue with abx.   - LFTs and CBC daily.     Discussed with Dr. Muro.     GI will continue to follow.     All recommendations are tentative until note is attested by an attending.     Anna Prasad, PGY-5  Gastroenterology/Hepatology Fellow  Available on Microsoft Teams  [phone] (Short Range Pager)  [phone] (Long Range Pager)    After 5pm, please contact the on-call GI fellow. [phone] Impression:     #Acute cholecystitis  #Choledocholithiasis  RUQ US revealed CBD 1.3cm, 5mm wall thickening, gallstones. Mild elevation in bilirubin and liver enzymes.   - s/p ERCP on 1/10 in the OR during the lap tray - removed stones from the CBD.   - Bili has been downtrending 4.3 to 4.1, and decreasing liver enzymes.     Recommendations:   - No further endoscopic intervention for now.   - Diet as per surgery.   - continue with abx.   - LFTs and CBC daily.     Discussed with Dr. Muro.     GI will continue to follow.     All recommendations are tentative until note is attested by an attending.     Anna Prasad, PGY-5  Gastroenterology/Hepatology Fellow  Available on Microsoft Teams  [phone] (Short Range Pager)  [phone] (Long Range Pager)    After 5pm, please contact the on-call GI fellow. [phone]

## 2024-01-11 NOTE — PROGRESS NOTE ADULT - ASSESSMENT
48y Female patient  PMH GERD, KATHLEEN, obesity with PSH robotic gastric sleeve (5/22) revised to RnY (1/11/23), SBO DONNA (5/2023) now S/P laparoscopic cholecystectomy and partial gastrectomy.     Plan:  - IV Abx: Zosyn  - Pain control PRN oxy  - Acetaminophen q6  - Diet: CLD  - IVF  - Activity: ambulate as tolerated   - DVT ppx: SCD's & Hsq   48y Female patient  PMH GERD, KATHLEEN, obesity with PSH robotic gastric sleeve (5/22) revised to RnY (1/11/23), SBO DONNA (5/2023) now S/P laparoscopic cholecystectomy and partial gastrectomy.     Plan:  - continue with CLD  - IVF  - Continue Zosyn for 24hrs  - prn pain and nausea control   - encourage oob/amb  - continue IS use  - SCD/SQH  - f/u AM labs      Zan Parsons PGY6  MIS/Bariatric Fellow  Surgery Green Team  [phone]   48y Female patient  PMH GERD, KATHLEEN, obesity with PSH robotic gastric sleeve (5/22) revised to RnY (1/11/23), SBO DONNA (5/2023) now S/P laparoscopic cholecystectomy and partial gastrectomy.     Plan:  - continue with CLD  - IVF  - Continue Zosyn for 24hrs  - prn pain and nausea control   - encourage oob/amb  - continue IS use  - SCD/SQH  - f/u AM labs      Zan Parsons PGY6  MIS/Bariatric Fellow  Surgery Green Team  [id number]

## 2024-01-11 NOTE — PROGRESS NOTE ADULT - SUBJECTIVE AND OBJECTIVE BOX
Gastroenterology/Hepatology Progress Note      Interval Events:     - s/p ERCP and lap tray on 1/10  - Reported she has left sided abd pain, right sided pain has improved.   - has nausea but no vomiting.   - No fevers.   - No bowel movement since the surgery. Not passing flatus yet.   - Mild increase in leukocytosis, bili slightly decreased from 4.3 to 4, downtrending AST/ALT.     Allergies:  No Known Allergies      Hospital Medications:  acetaminophen   Oral Liquid .. 1000 milliGRAM(s) Oral every 6 hours  heparin   Injectable 5000 Unit(s) SubCutaneous every 8 hours  lactated ringers. 1000 milliLiter(s) IV Continuous <Continuous>  ondansetron Injectable 4 milliGRAM(s) IV Push every 6 hours PRN  oxyCODONE    IR 2.5 milliGRAM(s) Oral every 4 hours PRN  oxyCODONE    IR 5 milliGRAM(s) Oral every 6 hours PRN  pantoprazole  Injectable 40 milliGRAM(s) IV Push daily  piperacillin/tazobactam IVPB.. 3.375 Gram(s) IV Intermittent every 8 hours      ROS: 14 point ROS negative unless otherwise state in subjective    PHYSICAL EXAM:   Vital Signs:  Vital Signs Last 24 Hrs  T(C): 36.8 (11 Jan 2024 08:13), Max: 36.9 (11 Jan 2024 00:00)  T(F): 98.2 (11 Jan 2024 08:13), Max: 98.4 (11 Jan 2024 00:00)  HR: 87 (11 Jan 2024 08:13) (67 - 93)  BP: 116/77 (11 Jan 2024 08:13) (111/65 - 140/78)  BP(mean): 101 (10 Devon 2024 20:15) (82 - 104)  RR: 18 (11 Jan 2024 08:13) (14 - 18)  SpO2: 95% (11 Jan 2024 08:13) (94% - 100%)    Parameters below as of 11 Jan 2024 08:13  Patient On (Oxygen Delivery Method): room air      Daily Height in cm: 160.02 (10 Devon 2024 12:53)    Daily     GENERAL:  No acute distress, obese female, sitting on the bed.   HEENT:  NCAT, no scleral icterus  CHEST: no resp distress  HEART:  RRR  ABDOMEN:  Soft, well healing surgical scars covered with gauze, moderate tenderness in the LUQ region at the surgical site, no drainage or pus seen, non-distended, no masses  EXTREMITIES:  No LE edema b/l  SKIN:  No rash/erythema/ecchymoses/petechiae/wounds/abscess/warm/dry  NEURO:  Alert and oriented x 3, no tremor    LABS:                        12.9   14.62 )-----------( 188      ( 11 Jan 2024 06:43 )             38.6     Mean Cell Volume: 99.2 fl (01-11-24 @ 06:43)    01-11    138  |  102  |  4<L>  ----------------------------<  152<H>  3.3<L>   |  24  |  0.59    Ca    8.9      11 Jan 2024 06:41  Phos  2.8     01-11  Mg     1.9     01-11    TPro  6.5  /  Alb  3.6  /  TBili  4.1<H>  /  DBili  3.3<H>  /  AST  280<H>  /  ALT  744<H>  /  AlkPhos  240<H>  01-11    LIVER FUNCTIONS - ( 11 Jan 2024 06:41 )  Alb: 3.6 g/dL / Pro: 6.5 g/dL / ALK PHOS: 240 U/L / ALT: 744 U/L / AST: 280 U/L / GGT: x           PT/INR - ( 10 Devon 2024 07:36 )   PT: 11.0 sec;   INR: 1.05 ratio         PTT - ( 10 Devon 2024 07:36 )  PTT:27.6 sec  Urinalysis Basic - ( 11 Jan 2024 06:41 )    Color: x / Appearance: x / SG: x / pH: x  Gluc: 152 mg/dL / Ketone: x  / Bili: x / Urobili: x   Blood: x / Protein: x / Nitrite: x   Leuk Esterase: x / RBC: x / WBC x   Sq Epi: x / Non Sq Epi: x / Bacteria: x      Amylase Serum--      Lipase serum7       Ammonia--        Imaging:      CT A/p    FINDINGS:  LOWER CHEST: Within normal limits.    LIVER: Within normal limits.  BILE DUCTS: Normal caliber. CBD measures 7 mm.  GALLBLADDER: No radiopaque gallstones. Possibility of trace   pericholecystic fluid.  SPLEEN: Within normal limits.  PANCREAS: Within normal limits.  ADRENALS: Within normal limits.  KIDNEYS/URETERS: Horseshoe kidney.    BLADDER: Minimally distended.  REPRODUCTIVE ORGANS: Supracervical hysterectomy.    BOWEL: No bowel obstruction. Appendix is normal. Prior gastric bypass.  PERITONEUM: No ascites.  VESSELS: Within normal limits.  RETROPERITONEUM/LYMPH NODES: No lymphadenopathy.  ABDOMINAL WALL: Within normal limits.  BONES: Within normal limits.    IMPRESSION:  No evidence of bowel obstruction.  Trace pericholecystic fluid.  Gastric bypass.  Horseshoe kidney.

## 2024-01-11 NOTE — PHYSICAL THERAPY INITIAL EVALUATION ADULT - DID THE PATIENT HAVE SURGERY?
Laparoscopic Cholecystectomy with ERCP, Laparoscopic partial gastrectomy, Laporscopic Lysis of intestinal adhesions/yes

## 2024-01-11 NOTE — PROGRESS NOTE ADULT - SUBJECTIVE AND OBJECTIVE BOX
TEAM Team Surgery Daily Progress Note  =====================================================    SUBJECTIVE: Patient seen and examined at bedside on AM rounds.      ALLERGIES:  No Known Allergies      --------------------------------------------------------------------------------------    MEDICATIONS:    Neurologic Medications  acetaminophen   Oral Liquid .. 1000 milliGRAM(s) Oral every 6 hours  ondansetron Injectable 4 milliGRAM(s) IV Push every 6 hours PRN Nausea  oxyCODONE    IR 2.5 milliGRAM(s) Oral every 4 hours PRN Moderate Pain (4 - 6)  oxyCODONE    IR 5 milliGRAM(s) Oral every 6 hours PRN Severe Pain (7 - 10)    Respiratory Medications    Cardiovascular Medications    Gastrointestinal Medications  lactated ringers. 1000 milliLiter(s) IV Continuous <Continuous>  pantoprazole  Injectable 40 milliGRAM(s) IV Push daily    Genitourinary Medications    Hematologic/Oncologic Medications  heparin   Injectable 5000 Unit(s) SubCutaneous every 8 hours    Antimicrobial/Immunologic Medications  piperacillin/tazobactam IVPB.. 3.375 Gram(s) IV Intermittent every 8 hours    Endocrine/Metabolic Medications    Topical/Other Medications    --------------------------------------------------------------------------------------    VITAL SIGNS:  Vital Signs Last 24 Hrs  T(C): 36.9 (11 Jan 2024 00:00), Max: 36.9 (10 Devon 2024 04:27)  T(F): 98.4 (11 Jan 2024 00:00), Max: 98.5 (10 Devon 2024 04:27)  HR: 93 (11 Jan 2024 00:00) (67 - 93)  BP: 126/80 (11 Jan 2024 00:00) (105/71 - 140/78)  BP(mean): 101 (10 Devon 2024 20:15) (82 - 104)  RR: 18 (11 Jan 2024 00:00) (14 - 18)  SpO2: 94% (11 Jan 2024 00:00) (94% - 100%)    Parameters below as of 11 Jan 2024 00:00  Patient On (Oxygen Delivery Method): room air      --------------------------------------------------------------------------------------    EXAM    General: NAD, resting in bed comfortably.  Cardiac: regular rate, warm and well perfused  Respiratory: Nonlabored respirations, normal cw expansion.  Abdomen: softly distended, appropriately tender, surgical incisions are c/d/i with scant SS strikethrough   Extremities: normal strength, FROM, no deformities    --------------------------------------------------------------------------------------    LABS                          12.5   6.29  )-----------( 193      ( 10 Devon 2024 03:25 )             37.7     01-10    139  |  104  |  5<L>  ----------------------------<  110<H>  3.7   |  24  |  0.59    Ca    9.1      10 Devon 2024 03:25  Phos  3.0     01-10  Mg     1.7     01-10    TPro  6.3  /  Alb  3.6  /  TBili  4.3<H>  /  DBili  x   /  AST  1142<H>  /  ALT  1226<H>  /  AlkPhos  243<H>  01-10      --------------------------------------------------------------------------------------    INS AND OUTS:    I&O's Summary    09 Jan 2024 07:01  -  10 Devon 2024 07:00  --------------------------------------------------------  IN: 0 mL / OUT: 1 mL / NET: -1 mL    10 Devon 2024 07:01  -  11 Jan 2024 02:46  --------------------------------------------------------  IN: 0 mL / OUT: 500 mL / NET: -500 mL      -------------------------------------------------------------------------------------- TEAM Team Surgery Daily Progress Note  =====================================================    SUBJECTIVE: Patient seen and examined at bedside. No acute events overnight. pain well controlled. Pain is now only LUQ by the 15mm port site. RUQ pain is significantly better. +oob/amb, voiding well. No other complaints at this time.      ALLERGIES:  No Known Allergies      --------------------------------------------------------------------------------------    MEDICATIONS:    Neurologic Medications  acetaminophen   Oral Liquid .. 1000 milliGRAM(s) Oral every 6 hours  ondansetron Injectable 4 milliGRAM(s) IV Push every 6 hours PRN Nausea  oxyCODONE    IR 2.5 milliGRAM(s) Oral every 4 hours PRN Moderate Pain (4 - 6)  oxyCODONE    IR 5 milliGRAM(s) Oral every 6 hours PRN Severe Pain (7 - 10)    Respiratory Medications    Cardiovascular Medications    Gastrointestinal Medications  lactated ringers. 1000 milliLiter(s) IV Continuous <Continuous>  pantoprazole  Injectable 40 milliGRAM(s) IV Push daily    Genitourinary Medications    Hematologic/Oncologic Medications  heparin   Injectable 5000 Unit(s) SubCutaneous every 8 hours    Antimicrobial/Immunologic Medications  piperacillin/tazobactam IVPB.. 3.375 Gram(s) IV Intermittent every 8 hours    Endocrine/Metabolic Medications    Topical/Other Medications    --------------------------------------------------------------------------------------    VITAL SIGNS:  Vital Signs Last 24 Hrs  T(C): 36.9 (11 Jan 2024 00:00), Max: 36.9 (10 Devon 2024 04:27)  T(F): 98.4 (11 Jan 2024 00:00), Max: 98.5 (10 Devon 2024 04:27)  HR: 93 (11 Jan 2024 00:00) (67 - 93)  BP: 126/80 (11 Jan 2024 00:00) (105/71 - 140/78)  BP(mean): 101 (10 Devon 2024 20:15) (82 - 104)  RR: 18 (11 Jan 2024 00:00) (14 - 18)  SpO2: 94% (11 Jan 2024 00:00) (94% - 100%)    Parameters below as of 11 Jan 2024 00:00  Patient On (Oxygen Delivery Method): room air      --------------------------------------------------------------------------------------    EXAM    General: NAD, resting in bed comfortably.  Cardiac: regular rate, warm and well perfused  Respiratory: Nonlabored respirations, normal cw expansion.  Abdomen: softly distended, appropriately tender, surgical incisions are c/d/i with scant SS strikethrough   Extremities: normal strength, FROM, no deformities    --------------------------------------------------------------------------------------    LABS                          12.5   6.29  )-----------( 193      ( 10 Devon 2024 03:25 )             37.7     01-10    139  |  104  |  5<L>  ----------------------------<  110<H>  3.7   |  24  |  0.59    Ca    9.1      10 Devon 2024 03:25  Phos  3.0     01-10  Mg     1.7     01-10    TPro  6.3  /  Alb  3.6  /  TBili  4.3<H>  /  DBili  x   /  AST  1142<H>  /  ALT  1226<H>  /  AlkPhos  243<H>  01-10      --------------------------------------------------------------------------------------    INS AND OUTS:    I&O's Summary    09 Jan 2024 07:01  -  10 Devon 2024 07:00  --------------------------------------------------------  IN: 0 mL / OUT: 1 mL / NET: -1 mL    10 Devon 2024 07:01  -  11 Jan 2024 02:46  --------------------------------------------------------  IN: 0 mL / OUT: 500 mL / NET: -500 mL      --------------------------------------------------------------------------------------

## 2024-01-11 NOTE — PATIENT PROFILE ADULT - FALL HARM RISK - HARM RISK INTERVENTIONS
Communicate Risk of Fall with Harm to all staff/Reinforce activity limits and safety measures with patient and family/Tailored Fall Risk Interventions/Visual Cue: Yellow wristband and red socks/Bed in lowest position, wheels locked, appropriate side rails in place/Call bell, personal items and telephone in reach/Instruct patient to call for assistance before getting out of bed or chair/Non-slip footwear when patient is out of bed/[place] to call system/Physically safe environment - no spills, clutter or unnecessary equipment/Purposeful Proactive Rounding/Room/bathroom lighting operational, light cord in reach Communicate Risk of Fall with Harm to all staff/Reinforce activity limits and safety measures with patient and family/Tailored Fall Risk Interventions/Visual Cue: Yellow wristband and red socks/Bed in lowest position, wheels locked, appropriate side rails in place/Call bell, personal items and telephone in reach/Instruct patient to call for assistance before getting out of bed or chair/Non-slip footwear when patient is out of bed/[place] to call system/Physically safe environment - no spills, clutter or unnecessary equipment/Purposeful Proactive Rounding/Room/bathroom lighting operational, light cord in reach

## 2024-01-12 ENCOUNTER — TRANSCRIPTION ENCOUNTER (OUTPATIENT)
Age: 49
End: 2024-01-12

## 2024-01-12 LAB
ALBUMIN SERPL ELPH-MCNC: 3.4 G/DL — SIGNIFICANT CHANGE UP (ref 3.3–5)
ALBUMIN SERPL ELPH-MCNC: 3.4 G/DL — SIGNIFICANT CHANGE UP (ref 3.3–5)
ALP SERPL-CCNC: 189 U/L — HIGH (ref 40–120)
ALP SERPL-CCNC: 189 U/L — HIGH (ref 40–120)
ALT FLD-CCNC: 412 U/L — HIGH (ref 10–45)
ALT FLD-CCNC: 412 U/L — HIGH (ref 10–45)
ANION GAP SERPL CALC-SCNC: 7 MMOL/L — SIGNIFICANT CHANGE UP (ref 5–17)
ANION GAP SERPL CALC-SCNC: 7 MMOL/L — SIGNIFICANT CHANGE UP (ref 5–17)
AST SERPL-CCNC: 60 U/L — HIGH (ref 10–40)
AST SERPL-CCNC: 60 U/L — HIGH (ref 10–40)
BILIRUB SERPL-MCNC: 1.3 MG/DL — HIGH (ref 0.2–1.2)
BILIRUB SERPL-MCNC: 1.3 MG/DL — HIGH (ref 0.2–1.2)
BUN SERPL-MCNC: <4 MG/DL — LOW (ref 7–23)
BUN SERPL-MCNC: <4 MG/DL — LOW (ref 7–23)
CALCIUM SERPL-MCNC: 8.8 MG/DL — SIGNIFICANT CHANGE UP (ref 8.4–10.5)
CALCIUM SERPL-MCNC: 8.8 MG/DL — SIGNIFICANT CHANGE UP (ref 8.4–10.5)
CHLORIDE SERPL-SCNC: 103 MMOL/L — SIGNIFICANT CHANGE UP (ref 96–108)
CHLORIDE SERPL-SCNC: 103 MMOL/L — SIGNIFICANT CHANGE UP (ref 96–108)
CO2 SERPL-SCNC: 27 MMOL/L — SIGNIFICANT CHANGE UP (ref 22–31)
CO2 SERPL-SCNC: 27 MMOL/L — SIGNIFICANT CHANGE UP (ref 22–31)
CREAT SERPL-MCNC: 0.53 MG/DL — SIGNIFICANT CHANGE UP (ref 0.5–1.3)
CREAT SERPL-MCNC: 0.53 MG/DL — SIGNIFICANT CHANGE UP (ref 0.5–1.3)
EGFR: 113 ML/MIN/1.73M2 — SIGNIFICANT CHANGE UP
EGFR: 113 ML/MIN/1.73M2 — SIGNIFICANT CHANGE UP
GLUCOSE SERPL-MCNC: 104 MG/DL — HIGH (ref 70–99)
GLUCOSE SERPL-MCNC: 104 MG/DL — HIGH (ref 70–99)
HCT VFR BLD CALC: 36 % — SIGNIFICANT CHANGE UP (ref 34.5–45)
HCT VFR BLD CALC: 36 % — SIGNIFICANT CHANGE UP (ref 34.5–45)
HGB BLD-MCNC: 11.7 G/DL — SIGNIFICANT CHANGE UP (ref 11.5–15.5)
HGB BLD-MCNC: 11.7 G/DL — SIGNIFICANT CHANGE UP (ref 11.5–15.5)
MAGNESIUM SERPL-MCNC: 2 MG/DL — SIGNIFICANT CHANGE UP (ref 1.6–2.6)
MAGNESIUM SERPL-MCNC: 2 MG/DL — SIGNIFICANT CHANGE UP (ref 1.6–2.6)
MCHC RBC-ENTMCNC: 32.5 GM/DL — SIGNIFICANT CHANGE UP (ref 32–36)
MCHC RBC-ENTMCNC: 32.5 GM/DL — SIGNIFICANT CHANGE UP (ref 32–36)
MCHC RBC-ENTMCNC: 32.5 PG — SIGNIFICANT CHANGE UP (ref 27–34)
MCHC RBC-ENTMCNC: 32.5 PG — SIGNIFICANT CHANGE UP (ref 27–34)
MCV RBC AUTO: 100 FL — SIGNIFICANT CHANGE UP (ref 80–100)
MCV RBC AUTO: 100 FL — SIGNIFICANT CHANGE UP (ref 80–100)
NRBC # BLD: 0 /100 WBCS — SIGNIFICANT CHANGE UP (ref 0–0)
NRBC # BLD: 0 /100 WBCS — SIGNIFICANT CHANGE UP (ref 0–0)
PHOSPHATE SERPL-MCNC: 1.7 MG/DL — LOW (ref 2.5–4.5)
PHOSPHATE SERPL-MCNC: 1.7 MG/DL — LOW (ref 2.5–4.5)
PLATELET # BLD AUTO: 197 K/UL — SIGNIFICANT CHANGE UP (ref 150–400)
PLATELET # BLD AUTO: 197 K/UL — SIGNIFICANT CHANGE UP (ref 150–400)
POTASSIUM SERPL-MCNC: 3.8 MMOL/L — SIGNIFICANT CHANGE UP (ref 3.5–5.3)
POTASSIUM SERPL-MCNC: 3.8 MMOL/L — SIGNIFICANT CHANGE UP (ref 3.5–5.3)
POTASSIUM SERPL-SCNC: 3.8 MMOL/L — SIGNIFICANT CHANGE UP (ref 3.5–5.3)
POTASSIUM SERPL-SCNC: 3.8 MMOL/L — SIGNIFICANT CHANGE UP (ref 3.5–5.3)
PROT SERPL-MCNC: 6.1 G/DL — SIGNIFICANT CHANGE UP (ref 6–8.3)
PROT SERPL-MCNC: 6.1 G/DL — SIGNIFICANT CHANGE UP (ref 6–8.3)
RBC # BLD: 3.6 M/UL — LOW (ref 3.8–5.2)
RBC # BLD: 3.6 M/UL — LOW (ref 3.8–5.2)
RBC # FLD: 11.5 % — SIGNIFICANT CHANGE UP (ref 10.3–14.5)
RBC # FLD: 11.5 % — SIGNIFICANT CHANGE UP (ref 10.3–14.5)
SODIUM SERPL-SCNC: 137 MMOL/L — SIGNIFICANT CHANGE UP (ref 135–145)
SODIUM SERPL-SCNC: 137 MMOL/L — SIGNIFICANT CHANGE UP (ref 135–145)
WBC # BLD: 10.77 K/UL — HIGH (ref 3.8–10.5)
WBC # BLD: 10.77 K/UL — HIGH (ref 3.8–10.5)
WBC # FLD AUTO: 10.77 K/UL — HIGH (ref 3.8–10.5)
WBC # FLD AUTO: 10.77 K/UL — HIGH (ref 3.8–10.5)

## 2024-01-12 PROCEDURE — 99232 SBSQ HOSP IP/OBS MODERATE 35: CPT | Mod: GC

## 2024-01-12 RX ORDER — POTASSIUM PHOSPHATE, MONOBASIC POTASSIUM PHOSPHATE, DIBASIC 236; 224 MG/ML; MG/ML
30 INJECTION, SOLUTION INTRAVENOUS ONCE
Refills: 0 | Status: DISCONTINUED | OUTPATIENT
Start: 2024-01-12 | End: 2024-01-12

## 2024-01-12 RX ORDER — SIMETHICONE 80 MG/1
80 TABLET, CHEWABLE ORAL EVERY 8 HOURS
Refills: 0 | Status: DISCONTINUED | OUTPATIENT
Start: 2024-01-12 | End: 2024-01-13

## 2024-01-12 RX ADMIN — DEXTROSE MONOHYDRATE, SODIUM CHLORIDE, AND POTASSIUM CHLORIDE 75 MILLILITER(S): 50; .745; 4.5 INJECTION, SOLUTION INTRAVENOUS at 13:04

## 2024-01-12 RX ADMIN — OXYCODONE HYDROCHLORIDE 5 MILLIGRAM(S): 5 TABLET ORAL at 06:44

## 2024-01-12 RX ADMIN — Medication 1000 MILLIGRAM(S): at 05:44

## 2024-01-12 RX ADMIN — Medication 1000 MILLIGRAM(S): at 18:19

## 2024-01-12 RX ADMIN — OXYCODONE HYDROCHLORIDE 5 MILLIGRAM(S): 5 TABLET ORAL at 05:44

## 2024-01-12 RX ADMIN — Medication 1000 MILLIGRAM(S): at 17:19

## 2024-01-12 RX ADMIN — ONDANSETRON 4 MILLIGRAM(S): 8 TABLET, FILM COATED ORAL at 06:41

## 2024-01-12 RX ADMIN — OXYCODONE HYDROCHLORIDE 5 MILLIGRAM(S): 5 TABLET ORAL at 19:59

## 2024-01-12 RX ADMIN — Medication 85 MILLIMOLE(S): at 16:08

## 2024-01-12 RX ADMIN — HEPARIN SODIUM 5000 UNIT(S): 5000 INJECTION INTRAVENOUS; SUBCUTANEOUS at 13:45

## 2024-01-12 RX ADMIN — Medication 1000 MILLIGRAM(S): at 00:02

## 2024-01-12 RX ADMIN — HEPARIN SODIUM 5000 UNIT(S): 5000 INJECTION INTRAVENOUS; SUBCUTANEOUS at 21:03

## 2024-01-12 RX ADMIN — Medication 1000 MILLIGRAM(S): at 13:56

## 2024-01-12 RX ADMIN — HEPARIN SODIUM 5000 UNIT(S): 5000 INJECTION INTRAVENOUS; SUBCUTANEOUS at 05:45

## 2024-01-12 RX ADMIN — OXYCODONE HYDROCHLORIDE 5 MILLIGRAM(S): 5 TABLET ORAL at 13:56

## 2024-01-12 RX ADMIN — OXYCODONE HYDROCHLORIDE 5 MILLIGRAM(S): 5 TABLET ORAL at 21:00

## 2024-01-12 RX ADMIN — Medication 1000 MILLIGRAM(S): at 12:56

## 2024-01-12 RX ADMIN — PANTOPRAZOLE SODIUM 40 MILLIGRAM(S): 20 TABLET, DELAYED RELEASE ORAL at 12:58

## 2024-01-12 RX ADMIN — Medication 1000 MILLIGRAM(S): at 06:44

## 2024-01-12 RX ADMIN — Medication 1000 MILLIGRAM(S): at 23:03

## 2024-01-12 RX ADMIN — Medication 1000 MILLIGRAM(S): at 23:33

## 2024-01-12 RX ADMIN — OXYCODONE HYDROCHLORIDE 5 MILLIGRAM(S): 5 TABLET ORAL at 12:56

## 2024-01-12 NOTE — DISCHARGE NOTE PROVIDER - NSDCCPTREATMENT_GEN_ALL_CORE_FT
PRINCIPAL PROCEDURE  Procedure: Cholecystectomy, laparoscopic, with ERCP  Findings and Treatment: WOUND CARE: Staples will be removed at follow up office visit.    BATHING: Please do not submerge wound underwater. You may shower and/or sponge bathe.  ACTIVITY: No heavy lifting anything more than 10-15lbs or straining. Otherwise, you may return to your usual level of physical activity. If you are taking narcotic pain medication (such as Percocet), do NOT drive a car, operate machinery or make important decisions.  DIET: low fat diet  NOTIFY YOUR SURGEON IF: You have any bleeding that does not stop, any pus draining from your wound, any fever (over 100.4 F) or chills, persistent nausea/vomiting with inability to tolerate food or liquids, persistent diarrhea, or if your pain is not controlled on your discharge pain medications.  FOLLOW-UP:  1. Please call to make a follow-up appointment within one week of discharge   2. Please follow up with your primary care physician in one week regarding your hospitalization.       PRINCIPAL PROCEDURE  Procedure: Cholecystectomy, laparoscopic, with ERCP  Findings and Treatment: WOUND CARE: Steri strips will fall off on their own or be removed at follow up office visit.    BATHING: Please do not submerge wound underwater. You may shower and/or sponge bathe.  ACTIVITY: No heavy lifting anything more than 10-15lbs or straining. Otherwise, you may return to your usual level of physical activity. If you are taking narcotic pain medication (such as Percocet), do NOT drive a car, operate machinery or make important decisions.  DIET: low fat diet  NOTIFY YOUR SURGEON IF: You have any bleeding that does not stop, any pus draining from your wound, any fever (over 100.4 F) or chills, persistent nausea/vomiting with inability to tolerate food or liquids, persistent diarrhea, or if your pain is not controlled on your discharge pain medications.  FOLLOW-UP:  1. Please call office on Tuesday to make a follow-up appointment with Dr. Field within 1-2 weeks of discharge   2. Please follow up with your primary care physician in one week regarding your hospitalization.

## 2024-01-12 NOTE — DISCHARGE NOTE PROVIDER - PROVIDER TOKENS
PROVIDER:[TOKEN:[[id number]],FOLLOWUP:[2 weeks]] PROVIDER:[TOKEN:[[id number]],FOLLOWUP:[2 weeks]]

## 2024-01-12 NOTE — PROGRESS NOTE ADULT - SUBJECTIVE AND OBJECTIVE BOX
Gastroenterology/Hepatology Progress Note      Interval Events:     - Reported she still has mild pain in the LLQ but passing flatus now. No bowel movements.   - no nausea or vomiting  - no fevers or chills.     Allergies:  No Known Allergies      Hospital Medications:  acetaminophen   Oral Liquid .. 1000 milliGRAM(s) Oral every 6 hours  dextrose 5% + sodium chloride 0.45% with potassium chloride 20 mEq/L 1000 milliLiter(s) IV Continuous <Continuous>  heparin   Injectable 5000 Unit(s) SubCutaneous every 8 hours  ondansetron Injectable 4 milliGRAM(s) IV Push every 6 hours PRN  oxyCODONE    IR 2.5 milliGRAM(s) Oral every 4 hours PRN  oxyCODONE    IR 5 milliGRAM(s) Oral every 6 hours PRN  pantoprazole  Injectable 40 milliGRAM(s) IV Push daily  potassium phosphate IVPB 30 milliMole(s) IV Intermittent once  simethicone 80 milliGRAM(s) Chew every 8 hours PRN    ROS: 14 point ROS negative unless otherwise state in subjective    PHYSICAL EXAM:   Vital Signs:  Vital Signs Last 24 Hrs  T(C): 36.6 (12 Jan 2024 08:23), Max: 36.8 (11 Jan 2024 20:55)  T(F): 97.8 (12 Jan 2024 08:23), Max: 98.3 (12 Jan 2024 00:36)  HR: 73 (12 Jan 2024 08:23) (73 - 97)  BP: 118/81 (12 Jan 2024 08:23) (105/68 - 133/66)  BP(mean): --  RR: 18 (12 Jan 2024 08:23) (18 - 18)  SpO2: 97% (12 Jan 2024 08:23) (95% - 98%)    Parameters below as of 12 Jan 2024 08:23  Patient On (Oxygen Delivery Method): room air      Daily     Daily     GENERAL:  No acute distress, obese female, sitting on the bed.   HEENT:  NCAT, no scleral icterus  CHEST: no resp distress  HEART:  RRR  ABDOMEN:  Soft, well healing surgical scars covered with gauze, mild tenderness in the LUQ region at the surgical site, no drainage or pus seen, non-distended, no masses  EXTREMITIES:  No LE edema b/l  SKIN:  No rash/erythema/ecchymoses/petechiae/wounds/abscess/warm/dry  NEURO:  Alert and oriented x 3, no tremor      LABS:                        11.7   10.77 )-----------( 197      ( 12 Jan 2024 12:42 )             36.0     Mean Cell Volume: 100.0 fl (01-12-24 @ 12:42)    01-12    137  |  103  |  <4<L>  ----------------------------<  104<H>  3.8   |  27  |  0.53    Ca    8.8      12 Jan 2024 12:42  Phos  1.7     01-12  Mg     2.0     01-12    TPro  6.1  /  Alb  3.4  /  TBili  1.3<H>  /  DBili  x   /  AST  60<H>  /  ALT  412<H>  /  AlkPhos  189<H>  01-12    LIVER FUNCTIONS - ( 12 Jan 2024 12:42 )  Alb: 3.4 g/dL / Pro: 6.1 g/dL / ALK PHOS: 189 U/L / ALT: 412 U/L / AST: 60 U/L / GGT: x             Urinalysis Basic - ( 12 Jan 2024 12:42 )    Color: x / Appearance: x / SG: x / pH: x  Gluc: 104 mg/dL / Ketone: x  / Bili: x / Urobili: x   Blood: x / Protein: x / Nitrite: x   Leuk Esterase: x / RBC: x / WBC x   Sq Epi: x / Non Sq Epi: x / Bacteria: x            Imaging:    ERCP on 1/10/24  Impression:          EGD:                       - Post gastric bypass excluded stomach                       ERCP:                       - The major papilla was prominent.                       - Choledocholithiasis was found in a mildly dilated common bile duct.                        Complete removal was accomplished by biliary sphincterotomy and balloon                        extraction.

## 2024-01-12 NOTE — PROGRESS NOTE ADULT - ASSESSMENT
Impression:     #Acute cholecystitis  #Choledocholithiasis  RUQ US revealed CBD 1.3cm, 5mm wall thickening, gallstones. Mild elevation in bilirubin and liver enzymes.   - s/p ERCP on 1/10 in the OR during the lap tray - removed stones from the CBD.   - Bili and liver enzymes have been downtrending.     Recommendations:   - No further endoscopic intervention for now.   - Diet as per surgery.   - continue with abx.   - LFTs and CBC daily.     All recommendations are tentative until note is attested by an attending.     Anna Prasad, PGY-5  Gastroenterology/Hepatology Fellow  Available on [employer] Teams  [phone] (Short Range Pager)  [phone] (Long Range Pager)    After 5pm, please contact the on-call GI fellow. [phone] Impression:     #Acute cholecystitis  #Choledocholithiasis  RUQ US revealed CBD 1.3cm, 5mm wall thickening, gallstones. Mild elevation in bilirubin and liver enzymes.   - s/p ERCP on 1/10 in the OR during the lap tray - removed stones from the CBD.   - Bili and liver enzymes have been downtrending.     Recommendations:   - No further endoscopic intervention for now.   - Diet as per surgery.   - continue with abx.   - LFTs and CBC daily.     All recommendations are tentative until note is attested by an attending.     Anna Prasad, PGY-5  Gastroenterology/Hepatology Fellow  Available on [employer] Teams  [phone] (Short Range Pager)  [phone] (Long Range Pager)    After 5pm, please contact the on-call GI fellow. [phone]

## 2024-01-12 NOTE — PROGRESS NOTE ADULT - ASSESSMENT
48y Female patient  PMH GERD, KATHLEEN, obesity with PSH robotic gastric sleeve (5/22) revised to RnY (1/11/23), SBO DONNA (5/2023) now S/P laparoscopic cholecystectomy and partial gastrectomy.     Plan:  - Regular diet  - IVF  - prn pain and nausea control   - encourage oob/amb  - continue IS use  - SCD/SQH    Zen Pena  PGY1  [id number]   48y Female patient  PMH GERD, KATHLEEN, obesity with PSH robotic gastric sleeve (5/22) revised to RnY (1/11/23), SBO DONNA (5/2023) now S/P laparoscopic cholecystectomy and partial gastrectomy.     Plan:  - Regular diet  - IVF  - prn pain and nausea control   - encourage oob/amb  - continue IS use  - SCD/SQH    Zen Pena  PGY1  [id number]   48y Female patient  PMH GERD, KATHLEEN, obesity with PSH robotic gastric sleeve (5/22) revised to RnY (1/11/23), SBO DONNA (5/2023) now S/P laparoscopic cholecystectomy and partial gastrectomy.     Plan:  - Regular diet  - IVF  - Pain control, add simethicone  - OOBA  - SCD/SQH  - PT eval: no skilled needs  - Like d/c later today    [facility]   48y Female patient  PMH GERD, KATHLEEN, obesity with PSH robotic gastric sleeve (5/22) revised to RnY (1/11/23), SBO DONNA (5/2023) now S/P laparoscopic cholecystectomy and partial gastrectomy.     Plan:  - Regular diet  - IVF  - Pain control, add simethicone  - OOBA  - SCD/SQH  - PT eval: no skilled needs  - Like d/c later today    [facility]   48y Female patient  PMH GERD, KATHLEEN, obesity with PSH robotic gastric sleeve (5/22) revised to RnY (1/11/23), SBO DONNA (5/2023) now S/P laparoscopic cholecystectomy and partial gastrectomy.     Plan:  - Tbili, LFTs downtrending  - Regular diet  - IVF  - Pain control, add simethicone  - OOBA  - SCD/SQH  - PT eval: no skilled needs  - D/c home today vs tomorrow    [id number]   48y Female patient  PMH GERD, KATHLEEN, obesity with PSH robotic gastric sleeve (5/22) revised to RnY (1/11/23), SBO DONNA (5/2023) now S/P laparoscopic cholecystectomy and partial gastrectomy.     Plan:  - Tbili, LFTs downtrending  - Regular diet  - IVF  - Pain control, add simethicone  - OOBA  - SCD/SQH  - PT eval: no skilled needs  - D/c home today vs tomorrow    [id number]

## 2024-01-12 NOTE — DISCHARGE NOTE PROVIDER - HOSPITAL COURSE
48 year old female with PMH GERD, KATHLEEN, obesity with PSH robotic gastric sleeve (5/22) revised to RnY (1/11/23), SBO s/p DONNA (5/2023), presents to ED today with abdominal pain since this morning with associated nausea. Exam with RUQ abdominal pain and epigastric pain, positive fernández sign on exam, labs with leukocytosis w/ left shift, transaminitis, and hyperbilirubinemia. RUQ US revealed CBD 1.3cm, 5mm wall thickening, gallstones, and CTAP revealed no visible stones and trace pericholecystic fluid. Findings concerning for cholecystitis and choledocholithiasis. Bariatric surgery consulted for management.     On 1/10 patient underwent ERCP with Post gastric bypass excluded stomach. Choledocholithiasis was found in a mildly dilated common bile duct. Complete removal was accomplished by biliary sphincterotomy and balloon extraction.  On 1/10 patient underwent laparoscopic cholecystectomy, partial gastrectomy, and lysis of adhesions. The patient tolerated the procedure well without complications, was extubated, and transferred to the PACU in stable condition. Diet was advanced as tolerated and patient was treated with pain control. Patient to follow up with Dr. Field in 1-2 weeks.

## 2024-01-12 NOTE — PROGRESS NOTE ADULT - SUBJECTIVE AND OBJECTIVE BOX
[facility] Daily Progress Note  =====================================================    SUBJECTIVE:      ALLERGIES:  No Known Allergies      --------------------------------------------------------------------------------------    MEDICATIONS:    Neurologic Medications  acetaminophen   Oral Liquid .. 1000 milliGRAM(s) Oral every 6 hours  ondansetron Injectable 4 milliGRAM(s) IV Push every 6 hours PRN Nausea  oxyCODONE    IR 2.5 milliGRAM(s) Oral every 4 hours PRN Moderate Pain (4 - 6)  oxyCODONE    IR 5 milliGRAM(s) Oral every 6 hours PRN Severe Pain (7 - 10)    Respiratory Medications    Cardiovascular Medications    Gastrointestinal Medications  lactated ringers. 1000 milliLiter(s) IV Continuous <Continuous>  pantoprazole  Injectable 40 milliGRAM(s) IV Push daily    Genitourinary Medications    Hematologic/Oncologic Medications  heparin   Injectable 5000 Unit(s) SubCutaneous every 8 hours    Antimicrobial/Immunologic Medications  piperacillin/tazobactam IVPB.. 3.375 Gram(s) IV Intermittent every 8 hours    Endocrine/Metabolic Medications    Topical/Other Medications    --------------------------------------------------------------------------------------    VITAL SIGNS:  Vital Signs Last 24 Hrs  T(C): 36.8 (12 Jan 2024 00:36), Max: 36.8 (11 Jan 2024 08:13)  T(F): 98.3 (12 Jan 2024 00:36), Max: 98.3 (11 Jan 2024 12:28)  HR: 97 (12 Jan 2024 00:36) (77 - 97)  BP: 133/66 (12 Jan 2024 00:36) (105/68 - 133/66)  BP(mean): --  RR: 18 (12 Jan 2024 00:36) (18 - 18)  SpO2: 95% (12 Jan 2024 00:36) (95% - 96%)    Parameters below as of 12 Jan 2024 00:36  Patient On (Oxygen Delivery Method): room air    --------------------------------------------------------------------------------------    EXAM    General: NAD, resting in bed comfortably.  Cardiac: regular rate, warm and well perfused  Respiratory: Nonlabored respirations, normal cw expansion.  Abdomen: softly distended, appropriately tender, surgical incisions are c/d/i with scant SS strikethrough   Extremities: normal strength, FROM, no deformities    --------------------------------------------------------------------------------------    LABS                        12.9   14.62 )-----------( 188      ( 11 Jan 2024 06:43 )             38.6     01-11    138  |  102  |  4<L>  ----------------------------<  152<H>  3.3<L>   |  24  |  0.59    Ca    8.9      11 Jan 2024 06:41  Phos  2.8     01-11  Mg     1.9     01-11    TPro  6.5  /  Alb  3.6  /  TBili  4.1<H>  /  DBili  3.3<H>  /  AST  280<H>  /  ALT  744<H>  /  AlkPhos  240<H>  01-11    --------------------------------------------------------------------------------------    INS AND OUTS:  I&O's Summary    10 Devon 2024 07:01  -  11 Jan 2024 07:00  --------------------------------------------------------  IN: 200 mL / OUT: 500 mL / NET: -300 mL    11 Jan 2024 07:01  -  12 Jan 2024 02:06  --------------------------------------------------------  IN: 940 mL / OUT: 850 mL / NET: 90 mL          -------------------------------------------------------------------------------------- [facility] Daily Progress Note  =====================================================    SUBJECTIVE: Reporting gas pain in lower abdomen. Not much of an appetite, had some fruit. No nausea or vomiting.       ALLERGIES:  No Known Allergies      --------------------------------------------------------------------------------------    MEDICATIONS:    Neurologic Medications  acetaminophen   Oral Liquid .. 1000 milliGRAM(s) Oral every 6 hours  ondansetron Injectable 4 milliGRAM(s) IV Push every 6 hours PRN Nausea  oxyCODONE    IR 2.5 milliGRAM(s) Oral every 4 hours PRN Moderate Pain (4 - 6)  oxyCODONE    IR 5 milliGRAM(s) Oral every 6 hours PRN Severe Pain (7 - 10)    Respiratory Medications    Cardiovascular Medications    Gastrointestinal Medications  lactated ringers. 1000 milliLiter(s) IV Continuous <Continuous>  pantoprazole  Injectable 40 milliGRAM(s) IV Push daily    Genitourinary Medications    Hematologic/Oncologic Medications  heparin   Injectable 5000 Unit(s) SubCutaneous every 8 hours    Antimicrobial/Immunologic Medications  piperacillin/tazobactam IVPB.. 3.375 Gram(s) IV Intermittent every 8 hours    Endocrine/Metabolic Medications    Topical/Other Medications    --------------------------------------------------------------------------------------    VITAL SIGNS:  Vital Signs Last 24 Hrs  T(C): 36.8 (12 Jan 2024 00:36), Max: 36.8 (11 Jan 2024 08:13)  T(F): 98.3 (12 Jan 2024 00:36), Max: 98.3 (11 Jan 2024 12:28)  HR: 97 (12 Jan 2024 00:36) (77 - 97)  BP: 133/66 (12 Jan 2024 00:36) (105/68 - 133/66)  BP(mean): --  RR: 18 (12 Jan 2024 00:36) (18 - 18)  SpO2: 95% (12 Jan 2024 00:36) (95% - 96%)    Parameters below as of 12 Jan 2024 00:36  Patient On (Oxygen Delivery Method): room air    --------------------------------------------------------------------------------------    EXAM    General: Sitting in chair, NAD  Respiratory: Breathing comfortably on room air  Abdomen: softly distended, appropriately tender around incisions, steri strips over incisions with scant SS strikethrough     --------------------------------------------------------------------------------------    LABS                        12.9   14.62 )-----------( 188      ( 11 Jan 2024 06:43 )             38.6     01-11    138  |  102  |  4<L>  ----------------------------<  152<H>  3.3<L>   |  24  |  0.59    Ca    8.9      11 Jan 2024 06:41  Phos  2.8     01-11  Mg     1.9     01-11    TPro  6.5  /  Alb  3.6  /  TBili  4.1<H>  /  DBili  3.3<H>  /  AST  280<H>  /  ALT  744<H>  /  AlkPhos  240<H>  01-11    --------------------------------------------------------------------------------------    INS AND OUTS:  I&O's Summary    10 Devon 2024 07:01  -  11 Jan 2024 07:00  --------------------------------------------------------  IN: 200 mL / OUT: 500 mL / NET: -300 mL    11 Jan 2024 07:01  -  12 Jan 2024 02:06  --------------------------------------------------------  IN: 940 mL / OUT: 850 mL / NET: 90 mL          --------------------------------------------------------------------------------------

## 2024-01-12 NOTE — DISCHARGE NOTE PROVIDER - CARE PROVIDER_API CALL
Rahul Field  Surgery  [address] [place] [address], [facility] [address]  [place] [address]  Phone: [phone]  Fax: [phone]  Follow Up Time: 2 weeks   Rahul Field  Surgery  [address] [place] [address], [facility] [address]  [place] [address]  Phone: [phone]  Fax: [phone]  Follow Up Time: 2 weeks

## 2024-01-12 NOTE — DISCHARGE NOTE PROVIDER - CARE PROVIDERS DIRECT ADDRESSES
[email or web address]

## 2024-01-13 ENCOUNTER — TRANSCRIPTION ENCOUNTER (OUTPATIENT)
Age: 49
End: 2024-01-13

## 2024-01-13 VITALS
RESPIRATION RATE: 18 BRPM | TEMPERATURE: 98 F | OXYGEN SATURATION: 97 % | DIASTOLIC BLOOD PRESSURE: 74 MMHG | HEART RATE: 67 BPM | SYSTOLIC BLOOD PRESSURE: 119 MMHG

## 2024-01-13 PROCEDURE — 88304 TISSUE EXAM BY PATHOLOGIST: CPT

## 2024-01-13 PROCEDURE — 86850 RBC ANTIBODY SCREEN: CPT

## 2024-01-13 PROCEDURE — 85018 HEMOGLOBIN: CPT

## 2024-01-13 PROCEDURE — 84295 ASSAY OF SERUM SODIUM: CPT

## 2024-01-13 PROCEDURE — 82803 BLOOD GASES ANY COMBINATION: CPT

## 2024-01-13 PROCEDURE — 85730 THROMBOPLASTIN TIME PARTIAL: CPT

## 2024-01-13 PROCEDURE — 36415 COLL VENOUS BLD VENIPUNCTURE: CPT

## 2024-01-13 PROCEDURE — C1889: CPT

## 2024-01-13 PROCEDURE — 83735 ASSAY OF MAGNESIUM: CPT

## 2024-01-13 PROCEDURE — 76705 ECHO EXAM OF ABDOMEN: CPT

## 2024-01-13 PROCEDURE — 87637 SARSCOV2&INF A&B&RSV AMP PRB: CPT

## 2024-01-13 PROCEDURE — 84702 CHORIONIC GONADOTROPIN TEST: CPT

## 2024-01-13 PROCEDURE — 71045 X-RAY EXAM CHEST 1 VIEW: CPT

## 2024-01-13 PROCEDURE — 76000 FLUOROSCOPY <1 HR PHYS/QHP: CPT

## 2024-01-13 PROCEDURE — 74177 CT ABD & PELVIS W/CONTRAST: CPT | Mod: MA

## 2024-01-13 PROCEDURE — C1758: CPT

## 2024-01-13 PROCEDURE — 97161 PT EVAL LOW COMPLEX 20 MIN: CPT

## 2024-01-13 PROCEDURE — 86901 BLOOD TYPING SEROLOGIC RH(D): CPT

## 2024-01-13 PROCEDURE — 96375 TX/PRO/DX INJ NEW DRUG ADDON: CPT

## 2024-01-13 PROCEDURE — 96374 THER/PROPH/DIAG INJ IV PUSH: CPT

## 2024-01-13 PROCEDURE — 85610 PROTHROMBIN TIME: CPT

## 2024-01-13 PROCEDURE — 80076 HEPATIC FUNCTION PANEL: CPT

## 2024-01-13 PROCEDURE — C1769: CPT

## 2024-01-13 PROCEDURE — 84100 ASSAY OF PHOSPHORUS: CPT

## 2024-01-13 PROCEDURE — 85027 COMPLETE CBC AUTOMATED: CPT

## 2024-01-13 PROCEDURE — 85025 COMPLETE CBC W/AUTO DIFF WBC: CPT

## 2024-01-13 PROCEDURE — 86900 BLOOD TYPING SEROLOGIC ABO: CPT

## 2024-01-13 PROCEDURE — 74300 X-RAY BILE DUCTS/PANCREAS: CPT

## 2024-01-13 PROCEDURE — 83605 ASSAY OF LACTIC ACID: CPT

## 2024-01-13 PROCEDURE — C9399: CPT

## 2024-01-13 PROCEDURE — 88307 TISSUE EXAM BY PATHOLOGIST: CPT

## 2024-01-13 PROCEDURE — 85014 HEMATOCRIT: CPT

## 2024-01-13 PROCEDURE — 84132 ASSAY OF SERUM POTASSIUM: CPT

## 2024-01-13 PROCEDURE — 82330 ASSAY OF CALCIUM: CPT

## 2024-01-13 PROCEDURE — 81003 URINALYSIS AUTO W/O SCOPE: CPT

## 2024-01-13 PROCEDURE — 80053 COMPREHEN METABOLIC PANEL: CPT

## 2024-01-13 PROCEDURE — 82435 ASSAY OF BLOOD CHLORIDE: CPT

## 2024-01-13 PROCEDURE — 83690 ASSAY OF LIPASE: CPT

## 2024-01-13 PROCEDURE — 99285 EMERGENCY DEPT VISIT HI MDM: CPT | Mod: 25

## 2024-01-13 PROCEDURE — 93005 ELECTROCARDIOGRAM TRACING: CPT

## 2024-01-13 PROCEDURE — 84484 ASSAY OF TROPONIN QUANT: CPT

## 2024-01-13 PROCEDURE — 82947 ASSAY GLUCOSE BLOOD QUANT: CPT

## 2024-01-13 PROCEDURE — 80048 BASIC METABOLIC PNL TOTAL CA: CPT

## 2024-01-13 PROCEDURE — 87086 URINE CULTURE/COLONY COUNT: CPT

## 2024-01-13 RX ORDER — OXYCODONE HYDROCHLORIDE 5 MG/1
5 TABLET ORAL
Qty: 75 | Refills: 0
Start: 2024-01-13

## 2024-01-13 RX ADMIN — OXYCODONE HYDROCHLORIDE 5 MILLIGRAM(S): 5 TABLET ORAL at 11:24

## 2024-01-13 RX ADMIN — Medication 1000 MILLIGRAM(S): at 11:57

## 2024-01-13 RX ADMIN — Medication 1000 MILLIGRAM(S): at 12:57

## 2024-01-13 RX ADMIN — Medication 1000 MILLIGRAM(S): at 05:11

## 2024-01-13 RX ADMIN — HEPARIN SODIUM 5000 UNIT(S): 5000 INJECTION INTRAVENOUS; SUBCUTANEOUS at 05:11

## 2024-01-13 RX ADMIN — OXYCODONE HYDROCHLORIDE 5 MILLIGRAM(S): 5 TABLET ORAL at 04:25

## 2024-01-13 RX ADMIN — Medication 1000 MILLIGRAM(S): at 05:25

## 2024-01-13 RX ADMIN — HEPARIN SODIUM 5000 UNIT(S): 5000 INJECTION INTRAVENOUS; SUBCUTANEOUS at 13:45

## 2024-01-13 RX ADMIN — OXYCODONE HYDROCHLORIDE 5 MILLIGRAM(S): 5 TABLET ORAL at 10:24

## 2024-01-13 RX ADMIN — PANTOPRAZOLE SODIUM 40 MILLIGRAM(S): 20 TABLET, DELAYED RELEASE ORAL at 11:58

## 2024-01-13 NOTE — PROGRESS NOTE ADULT - ASSESSMENT
48y Female patient  PMH GERD, KATHLEEN, obesity with PSH robotic gastric sleeve (5/22) revised to RnY (1/11/23), SBO DONNA (5/2023) now S/P laparoscopic cholecystectomy and partial gastrectomy.     Plan:  - Tbili, LFTs downtrending  - Regular diet  - IVF  - Pain control  - OOBA  - SCD/SQH  - PT eval: no skilled needs  - D/c home today     [facility] 4384   48y Female patient  PMH GERD, KATHLEEN, obesity with PSH robotic gastric sleeve (5/22) revised to RnY (1/11/23), SBO DONNA (5/2023) now S/P laparoscopic cholecystectomy and partial gastrectomy.     Plan:  - Tbili, LFTs downtrending  - Regular diet  - IVF  - Pain control  - OOBA  - SCD/SQH  - PT eval: no skilled needs  - D/c home today     [facility] 8600   48y Female patient  PMH GERD, KATHLEEN, obesity with PSH robotic gastric sleeve (5/22) revised to RnY (1/11/23), SBO DONNA (5/2023) now S/P laparoscopic cholecystectomy and partial gastrectomy.     Plan:  - Tbili, LFTs downtrending  - Regular diet  - IVF  - Pain control  - OOB as tolerated  - SCD/SQH  - PT eval: no skilled needs  - D/c home today     [facility] 6003   48y Female patient  PMH GERD, KATHLEEN, obesity with PSH robotic gastric sleeve (5/22) revised to RnY (1/11/23), SBO DONNA (5/2023) now S/P laparoscopic cholecystectomy and partial gastrectomy.     Plan:  - Tbili, LFTs downtrending  - Regular diet  - IVF  - Pain control  - OOB as tolerated  - SCD/SQH  - PT eval: no skilled needs  - D/c home today     [facility]12

## 2024-01-13 NOTE — DISCHARGE NOTE NURSING/CASE MANAGEMENT/SOCIAL WORK - NSDCPEFALRISK_GEN_ALL_CORE
For information on Fall & Injury Prevention, visit: [email or web address] OR  [email or web address] OR  https://www.cdc.gov/steadi/patient.html For information on Fall & Injury Prevention, visit: [email or web address].[email or web address]/[email or web address] OR  [email or web address].[email or web address]/[email or web address] OR  https://www.cdc.gov/steadi/patient.html

## 2024-01-13 NOTE — DISCHARGE NOTE NURSING/CASE MANAGEMENT/SOCIAL WORK - PATIENT PORTAL LINK FT
You can access the FollowMyHealth Patient Portal offered by [facility] by registering at the following website: [email or web address]. By joining [email or web address]’s FollowMyHealth portal, you will also be able to view your health information using other applications (apps) compatible with our system. You can access the FollowMyHealth Patient Portal offered by [facility] by registering at the following website: [email or web address]. By joining [employer]’s FollowMyHealth portal, you will also be able to view your health information using other applications (apps) compatible with our system.

## 2024-01-13 NOTE — PROGRESS NOTE ADULT - ATTENDING COMMENTS
As above.    Impression:    #1.  Choledocholithiasis s/p laparoscopic assisted ERCP 1/10/24 with complete extraction by biliary sphincterotomy and balloon sweeps.  #2.  Abnormal LFTs, improved post ERCP  #3.  Cholecystitis s/p cholecystectomy 1/10/24  #4.  s/p laparoscopic gastric sleeve, s/p RYGB    Recommendations:    #1.  Follow CBC/LFTs  #2.  Diet per surgery    GI consult will sign off / call for questions/concerns.
COntinues to improve.  Pain is better.  Tolerating regular diet and ambulating well.    Abdomen soft, nondistended  Incisions clean and dry    Plan to d/c home  Will f/u in office in 1-2 weeks.    Rahul Field MD
POD1 s/p lap tray/IOC, lap-assisted transgastric ERCP with sphincterotomy and stone extraction  Doing well, pain well controlled, ambulating, tolerating clears  Voiding well    Vitals normal  Abdomen soft  Inc c/d/i    F/u labs today  Cont liquids PO  Encouraged ambulation      Rahul Field MD
POD2 s/p lap tray + transgastric ERCP/sphincterotomy/stone extraction  Reports incisional pain LUQ, no nausea. Tolerating small amount of PO  + flatus  Ambulating    A+Ox3, NAD  Abd soft, mildly distended, incisions clean and dry  No guarding or rebound    Plan  Monitor labs; LFTs are trending down and lipase nl yesterday  Pain control as needed  Diet as tolerated    Rahul Field MD
48yF with hx of sleeve gastrectomy converted to gastric bypass with repair of recurrent hiatal hernia, now admitted with acute cholecystitis and suspected choledocholithiasis.  Patient with epigastric and RUQ abdominal pain radiating to the back  U/S and CT showing GB with wall thickening and gallstones.  CBD dilated on ED U/S  LFTs elevated, bili 2 on admission, up to 4.3 this morning.  Patient reports pain is well controlled, no nausea.  No fever.    Vitals normal  Abd soft, nondistended, TTP RUQ with + Green's sign    Labs and imaging reviewed.    Plan  IV abx  NPO/IVF  Will plan for lap-assisted transgastric ERCP, cholecystectomy  Discussed with patient risks and benefits  All questions answered.    Rahul Field MD

## 2024-01-13 NOTE — PROGRESS NOTE ADULT - SUBJECTIVE AND OBJECTIVE BOX
[facility] Daily Progress Note  =====================================================    SUBJECTIVE:     ALLERGIES:  No Known Allergies      --------------------------------------------------------------------------------------    MEDICATIONS:    Neurologic Medications  acetaminophen   Oral Liquid .. 1000 milliGRAM(s) Oral every 6 hours  ondansetron Injectable 4 milliGRAM(s) IV Push every 6 hours PRN Nausea  oxyCODONE    IR 2.5 milliGRAM(s) Oral every 4 hours PRN Moderate Pain (4 - 6)  oxyCODONE    IR 5 milliGRAM(s) Oral every 6 hours PRN Severe Pain (7 - 10)    Respiratory Medications    Cardiovascular Medications    Gastrointestinal Medications  lactated ringers. 1000 milliLiter(s) IV Continuous <Continuous>  pantoprazole  Injectable 40 milliGRAM(s) IV Push daily    Genitourinary Medications    Hematologic/Oncologic Medications  heparin   Injectable 5000 Unit(s) SubCutaneous every 8 hours    Antimicrobial/Immunologic Medications  piperacillin/tazobactam IVPB.. 3.375 Gram(s) IV Intermittent every 8 hours    Endocrine/Metabolic Medications    Topical/Other Medications    --------------------------------------------------------------------------------------    VITAL SIGNS:  Vital Signs Last 24 Hrs  T(C): 36.7 (13 Jan 2024 00:41), Max: 37 (12 Jan 2024 14:05)  T(F): 98 (13 Jan 2024 00:41), Max: 98.6 (12 Jan 2024 14:05)  HR: 79 (13 Jan 2024 00:41) (73 - 100)  BP: 107/71 (13 Jan 2024 00:41) (107/71 - 128/84)  BP(mean): --  RR: 18 (13 Jan 2024 00:41) (18 - 18)  SpO2: 95% (13 Jan 2024 00:41) (95% - 98%)    Parameters below as of 13 Jan 2024 00:41  Patient On (Oxygen Delivery Method): room air      --------------------------------------------------------------------------------------    EXAM    General: Sitting in chair, NAD  Respiratory: Breathing comfortably on room air  Abdomen: softly distended, appropriately tender around incisions, steri strips over incisions with scant SS strikethrough     --------------------------------------------------------------------------------------    LABS                        11.7   10.77 )-----------( 197      ( 12 Jan 2024 12:42 )             36.0       01-12    137  |  103  |  <4<L>  ----------------------------<  104<H>  3.8   |  27  |  0.53    Ca    8.8      12 Jan 2024 12:42  Phos  1.7     01-12  Mg     2.0     01-12    TPro  6.1  /  Alb  3.4  /  TBili  1.3<H>  /  DBili  x   /  AST  60<H>  /  ALT  412<H>  /  AlkPhos  189<H>  01-12      --------------------------------------------------------------------------------------    INS AND OUTS:    I&O's Summary    11 Jan 2024 07:01  -  12 Jan 2024 07:00  --------------------------------------------------------  IN: 1180 mL / OUT: 1350 mL / NET: -170 mL    12 Jan 2024 07:01  -  13 Jan 2024 02:50  --------------------------------------------------------  IN: 1325 mL / OUT: 1200 mL / NET: 125 mL        -------------------------------------------------------------------------------------- [facility] Daily Progress Note  =====================================================    SUBJECTIVE: Patient resting comfortably. Says pain has improved. She is tolerating PO intake. Denies N/V. Is passing flatus, has not yet had a BM. Would like to go home today.    ALLERGIES:  No Known Allergies      --------------------------------------------------------------------------------------    MEDICATIONS:    Neurologic Medications  acetaminophen   Oral Liquid .. 1000 milliGRAM(s) Oral every 6 hours  ondansetron Injectable 4 milliGRAM(s) IV Push every 6 hours PRN Nausea  oxyCODONE    IR 2.5 milliGRAM(s) Oral every 4 hours PRN Moderate Pain (4 - 6)  oxyCODONE    IR 5 milliGRAM(s) Oral every 6 hours PRN Severe Pain (7 - 10)    Respiratory Medications    Cardiovascular Medications    Gastrointestinal Medications  lactated ringers. 1000 milliLiter(s) IV Continuous <Continuous>  pantoprazole  Injectable 40 milliGRAM(s) IV Push daily    Genitourinary Medications    Hematologic/Oncologic Medications  heparin   Injectable 5000 Unit(s) SubCutaneous every 8 hours    Antimicrobial/Immunologic Medications  piperacillin/tazobactam IVPB.. 3.375 Gram(s) IV Intermittent every 8 hours    Endocrine/Metabolic Medications    Topical/Other Medications    --------------------------------------------------------------------------------------    VITAL SIGNS:  Vital Signs Last 24 Hrs  T(C): 36.7 (13 Jan 2024 00:41), Max: 37 (12 Jan 2024 14:05)  T(F): 98 (13 Jan 2024 00:41), Max: 98.6 (12 Jan 2024 14:05)  HR: 79 (13 Jan 2024 00:41) (73 - 100)  BP: 107/71 (13 Jan 2024 00:41) (107/71 - 128/84)  BP(mean): --  RR: 18 (13 Jan 2024 00:41) (18 - 18)  SpO2: 95% (13 Jan 2024 00:41) (95% - 98%)    Parameters below as of 13 Jan 2024 00:41  Patient On (Oxygen Delivery Method): room air      --------------------------------------------------------------------------------------    EXAM    General: Sitting in chair, NAD  Respiratory: Breathing comfortably on room air  Abdomen: softly distended, appropriately tender around incisions, steri strips over incisions with scant SS strikethrough     --------------------------------------------------------------------------------------    LABS                        11.7   10.77 )-----------( 197      ( 12 Jan 2024 12:42 )             36.0       01-12    137  |  103  |  <4<L>  ----------------------------<  104<H>  3.8   |  27  |  0.53    Ca    8.8      12 Jan 2024 12:42  Phos  1.7     01-12  Mg     2.0     01-12    TPro  6.1  /  Alb  3.4  /  TBili  1.3<H>  /  DBili  x   /  AST  60<H>  /  ALT  412<H>  /  AlkPhos  189<H>  01-12      --------------------------------------------------------------------------------------    INS AND OUTS:    I&O's Summary    11 Jan 2024 07:01  -  12 Jan 2024 07:00  --------------------------------------------------------  IN: 1180 mL / OUT: 1350 mL / NET: -170 mL    12 Jan 2024 07:01  -  13 Jan 2024 02:50  --------------------------------------------------------  IN: 1325 mL / OUT: 1200 mL / NET: 125 mL        --------------------------------------------------------------------------------------

## 2024-01-16 LAB
SURGICAL PATHOLOGY STUDY: SIGNIFICANT CHANGE UP
SURGICAL PATHOLOGY STUDY: SIGNIFICANT CHANGE UP

## 2024-03-26 NOTE — HISTORY OF PRESENT ILLNESS
Bed: ED02  Expected date:   Expected time:   Means of arrival:   Comments:  EMS   [FreeTextEntry1] : Preoperative Clearance  [de-identified] : 47 year old female presents for preop clearance for hiatal hernia surgery and revision of gastric bypass.  She feels well today and offers no acute complaints or concerns.

## 2024-06-04 NOTE — HISTORY OF PRESENT ILLNESS
[de-identified] : Katina is a 48 year old female here for yearly follow up postoperative visit status post ex lap and lysis of adhesions for treatment of small bowel obstruction on 5/4/2023.

## 2024-06-07 ENCOUNTER — APPOINTMENT (OUTPATIENT)
Dept: SURGERY | Facility: CLINIC | Age: 49
End: 2024-06-07

## 2024-08-21 ENCOUNTER — NON-APPOINTMENT (OUTPATIENT)
Age: 49
End: 2024-08-21

## 2024-08-21 ENCOUNTER — APPOINTMENT (OUTPATIENT)
Dept: INTERNAL MEDICINE | Facility: CLINIC | Age: 49
End: 2024-08-21

## 2024-08-21 DIAGNOSIS — Z11.3 ENCOUNTER FOR SCREENING FOR INFECTIONS WITH A PREDOMINANTLY SEXUAL MODE OF TRANSMISSION: ICD-10-CM

## 2024-08-21 DIAGNOSIS — Z00.00 ENCOUNTER FOR GENERAL ADULT MEDICAL EXAMINATION W/OUT ABNORMAL FINDINGS: ICD-10-CM

## 2024-08-21 DIAGNOSIS — Z02.9 ENCOUNTER FOR ADMINISTRATIVE EXAMINATIONS, UNSPECIFIED: ICD-10-CM

## 2024-08-21 DIAGNOSIS — Z71.89 OTHER SPECIFIED COUNSELING: ICD-10-CM

## 2024-08-21 DIAGNOSIS — J45.909 UNSPECIFIED ASTHMA, UNCOMPLICATED: ICD-10-CM

## 2024-08-21 DIAGNOSIS — Z13.6 ENCOUNTER FOR SCREENING FOR CARDIOVASCULAR DISORDERS: ICD-10-CM

## 2024-08-21 PROCEDURE — 36415 COLL VENOUS BLD VENIPUNCTURE: CPT

## 2024-08-21 PROCEDURE — G0444 DEPRESSION SCREEN ANNUAL: CPT | Mod: 59

## 2024-08-21 PROCEDURE — 99396 PREV VISIT EST AGE 40-64: CPT | Mod: 25

## 2024-08-21 PROCEDURE — G0447 BEHAVIOR COUNSEL OBESITY 15M: CPT | Mod: 59

## 2024-08-21 PROCEDURE — 99401 PREV MED CNSL INDIV APPRX 15: CPT | Mod: 59

## 2024-08-21 PROCEDURE — 99204 OFFICE O/P NEW MOD 45 MIN: CPT

## 2024-08-21 PROCEDURE — 93000 ELECTROCARDIOGRAM COMPLETE: CPT | Mod: 59

## 2024-08-21 PROCEDURE — 99497 ADVNCD CARE PLAN 30 MIN: CPT | Mod: 25

## 2024-08-21 NOTE — HISTORY OF PRESENT ILLNESS
[FreeTextEntry1] : annual wellness  [de-identified] : 50 y/o female presents for annual wellness exam. Pt with PMHx: Obesity, Vit D deficiency.  She feels otherwise well and reports no other acute complaints or concerns. ROS as documented below.  Medications: none Pshx: Small bowel Obst s/p Exp Lap. Hiatal hernia Repair s/p Gastric sleeve 2022.

## 2024-08-21 NOTE — HISTORY OF PRESENT ILLNESS
[FreeTextEntry1] : annual wellness  [de-identified] : 50 y/o female presents for annual wellness exam. Pt with PMHx: Obesity, Vit D deficiency.  She feels otherwise well and reports no other acute complaints or concerns. ROS as documented below.  Medications: none Pshx: Small bowel Obst s/p Exp Lap. Hiatal hernia Repair s/p Gastric sleeve 2022.

## 2024-08-21 NOTE — HEALTH RISK ASSESSMENT
[Good] : ~his/her~  mood as  good [Yes] : Yes [Monthly or less (1 pt)] : Monthly or less (1 point) [No falls in past year] : Patient reported no falls in the past year [Little interest or pleasure doing things] : 1) Little interest or pleasure doing things [Feeling down, depressed, or hopeless] : 2) Feeling down, depressed, or hopeless [0] : 2) Feeling down, depressed, or hopeless: Not at all (0) [Time Spent: ___ Minutes] : I spent [unfilled] minutes performing a depression screening for this patient. [Patient reported mammogram was normal] : Patient reported mammogram was normal [Patient reported PAP Smear was normal] : Patient reported PAP Smear was normal [Patient reported colonoscopy was normal] : Patient reported colonoscopy was normal [HIV Test offered] : HIV Test offered [Hepatitis C test offered] : Hepatitis C test offered [Alone] : lives alone [High School] : high school [Single] : single [# Of Children ___] : has [unfilled] children [Sexually Active] : sexually active [Feels Safe at Home] : Feels safe at home [Fully functional (bathing, dressing, toileting, transferring, walking, feeding)] : Fully functional (bathing, dressing, toileting, transferring, walking, feeding) [Fully functional (using the telephone, shopping, preparing meals, housekeeping, doing laundry, using] : Fully functional and needs no help or supervision to perform IADLs (using the telephone, shopping, preparing meals, housekeeping, doing laundry, using transportation, managing medications and managing finances) [With Patient/Caregiver] : , with patient/caregiver [Designated Healthcare Proxy] : Designated healthcare proxy [Name: ___] : Health Care Proxy's Name: [unfilled]  [Relationship: ___] : Relationship: [unfilled] [Aggressive treatment] : aggressive treatment [I will adhere to the patient's wishes.] : I will adhere to the patient's wishes. [Time Spent: ___ minutes] : Time Spent: [unfilled] minutes [de-identified] : occasionally [de-identified] : Inactive [Reports changes in hearing] : Reports no changes in hearing [Reports changes in vision] : Reports no changes in vision [Reports normal functional visual acuity (ie: able to read med bottle)] : Reports poor functional visual acuity.  [MammogramDate] : 2022 [MammogramComments] : needs referrals [PapSmearDate] : 2023 [PapSmearComments] : CALI [ColonoscopyDate] : 2022 [AdvancecareDate] : 08/2024 [FreeTextEntry4] :  Met with NORBERT COONEY, who was willing to discuss advance care planning.  Our advance care planning conversation included a discussion about:  1. The value and importance of advance care planning.  2. Experiences with loved ones who have been seriously ill or have .  3. Exploration of personal, cultural, or spiritual beliefs that might influence medical decisions.  4. Exploration of goals of care in the event of a sudden injury or illness.  5. Identification of a health care agent.  6. Review and update, or completion of, an advance directive.  Start time: ____________                    End time: ____________

## 2024-08-21 NOTE — COUNSELING
[Potential consequences of obesity discussed] : Potential consequences of obesity discussed [Benefits of weight loss discussed] : Benefits of weight loss discussed [Encouraged to increase physical activity] : Encouraged to increase physical activity [Fall prevention counseling provided] : Fall prevention counseling provided [Adequate lighting] : Adequate lighting [No throw rugs] : No throw rugs [Use proper foot wear] : Use proper foot wear [Use recommended devices] : Use recommended devices [Behavioral health counseling provided] : Behavioral health counseling provided [Sleep ___ hours/day] : Sleep [unfilled] hours/day [Engage in a relaxing activity] : Engage in a relaxing activity [Plan in advance] : Plan in advance [Target Wt Loss Goal ___] : Weight Loss Goals: Target weight loss goal [unfilled] lbs [Weigh Self Weekly] : weigh self weekly [Decrease Portions] : decrease portions [____ min/wk Activity] : [unfilled] min/wk activity [Keep Food Diary] : keep food diary [Good understanding] : Patient has a good understanding of disease, goals and obesity follow-up plan [FreeTextEntry4] : 16

## 2024-08-21 NOTE — ASSESSMENT
[FreeTextEntry1] : -Medical Annual wellness visit completed: -HRA completed and reviewed with patient -Medical, family, surgical history reviewed with patient and updated -List of current providers r/w patient and updated -Vitals, BMI reviewed and discussed along with healthy BMI goals. Dietary counseling x 15 minutes provided -Depression PHQ 9 completed and reviewed -Annual safety assessment reviewed -discussed advanced directives  -Established routine screening and immunization schedule      VACCINATION & OTHER TX RECOMMENDATIONS  ASA preventative therapy  Calcium/Vitamin D supplementation    Dietary counseling, nutrition referral risks vs. benefits d/w patient. routine vaccination and vaccination schedules and recommendation d/w patient  Vaccines recommended: * pneumovax (once after 65) & prevnar  * annual Influenza vaccine  -Hep B vaccines  * zostavax   * Tdap Colorectal screening recommended; screening colonoscopy q10yr, flex sig q5yr, annual fecal occult testing  BMD recommended biennially for osteoporosis screening Glaucoma screening recommended, annual optho evals  Cardiovascular screening and blood tests recommended and discussed w/ patient, cholesterol screening and dietary counseling  AAA recommended x 1

## 2024-08-21 NOTE — HEALTH RISK ASSESSMENT
[Good] : ~his/her~  mood as  good [Yes] : Yes [Monthly or less (1 pt)] : Monthly or less (1 point) [No falls in past year] : Patient reported no falls in the past year [Little interest or pleasure doing things] : 1) Little interest or pleasure doing things [Feeling down, depressed, or hopeless] : 2) Feeling down, depressed, or hopeless [0] : 2) Feeling down, depressed, or hopeless: Not at all (0) [Time Spent: ___ Minutes] : I spent [unfilled] minutes performing a depression screening for this patient. [Patient reported mammogram was normal] : Patient reported mammogram was normal [Patient reported PAP Smear was normal] : Patient reported PAP Smear was normal [Patient reported colonoscopy was normal] : Patient reported colonoscopy was normal [HIV Test offered] : HIV Test offered [Hepatitis C test offered] : Hepatitis C test offered [Alone] : lives alone [High School] : high school [Single] : single [# Of Children ___] : has [unfilled] children [Sexually Active] : sexually active [Feels Safe at Home] : Feels safe at home [Fully functional (bathing, dressing, toileting, transferring, walking, feeding)] : Fully functional (bathing, dressing, toileting, transferring, walking, feeding) [Fully functional (using the telephone, shopping, preparing meals, housekeeping, doing laundry, using] : Fully functional and needs no help or supervision to perform IADLs (using the telephone, shopping, preparing meals, housekeeping, doing laundry, using transportation, managing medications and managing finances) [With Patient/Caregiver] : , with patient/caregiver [Designated Healthcare Proxy] : Designated healthcare proxy [Name: ___] : Health Care Proxy's Name: [unfilled]  [Relationship: ___] : Relationship: [unfilled] [Aggressive treatment] : aggressive treatment [I will adhere to the patient's wishes.] : I will adhere to the patient's wishes. [Time Spent: ___ minutes] : Time Spent: [unfilled] minutes [de-identified] : occasionally [de-identified] : Inactive [Reports changes in hearing] : Reports no changes in hearing [Reports changes in vision] : Reports no changes in vision [Reports normal functional visual acuity (ie: able to read med bottle)] : Reports poor functional visual acuity.  [MammogramDate] : 2022 [MammogramComments] : needs referrals [PapSmearDate] : 2023 [PapSmearComments] : CALI [ColonoscopyDate] : 2022 [AdvancecareDate] : 08/2024 [FreeTextEntry4] :  Met with NORBERT COONEY, who was willing to discuss advance care planning.  Our advance care planning conversation included a discussion about:  1. The value and importance of advance care planning.  2. Experiences with loved ones who have been seriously ill or have .  3. Exploration of personal, cultural, or spiritual beliefs that might influence medical decisions.  4. Exploration of goals of care in the event of a sudden injury or illness.  5. Identification of a health care agent.  6. Review and update, or completion of, an advance directive.  Start time: ____________                    End time: ____________

## 2024-08-30 DIAGNOSIS — R79.89 OTHER SPECIFIED ABNORMAL FINDINGS OF BLOOD CHEMISTRY: ICD-10-CM

## 2024-08-30 DIAGNOSIS — Z86.39 PERSONAL HISTORY OF OTHER ENDOCRINE, NUTRITIONAL AND METABOLIC DISEASE: ICD-10-CM

## 2024-08-30 DIAGNOSIS — E55.9 VITAMIN D DEFICIENCY, UNSPECIFIED: ICD-10-CM

## 2024-08-30 LAB
25(OH)D3 SERPL-MCNC: 13.5 NG/ML
ALBUMIN SERPL ELPH-MCNC: 4.7 G/DL
ALP BLD-CCNC: 100 U/L
ALT SERPL-CCNC: 29 U/L
ANION GAP SERPL CALC-SCNC: 14 MMOL/L
APPEARANCE: CLEAR
AST SERPL-CCNC: 21 U/L
BASOPHILS # BLD AUTO: 0.05 K/UL
BASOPHILS NFR BLD AUTO: 0.8 %
BILIRUB SERPL-MCNC: 0.4 MG/DL
BILIRUBIN URINE: NEGATIVE
BLOOD URINE: NEGATIVE
BUN SERPL-MCNC: 11 MG/DL
C TRACH RRNA SPEC QL NAA+PROBE: NOT DETECTED
CALCIUM SERPL-MCNC: 9.7 MG/DL
CHLORIDE SERPL-SCNC: 105 MMOL/L
CHOLEST SERPL-MCNC: 148 MG/DL
CO2 SERPL-SCNC: 25 MMOL/L
COLOR: YELLOW
CREAT SERPL-MCNC: 0.61 MG/DL
EGFR: 108 ML/MIN/1.73M2
EOSINOPHIL # BLD AUTO: 0.02 K/UL
EOSINOPHIL NFR BLD AUTO: 0.3 %
ESTIMATED AVERAGE GLUCOSE: 82 MG/DL
GGT SERPL-CCNC: 13 U/L
GLUCOSE QUALITATIVE U: NEGATIVE MG/DL
GLUCOSE SERPL-MCNC: 87 MG/DL
HBA1C MFR BLD HPLC: 4.5 %
HBV SURFACE AB SER QL: REACTIVE
HBV SURFACE AG SER QL: NONREACTIVE
HCT VFR BLD CALC: 46 %
HCV AB SER QL: NONREACTIVE
HCV S/CO RATIO: 0.12 S/CO
HDLC SERPL-MCNC: 95 MG/DL
HGB BLD-MCNC: 14.5 G/DL
HIV1+2 AB SPEC QL IA.RAPID: NONREACTIVE
HSV 1+2 IGG SER IA-IMP: NEGATIVE
HSV 1+2 IGG SER IA-IMP: POSITIVE
HSV1 IGG SER QL: 46.5 INDEX
HSV2 IGG SER QL: 0.03 INDEX
IMM GRANULOCYTES NFR BLD AUTO: 0.2 %
KETONES URINE: NEGATIVE MG/DL
LDLC SERPL CALC-MCNC: 38 MG/DL
LEUKOCYTE ESTERASE URINE: NEGATIVE
LYMPHOCYTES # BLD AUTO: 1.32 K/UL
LYMPHOCYTES NFR BLD AUTO: 22.3 %
M TB IFN-G BLD-IMP: NEGATIVE
MAN DIFF?: NORMAL
MCHC RBC-ENTMCNC: 31.5 GM/DL
MCHC RBC-ENTMCNC: 33 PG
MCV RBC AUTO: 104.5 FL
MONOCYTES # BLD AUTO: 0.48 K/UL
MONOCYTES NFR BLD AUTO: 8.1 %
N GONORRHOEA RRNA SPEC QL NAA+PROBE: NOT DETECTED
NEUTROPHILS # BLD AUTO: 4.04 K/UL
NEUTROPHILS NFR BLD AUTO: 68.3 %
NITRITE URINE: NEGATIVE
NONHDLC SERPL-MCNC: 53 MG/DL
PH URINE: 6
PLATELET # BLD AUTO: 271 K/UL
POTASSIUM SERPL-SCNC: 4.3 MMOL/L
PROT SERPL-MCNC: 7.1 G/DL
PROTEIN URINE: NEGATIVE MG/DL
QUANTIFERON TB PLUS MITOGEN MINUS NIL: >10 IU/ML
QUANTIFERON TB PLUS NIL: 0.05 IU/ML
QUANTIFERON TB PLUS TB1 MINUS NIL: 0 IU/ML
QUANTIFERON TB PLUS TB2 MINUS NIL: 0 IU/ML
RBC # BLD: 4.4 M/UL
RBC # FLD: 12.3 %
SODIUM SERPL-SCNC: 144 MMOL/L
SOURCE AMPLIFICATION: NORMAL
SOURCE AMPLIFICATION: NORMAL
SPECIFIC GRAVITY URINE: 1.03
T PALLIDUM AB SER QL IA: NEGATIVE
T VAGINALIS RRNA SPEC QL NAA+PROBE: NOT DETECTED
TRIGL SERPL-MCNC: 79 MG/DL
TSH SERPL-ACNC: 0.87 UIU/ML
UROBILINOGEN URINE: 0.2 MG/DL
WBC # FLD AUTO: 5.92 K/UL

## 2024-08-30 RX ORDER — ERGOCALCIFEROL 1.25 MG/1
1.25 MG CAPSULE, LIQUID FILLED ORAL
Qty: 4 | Refills: 2 | Status: ACTIVE | COMMUNITY
Start: 2024-08-30 | End: 1900-01-01

## 2024-09-05 ENCOUNTER — NON-APPOINTMENT (OUTPATIENT)
Age: 49
End: 2024-09-05

## 2024-09-21 ENCOUNTER — APPOINTMENT (OUTPATIENT)
Dept: INTERNAL MEDICINE | Facility: CLINIC | Age: 49
End: 2024-09-21
Payer: MEDICAID

## 2024-09-21 VITALS
HEART RATE: 69 BPM | HEIGHT: 63 IN | WEIGHT: 170 LBS | DIASTOLIC BLOOD PRESSURE: 80 MMHG | OXYGEN SATURATION: 99 % | SYSTOLIC BLOOD PRESSURE: 116 MMHG | BODY MASS INDEX: 30.12 KG/M2 | TEMPERATURE: 96.8 F | RESPIRATION RATE: 18 BRPM

## 2024-09-21 DIAGNOSIS — Z01.419 ENCOUNTER FOR GYNECOLOGICAL EXAMINATION (GENERAL) (ROUTINE) W/OUT ABNORMAL FINDINGS: ICD-10-CM

## 2024-09-21 DIAGNOSIS — N95.2 POSTMENOPAUSAL ATROPHIC VAGINITIS: ICD-10-CM

## 2024-09-21 DIAGNOSIS — K21.9 GASTRO-ESOPHAGEAL REFLUX DISEASE W/OUT ESOPHAGITIS: ICD-10-CM

## 2024-09-21 PROCEDURE — 99213 OFFICE O/P EST LOW 20 MIN: CPT | Mod: 25

## 2024-09-21 PROCEDURE — G0101: CPT

## 2024-09-21 NOTE — ASSESSMENT
[FreeTextEntry1] : Pap smear cytology HPV bacterial vaginosis GC chlamydia done pending.  Discussed with the patient to get bone density given history of hysterectomy and PPI use Atrophic vaginitis mild asymptomatic supportive Follow-up pending results

## 2024-09-21 NOTE — HISTORY OF PRESENT ILLNESS
[Other: _____] : [unfilled] [FreeTextEntry1] : Pap smear [de-identified] : pt presents for papsmear  pt is A0  LMP more than 5 years ago  s/p hysterecomy 2/2 uterine fibroids , ovaries , cervix spared Currently not sexually active Denies family history of breast ovarian cancer Mammogram 2024 BI-RADS 1  denies d/c , pelvic pain , urinary sx

## 2024-09-21 NOTE — PHYSICAL EXAM
[No Acute Distress] : no acute distress [No Respiratory Distress] : no respiratory distress  [Normal] : soft, non-tender, non-distended, no masses palpated, no HSM and normal bowel sounds [Urethral Meatus] : the meatus of the urethra showed no abnormalities [External Female Genitalia] : normal external genitalia [Anus Abnormality] : the anus and perineum were normal [Normal Appearance] : normal in appearance [No Masses] : no palpable masses [No Nipple Discharge] : no nipple discharge [No Axillary Lymphadenopathy] : no axillary lymphadenopathy

## 2024-09-26 LAB
C TRACH RRNA SPEC QL NAA+PROBE: NOT DETECTED
CANDIDA VAG CYTO: NOT DETECTED
G VAGINALIS+PREV SP MTYP VAG QL MICRO: NOT DETECTED
HPV HIGH+LOW RISK DNA PNL CVX: NOT DETECTED
N GONORRHOEA RRNA SPEC QL NAA+PROBE: NOT DETECTED
SOURCE TP AMPLIFICATION: NORMAL
T VAGINALIS VAG QL WET PREP: NOT DETECTED

## 2024-09-28 LAB — CYTOLOGY CVX/VAG DOC THIN PREP: NORMAL

## 2024-11-01 NOTE — PROGRESS NOTE ADULT - ASSESSMENT
46F s/p robotic sleeve gastrectomy, hiatal hernia repair, c/b recurrent hiatal hernia, s/p diag lap, DONNA, lap hiatal hernia repair 6/3/22, prior admission with dysphagia to liquids, presents with dysphagia and chest pain. EGD showed 2 cm hiatal hernia, sleeve gastrectomy w/ healthy appearing mucosa, mild narrowing in mid stomach, but widely patent.    Recommendation:    - CLD  - Pantoprazole 40mg IV daily  - DVT ppx with HSQ  - Discharge patient.         Green surgery  [id number]   Progress Note    Date:11/1/2024       Room:0721/721-02  Patient Name:Dahiana Ortiz     YOB: 1968     Age:56 y.o.      Subjective    Subjective: 56-year-old female with longstanding tobacco use, recently hospitalized here treated for pneumonia, discharged 9/13, presenting back here as transfer from [facility] after presenting there with ongoing dyspnea and productive cough. Transferred here due to large left loculated pleural effusion, possible empyema on CT chest. Patient treated with empiric IV antibiotics vancomycin and cefepime. Seen in consultation by CT surgery, s/p thoracotomy for evacuation of pleural effusion and decortication on 10/14, noted very large empyema in left pleural space with complete atelectasis of the left lung, purulent material removed, chest tubes in place with drainage.  She did require transfusion of PRBCs for postoperative acute blood loss anemia, although she does appear to have developed some anemia of chronic disease.       She had prolonged hospitalization with chest tubes in place with persistent air leak, pneumothorax, bronchopleural fistula.  Attempts were made to wean off and monitor off chest tube suction however pneumothorax enlarged and placed back on chest tube suction.  Operative culture grew multiple gram-positive/gram-negative anaerobes, treated on prolonged course of IV antibiotics, clinically improved and transitioned to Augmentin. CTS re-evaluated and s/p repeat thoracotomy decortication on 10/29/24. Patient currently with 3 CT in place.    No acute overnight event noted, evaluated by cardiothoracic surgery, She no longer has an air leak on chest tube #2 #1 remains without air leak she only has a moderate air leak from chest tube #3 which is the more anterior superior tube. Chest tube 1 and 2 now off suction. CT 3 remains on suction.    Hb at 7 this AM and patient remains tachycardic, transfused 1 unit of blood today.      Review of Systems: 10 point  46F s/p robotic sleeve gastrectomy, hiatal hernia repair, c/b recurrent hiatal hernia, s/p diag lap, DONNA, lap hiatal hernia repair 6/3/22, prior admission with dysphagia to liquids, presents with dysphagia and chest pain. EGD showed 2 cm hiatal hernia, sleeve gastrectomy w/ healthy appearing mucosa, mild narrowing in mid stomach, but widely patent.      Recommendation:    - CLD  - Pantoprazole 40mg IV daily  - DVT ppx with HSQ  - Discharge patient.         Green surgery  [id number]   46F s/p robotic sleeve gastrectomy, hiatal hernia repair, c/b recurrent hiatal hernia, s/p diag lap, DONNA, lap hiatal hernia repair 6/3/22, prior admission with dysphagia to liquids, presents with dysphagia and chest pain. EGD showed 2 cm hiatal hernia, sleeve gastrectomy w/ healthy appearing mucosa, mild narrowing in mid stomach, but widely patent.      Recommendation:    - CLD, advance to carloz fulls today  - PPI daily  - DVT ppx with HSQ  - Plan for discharge today pending am labs and diet tolerance (carloz fulls/pro shakes) with op f/u with Dr Field and CTS Dr Omar Rodriguez at [facility] surgery  [id number]

## 2024-11-13 ENCOUNTER — NON-APPOINTMENT (OUTPATIENT)
Age: 49
End: 2024-11-13

## 2024-11-22 ENCOUNTER — APPOINTMENT (OUTPATIENT)
Dept: SURGERY | Facility: CLINIC | Age: 49
End: 2024-11-22
Payer: MEDICAID

## 2024-11-22 DIAGNOSIS — Z98.84 BARIATRIC SURGERY STATUS: ICD-10-CM

## 2024-11-22 DIAGNOSIS — R63.5 ABNORMAL WEIGHT GAIN: ICD-10-CM

## 2024-11-22 PROCEDURE — 99213 OFFICE O/P EST LOW 20 MIN: CPT

## 2024-11-25 NOTE — DISCHARGE NOTE PROVIDER - NSDCQMCOGNITION_NEU_ALL_CORE
Copied from CRM #[id number]. Topic: MW Clinical Concerns - MW Routine RN Triage  >> Nov 25, 2024  1:52 PM Brittany BARRERA wrote:  Sarahi Quintana called during working hours and mentioned a symptom(s) not on the Emergent symptom list.    Routine RN Triage provided by Care Site.  Selected 'Wrap Up CRM' and created new Telephone Encounter after clicking 'Convert to Clinical Call'. Selected Reason For Call by searching symptom. Sent Routine Triage-Site message template and routed as routine priority per Care Site Dept KB page for Routine Triage Working Hrs support to appropriate clinician pool.  Readback full message.  Patient Name: Sarahi Quintana    Specialist or PCP Name: Sabina Trinidad DO    Symptoms: Feeling symptoms of Yeast and BV, Irritation     Pregnant (females aged 13-60. If Yes, how long?) : No     Call Back # : [phone]     Which State are you currently located in?: [place], [place]     Name of Clinic Site / Acct# : [facility] - [address]     Call arrived during: Work Hours    No difficulties

## 2024-12-25 PROBLEM — F10.90 ALCOHOL USE: Status: ACTIVE | Noted: 2022-09-02

## 2025-01-14 NOTE — DISCHARGE NOTE PROVIDER - NSDCCPTREATMENT_GEN_ALL_CORE_FT
PRINCIPAL PROCEDURE  Procedure: Exploration, esophagus, robot-assisted  Findings and Treatment: Mobilization of the esophagus      SECONDARY PROCEDURE  Procedure: Robot-assisted laparoscopic conversion of previous sleeve gastrectomy to Poncho-en-Y gastric bypass using da Barber Xi  Findings and Treatment: Bariatric liquid diet, analgesia, dietary supplement, follow up care    
lives with family  and two children

## 2025-02-06 NOTE — PATIENT PROFILE ADULT - FALL HARM RISK - ATTEMPT OOB
2/6/2025    Steven Keen is a 14 year old male who was seen for medication management. Patient and []  father [x] mother [] legal guardian [] other presented to outpatient clinic for ongoing medication management for ADHD, depression, anxiety. Patient and family/legal guardian interviewed. This visit was conducted over the video. This patient verbally consents to a video visit. Patient identifies as Steven Keen and reports he is currently located at home.   The treatment advice that was being provided was based on what was reported by the patient. Without the patient being seen and evaluated in person, there would be some risk that the information and/or assessment provided by the [facility] provider might be incomplete or inaccurate.                                                           Chief Complaint: Anger outburst, recent hospitalization    Diagnosis:  Attention deficit hyperactivity disorder (ADHD), predominantly inattentive type  (primary encounter diagnosis)  Other specified anxiety disorders  Major depressive disorder, single episode, moderate  (CMD)      Medications:  Current Outpatient Medications   Medication Sig Dispense Refill    methylpheniDATE (RITALIN) 10 MG tablet Take 1 tablet by mouth daily. 30 tablet 0    fluticasone (FLONASE) 50 MCG/ACT nasal spray Spray 2 sprays in each nostril daily. SHAKE LIQUID 48 g 3    DULoxetine (CYMBALTA) 30 MG capsule Take 30 mg by mouth in the morning and 30 mg in the evening.       No current facility-administered medications for this visit.           Allergies:  ALLERGIES:  No Known Allergies      There were no vitals filed for this visit.      Subjective:    History was obtained from patient/family. Chart was reviewed.  Patient's first appointment was on January 6, 2025.  Patient's mother provided an interval history.  Patient was admitted to [facility].  He was there for a week.  Patient reported hospitalization was helpful.  He reported he  learned some coping skills.  Patient continued to struggle regulating his emotions.  He gets angry easily.  He yells, screams.  He starts hitting himself.  He loses control over his language.  He gets dysregulated.  He has been struggling with also increase in anxiety.  He feels overwhelmed.  He worries a lot.  He often feels like he is on the edge.  He has been also struggling with feeling down.  He denied any suicidal ideations during the interview.  He tends to get upset if something does not go his way.  He has been seeing his therapist Kierra Solis regularly.  Patient was started on Seroquel 12.5 mg p.o. twice daily in hospital.  Patient is experiencing tiredness on the medication.  Patient was noted to be getting upset when mother was bringing up her concerns in today's visit.     Patient is currently attending 11th grade.  He is attending [employer].  He resides with his mother, mother's boyfriend, 11 years old sister and 9 years old brother.      Patient has been carrying diagnosis of ADHD, depression and anxiety disorder.        Patient was admitted to [facility] secondary to suicidal ideations in .  He completed intensive outpatient program.  He was again hospitalized recently in  for 1 week.     Patient's mother reported that patient struggles with difficulty controlling outburst is not new.  It has been going on since he was in elementary school and over the period of time intensity and duration has worsened.      Patient lost his father when he was 4 years old.  Patient's father  secondary to suicide.  According to mother her father struggle with severe mood swings had engaged in self-injurious behavior cutting several times.      Patient was on probation in the past secondary to his anger outburst but he completed the requirement.      Patient has started using marijuana since he was a freshman in high school.  He has been using it daily since sophomore year.   Patient is also using nicotine.      Previous medication trials  Vyvanse-increased anger  Methylphenidate extended release  Methylphenidate-anger  Hydroxyzine-drowsiness  Cymbalta         Symptoms are present in all settings.        REVIEW OF SYSTEMS:   Psychiatric:  No history suggestive of psychosis.  No history suggestive of manic episode.  No history suggestive of illicit drug use.  Constitutional:  Weight loss  []       Weight Gain   []  No change  []   Neurological:  Headache - Yes [] No    [x]                             Rigidity - Yes [] No [x]                                 Spasticity - Yes   []  No  [x]   Gastrointestinal - Nausea  Yes  []   No  [x]          Stomach upset  Yes []     No  [x]    All other systems reviewed and are negative    Comprehensive Past/Family/Social history which was performed during initial evaluation was reexamined and reviewed .  There is nothing new to add today.  For details, please refer to my initial evaluation note in this chart.    Mental Status Examination:  Casually dressed, healthy looking, [x] well [] poorly groomed   [] old, [] boy [] girl [] adolescent boy [] adolescent girl showed   [x] good [] partial [] no interest in interview.    Eye to eye contact was [x] maintained, [] not maintained, [] partially maintained.  Rapport established.      Mood was [x] euthymic, []depressed, [] irritable, [] anxious, [] angry, [] euphoric,   [] empty, [] guilty, [] perplexed.    It was [x] consistent, [] fluctuating, [] alternating rapidly between extremes during the interview.    Affect was [x] full, [] constricted, [] blunted, [] flat in range and [] congruent,   [] not congruent with mood.    Speech was [x] spontaneous, [] not spontaneous    Rate and rhythm was [x] regular, [] slow, [] pressured, [] hesitant, [] emotional,   [] dramatic, [] loud, [] monotonous, [] whispered, [] slurred.    Attention and concentration was [x] good, [] poor, [] impaired.    Thought process was  [x] logical and coherent, [] illogical, [] incomprehensible.    Rate of thoughts was [x] normal, [] slow, [] hesitant, [] rapid, [] poverty of ideas,   [] overabundance of ideas, [] racing, [] flights of ideas, [] thought blocking.    Associations of thoughts:  [x] Intact, [] loose, [] circumstantial, [] tangential,   [] word salad, [] neologisms.    Thought content:    Delusions  Yes  []    No  [x]     Obsessions  Yes  []    No  [x]     Hallucinations  Yes  []    No  [x]     Tics  Yes  []    No  [x]     Suicidal ideations:  [x] none, [] passive, [] present with plan    Alert and oriented x3.    Language fluent.    Judgment was [x] fair, [] poor, [] impaired.    Insight was [x] good, [] limited, [] poor.            Treatment Intervention/Prescription drug management - Reviewed medication efficacy, possible/potential side effects, and patient/family preferences. Based on shared medical decision making, the plan for prescription drug management is as stated below. Pt will take   Current Outpatient Medications   Medication Sig Dispense Refill    methylpheniDATE (RITALIN) 10 MG tablet Take 1 tablet by mouth daily. 30 tablet 0    fluticasone (FLONASE) 50 MCG/ACT nasal spray Spray 2 sprays in each nostril daily. SHAKE LIQUID 48 g 3    DULoxetine (CYMBALTA) 30 MG capsule Take 30 mg by mouth in the morning and 30 mg in the evening.       No current facility-administered medications for this visit.   .      Major depressive disorder, single episode, moderate  (CMD)  (primary encounter diagnosis)  Attention deficit hyperactivity disorder (ADHD), predominantly inattentive type  Other specified anxiety disorders Chronic and Exacerbation    Patient will continue to take Cymbalta 60 mg/day  We talked about possibility of increasing the Cymbalta to 90 mg/day  We decided to change patient's Seroquel to 25 mg p.o. nightly.  If patient is able to tolerate it then within a week we will be increasing that to Seroquel XR 50 mg/day.      Patient and family agreed with treatment plan.     Consents were sent to patient/family via portal. []     Labs ordered   []   Labs reviewed   []  Pt/family will follow up with PCP  []     I will follow up with patient in 1 months.  If any safety concern arises, patient/family will call 911, go to ER, or nearby hospital.  Patient/family can call my office for any question or concerns during regular business hours.    Abner Holley MD   No

## 2025-03-22 ENCOUNTER — APPOINTMENT (OUTPATIENT)
Dept: INTERNAL MEDICINE | Facility: CLINIC | Age: 50
End: 2025-03-22
Payer: COMMERCIAL

## 2025-03-22 ENCOUNTER — LABORATORY RESULT (OUTPATIENT)
Age: 50
End: 2025-03-22

## 2025-03-22 VITALS
WEIGHT: 182.5 LBS | BODY MASS INDEX: 32.34 KG/M2 | HEART RATE: 103 BPM | DIASTOLIC BLOOD PRESSURE: 80 MMHG | SYSTOLIC BLOOD PRESSURE: 124 MMHG | RESPIRATION RATE: 18 BRPM | OXYGEN SATURATION: 97 % | HEIGHT: 63 IN | TEMPERATURE: 98.7 F

## 2025-03-22 DIAGNOSIS — N95.2 POSTMENOPAUSAL ATROPHIC VAGINITIS: ICD-10-CM

## 2025-03-22 LAB
BILIRUB UR QL STRIP: NEGATIVE
GLUCOSE UR-MCNC: NEGATIVE
HCG UR QL: 0.2 EU/DL
HGB UR QL STRIP.AUTO: NORMAL
KETONES UR-MCNC: NEGATIVE
LEUKOCYTE ESTERASE UR QL STRIP: NORMAL
NITRITE UR QL STRIP: POSITIVE
PH UR STRIP: 6
PROT UR STRIP-MCNC: NORMAL
SP GR UR STRIP: 1.02

## 2025-03-22 PROCEDURE — 81003 URINALYSIS AUTO W/O SCOPE: CPT | Mod: QW

## 2025-03-22 PROCEDURE — 99213 OFFICE O/P EST LOW 20 MIN: CPT

## 2025-03-22 RX ORDER — CIPROFLOXACIN HYDROCHLORIDE 500 MG/1
500 TABLET, FILM COATED ORAL
Qty: 14 | Refills: 0 | Status: ACTIVE | COMMUNITY
Start: 2025-03-22 | End: 1900-01-01

## 2025-03-25 NOTE — ED ADULT TRIAGE NOTE - AS HEIGHT TYPE
Pt requests refill of Amitriptyline 25 mg qd to supplement the 50 mg she is currently taking.  Her discomfort is not being handled by the 50 mg currently and she thinks the extra  25 mg will help.  Thank you.    BEVERLY 12/16/24  NV 1 year (not scheduled yet)        Natalya Michaels  
Pt updated.  
stated

## 2025-03-27 LAB
APPEARANCE: ABNORMAL
BILIRUBIN URINE: NEGATIVE
BLOOD URINE: ABNORMAL
COLOR: YELLOW
GLUCOSE QUALITATIVE U: NEGATIVE MG/DL
KETONES URINE: NEGATIVE MG/DL
LEUKOCYTE ESTERASE URINE: ABNORMAL
NITRITE URINE: NEGATIVE
PH URINE: 6
PROTEIN URINE: 100 MG/DL
SPECIFIC GRAVITY URINE: 1.01
UROBILINOGEN URINE: 0.2 MG/DL

## 2025-03-28 NOTE — H&P PST ADULT - WEIGHT IN KG
83.5

## 2025-03-28 NOTE — ED PROVIDER NOTE - CROS ED CARDIOVAS ALL NEG
[facility] HISTORY AND PHYSICAL     Requesting MD BRAYDEN Schreiber  CC:post op med management       PCP: Natalie Marshall MD    History of Present Illness:   Patient is a 78 year old female with PMH sig for afib on xarelto, HTN, HL here sp L mastectomy. POD 0 d/w DR Schreiber no reconstruction- significant history of post op nausea medication related so attempted to minimize / avoid narcotics as able. She had 1gm acetaminophen preop, per d/w PACU RN- patient reported no relief from gabapentin, currently sedated unable to provide history. Her chronic medical problems of afib, aortic atherosclerosis, HTN,, HL spinal stenosis  are controlled.      Past Medical History:    Arrhythmia    AFib    Arthritis of facet joint of lumbar spine    Back problem    Bulging disc    C5-6    Cancer (HCC)    left breast ca    Cataract    Cervical spinal stenosis    C5-6, C6-7    Chickenpox    Diverticulosis of large intestine    DJD (degenerative joint disease), cervical    Esophageal reflux    Hemorrhoids    High blood pressure    High cholesterol    Hypercholesterolemia    Hypothenar muscle atrophy    R hand    Impaired vision    Internal hemorrhoids    Kidney stone    small right    Lordosis    cervical spine    Lumbosacral spondylosis without myelopathy    Measles    Osteoarthritis    PAF (paroxysmal atrial fibrillation) (HCC)    Pneumonia    Primary osteoarthritis of right foot    s/p right L3-L5 hardware removal; 3/9/2021    Sensorineural hearing loss    Urinary frequency    Vaginal cyst    Vertigo    Visual impairment    readers    Whooping cough      Past Surgical History:   Procedure Laterality Date    Back surgery  11/21/2017    Back surgery  06/02/2020    S/P Revision L3-L4 MIS TLIF/PLF, exchange of HW with L3-L4 Fusion    Breast surgery procedure unlisted  1995    L breast bx-benign    Cataract      Colonoscopy      Excisional biopsy left  1995    duct excision    Laser surgery of eye  2001    LASIK    Gabriela localization wire 1  site left (cpt=19281)      1993 bn    Needle biopsy left      Other surgical history  02/23/1999    Flexible sigmoidoscopy    Spine surgery procedure unlisted      Spine surgery procedure unlisted  07/24/2024    right side Lumbar 2 - 3 extreme lateral interbody fusion with posterior spinal fusion Lumbar 2 - 3 laminotomy left side, Lumbar 2 - 3, Lumbar 3 - 4 instrumented fusion - Yanna        ALL:  Allergies[1]          Social History     Tobacco Use    Smoking status: Never    Smokeless tobacco: Never   Substance Use Topics    Alcohol use: No     Alcohol/week: 0.0 standard drinks of alcohol        Fam Hx  Family History   Problem Relation Age of Onset    Hypertension Father     Heart Disease Father     Other (CHF) Father     Cancer Mother         stomach ca    Breast Cancer Paternal Grandmother 70        age of dx 70    Alcohol and Other Disorders Associated Paternal Grandfather     Other (Autism) Son     Gastro-Intestinal Disorder Brother         Crohn's disease       Review of Systems  10 point Comprehensive ROS reviewed and negative except for what's stated above.     OBJECTIVE:  /64   Pulse 79   Temp 97.2 °F (36.2 °C)   Resp 24   Ht 5' 1\" (1.549 m)   Wt 120 lb (54.4 kg)   LMP  (LMP Unknown)   SpO2 97%   BMI 22.67 kg/m²     GEN: NAD, AAO  NECK: supple, no LAD  HEENT- sclera anti-icteric, OP- MMM  Chest / Breaast/ CV: rrr, +s1/s2, dressing overlyiing chest wall  LUNGS: CTAB, normal resp effort  ABL soft, NT/ND, NABS, no HSC  EXT: no LE edema b/l , DP pulses 2+ b/l  Neuro: sensation intact, no focal deficits  SKIN- no rashes, no lesion  PSYCH- sedated       LABS:        Radiology: No results found.       ASSESSMENT / PLAN:  78 year old female with PMH sig for afib on xarelto, HTN, HL here sp L mastectomy    L breast cancer sp Left total mastectomy   - d/w DR Schreiber- no reconstuction  - avoiding narcotics d/t significant hx post op nausea    Afib  - d/w DR Schreiber rec holding xarelto few days post  op    HTN HL, aortic atherosclerosis  - home meds as able BBstatin    Spinal stenosis  L optic neuropathy  - no issues in house    Est dc  date 3/29    Outpatient records or previous hospital records reviewed as detailed above.    [facility] hospitalist to continue to follow patient while in house      [facility] Hospital[facility]  Pager [phone]    3/28/2025  10:20 AM         [1]   Allergies  Allergen Reactions    Codeine NAUSEA AND VOMITING    Morphine ANAPHYLAXIS and NAUSEA AND VOMITING    Oxycodone NAUSEA AND VOMITING     severe    Tramadol NAUSEA AND VOMITING    Aspirin OTHER (SEE COMMENTS)     Blood thinner    Blood thinner, contra indicated w/ xarelto    Clonidine OTHER (SEE COMMENTS)     Reaction: dry mouth      Hydrocodone OTHER (SEE COMMENTS)     Nausea/vomiting    Ibuprofen OTHER (SEE COMMENTS)     All NSAIDS contra indicated      negative...

## 2025-03-29 ENCOUNTER — LABORATORY RESULT (OUTPATIENT)
Age: 50
End: 2025-03-29

## 2025-03-29 ENCOUNTER — APPOINTMENT (OUTPATIENT)
Dept: INTERNAL MEDICINE | Facility: CLINIC | Age: 50
End: 2025-03-29
Payer: COMMERCIAL

## 2025-03-29 VITALS
SYSTOLIC BLOOD PRESSURE: 110 MMHG | BODY MASS INDEX: 32.25 KG/M2 | WEIGHT: 182 LBS | TEMPERATURE: 98.2 F | HEIGHT: 63 IN | RESPIRATION RATE: 17 BRPM | HEART RATE: 78 BPM | DIASTOLIC BLOOD PRESSURE: 70 MMHG | OXYGEN SATURATION: 98 %

## 2025-03-29 DIAGNOSIS — N30.00 ACUTE CYSTITIS W/OUT HEMATURIA: ICD-10-CM

## 2025-03-29 PROCEDURE — 99213 OFFICE O/P EST LOW 20 MIN: CPT

## 2025-03-31 ENCOUNTER — NON-APPOINTMENT (OUTPATIENT)
Age: 50
End: 2025-03-31

## 2025-04-03 LAB
APPEARANCE: CLEAR
BILIRUBIN URINE: NEGATIVE
BLOOD URINE: NEGATIVE
COLOR: YELLOW
GLUCOSE QUALITATIVE U: NEGATIVE MG/DL
KETONES URINE: 15 MG/DL
LEUKOCYTE ESTERASE URINE: ABNORMAL
NITRITE URINE: NEGATIVE
PH URINE: 6
PROTEIN URINE: NEGATIVE MG/DL
SPECIFIC GRAVITY URINE: 1.01
UROBILINOGEN URINE: 0.2 MG/DL

## 2025-05-11 NOTE — DIETITIAN INITIAL EVALUATION ADULT - NAME AND PHONE
US AWARE [id number]  
US paged [phone]  
Violette Dobbins RD, CDN, [facility]. Pager: [phone]

## 2025-09-11 ENCOUNTER — EMERGENCY (EMERGENCY)
Facility: HOSPITAL | Age: 50
LOS: 0 days | Discharge: ANOTHER HOSPITAL | End: 2025-09-12
Attending: EMERGENCY MEDICINE
Payer: MEDICAID

## 2025-09-11 VITALS
SYSTOLIC BLOOD PRESSURE: 119 MMHG | WEIGHT: 179.9 LBS | OXYGEN SATURATION: 100 % | HEIGHT: 63 IN | TEMPERATURE: 98 F | HEART RATE: 85 BPM | DIASTOLIC BLOOD PRESSURE: 86 MMHG | RESPIRATION RATE: 18 BRPM

## 2025-09-11 DIAGNOSIS — K56.609 UNSPECIFIED INTESTINAL OBSTRUCTION, UNSPECIFIED AS TO PARTIAL VERSUS COMPLETE OBSTRUCTION: ICD-10-CM

## 2025-09-11 DIAGNOSIS — Z98.84 BARIATRIC SURGERY STATUS: Chronic | ICD-10-CM

## 2025-09-11 DIAGNOSIS — Z90.3 ACQUIRED ABSENCE OF STOMACH [PART OF]: Chronic | ICD-10-CM

## 2025-09-11 DIAGNOSIS — Z87.19 PERSONAL HISTORY OF OTHER DISEASES OF THE DIGESTIVE SYSTEM: ICD-10-CM

## 2025-09-11 DIAGNOSIS — Z98.890 OTHER SPECIFIED POSTPROCEDURAL STATES: Chronic | ICD-10-CM

## 2025-09-11 DIAGNOSIS — Z90.710 ACQUIRED ABSENCE OF BOTH CERVIX AND UTERUS: Chronic | ICD-10-CM

## 2025-09-11 LAB
ALBUMIN SERPL ELPH-MCNC: 4.2 G/DL — SIGNIFICANT CHANGE UP (ref 3.3–5)
ALP SERPL-CCNC: 112 U/L — SIGNIFICANT CHANGE UP (ref 40–120)
ALT FLD-CCNC: 30 U/L — SIGNIFICANT CHANGE UP (ref 12–78)
ANION GAP SERPL CALC-SCNC: 6 MMOL/L — SIGNIFICANT CHANGE UP (ref 5–17)
AST SERPL-CCNC: 23 U/L — SIGNIFICANT CHANGE UP (ref 15–37)
BILIRUB SERPL-MCNC: 0.5 MG/DL — SIGNIFICANT CHANGE UP (ref 0.2–1.2)
BUN SERPL-MCNC: 17 MG/DL — SIGNIFICANT CHANGE UP (ref 7–23)
CALCIUM SERPL-MCNC: 10 MG/DL — SIGNIFICANT CHANGE UP (ref 8.5–10.1)
CHLORIDE SERPL-SCNC: 109 MMOL/L — HIGH (ref 96–108)
CO2 SERPL-SCNC: 27 MMOL/L — SIGNIFICANT CHANGE UP (ref 22–31)
CREAT SERPL-MCNC: 0.9 MG/DL — SIGNIFICANT CHANGE UP (ref 0.5–1.3)
EGFR: 77 ML/MIN/1.73M2 — SIGNIFICANT CHANGE UP
EGFR: 77 ML/MIN/1.73M2 — SIGNIFICANT CHANGE UP
GLUCOSE SERPL-MCNC: 158 MG/DL — HIGH (ref 70–99)
HCT VFR BLD CALC: 45.4 % — HIGH (ref 34.5–45)
HGB BLD-MCNC: 15.1 G/DL — SIGNIFICANT CHANGE UP (ref 11.5–15.5)
LIDOCAIN IGE QN: 22 U/L — SIGNIFICANT CHANGE UP (ref 13–75)
MCHC RBC-ENTMCNC: 32.9 PG — SIGNIFICANT CHANGE UP (ref 27–34)
MCHC RBC-ENTMCNC: 33.3 G/DL — SIGNIFICANT CHANGE UP (ref 32–36)
MCV RBC AUTO: 98.9 FL — SIGNIFICANT CHANGE UP (ref 80–100)
NRBC BLD AUTO-RTO: 0 /100 WBCS — SIGNIFICANT CHANGE UP (ref 0–0)
PLATELET # BLD AUTO: 282 K/UL — SIGNIFICANT CHANGE UP (ref 150–400)
POTASSIUM SERPL-MCNC: 5 MMOL/L — SIGNIFICANT CHANGE UP (ref 3.5–5.3)
POTASSIUM SERPL-SCNC: 5 MMOL/L — SIGNIFICANT CHANGE UP (ref 3.5–5.3)
PROT SERPL-MCNC: 8.4 GM/DL — HIGH (ref 6–8.3)
RBC # BLD: 4.59 M/UL — SIGNIFICANT CHANGE UP (ref 3.8–5.2)
RBC # FLD: 11.3 % — SIGNIFICANT CHANGE UP (ref 10.3–14.5)
SODIUM SERPL-SCNC: 142 MMOL/L — SIGNIFICANT CHANGE UP (ref 135–145)
WBC # BLD: 9.28 K/UL — SIGNIFICANT CHANGE UP (ref 3.8–10.5)
WBC # FLD AUTO: 9.28 K/UL — SIGNIFICANT CHANGE UP (ref 3.8–10.5)

## 2025-09-11 PROCEDURE — 74177 CT ABD & PELVIS W/CONTRAST: CPT | Mod: 26

## 2025-09-11 PROCEDURE — 99285 EMERGENCY DEPT VISIT HI MDM: CPT

## 2025-09-11 RX ORDER — METOCLOPRAMIDE HCL 10 MG
10 TABLET ORAL ONCE
Refills: 0 | Status: COMPLETED | OUTPATIENT
Start: 2025-09-11 | End: 2025-09-11

## 2025-09-11 RX ORDER — ONDANSETRON HCL/PF 4 MG/2 ML
4 VIAL (ML) INJECTION ONCE
Refills: 0 | Status: COMPLETED | OUTPATIENT
Start: 2025-09-11 | End: 2025-09-11

## 2025-09-11 RX ORDER — KETOROLAC TROMETHAMINE 30 MG/ML
15 INJECTION, SOLUTION INTRAMUSCULAR; INTRAVENOUS ONCE
Refills: 0 | Status: DISCONTINUED | OUTPATIENT
Start: 2025-09-11 | End: 2025-09-11

## 2025-09-11 RX ORDER — ACETAMINOPHEN 500 MG/5ML
1000 LIQUID (ML) ORAL ONCE
Refills: 0 | Status: COMPLETED | OUTPATIENT
Start: 2025-09-11 | End: 2025-09-11

## 2025-09-11 RX ADMIN — Medication 400 MILLIGRAM(S): at 22:07

## 2025-09-11 RX ADMIN — KETOROLAC TROMETHAMINE 15 MILLIGRAM(S): 30 INJECTION, SOLUTION INTRAMUSCULAR; INTRAVENOUS at 22:03

## 2025-09-11 RX ADMIN — Medication 4 MILLIGRAM(S): at 22:03

## 2025-09-11 RX ADMIN — Medication 10 MILLIGRAM(S): at 22:03

## 2025-09-12 ENCOUNTER — TRANSCRIPTION ENCOUNTER (OUTPATIENT)
Age: 50
End: 2025-09-12

## 2025-09-12 VITALS
DIASTOLIC BLOOD PRESSURE: 82 MMHG | HEART RATE: 86 BPM | TEMPERATURE: 98 F | RESPIRATION RATE: 20 BRPM | SYSTOLIC BLOOD PRESSURE: 137 MMHG | OXYGEN SATURATION: 100 %

## 2025-09-12 LAB
APTT BLD: 28.6 SEC — SIGNIFICANT CHANGE UP (ref 26.1–36.8)
INR BLD: 0.87 RATIO — SIGNIFICANT CHANGE UP (ref 0.85–1.16)
PROTHROM AB SERPL-ACNC: 10.1 SEC — SIGNIFICANT CHANGE UP (ref 9.9–13.4)

## 2025-09-12 PROCEDURE — 76705 ECHO EXAM OF ABDOMEN: CPT | Mod: 26

## 2025-09-12 RX ORDER — ONDANSETRON HCL/PF 4 MG/2 ML
4 VIAL (ML) INJECTION ONCE
Refills: 0 | Status: COMPLETED | OUTPATIENT
Start: 2025-09-12 | End: 2025-09-12

## 2025-09-12 RX ADMIN — Medication 4 MILLIGRAM(S): at 01:11

## 2025-09-12 RX ADMIN — Medication 1000 MILLIGRAM(S): at 00:20

## 2025-09-12 RX ADMIN — KETOROLAC TROMETHAMINE 15 MILLIGRAM(S): 30 INJECTION, SOLUTION INTRAMUSCULAR; INTRAVENOUS at 00:20

## 2025-09-15 ENCOUNTER — TRANSCRIPTION ENCOUNTER (OUTPATIENT)
Age: 50
End: 2025-09-15

## 2025-09-22 PROBLEM — K56.609 SBO (SMALL BOWEL OBSTRUCTION): Status: ACTIVE | Noted: 2025-09-22

## 2025-09-22 PROBLEM — Z98.84 S/P GASTRIC BYPASS: Status: ACTIVE | Noted: 2025-09-22

## 2025-09-22 PROBLEM — Z98.84 S/P GASTRIC BYPASS: Status: RESOLVED | Noted: 2023-01-30 | Resolved: 2025-09-22

## (undated) DEVICE — TUBING STRYKEFLOW II SUCTION / IRRIGATOR

## (undated) DEVICE — DRSG TEGADERM 6"X8"

## (undated) DEVICE — TROCAR SURGIQUEST AIRSEAL 12MMX100MM

## (undated) DEVICE — SUT VICRYL 3-0 27" CT-2 UNDYED

## (undated) DEVICE — D HELP - CLEARVIEW CLEARIFY SYSTEM

## (undated) DEVICE — XI ARM FORCEP TENACULUM

## (undated) DEVICE — SYR ALLIANCE II INFLATION 60ML

## (undated) DEVICE — BLADE SCALPEL SAFETYLOCK #10

## (undated) DEVICE — VENODYNE/SCD SLEEVE CALF LARGE

## (undated) DEVICE — GLV 7 PROTEXIS (WHITE)

## (undated) DEVICE — SOL IRR POUR NS 0.9% 500ML

## (undated) DEVICE — TROCAR COVIDIEN VERSASTEP PLUS 11MM STANDARD

## (undated) DEVICE — SUT MAXON 1 36" GS-24

## (undated) DEVICE — COVIDIEN SIGNIA POWER CONTROL SHELL

## (undated) DEVICE — VENODYNE/SCD SLEEVE CALF MEDIUM

## (undated) DEVICE — GRASPER LAPA 5MMX35CM

## (undated) DEVICE — VALVE YELLOW PORT SEAL PLUS 5MM

## (undated) DEVICE — DRAPE TOWEL BLUE 17" X 24"

## (undated) DEVICE — WARMING BLANKET LOWER ADULT

## (undated) DEVICE — WARMING BLANKET UPPER ADULT

## (undated) DEVICE — DRSG VAC ABTHERS

## (undated) DEVICE — TUBING SUCTION 20FT

## (undated) DEVICE — XI SEAL UNIV 5- 8 MM

## (undated) DEVICE — MEDICATION LABELS W MARKER

## (undated) DEVICE — SYR LUER LOK 5CC

## (undated) DEVICE — DRAIN PENROSE .25" X 18" LATEX

## (undated) DEVICE — ENDOCATCH 10MM SPECIMEN POUCH

## (undated) DEVICE — TROCAR COVIDIEN BLUNT TIP HASSAN 12MM

## (undated) DEVICE — DRAPE GENERAL ENDOSCOPY

## (undated) DEVICE — PACK ADVANCED LAPAROSCOPIC NS

## (undated) DEVICE — LIGASURE BLUNT TIP 37CM

## (undated) DEVICE — DRSG MASTISOL

## (undated) DEVICE — DRSG OPSITE 13.75 X 4"

## (undated) DEVICE — DRAPE 1/2 SHEET 40X57"

## (undated) DEVICE — Device

## (undated) DEVICE — PACK IV START WITH CHG

## (undated) DEVICE — TROCAR COVIDIEN VERSASTEP PLUS 12MM STANDARD

## (undated) DEVICE — XI ARM FORCEP FENESTRATED BIPOLAR 8MM

## (undated) DEVICE — XI ARM SCISSOR MONO CURVED

## (undated) DEVICE — INSUFFLATION NDL COVIDIEN STEP 14G FOR STEP/VERSASTEP

## (undated) DEVICE — BLADE SCALPEL SAFETYLOCK #15

## (undated) DEVICE — CONTAINER FORMALIN 80ML YELLOW

## (undated) DEVICE — SUT TICRON 0 30" GS-22

## (undated) DEVICE — FOLEY HOLDER STATLOCK 2 WAY ADULT

## (undated) DEVICE — SUT BIOSYN 4-0 18" P-12

## (undated) DEVICE — TRAP SPECIMEN SPUTUM 40CC

## (undated) DEVICE — XI ARM CLIP APPLIER LARGE

## (undated) DEVICE — XI ARM NEEDLE DRIVER SUTURECUT MEGA 8MM

## (undated) DEVICE — POSITIONER FOAM EGG CRATE ULNAR 2PCS (PINK)

## (undated) DEVICE — PACK BASIN SPECIAL PROCEDURE

## (undated) DEVICE — TUBING SUCTION CONN 6FT STERILE

## (undated) DEVICE — DRSG BANDAID 0.75X3"

## (undated) DEVICE — TROCAR APPLIED MEDICAL KII BALLOON BLUNT TIP 12MM X 130MM

## (undated) DEVICE — TROCAR COVIDIEN VERSAPORT BLADELESS OPTICAL 12MM STANDARD

## (undated) DEVICE — SUT VLOC PBT 0 6" GS-21 BLUE

## (undated) DEVICE — MARKING PEN W RULER

## (undated) DEVICE — BITE BLOCK ADULT 20 X 27MM (GREEN)

## (undated) DEVICE — LIGASURE MARYLAND 37CM

## (undated) DEVICE — FOLEY TRAY 16FR 5CC LTX UMETER CLOSED

## (undated) DEVICE — PREP CHLORAPREP HI-LITE ORANGE 26ML

## (undated) DEVICE — SUT POLYSORB 2-0 30" V-20 UNDYED

## (undated) DEVICE — GOWN TRIMAX LG

## (undated) DEVICE — DRSG CURITY GAUZE SPONGE 4 X 4" 12-PLY

## (undated) DEVICE — SOL IRR POUR H2O 250ML

## (undated) DEVICE — FOLEY TRAY 16FR 5CC LF LUBRISIL ADVANCE TEMP CLOSED

## (undated) DEVICE — SYR LUER LOK 30CC

## (undated) DEVICE — DRAPE MAYO STAND 30"

## (undated) DEVICE — NDL HYPO SAFE 22G X 1.5" (BLACK)

## (undated) DEVICE — ENDOCATCH II 15MM

## (undated) DEVICE — TUBING IV SET GRAVITY 3Y 100" MACRO

## (undated) DEVICE — ELCTR BOVIE TIP BLADE INSULATED 2.75" EDGE

## (undated) DEVICE — SYR LUER LOK 50CC

## (undated) DEVICE — ELCTR LAPAROSCOPIC WIRE L HOOK 36CM

## (undated) DEVICE — BASIN EMESIS 10IN GRADUATED MAUVE

## (undated) DEVICE — STAPLER COVIDIEN ENDO GIA STANDARD HANDLE

## (undated) DEVICE — GLV 6.5 PROTEXIS (WHITE)

## (undated) DEVICE — CHEST DRAIN PLEUR-EVAC DRY/WET ADULT-PEDS SINGLE (QUICK)

## (undated) DEVICE — INSUFFLATION NDL COVIDIEN SURGINEEDLE VERESS 120MM

## (undated) DEVICE — XI OBTURATOR OPTICAL BLADELESS 8MM

## (undated) DEVICE — SHEARS COVIDIEN ENDO SHEAR 5MM X 31CM W UNIPOLAR CAUTERY

## (undated) DEVICE — SCOPE WARMER SEAL DISP

## (undated) DEVICE — GLV 8 PROTEXIS (WHITE)

## (undated) DEVICE — LIGASURE MARYLAND 44CM

## (undated) DEVICE — SHEARS COVIDIEN ENDO SHEAR 5MM X 45CM LONG W MONOPOLAR CAUTERY

## (undated) DEVICE — GLV 8.5 PROTEXIS (WHITE)

## (undated) DEVICE — VISIGI 3D SLEEVE GASTRECTOMY 36FR

## (undated) DEVICE — XI VESSEL SEALER

## (undated) DEVICE — TROCAR COVIDIEN VERSASTEP PLUS 15MM STANDARD

## (undated) DEVICE — TROCAR COVIDIEN VERSAPORT BLADELESS OPTICAL 5MM STANDARD

## (undated) DEVICE — SUT SOFSILK 2-0 18" C-23

## (undated) DEVICE — SPECIMEN CONTAINER 100ML

## (undated) DEVICE — LUBRICATING JELLY ONESHOT 1.25OZ

## (undated) DEVICE — SUT POLYSORB 0 36" GU-46

## (undated) DEVICE — SUCTION YANKAUER NO CONTROL VENT

## (undated) DEVICE — TUBING INSUFFLATION LAP FILTER 10FT

## (undated) DEVICE — DRAPE INSTRUMENT POUCH 6.75" X 11"

## (undated) DEVICE — UNDERPAD LINEN SAVER 17 X 24"

## (undated) DEVICE — GLV 7.5 PROTEXIS (WHITE)

## (undated) DEVICE — ELCTR BOVIE TIP BLADE INSULATED 6.5" EDGE

## (undated) DEVICE — SUT VLOC 180 0 12" GS-21 GREEN

## (undated) DEVICE — SHEARS COVIDIEN ROTICULATOR ENDO MINI 5MM WITH UNIPOLAR CAUTERY

## (undated) DEVICE — DRAIN RESERVOIR FOR JACKSON PRATT 100CC CARDINAL

## (undated) DEVICE — XI TIP COVER

## (undated) DEVICE — XI ARM PERMANENT CAUTERY HOOK

## (undated) DEVICE — TUBING IRRIGATION DAVOL SYSTEM X STREAM

## (undated) DEVICE — DRAPE BACK TABLE COVER HEAVY DUTY 60"

## (undated) DEVICE — XI ARM PERMANENT CAUTERY SPATULA

## (undated) DEVICE — SUT POLYSORB 0 30" GS-23

## (undated) DEVICE — SYR LUER LOK 20CC

## (undated) DEVICE — XI ARM FORCEP PROGRASP 8MM

## (undated) DEVICE — BIOPSY FORCEP COLD DISP

## (undated) DEVICE — DRSG STERISTRIPS 0.5 X 4"

## (undated) DEVICE — CATH IV SAFE BC 22G X 1" (BLUE)

## (undated) DEVICE — CONTAINER FORMALIN 10% 20ML

## (undated) DEVICE — CATH IV SAFE BC 20G X 1.16" (PINK)

## (undated) DEVICE — XI ARM CLIP APPLIER MEDIUM-LARGE

## (undated) DEVICE — SUT VICRYL 0 27" UR-6

## (undated) DEVICE — NDL COUNTER FOAM AND MAGNET 40-70

## (undated) DEVICE — LAP PAD 18 X 18"

## (undated) DEVICE — SUT MONOCRYL 4-0 27" PS-2 UNDYED

## (undated) DEVICE — LINE BREATHE SAMPLNG

## (undated) DEVICE — DRAIN CHANNEL 19FR ROUND FULL FLUTED

## (undated) DEVICE — XI ARM GRASPER TIP UP FENESTRATED

## (undated) DEVICE — XI ARM NEEDLE DRIVER LARGE

## (undated) DEVICE — SHEARS HARMONIC ACE 5MM X 36CM CURVED TIP

## (undated) DEVICE — TIP METZENBAUM SCISSOR MONOPOLAR ENDOCUT (ORANGE)

## (undated) DEVICE — PACK MAJOR ABDOMINAL SUPINE

## (undated) DEVICE — ELCTR DISSECTOR ULTRASONIC CORDLESS CVD JAW 5MM-39CM

## (undated) DEVICE — XI DRAPE ARM

## (undated) DEVICE — PACK LAP CHOLE LAP APPENDECTOMY

## (undated) DEVICE — DRAPE 3/4 SHEET W REINFORCEMENT 56X77"

## (undated) DEVICE — LIGASURE IMPACT

## (undated) DEVICE — DENTURE CUP PINK

## (undated) DEVICE — XI ARM GRASPER BIPOLAR LONG 8MM

## (undated) DEVICE — BALLOON US ENDO

## (undated) DEVICE — DRSG OPSITE 2.5 X 2"

## (undated) DEVICE — DRAPE C ARM UNIVERSAL

## (undated) DEVICE — TROCAR COVIDIEN VERSAONE FIXATION CANNULA 5MM

## (undated) DEVICE — GRASPER COVIDIEN ENDO GRASP ROTICULATOR 5MM W SPIN LOCK

## (undated) DEVICE — XI ARM FORCEP MARYLAND BIPOLAR

## (undated) DEVICE — NDL HYPO REGULAR BEVEL 22G X 1.5" (TURQUOISE)

## (undated) DEVICE — SUT PDS II 0 18" ENDOLOOP LIGATURE

## (undated) DEVICE — CATH IV SAFE INSYTE 14G X 1.75" (ORANGE)

## (undated) DEVICE — STOPCOCK 3 WAY W TUBE 35"

## (undated) DEVICE — SUT POLYSORB 3-0 30" V-20 UNDYED

## (undated) DEVICE — XI ARM DISSECTOR CURVED BIPOLAR 8MM

## (undated) DEVICE — SUT VLOC PBT 0 9" GS-21 BLUE

## (undated) DEVICE — LUBRICANT INST ELECTROLUBE Z SOLUTION

## (undated) DEVICE — SALIVA EJECTOR (BLUE)

## (undated) DEVICE — SUT PDS II 0 27" CT-2

## (undated) DEVICE — DRSG GAUZE PACKTNER ROLL

## (undated) DEVICE — SENSOR O2 FINGER ADULT

## (undated) DEVICE — DRAIN JACKSON PRATT 10MM FLAT FULL NO TROCAR

## (undated) DEVICE — SUT SOFSILK 3-0 18" V-20 (POP-OFF)

## (undated) DEVICE — SUT SOFSILK 2-0 30" V-20

## (undated) DEVICE — STAPLER SKIN VISI-STAT 35 WIDE

## (undated) DEVICE — TROCAR COVIDIEN BLUNT TIP HASSAN 10MM

## (undated) DEVICE — LUBRICATING JELLY HR ONE SHOT 3G

## (undated) DEVICE — PACKING GAUZE IODOFORM 0.5"

## (undated) DEVICE — SUT SILK 2-0 30" SH

## (undated) DEVICE — TUBING AIRSEAL TRI-LUMEN FILTERED

## (undated) DEVICE — PACK ROBOTIC LIJ

## (undated) DEVICE — PAD AIRPAL TRANSFER 34" DISP

## (undated) DEVICE — SUT ETHIBOND 0 44" EN3

## (undated) DEVICE — DRSG TELFA 3 X 8

## (undated) DEVICE — GOWN XL EXTRA LONG

## (undated) DEVICE — TROCAR COVIDIEN STEP 5MM SHORT 70MM

## (undated) DEVICE — XI ARM FORCEP CADIERE 8MM

## (undated) DEVICE — VISITEC 4X4

## (undated) DEVICE — SUT VLOC 180 0 9" GS-21 GREEN

## (undated) DEVICE — TROCAR COVIDIEN VERSAONE BLADED SMOOTH 12MM LONG

## (undated) DEVICE — SUT SILK 0 24" SH DA

## (undated) DEVICE — APPLICATOR FOR ARISTA XL 38CM

## (undated) DEVICE — DRSG CURITY GAUZE SPONGE 4 X 4" 12-PLY NON-STERILE

## (undated) DEVICE — GOWN LG

## (undated) DEVICE — SUT ENDOSTITCH DEVICE 10MM

## (undated) DEVICE — INSUFFLATION NDL COVIDIEN STEP 14G SHORT FOR STEP/VERSASTEP

## (undated) DEVICE — DRSG 2X2

## (undated) DEVICE — OLYMPUS DISTAL COVER ENDOSCOPE

## (undated) DEVICE — CHEST DRAIN PLEUR-EVAC DRY/DRY ADULT-PEDS SINGLE (QUICK)

## (undated) DEVICE — SOL INJ NS 0.9% 500ML 2 PORT

## (undated) DEVICE — XI DRAPE COLUMN

## (undated) DEVICE — DRAIN PENROSE .5" X 18" LATEX

## (undated) DEVICE — ELCTR BOVIE PENCIL SMOKE EVACUATION

## (undated) DEVICE — POSITIONER STRAP ARMBOARD VELCRO TS-30

## (undated) DEVICE — TROCAR APPLIED MEDICAL KII BALLOON BLUNT TIP 12MM X 100MM

## (undated) DEVICE — LIGASURE ATLAS 10MM 37CM

## (undated) DEVICE — IRRIGATION TRAY W PISTON SYRINGE 60ML

## (undated) DEVICE — TUBING MEDI-VAC W MAXIGRIP CONNECTORS 1/4"X6'

## (undated) DEVICE — TAPE SILK 3"

## (undated) DEVICE — CLAMP BX HOT RAD JAW 3

## (undated) DEVICE — ELCTR ECG CONDUCTIVE ADHESIVE

## (undated) DEVICE — XI NEEDLE DRIVER LARGE

## (undated) DEVICE — ELCTR CORD FOOTSWITCH 1PLR LAPSCP 10FT

## (undated) DEVICE — BIOPSY FORCEP RADIAL JAW 4 STANDARD WITH NEEDLE

## (undated) DEVICE — XI ARM NEEDLE DRIVER MEGA